# Patient Record
Sex: MALE | Race: WHITE | Employment: UNEMPLOYED | URBAN - METROPOLITAN AREA
[De-identification: names, ages, dates, MRNs, and addresses within clinical notes are randomized per-mention and may not be internally consistent; named-entity substitution may affect disease eponyms.]

---

## 2017-06-29 ENCOUNTER — APPOINTMENT (OUTPATIENT)
Dept: CT IMAGING | Age: 72
DRG: 071 | End: 2017-06-29
Payer: COMMERCIAL

## 2017-06-29 ENCOUNTER — HOSPITAL ENCOUNTER (INPATIENT)
Age: 72
LOS: 3 days | Discharge: HOME HEALTH | DRG: 071 | End: 2017-07-03
Attending: EMERGENCY MEDICINE | Admitting: FAMILY MEDICINE
Payer: COMMERCIAL

## 2017-06-29 ENCOUNTER — APPOINTMENT (OUTPATIENT)
Dept: GENERAL RADIOLOGY | Age: 72
DRG: 071 | End: 2017-06-29
Payer: COMMERCIAL

## 2017-06-29 DIAGNOSIS — R41.3 MEMORY DEFICIT: Primary | ICD-10-CM

## 2017-06-29 LAB
ABSOLUTE EOS #: 0 K/UL (ref 0–0.4)
ABSOLUTE LYMPH #: 1 K/UL (ref 1–4.8)
ABSOLUTE MONO #: 0.7 K/UL (ref 0.1–1.2)
ALBUMIN SERPL-MCNC: 4.3 G/DL (ref 3.5–5.2)
ALBUMIN/GLOBULIN RATIO: 1.2 (ref 1–2.5)
ALP BLD-CCNC: 45 U/L (ref 40–129)
ALT SERPL-CCNC: <5 U/L (ref 5–41)
ANION GAP SERPL CALCULATED.3IONS-SCNC: 17 MMOL/L (ref 9–17)
AST SERPL-CCNC: 13 U/L
BASOPHILS # BLD: 0 %
BASOPHILS ABSOLUTE: 0 K/UL (ref 0–0.2)
BILIRUB SERPL-MCNC: 0.49 MG/DL (ref 0.3–1.2)
BNP INTERPRETATION: NORMAL
BUN BLDV-MCNC: 14 MG/DL (ref 8–23)
BUN/CREAT BLD: ABNORMAL (ref 9–20)
CALCIUM SERPL-MCNC: 10.1 MG/DL (ref 8.6–10.4)
CHLORIDE BLD-SCNC: 97 MMOL/L (ref 98–107)
CO2: 24 MMOL/L (ref 20–31)
CREAT SERPL-MCNC: 1.49 MG/DL (ref 0.7–1.2)
DIFFERENTIAL TYPE: ABNORMAL
EOSINOPHILS RELATIVE PERCENT: 0 %
GFR AFRICAN AMERICAN: 57 ML/MIN
GFR NON-AFRICAN AMERICAN: 47 ML/MIN
GFR SERPL CREATININE-BSD FRML MDRD: ABNORMAL ML/MIN/{1.73_M2}
GFR SERPL CREATININE-BSD FRML MDRD: ABNORMAL ML/MIN/{1.73_M2}
GLUCOSE BLD-MCNC: 265 MG/DL (ref 70–99)
HCT VFR BLD CALC: 41.3 % (ref 41–53)
HEMOGLOBIN: 14.2 G/DL (ref 13.5–17.5)
LYMPHOCYTES # BLD: 10 %
MCH RBC QN AUTO: 31.7 PG (ref 26–34)
MCHC RBC AUTO-ENTMCNC: 34.3 G/DL (ref 31–37)
MCV RBC AUTO: 92.5 FL (ref 80–100)
MONOCYTES # BLD: 7 %
PDW BLD-RTO: 14 % (ref 12.5–15.4)
PLATELET # BLD: 321 K/UL (ref 140–450)
PLATELET ESTIMATE: ABNORMAL
PMV BLD AUTO: 8.7 FL (ref 6–12)
POC TROPONIN I: 0 NG/ML (ref 0–0.1)
POC TROPONIN INTERP: NORMAL
POTASSIUM SERPL-SCNC: 3.4 MMOL/L (ref 3.7–5.3)
PRO-BNP: 188 PG/ML
RBC # BLD: 4.47 M/UL (ref 4.5–5.9)
RBC # BLD: ABNORMAL 10*6/UL
SEG NEUTROPHILS: 83 %
SEGMENTED NEUTROPHILS ABSOLUTE COUNT: 8.4 K/UL (ref 1.8–7.7)
SODIUM BLD-SCNC: 138 MMOL/L (ref 135–144)
TOTAL PROTEIN: 7.9 G/DL (ref 6.4–8.3)
WBC # BLD: 10.2 K/UL (ref 3.5–11)
WBC # BLD: ABNORMAL 10*3/UL

## 2017-06-29 PROCEDURE — 93005 ELECTROCARDIOGRAM TRACING: CPT

## 2017-06-29 PROCEDURE — 80053 COMPREHEN METABOLIC PANEL: CPT

## 2017-06-29 PROCEDURE — 84484 ASSAY OF TROPONIN QUANT: CPT

## 2017-06-29 PROCEDURE — 99285 EMERGENCY DEPT VISIT HI MDM: CPT

## 2017-06-29 PROCEDURE — 85025 COMPLETE CBC W/AUTO DIFF WBC: CPT

## 2017-06-29 PROCEDURE — 83880 ASSAY OF NATRIURETIC PEPTIDE: CPT

## 2017-06-29 PROCEDURE — 70450 CT HEAD/BRAIN W/O DYE: CPT

## 2017-06-29 PROCEDURE — 71020 XR CHEST STANDARD TWO VW: CPT

## 2017-06-29 ASSESSMENT — ENCOUNTER SYMPTOMS
EYES NEGATIVE: 1
SHORTNESS OF BREATH: 1
GASTROINTESTINAL NEGATIVE: 1
ALLERGIC/IMMUNOLOGIC NEGATIVE: 1

## 2017-06-30 ENCOUNTER — APPOINTMENT (OUTPATIENT)
Dept: ULTRASOUND IMAGING | Age: 72
DRG: 071 | End: 2017-06-30
Payer: COMMERCIAL

## 2017-06-30 PROBLEM — G93.40 ACUTE ENCEPHALOPATHY: Status: ACTIVE | Noted: 2017-06-30

## 2017-06-30 PROBLEM — E11.65 TYPE 2 DIABETES MELLITUS WITH HYPERGLYCEMIA, WITHOUT LONG-TERM CURRENT USE OF INSULIN (HCC): Status: ACTIVE | Noted: 2017-06-30

## 2017-06-30 PROBLEM — R06.02 SHORTNESS OF BREATH: Status: ACTIVE | Noted: 2017-06-30

## 2017-06-30 PROBLEM — N17.9 AKI (ACUTE KIDNEY INJURY) (HCC): Status: ACTIVE | Noted: 2017-06-30

## 2017-06-30 LAB
-: ABNORMAL
AMORPHOUS: ABNORMAL
AMPHETAMINE SCREEN URINE: NEGATIVE
ANION GAP SERPL CALCULATED.3IONS-SCNC: 18 MMOL/L (ref 9–17)
BACTERIA: ABNORMAL
BARBITURATE SCREEN URINE: NEGATIVE
BENZODIAZEPINE SCREEN, URINE: POSITIVE
BILIRUBIN URINE: NEGATIVE
BUN BLDV-MCNC: 15 MG/DL (ref 8–23)
BUN/CREAT BLD: ABNORMAL (ref 9–20)
BUPRENORPHINE URINE: ABNORMAL
CALCIUM SERPL-MCNC: 9.7 MG/DL (ref 8.6–10.4)
CANNABINOID SCREEN URINE: POSITIVE
CASTS UA: ABNORMAL /LPF (ref 0–2)
CHLORIDE BLD-SCNC: 98 MMOL/L (ref 98–107)
CO2: 22 MMOL/L (ref 20–31)
COCAINE METABOLITE, URINE: NEGATIVE
COLOR: YELLOW
COMMENT UA: ABNORMAL
CREAT SERPL-MCNC: 1.34 MG/DL (ref 0.7–1.2)
CREATININE URINE: 170.5 MG/DL (ref 39–259)
CRYSTALS, UA: ABNORMAL /HPF
EKG ATRIAL RATE: 103 BPM
EKG P AXIS: 7 DEGREES
EKG P-R INTERVAL: 120 MS
EKG Q-T INTERVAL: 348 MS
EKG QRS DURATION: 78 MS
EKG QTC CALCULATION (BAZETT): 455 MS
EKG R AXIS: -29 DEGREES
EKG T AXIS: 59 DEGREES
EKG VENTRICULAR RATE: 103 BPM
EOSINOPHIL,URINE: NORMAL
EPITHELIAL CELLS UA: ABNORMAL /HPF (ref 0–5)
ESTIMATED AVERAGE GLUCOSE: 160 MG/DL
ETHANOL PERCENT: <0.01 %
ETHANOL: <10 MG/DL
FOLATE: 18.7 NG/ML
GFR AFRICAN AMERICAN: >60 ML/MIN
GFR NON-AFRICAN AMERICAN: 53 ML/MIN
GFR SERPL CREATININE-BSD FRML MDRD: ABNORMAL ML/MIN/{1.73_M2}
GFR SERPL CREATININE-BSD FRML MDRD: ABNORMAL ML/MIN/{1.73_M2}
GLUCOSE BLD-MCNC: 115 MG/DL (ref 75–110)
GLUCOSE BLD-MCNC: 115 MG/DL (ref 75–110)
GLUCOSE BLD-MCNC: 180 MG/DL (ref 70–99)
GLUCOSE BLD-MCNC: 203 MG/DL (ref 75–110)
GLUCOSE BLD-MCNC: 85 MG/DL (ref 75–110)
GLUCOSE URINE: NEGATIVE
HBA1C MFR BLD: 7.2 % (ref 4–6)
KETONES, URINE: NEGATIVE
LEUKOCYTE ESTERASE, URINE: NEGATIVE
MAGNESIUM: 1.5 MG/DL (ref 1.6–2.6)
MDMA URINE: ABNORMAL
METHADONE SCREEN, URINE: NEGATIVE
METHAMPHETAMINE, URINE: ABNORMAL
MICROALBUMIN/CREAT 24H UR: 98 MG/L
MICROALBUMIN/CREAT UR-RTO: 57 MCG/MG CREAT
MUCUS: ABNORMAL
NITRITE, URINE: NEGATIVE
OPIATES, URINE: NEGATIVE
OSMOLALITY URINE: 379 MOSM/KG (ref 80–1300)
OTHER OBSERVATIONS UA: ABNORMAL
OXYCODONE SCREEN URINE: NEGATIVE
PH UA: 5.5 (ref 5–8)
PHENCYCLIDINE, URINE: NEGATIVE
PHOSPHORUS: 4.2 MG/DL (ref 2.5–4.5)
POTASSIUM SERPL-SCNC: 3.6 MMOL/L (ref 3.7–5.3)
PROPOXYPHENE, URINE: ABNORMAL
PROTEIN UA: ABNORMAL
RBC UA: ABNORMAL /HPF (ref 0–2)
RENAL EPITHELIAL, UA: ABNORMAL /HPF
SERUM OSMOLALITY: 296 MOSM/KG (ref 275–295)
SODIUM BLD-SCNC: 138 MMOL/L (ref 135–144)
SODIUM,UR: 39 MMOL/L
SPECIFIC GRAVITY UA: 1.01 (ref 1–1.03)
TEST INFORMATION: ABNORMAL
TRICHOMONAS: ABNORMAL
TRICYCLIC ANTIDEPRESSANTS, UR: ABNORMAL
TURBIDITY: CLEAR
URINE HGB: NEGATIVE
UROBILINOGEN, URINE: NORMAL
VITAMIN B-12: 313 PG/ML (ref 211–946)
WBC UA: ABNORMAL /HPF (ref 0–5)
YEAST: ABNORMAL

## 2017-06-30 PROCEDURE — 83935 ASSAY OF URINE OSMOLALITY: CPT

## 2017-06-30 PROCEDURE — 99222 1ST HOSP IP/OBS MODERATE 55: CPT | Performed by: PSYCHIATRY & NEUROLOGY

## 2017-06-30 PROCEDURE — 82607 VITAMIN B-12: CPT

## 2017-06-30 PROCEDURE — 81001 URINALYSIS AUTO W/SCOPE: CPT

## 2017-06-30 PROCEDURE — 80048 BASIC METABOLIC PNL TOTAL CA: CPT

## 2017-06-30 PROCEDURE — 82947 ASSAY GLUCOSE BLOOD QUANT: CPT

## 2017-06-30 PROCEDURE — 83930 ASSAY OF BLOOD OSMOLALITY: CPT

## 2017-06-30 PROCEDURE — 82043 UR ALBUMIN QUANTITATIVE: CPT

## 2017-06-30 PROCEDURE — 82570 ASSAY OF URINE CREATININE: CPT

## 2017-06-30 PROCEDURE — 84300 ASSAY OF URINE SODIUM: CPT

## 2017-06-30 PROCEDURE — 36415 COLL VENOUS BLD VENIPUNCTURE: CPT

## 2017-06-30 PROCEDURE — G0480 DRUG TEST DEF 1-7 CLASSES: HCPCS

## 2017-06-30 PROCEDURE — 84100 ASSAY OF PHOSPHORUS: CPT

## 2017-06-30 PROCEDURE — 82746 ASSAY OF FOLIC ACID SERUM: CPT

## 2017-06-30 PROCEDURE — 6370000000 HC RX 637 (ALT 250 FOR IP): Performed by: FAMILY MEDICINE

## 2017-06-30 PROCEDURE — 6370000000 HC RX 637 (ALT 250 FOR IP): Performed by: PSYCHIATRY & NEUROLOGY

## 2017-06-30 PROCEDURE — 99223 1ST HOSP IP/OBS HIGH 75: CPT | Performed by: FAMILY MEDICINE

## 2017-06-30 PROCEDURE — 95816 EEG AWAKE AND DROWSY: CPT

## 2017-06-30 PROCEDURE — 1200000000 HC SEMI PRIVATE

## 2017-06-30 PROCEDURE — 83036 HEMOGLOBIN GLYCOSYLATED A1C: CPT

## 2017-06-30 PROCEDURE — G8978 MOBILITY CURRENT STATUS: HCPCS

## 2017-06-30 PROCEDURE — 83735 ASSAY OF MAGNESIUM: CPT

## 2017-06-30 PROCEDURE — 97116 GAIT TRAINING THERAPY: CPT

## 2017-06-30 PROCEDURE — 95819 EEG AWAKE AND ASLEEP: CPT

## 2017-06-30 PROCEDURE — 87205 SMEAR GRAM STAIN: CPT

## 2017-06-30 PROCEDURE — 80307 DRUG TEST PRSMV CHEM ANLYZR: CPT

## 2017-06-30 PROCEDURE — 76775 US EXAM ABDO BACK WALL LIM: CPT

## 2017-06-30 PROCEDURE — 6360000002 HC RX W HCPCS: Performed by: FAMILY MEDICINE

## 2017-06-30 PROCEDURE — 2580000003 HC RX 258: Performed by: FAMILY MEDICINE

## 2017-06-30 PROCEDURE — 97161 PT EVAL LOW COMPLEX 20 MIN: CPT

## 2017-06-30 RX ORDER — SODIUM CHLORIDE 0.9 % (FLUSH) 0.9 %
10 SYRINGE (ML) INJECTION PRN
Status: DISCONTINUED | OUTPATIENT
Start: 2017-06-30 | End: 2017-07-03 | Stop reason: HOSPADM

## 2017-06-30 RX ORDER — DOCUSATE SODIUM 100 MG/1
100 CAPSULE, LIQUID FILLED ORAL 2 TIMES DAILY PRN
Status: DISCONTINUED | OUTPATIENT
Start: 2017-06-30 | End: 2017-07-03 | Stop reason: HOSPADM

## 2017-06-30 RX ORDER — SODIUM CHLORIDE 9 MG/ML
INJECTION, SOLUTION INTRAVENOUS CONTINUOUS
Status: DISCONTINUED | OUTPATIENT
Start: 2017-06-30 | End: 2017-07-03 | Stop reason: HOSPADM

## 2017-06-30 RX ORDER — ONDANSETRON 2 MG/ML
4 INJECTION INTRAMUSCULAR; INTRAVENOUS EVERY 6 HOURS PRN
Status: DISCONTINUED | OUTPATIENT
Start: 2017-06-30 | End: 2017-07-03 | Stop reason: HOSPADM

## 2017-06-30 RX ORDER — ACETAMINOPHEN 325 MG/1
650 TABLET ORAL EVERY 4 HOURS PRN
Status: DISCONTINUED | OUTPATIENT
Start: 2017-06-30 | End: 2017-07-03 | Stop reason: HOSPADM

## 2017-06-30 RX ORDER — DEXTROSE MONOHYDRATE 50 MG/ML
100 INJECTION, SOLUTION INTRAVENOUS PRN
Status: DISCONTINUED | OUTPATIENT
Start: 2017-06-30 | End: 2017-07-03 | Stop reason: HOSPADM

## 2017-06-30 RX ORDER — MAGNESIUM SULFATE 1 G/100ML
1 INJECTION INTRAVENOUS PRN
Status: DISCONTINUED | OUTPATIENT
Start: 2017-06-30 | End: 2017-07-03 | Stop reason: HOSPADM

## 2017-06-30 RX ORDER — AMLODIPINE BESYLATE 5 MG/1
5 TABLET ORAL DAILY
Status: DISCONTINUED | OUTPATIENT
Start: 2017-06-30 | End: 2017-07-03 | Stop reason: HOSPADM

## 2017-06-30 RX ORDER — DEXTROSE MONOHYDRATE 25 G/50ML
12.5 INJECTION, SOLUTION INTRAVENOUS PRN
Status: DISCONTINUED | OUTPATIENT
Start: 2017-06-30 | End: 2017-07-03 | Stop reason: HOSPADM

## 2017-06-30 RX ORDER — SODIUM CHLORIDE 0.9 % (FLUSH) 0.9 %
10 SYRINGE (ML) INJECTION EVERY 12 HOURS SCHEDULED
Status: DISCONTINUED | OUTPATIENT
Start: 2017-06-30 | End: 2017-07-03 | Stop reason: HOSPADM

## 2017-06-30 RX ORDER — POTASSIUM CHLORIDE 20MEQ/15ML
40 LIQUID (ML) ORAL PRN
Status: DISCONTINUED | OUTPATIENT
Start: 2017-06-30 | End: 2017-07-03 | Stop reason: HOSPADM

## 2017-06-30 RX ORDER — GLIPIZIDE 5 MG/1
5 TABLET ORAL DAILY
Status: DISCONTINUED | OUTPATIENT
Start: 2017-06-30 | End: 2017-07-03 | Stop reason: HOSPADM

## 2017-06-30 RX ORDER — NICOTINE POLACRILEX 4 MG
15 LOZENGE BUCCAL PRN
Status: DISCONTINUED | OUTPATIENT
Start: 2017-06-30 | End: 2017-07-03 | Stop reason: HOSPADM

## 2017-06-30 RX ORDER — POTASSIUM CHLORIDE 7.45 MG/ML
10 INJECTION INTRAVENOUS PRN
Status: DISCONTINUED | OUTPATIENT
Start: 2017-06-30 | End: 2017-07-03 | Stop reason: HOSPADM

## 2017-06-30 RX ORDER — LISINOPRIL 20 MG/1
20 TABLET ORAL 2 TIMES DAILY
Status: DISCONTINUED | OUTPATIENT
Start: 2017-06-30 | End: 2017-07-03 | Stop reason: HOSPADM

## 2017-06-30 RX ORDER — POTASSIUM CHLORIDE 20 MEQ/1
40 TABLET, EXTENDED RELEASE ORAL PRN
Status: DISCONTINUED | OUTPATIENT
Start: 2017-06-30 | End: 2017-07-03 | Stop reason: HOSPADM

## 2017-06-30 RX ORDER — CHOLECALCIFEROL (VITAMIN D3) 125 MCG
1000 CAPSULE ORAL DAILY
Status: DISCONTINUED | OUTPATIENT
Start: 2017-06-30 | End: 2017-07-03 | Stop reason: HOSPADM

## 2017-06-30 RX ORDER — ATENOLOL 50 MG/1
50 TABLET ORAL DAILY
Status: DISCONTINUED | OUTPATIENT
Start: 2017-06-30 | End: 2017-07-03 | Stop reason: HOSPADM

## 2017-06-30 RX ORDER — THIAMINE MONONITRATE (VIT B1) 100 MG
100 TABLET ORAL DAILY
Status: DISCONTINUED | OUTPATIENT
Start: 2017-06-30 | End: 2017-07-03 | Stop reason: HOSPADM

## 2017-06-30 RX ORDER — DONEPEZIL HYDROCHLORIDE 10 MG/1
10 TABLET, FILM COATED ORAL NIGHTLY
Status: DISCONTINUED | OUTPATIENT
Start: 2017-06-30 | End: 2017-07-03 | Stop reason: HOSPADM

## 2017-06-30 RX ADMIN — LISINOPRIL 20 MG: 20 TABLET ORAL at 10:32

## 2017-06-30 RX ADMIN — ENOXAPARIN SODIUM 40 MG: 40 INJECTION SUBCUTANEOUS at 10:32

## 2017-06-30 RX ADMIN — DONEPEZIL HYDROCHLORIDE 10 MG: 10 TABLET, FILM COATED ORAL at 20:58

## 2017-06-30 RX ADMIN — SODIUM CHLORIDE: 9 INJECTION, SOLUTION INTRAVENOUS at 21:10

## 2017-06-30 RX ADMIN — AMLODIPINE BESYLATE 5 MG: 5 TABLET ORAL at 10:32

## 2017-06-30 RX ADMIN — Medication 1000 MCG: at 16:41

## 2017-06-30 RX ADMIN — INSULIN LISPRO 4 UNITS: 100 INJECTION, SOLUTION INTRAVENOUS; SUBCUTANEOUS at 10:32

## 2017-06-30 RX ADMIN — Medication 100 MG: at 10:32

## 2017-06-30 RX ADMIN — SODIUM CHLORIDE, PRESERVATIVE FREE 10 ML: 5 INJECTION INTRAVENOUS at 10:45

## 2017-06-30 RX ADMIN — ATENOLOL 50 MG: 50 TABLET ORAL at 10:44

## 2017-06-30 RX ADMIN — GLIPIZIDE 5 MG: 5 TABLET ORAL at 10:32

## 2017-06-30 RX ADMIN — SODIUM CHLORIDE: 9 INJECTION, SOLUTION INTRAVENOUS at 06:32

## 2017-06-30 RX ADMIN — LISINOPRIL 20 MG: 20 TABLET ORAL at 20:58

## 2017-06-30 RX ADMIN — ACETAMINOPHEN 650 MG: 325 TABLET ORAL at 21:09

## 2017-06-30 ASSESSMENT — ENCOUNTER SYMPTOMS
EYE PAIN: 0
SINUS PRESSURE: 0
ABDOMINAL PAIN: 0
BACK PAIN: 0
WHEEZING: 0
BLOOD IN STOOL: 0
VOMITING: 0
COUGH: 0
SORE THROAT: 0
DIARRHEA: 0
RECTAL PAIN: 0
CHEST TIGHTNESS: 0
SHORTNESS OF BREATH: 0
NAUSEA: 0
CONSTIPATION: 0

## 2017-06-30 ASSESSMENT — PAIN DESCRIPTION - ORIENTATION: ORIENTATION: RIGHT;LEFT

## 2017-06-30 ASSESSMENT — PAIN DESCRIPTION - PAIN TYPE: TYPE: ACUTE PAIN

## 2017-06-30 ASSESSMENT — PAIN SCALES - GENERAL
PAINLEVEL_OUTOF10: 0
PAINLEVEL_OUTOF10: 0
PAINLEVEL_OUTOF10: 2

## 2017-06-30 ASSESSMENT — PAIN DESCRIPTION - DESCRIPTORS: DESCRIPTORS: ACHING

## 2017-06-30 ASSESSMENT — PAIN DESCRIPTION - LOCATION: LOCATION: HEAD;LEG

## 2017-07-01 PROBLEM — F12.10 MARIJUANA ABUSE: Status: ACTIVE | Noted: 2017-07-01

## 2017-07-01 PROBLEM — E83.42 HYPOMAGNESEMIA: Status: ACTIVE | Noted: 2017-07-01

## 2017-07-01 PROBLEM — N20.0 CALCULUS, KIDNEY: Status: ACTIVE | Noted: 2017-07-01

## 2017-07-01 PROBLEM — N28.1 RENAL CYST: Status: ACTIVE | Noted: 2017-07-01

## 2017-07-01 LAB
ANION GAP SERPL CALCULATED.3IONS-SCNC: 19 MMOL/L (ref 9–17)
BUN BLDV-MCNC: 14 MG/DL (ref 8–23)
BUN/CREAT BLD: ABNORMAL (ref 9–20)
CALCIUM SERPL-MCNC: 9.2 MG/DL (ref 8.6–10.4)
CHLORIDE BLD-SCNC: 103 MMOL/L (ref 98–107)
CO2: 22 MMOL/L (ref 20–31)
CREAT SERPL-MCNC: 0.99 MG/DL (ref 0.7–1.2)
GFR AFRICAN AMERICAN: >60 ML/MIN
GFR NON-AFRICAN AMERICAN: >60 ML/MIN
GFR SERPL CREATININE-BSD FRML MDRD: ABNORMAL ML/MIN/{1.73_M2}
GFR SERPL CREATININE-BSD FRML MDRD: ABNORMAL ML/MIN/{1.73_M2}
GLUCOSE BLD-MCNC: 124 MG/DL (ref 75–110)
GLUCOSE BLD-MCNC: 134 MG/DL (ref 75–110)
GLUCOSE BLD-MCNC: 148 MG/DL (ref 70–99)
GLUCOSE BLD-MCNC: 165 MG/DL (ref 75–110)
GLUCOSE BLD-MCNC: 193 MG/DL (ref 75–110)
HCT VFR BLD CALC: 39.2 % (ref 41–53)
HEMOGLOBIN: 13.4 G/DL (ref 13.5–17.5)
MAGNESIUM: 1.4 MG/DL (ref 1.6–2.6)
MCH RBC QN AUTO: 31.7 PG (ref 26–34)
MCHC RBC AUTO-ENTMCNC: 34.3 G/DL (ref 31–37)
MCV RBC AUTO: 92.5 FL (ref 80–100)
PDW BLD-RTO: 13.9 % (ref 12.5–15.4)
PLATELET # BLD: 280 K/UL (ref 140–450)
PMV BLD AUTO: 10.1 FL (ref 6–12)
POTASSIUM SERPL-SCNC: 3.6 MMOL/L (ref 3.7–5.3)
RBC # BLD: 4.24 M/UL (ref 4.5–5.9)
SODIUM BLD-SCNC: 144 MMOL/L (ref 135–144)
TSH SERPL DL<=0.05 MIU/L-ACNC: 0.73 MIU/L (ref 0.3–5)
WBC # BLD: 8.8 K/UL (ref 3.5–11)

## 2017-07-01 PROCEDURE — 6370000000 HC RX 637 (ALT 250 FOR IP): Performed by: FAMILY MEDICINE

## 2017-07-01 PROCEDURE — 6370000000 HC RX 637 (ALT 250 FOR IP): Performed by: PSYCHIATRY & NEUROLOGY

## 2017-07-01 PROCEDURE — 83735 ASSAY OF MAGNESIUM: CPT

## 2017-07-01 PROCEDURE — 82947 ASSAY GLUCOSE BLOOD QUANT: CPT

## 2017-07-01 PROCEDURE — 6370000000 HC RX 637 (ALT 250 FOR IP): Performed by: INTERNAL MEDICINE

## 2017-07-01 PROCEDURE — 6360000002 HC RX W HCPCS: Performed by: FAMILY MEDICINE

## 2017-07-01 PROCEDURE — 99233 SBSQ HOSP IP/OBS HIGH 50: CPT | Performed by: FAMILY MEDICINE

## 2017-07-01 PROCEDURE — 1200000000 HC SEMI PRIVATE

## 2017-07-01 PROCEDURE — 85027 COMPLETE CBC AUTOMATED: CPT

## 2017-07-01 PROCEDURE — 36415 COLL VENOUS BLD VENIPUNCTURE: CPT

## 2017-07-01 PROCEDURE — 80048 BASIC METABOLIC PNL TOTAL CA: CPT

## 2017-07-01 PROCEDURE — 84443 ASSAY THYROID STIM HORMONE: CPT

## 2017-07-01 PROCEDURE — 99232 SBSQ HOSP IP/OBS MODERATE 35: CPT | Performed by: NURSE PRACTITIONER

## 2017-07-01 RX ORDER — LORAZEPAM 2 MG/ML
1 INJECTION INTRAMUSCULAR
Status: DISCONTINUED | OUTPATIENT
Start: 2017-07-01 | End: 2017-07-03 | Stop reason: HOSPADM

## 2017-07-01 RX ORDER — SODIUM CHLORIDE 0.9 % (FLUSH) 0.9 %
10 SYRINGE (ML) INJECTION PRN
Status: DISCONTINUED | OUTPATIENT
Start: 2017-07-01 | End: 2017-07-03 | Stop reason: HOSPADM

## 2017-07-01 RX ORDER — SODIUM CHLORIDE 0.9 % (FLUSH) 0.9 %
10 SYRINGE (ML) INJECTION EVERY 12 HOURS SCHEDULED
Status: DISCONTINUED | OUTPATIENT
Start: 2017-07-01 | End: 2017-07-03 | Stop reason: HOSPADM

## 2017-07-01 RX ORDER — LORAZEPAM 2 MG/1
2 TABLET ORAL
Status: DISCONTINUED | OUTPATIENT
Start: 2017-07-01 | End: 2017-07-03 | Stop reason: HOSPADM

## 2017-07-01 RX ORDER — MULTIVITAMIN WITH FOLIC ACID 400 MCG
1 TABLET ORAL DAILY
Status: DISCONTINUED | OUTPATIENT
Start: 2017-07-02 | End: 2017-07-03 | Stop reason: HOSPADM

## 2017-07-01 RX ORDER — LORAZEPAM 2 MG/1
4 TABLET ORAL
Status: DISCONTINUED | OUTPATIENT
Start: 2017-07-01 | End: 2017-07-03 | Stop reason: HOSPADM

## 2017-07-01 RX ORDER — LORAZEPAM 2 MG/ML
4 INJECTION INTRAMUSCULAR
Status: DISCONTINUED | OUTPATIENT
Start: 2017-07-01 | End: 2017-07-03 | Stop reason: HOSPADM

## 2017-07-01 RX ORDER — FOLIC ACID 1 MG/1
1 TABLET ORAL DAILY
Status: DISCONTINUED | OUTPATIENT
Start: 2017-07-02 | End: 2017-07-03 | Stop reason: HOSPADM

## 2017-07-01 RX ORDER — LORAZEPAM 2 MG/ML
3 INJECTION INTRAMUSCULAR
Status: DISCONTINUED | OUTPATIENT
Start: 2017-07-01 | End: 2017-07-03 | Stop reason: HOSPADM

## 2017-07-01 RX ORDER — LORAZEPAM 2 MG/ML
2 INJECTION INTRAMUSCULAR
Status: DISCONTINUED | OUTPATIENT
Start: 2017-07-01 | End: 2017-07-03 | Stop reason: HOSPADM

## 2017-07-01 RX ORDER — LORAZEPAM 1 MG/1
1 TABLET ORAL
Status: DISCONTINUED | OUTPATIENT
Start: 2017-07-01 | End: 2017-07-03 | Stop reason: HOSPADM

## 2017-07-01 RX ADMIN — ATENOLOL 50 MG: 50 TABLET ORAL at 08:44

## 2017-07-01 RX ADMIN — MAGNESIUM SULFATE IN DEXTROSE 1 G: 10 INJECTION, SOLUTION INTRAVENOUS at 10:06

## 2017-07-01 RX ADMIN — AMLODIPINE BESYLATE 5 MG: 5 TABLET ORAL at 08:44

## 2017-07-01 RX ADMIN — LISINOPRIL 20 MG: 20 TABLET ORAL at 08:44

## 2017-07-01 RX ADMIN — Medication 100 MG: at 08:44

## 2017-07-01 RX ADMIN — ENOXAPARIN SODIUM 40 MG: 40 INJECTION SUBCUTANEOUS at 08:44

## 2017-07-01 RX ADMIN — DONEPEZIL HYDROCHLORIDE 10 MG: 10 TABLET, FILM COATED ORAL at 21:27

## 2017-07-01 RX ADMIN — Medication 1000 MCG: at 08:44

## 2017-07-01 RX ADMIN — MAGNESIUM SULFATE IN DEXTROSE 1 G: 10 INJECTION, SOLUTION INTRAVENOUS at 11:16

## 2017-07-01 RX ADMIN — GLIPIZIDE 5 MG: 5 TABLET ORAL at 08:44

## 2017-07-01 RX ADMIN — LISINOPRIL 20 MG: 20 TABLET ORAL at 21:27

## 2017-07-01 RX ADMIN — ONDANSETRON 4 MG: 2 INJECTION, SOLUTION INTRAMUSCULAR; INTRAVENOUS at 17:57

## 2017-07-01 RX ADMIN — LORAZEPAM 3 MG: 1 TABLET ORAL at 22:58

## 2017-07-01 ASSESSMENT — ENCOUNTER SYMPTOMS
NAUSEA: 0
WHEEZING: 0
DIARRHEA: 0
VOMITING: 0
CONSTIPATION: 0
SHORTNESS OF BREATH: 0
ABDOMINAL PAIN: 0

## 2017-07-02 LAB
ANION GAP SERPL CALCULATED.3IONS-SCNC: 14 MMOL/L (ref 9–17)
BUN BLDV-MCNC: 14 MG/DL (ref 8–23)
BUN/CREAT BLD: ABNORMAL (ref 9–20)
C DIFFICILE TOXINS, PCR: NORMAL
CALCIUM SERPL-MCNC: 8.5 MG/DL (ref 8.6–10.4)
CAMPYLOBACTER PCR: NORMAL
CHLORIDE BLD-SCNC: 109 MMOL/L (ref 98–107)
CO2: 21 MMOL/L (ref 20–31)
CREAT SERPL-MCNC: 0.92 MG/DL (ref 0.7–1.2)
DATE, STOOL #1: NORMAL
DATE, STOOL #2: NORMAL
DATE, STOOL #3: NORMAL
GFR AFRICAN AMERICAN: >60 ML/MIN
GFR NON-AFRICAN AMERICAN: >60 ML/MIN
GFR SERPL CREATININE-BSD FRML MDRD: ABNORMAL ML/MIN/{1.73_M2}
GFR SERPL CREATININE-BSD FRML MDRD: ABNORMAL ML/MIN/{1.73_M2}
GLUCOSE BLD-MCNC: 154 MG/DL (ref 75–110)
GLUCOSE BLD-MCNC: 170 MG/DL (ref 70–99)
GLUCOSE BLD-MCNC: 178 MG/DL (ref 75–110)
HCT VFR BLD CALC: 42 % (ref 41–53)
HEMOCCULT SP1 STL QL: NEGATIVE
HEMOCCULT SP2 STL QL: NORMAL
HEMOCCULT SP3 STL QL: NORMAL
HEMOGLOBIN: 14.3 G/DL (ref 13.5–17.5)
LACTOFERRIN, QUAL: NORMAL
MCH RBC QN AUTO: 31.8 PG (ref 26–34)
MCHC RBC AUTO-ENTMCNC: 33.9 G/DL (ref 31–37)
MCV RBC AUTO: 93.8 FL (ref 80–100)
PDW BLD-RTO: 13.9 % (ref 12.5–15.4)
PLATELET # BLD: 337 K/UL (ref 140–450)
PMV BLD AUTO: 9.5 FL (ref 6–12)
POTASSIUM SERPL-SCNC: 3.8 MMOL/L (ref 3.7–5.3)
RBC # BLD: 4.48 M/UL (ref 4.5–5.9)
SALMONELLA PCR: NORMAL
SHIGATOXIN GENE PCR: NORMAL
SHIGELLA SP PCR: NORMAL
SODIUM BLD-SCNC: 144 MMOL/L (ref 135–144)
SPECIMEN DESCRIPTION: NORMAL
SPECIMEN: NORMAL
TIME, STOOL #1: NORMAL
TIME, STOOL #2: NORMAL
TIME, STOOL #3: NORMAL
WBC # BLD: 11.5 K/UL (ref 3.5–11)

## 2017-07-02 PROCEDURE — 6370000000 HC RX 637 (ALT 250 FOR IP): Performed by: FAMILY MEDICINE

## 2017-07-02 PROCEDURE — 6360000002 HC RX W HCPCS: Performed by: FAMILY MEDICINE

## 2017-07-02 PROCEDURE — 85027 COMPLETE CBC AUTOMATED: CPT

## 2017-07-02 PROCEDURE — G0328 FECAL BLOOD SCRN IMMUNOASSAY: HCPCS

## 2017-07-02 PROCEDURE — G8978 MOBILITY CURRENT STATUS: HCPCS

## 2017-07-02 PROCEDURE — G8979 MOBILITY GOAL STATUS: HCPCS

## 2017-07-02 PROCEDURE — 36415 COLL VENOUS BLD VENIPUNCTURE: CPT

## 2017-07-02 PROCEDURE — 87493 C DIFF AMPLIFIED PROBE: CPT

## 2017-07-02 PROCEDURE — 97164 PT RE-EVAL EST PLAN CARE: CPT

## 2017-07-02 PROCEDURE — 87505 NFCT AGENT DETECTION GI: CPT

## 2017-07-02 PROCEDURE — 80048 BASIC METABOLIC PNL TOTAL CA: CPT

## 2017-07-02 PROCEDURE — 1200000000 HC SEMI PRIVATE

## 2017-07-02 PROCEDURE — 6370000000 HC RX 637 (ALT 250 FOR IP): Performed by: INTERNAL MEDICINE

## 2017-07-02 PROCEDURE — 99232 SBSQ HOSP IP/OBS MODERATE 35: CPT | Performed by: FAMILY MEDICINE

## 2017-07-02 PROCEDURE — 99233 SBSQ HOSP IP/OBS HIGH 50: CPT | Performed by: NURSE PRACTITIONER

## 2017-07-02 PROCEDURE — 83630 LACTOFERRIN FECAL (QUAL): CPT

## 2017-07-02 PROCEDURE — 82784 ASSAY IGA/IGD/IGG/IGM EACH: CPT

## 2017-07-02 PROCEDURE — 6370000000 HC RX 637 (ALT 250 FOR IP): Performed by: PSYCHIATRY & NEUROLOGY

## 2017-07-02 PROCEDURE — 83516 IMMUNOASSAY NONANTIBODY: CPT

## 2017-07-02 PROCEDURE — 82947 ASSAY GLUCOSE BLOOD QUANT: CPT

## 2017-07-02 PROCEDURE — 97530 THERAPEUTIC ACTIVITIES: CPT

## 2017-07-02 RX ADMIN — DONEPEZIL HYDROCHLORIDE 10 MG: 10 TABLET, FILM COATED ORAL at 20:41

## 2017-07-02 RX ADMIN — LISINOPRIL 20 MG: 20 TABLET ORAL at 20:41

## 2017-07-02 RX ADMIN — ATENOLOL 50 MG: 50 TABLET ORAL at 08:45

## 2017-07-02 RX ADMIN — THERA TABS 1 TABLET: TAB at 08:45

## 2017-07-02 RX ADMIN — LISINOPRIL 20 MG: 20 TABLET ORAL at 08:45

## 2017-07-02 RX ADMIN — ENOXAPARIN SODIUM 40 MG: 40 INJECTION SUBCUTANEOUS at 08:45

## 2017-07-02 RX ADMIN — GLIPIZIDE 5 MG: 5 TABLET ORAL at 08:45

## 2017-07-02 RX ADMIN — Medication 100 MG: at 08:45

## 2017-07-02 RX ADMIN — ACETAMINOPHEN 650 MG: 325 TABLET ORAL at 16:56

## 2017-07-02 RX ADMIN — AMLODIPINE BESYLATE 5 MG: 5 TABLET ORAL at 08:45

## 2017-07-02 RX ADMIN — Medication 1000 MCG: at 08:45

## 2017-07-02 RX ADMIN — ACETAMINOPHEN 650 MG: 325 TABLET ORAL at 21:04

## 2017-07-02 RX ADMIN — FOLIC ACID 1 MG: 1 TABLET ORAL at 08:45

## 2017-07-02 ASSESSMENT — ENCOUNTER SYMPTOMS
WHEEZING: 0
CONSTIPATION: 0
VOMITING: 0
ABDOMINAL PAIN: 0
DIARRHEA: 0
SHORTNESS OF BREATH: 0
NAUSEA: 0

## 2017-07-02 ASSESSMENT — PAIN SCALES - GENERAL
PAINLEVEL_OUTOF10: 3
PAINLEVEL_OUTOF10: 0
PAINLEVEL_OUTOF10: 0

## 2017-07-03 VITALS
HEART RATE: 73 BPM | HEIGHT: 67 IN | RESPIRATION RATE: 16 BRPM | DIASTOLIC BLOOD PRESSURE: 77 MMHG | WEIGHT: 147.1 LBS | OXYGEN SATURATION: 98 % | BODY MASS INDEX: 23.09 KG/M2 | SYSTOLIC BLOOD PRESSURE: 145 MMHG | TEMPERATURE: 98.1 F

## 2017-07-03 LAB
ANION GAP SERPL CALCULATED.3IONS-SCNC: 13 MMOL/L (ref 9–17)
BUN BLDV-MCNC: 9 MG/DL (ref 8–23)
BUN/CREAT BLD: ABNORMAL (ref 9–20)
CALCIUM SERPL-MCNC: 8.5 MG/DL (ref 8.6–10.4)
CHLORIDE BLD-SCNC: 108 MMOL/L (ref 98–107)
CO2: 21 MMOL/L (ref 20–31)
CREAT SERPL-MCNC: 0.76 MG/DL (ref 0.7–1.2)
GFR AFRICAN AMERICAN: >60 ML/MIN
GFR NON-AFRICAN AMERICAN: >60 ML/MIN
GFR SERPL CREATININE-BSD FRML MDRD: ABNORMAL ML/MIN/{1.73_M2}
GFR SERPL CREATININE-BSD FRML MDRD: ABNORMAL ML/MIN/{1.73_M2}
GLIADIN DEAMINIDATED PEPTIDE AB IGA: <0.1 U/ML
GLIADIN DEAMINIDATED PEPTIDE AB IGG: <0.4 U/ML
GLUCOSE BLD-MCNC: 114 MG/DL (ref 75–110)
GLUCOSE BLD-MCNC: 117 MG/DL (ref 70–99)
GLUCOSE BLD-MCNC: 118 MG/DL (ref 75–110)
GLUCOSE BLD-MCNC: 91 MG/DL (ref 75–110)
HCT VFR BLD CALC: 35.5 % (ref 41–53)
HEMOGLOBIN: 12.2 G/DL (ref 13.5–17.5)
IGA: 97 MG/DL (ref 70–400)
MCH RBC QN AUTO: 32.1 PG (ref 26–34)
MCHC RBC AUTO-ENTMCNC: 34.3 G/DL (ref 31–37)
MCV RBC AUTO: 93.5 FL (ref 80–100)
PDW BLD-RTO: 13.6 % (ref 12.5–15.4)
PLATELET # BLD: 268 K/UL (ref 140–450)
PMV BLD AUTO: 9.1 FL (ref 6–12)
POTASSIUM SERPL-SCNC: 4.2 MMOL/L (ref 3.7–5.3)
RBC # BLD: 3.8 M/UL (ref 4.5–5.9)
SODIUM BLD-SCNC: 142 MMOL/L (ref 135–144)
TISSUE TRANSGLUTAMINASE IGA: <0.1 U/ML
WBC # BLD: 8 K/UL (ref 3.5–11)

## 2017-07-03 PROCEDURE — 6360000002 HC RX W HCPCS: Performed by: FAMILY MEDICINE

## 2017-07-03 PROCEDURE — G8988 SELF CARE GOAL STATUS: HCPCS

## 2017-07-03 PROCEDURE — 6370000000 HC RX 637 (ALT 250 FOR IP): Performed by: NURSE PRACTITIONER

## 2017-07-03 PROCEDURE — 99232 SBSQ HOSP IP/OBS MODERATE 35: CPT | Performed by: FAMILY MEDICINE

## 2017-07-03 PROCEDURE — 97166 OT EVAL MOD COMPLEX 45 MIN: CPT

## 2017-07-03 PROCEDURE — 6370000000 HC RX 637 (ALT 250 FOR IP): Performed by: FAMILY MEDICINE

## 2017-07-03 PROCEDURE — 6370000000 HC RX 637 (ALT 250 FOR IP): Performed by: INTERNAL MEDICINE

## 2017-07-03 PROCEDURE — 80048 BASIC METABOLIC PNL TOTAL CA: CPT

## 2017-07-03 PROCEDURE — G8987 SELF CARE CURRENT STATUS: HCPCS

## 2017-07-03 PROCEDURE — 6370000000 HC RX 637 (ALT 250 FOR IP): Performed by: PSYCHIATRY & NEUROLOGY

## 2017-07-03 PROCEDURE — 97535 SELF CARE MNGMENT TRAINING: CPT

## 2017-07-03 PROCEDURE — 36415 COLL VENOUS BLD VENIPUNCTURE: CPT

## 2017-07-03 PROCEDURE — 97116 GAIT TRAINING THERAPY: CPT

## 2017-07-03 PROCEDURE — 82947 ASSAY GLUCOSE BLOOD QUANT: CPT

## 2017-07-03 PROCEDURE — 85027 COMPLETE CBC AUTOMATED: CPT

## 2017-07-03 RX ORDER — LOPERAMIDE HYDROCHLORIDE 2 MG/1
2 CAPSULE ORAL 4 TIMES DAILY PRN
Status: DISCONTINUED | OUTPATIENT
Start: 2017-07-03 | End: 2017-07-03 | Stop reason: HOSPADM

## 2017-07-03 RX ORDER — DONEPEZIL HYDROCHLORIDE 10 MG/1
10 TABLET, FILM COATED ORAL NIGHTLY
Qty: 30 TABLET | Refills: 3 | Status: ON HOLD | OUTPATIENT
Start: 2017-07-03 | End: 2019-04-16 | Stop reason: HOSPADM

## 2017-07-03 RX ORDER — FOLIC ACID 1 MG/1
1 TABLET ORAL DAILY
Qty: 30 TABLET | Refills: 3 | Status: ON HOLD | OUTPATIENT
Start: 2017-07-03 | End: 2019-04-16 | Stop reason: HOSPADM

## 2017-07-03 RX ORDER — MULTIVITAMIN WITH FOLIC ACID 400 MCG
1 TABLET ORAL DAILY
Qty: 30 TABLET | Refills: 3 | Status: SHIPPED | OUTPATIENT
Start: 2017-07-03 | End: 2020-06-02

## 2017-07-03 RX ADMIN — ATENOLOL 50 MG: 50 TABLET ORAL at 09:07

## 2017-07-03 RX ADMIN — GLIPIZIDE 5 MG: 5 TABLET ORAL at 09:07

## 2017-07-03 RX ADMIN — LISINOPRIL 20 MG: 20 TABLET ORAL at 09:07

## 2017-07-03 RX ADMIN — ENOXAPARIN SODIUM 40 MG: 40 INJECTION SUBCUTANEOUS at 09:08

## 2017-07-03 RX ADMIN — LOPERAMIDE HYDROCHLORIDE 2 MG: 2 CAPSULE ORAL at 05:01

## 2017-07-03 RX ADMIN — Medication 1000 MCG: at 09:07

## 2017-07-03 RX ADMIN — Medication 100 MG: at 09:07

## 2017-07-03 RX ADMIN — AMLODIPINE BESYLATE 5 MG: 5 TABLET ORAL at 09:07

## 2017-07-03 RX ADMIN — FOLIC ACID 1 MG: 1 TABLET ORAL at 09:07

## 2017-07-03 RX ADMIN — LOPERAMIDE HYDROCHLORIDE 2 MG: 2 CAPSULE ORAL at 02:05

## 2017-07-03 RX ADMIN — THERA TABS 1 TABLET: TAB at 09:07

## 2017-07-03 RX ADMIN — ACETAMINOPHEN 650 MG: 325 TABLET ORAL at 09:06

## 2017-07-03 ASSESSMENT — PAIN DESCRIPTION - PAIN TYPE: TYPE: CHRONIC PAIN

## 2017-07-03 ASSESSMENT — PAIN SCALES - GENERAL
PAINLEVEL_OUTOF10: 2
PAINLEVEL_OUTOF10: 5
PAINLEVEL_OUTOF10: 2

## 2017-07-03 ASSESSMENT — ENCOUNTER SYMPTOMS
NAUSEA: 0
CONSTIPATION: 0
ABDOMINAL PAIN: 0
DIARRHEA: 0
VOMITING: 0
WHEEZING: 0
SHORTNESS OF BREATH: 0

## 2017-07-03 ASSESSMENT — PAIN DESCRIPTION - LOCATION: LOCATION: NECK;BACK

## 2018-04-17 ENCOUNTER — HOSPITAL ENCOUNTER (INPATIENT)
Age: 73
LOS: 1 days | Discharge: HOME | DRG: 083 | End: 2018-04-18
Attending: EMERGENCY MEDICINE | Admitting: SURGERY
Payer: MEDICARE

## 2018-04-17 ENCOUNTER — APPOINTMENT (OUTPATIENT)
Dept: GENERAL RADIOLOGY | Age: 73
DRG: 083 | End: 2018-04-17
Payer: MEDICARE

## 2018-04-17 ENCOUNTER — APPOINTMENT (OUTPATIENT)
Dept: CT IMAGING | Age: 73
DRG: 083 | End: 2018-04-17
Payer: MEDICARE

## 2018-04-17 DIAGNOSIS — S01.01XA LACERATION OF SCALP, INITIAL ENCOUNTER: ICD-10-CM

## 2018-04-17 DIAGNOSIS — S06.6X9A: ICD-10-CM

## 2018-04-17 DIAGNOSIS — S00.03XA HEMATOMA OF SCALP, INITIAL ENCOUNTER: Primary | ICD-10-CM

## 2018-04-17 DIAGNOSIS — E87.1 HYPONATREMIA: ICD-10-CM

## 2018-04-17 PROBLEM — I60.9 SUBARACHNOID BLEED (HCC): Status: ACTIVE | Noted: 2018-04-17

## 2018-04-17 LAB
ABSOLUTE EOS #: 0.19 K/UL (ref 0–0.44)
ABSOLUTE IMMATURE GRANULOCYTE: 0.12 K/UL (ref 0–0.3)
ABSOLUTE LYMPH #: 3 K/UL (ref 1.1–3.7)
ABSOLUTE MONO #: 0.99 K/UL (ref 0.1–1.2)
ACETAMINOPHEN LEVEL: <10 UG/ML (ref 10–30)
AMMONIA: 70 UMOL/L (ref 16–60)
ANION GAP SERPL CALCULATED.3IONS-SCNC: 16 MMOL/L (ref 9–17)
BASOPHILS # BLD: 1 % (ref 0–2)
BASOPHILS ABSOLUTE: 0.08 K/UL (ref 0–0.2)
BUN BLDV-MCNC: 9 MG/DL (ref 8–23)
BUN/CREAT BLD: ABNORMAL (ref 9–20)
CALCIUM SERPL-MCNC: 9.9 MG/DL (ref 8.6–10.4)
CHLORIDE BLD-SCNC: 93 MMOL/L (ref 98–107)
CO2: 23 MMOL/L (ref 20–31)
CREAT SERPL-MCNC: 0.55 MG/DL (ref 0.7–1.2)
DIFFERENTIAL TYPE: ABNORMAL
EOSINOPHILS RELATIVE PERCENT: 2 % (ref 1–4)
ETHANOL PERCENT: 0.34 %
ETHANOL: 336 MG/DL
GFR AFRICAN AMERICAN: >60 ML/MIN
GFR NON-AFRICAN AMERICAN: >60 ML/MIN
GFR SERPL CREATININE-BSD FRML MDRD: ABNORMAL ML/MIN/{1.73_M2}
GFR SERPL CREATININE-BSD FRML MDRD: ABNORMAL ML/MIN/{1.73_M2}
GLUCOSE BLD-MCNC: 121 MG/DL (ref 70–99)
HCT VFR BLD CALC: 44.6 % (ref 40.7–50.3)
HEMOGLOBIN: 15 G/DL (ref 13–17)
IMMATURE GRANULOCYTES: 1 %
LYMPHOCYTES # BLD: 24 % (ref 24–43)
MCH RBC QN AUTO: 31.1 PG (ref 25.2–33.5)
MCHC RBC AUTO-ENTMCNC: 33.6 G/DL (ref 28.4–34.8)
MCV RBC AUTO: 92.5 FL (ref 82.6–102.9)
MONOCYTES # BLD: 8 % (ref 3–12)
NRBC AUTOMATED: 0 PER 100 WBC
PDW BLD-RTO: 12.8 % (ref 11.8–14.4)
PLATELET # BLD: 325 K/UL (ref 138–453)
PLATELET ESTIMATE: ABNORMAL
PMV BLD AUTO: 10.6 FL (ref 8.1–13.5)
POTASSIUM SERPL-SCNC: 3.8 MMOL/L (ref 3.7–5.3)
RBC # BLD: 4.82 M/UL (ref 4.21–5.77)
RBC # BLD: ABNORMAL 10*6/UL
SALICYLATE LEVEL: 2 MG/DL (ref 3–10)
SEG NEUTROPHILS: 64 % (ref 36–65)
SEGMENTED NEUTROPHILS ABSOLUTE COUNT: 7.98 K/UL (ref 1.5–8.1)
SODIUM BLD-SCNC: 132 MMOL/L (ref 135–144)
TOXIC TRICYCLIC SC,BLOOD: NEGATIVE
WBC # BLD: 12.4 K/UL (ref 3.5–11.3)
WBC # BLD: ABNORMAL 10*3/UL

## 2018-04-17 PROCEDURE — 6360000002 HC RX W HCPCS: Performed by: EMERGENCY MEDICINE

## 2018-04-17 PROCEDURE — G0378 HOSPITAL OBSERVATION PER HR: HCPCS

## 2018-04-17 PROCEDURE — 80048 BASIC METABOLIC PNL TOTAL CA: CPT

## 2018-04-17 PROCEDURE — 72170 X-RAY EXAM OF PELVIS: CPT

## 2018-04-17 PROCEDURE — 2060000000 HC ICU INTERMEDIATE R&B

## 2018-04-17 PROCEDURE — 82140 ASSAY OF AMMONIA: CPT

## 2018-04-17 PROCEDURE — 90715 TDAP VACCINE 7 YRS/> IM: CPT | Performed by: EMERGENCY MEDICINE

## 2018-04-17 PROCEDURE — G0480 DRUG TEST DEF 1-7 CLASSES: HCPCS

## 2018-04-17 PROCEDURE — 93005 ELECTROCARDIOGRAM TRACING: CPT

## 2018-04-17 PROCEDURE — 90471 IMMUNIZATION ADMIN: CPT | Performed by: EMERGENCY MEDICINE

## 2018-04-17 PROCEDURE — 99285 EMERGENCY DEPT VISIT HI MDM: CPT

## 2018-04-17 PROCEDURE — 85025 COMPLETE CBC W/AUTO DIFF WBC: CPT

## 2018-04-17 PROCEDURE — 80307 DRUG TEST PRSMV CHEM ANLYZR: CPT

## 2018-04-17 PROCEDURE — 72125 CT NECK SPINE W/O DYE: CPT

## 2018-04-17 PROCEDURE — 2580000003 HC RX 258: Performed by: EMERGENCY MEDICINE

## 2018-04-17 PROCEDURE — 70450 CT HEAD/BRAIN W/O DYE: CPT

## 2018-04-17 PROCEDURE — 71045 X-RAY EXAM CHEST 1 VIEW: CPT

## 2018-04-17 RX ORDER — SODIUM CHLORIDE 0.9 % (FLUSH) 0.9 %
10 SYRINGE (ML) INJECTION EVERY 12 HOURS SCHEDULED
Status: DISCONTINUED | OUTPATIENT
Start: 2018-04-17 | End: 2018-04-18 | Stop reason: HOSPADM

## 2018-04-17 RX ORDER — FOLIC ACID 1 MG/1
1 TABLET ORAL DAILY
Status: DISCONTINUED | OUTPATIENT
Start: 2018-04-18 | End: 2018-04-18 | Stop reason: HOSPADM

## 2018-04-17 RX ORDER — GLIPIZIDE 5 MG/1
5 TABLET ORAL DAILY
Status: DISCONTINUED | OUTPATIENT
Start: 2018-04-18 | End: 2018-04-18 | Stop reason: HOSPADM

## 2018-04-17 RX ORDER — ACETAMINOPHEN 325 MG/1
650 TABLET ORAL EVERY 4 HOURS PRN
Status: DISCONTINUED | OUTPATIENT
Start: 2018-04-17 | End: 2018-04-18 | Stop reason: HOSPADM

## 2018-04-17 RX ORDER — ONDANSETRON 2 MG/ML
4 INJECTION INTRAMUSCULAR; INTRAVENOUS EVERY 6 HOURS PRN
Status: DISCONTINUED | OUTPATIENT
Start: 2018-04-17 | End: 2018-04-18

## 2018-04-17 RX ORDER — SODIUM CHLORIDE 0.9 % (FLUSH) 0.9 %
10 SYRINGE (ML) INJECTION PRN
Status: DISCONTINUED | OUTPATIENT
Start: 2018-04-17 | End: 2018-04-18 | Stop reason: HOSPADM

## 2018-04-17 RX ORDER — SODIUM CHLORIDE 9 MG/ML
INJECTION, SOLUTION INTRAVENOUS CONTINUOUS
Status: DISCONTINUED | OUTPATIENT
Start: 2018-04-17 | End: 2018-04-18

## 2018-04-17 RX ORDER — CHOLECALCIFEROL (VITAMIN D3) 125 MCG
1000 CAPSULE ORAL DAILY
Status: DISCONTINUED | OUTPATIENT
Start: 2018-04-18 | End: 2018-04-18 | Stop reason: HOSPADM

## 2018-04-17 RX ORDER — ATENOLOL 50 MG/1
50 TABLET ORAL DAILY
Status: DISCONTINUED | OUTPATIENT
Start: 2018-04-18 | End: 2018-04-18 | Stop reason: HOSPADM

## 2018-04-17 RX ORDER — GEMFIBROZIL 600 MG/1
600 TABLET, FILM COATED ORAL
Status: DISCONTINUED | OUTPATIENT
Start: 2018-04-18 | End: 2018-04-18 | Stop reason: HOSPADM

## 2018-04-17 RX ORDER — MULTIVITAMIN WITH FOLIC ACID 400 MCG
1 TABLET ORAL DAILY
Status: DISCONTINUED | OUTPATIENT
Start: 2018-04-18 | End: 2018-04-18 | Stop reason: HOSPADM

## 2018-04-17 RX ORDER — THIAMINE MONONITRATE (VIT B1) 100 MG
100 TABLET ORAL DAILY
Status: DISCONTINUED | OUTPATIENT
Start: 2018-04-18 | End: 2018-04-18 | Stop reason: HOSPADM

## 2018-04-17 RX ORDER — LISINOPRIL 20 MG/1
20 TABLET ORAL 2 TIMES DAILY
Status: DISCONTINUED | OUTPATIENT
Start: 2018-04-17 | End: 2018-04-18 | Stop reason: HOSPADM

## 2018-04-17 RX ORDER — AMLODIPINE BESYLATE 5 MG/1
5 TABLET ORAL DAILY
Status: DISCONTINUED | OUTPATIENT
Start: 2018-04-18 | End: 2018-04-18 | Stop reason: HOSPADM

## 2018-04-17 RX ORDER — LEVETIRACETAM 10 MG/ML
1000 INJECTION INTRAVASCULAR ONCE
Status: COMPLETED | OUTPATIENT
Start: 2018-04-17 | End: 2018-04-17

## 2018-04-17 RX ORDER — ACETAMINOPHEN 325 MG/1
650 TABLET ORAL EVERY 4 HOURS PRN
Status: DISCONTINUED | OUTPATIENT
Start: 2018-04-17 | End: 2018-04-17 | Stop reason: SDUPTHER

## 2018-04-17 RX ORDER — DONEPEZIL HYDROCHLORIDE 10 MG/1
10 TABLET, FILM COATED ORAL NIGHTLY
Status: DISCONTINUED | OUTPATIENT
Start: 2018-04-17 | End: 2018-04-18 | Stop reason: HOSPADM

## 2018-04-17 RX ADMIN — LEVETIRACETAM 1000 MG: 10 INJECTION INTRAVENOUS at 19:52

## 2018-04-17 RX ADMIN — SODIUM CHLORIDE: 9 INJECTION, SOLUTION INTRAVENOUS at 23:55

## 2018-04-17 RX ADMIN — TETANUS TOXOID, REDUCED DIPHTHERIA TOXOID AND ACELLULAR PERTUSSIS VACCINE, ADSORBED 0.5 ML: 5; 2.5; 8; 8; 2.5 SUSPENSION INTRAMUSCULAR at 19:52

## 2018-04-17 ASSESSMENT — PAIN SCALES - GENERAL: PAINLEVEL_OUTOF10: 0

## 2018-04-18 VITALS
SYSTOLIC BLOOD PRESSURE: 165 MMHG | WEIGHT: 156.53 LBS | BODY MASS INDEX: 25.16 KG/M2 | TEMPERATURE: 98.2 F | HEIGHT: 66 IN | DIASTOLIC BLOOD PRESSURE: 83 MMHG | RESPIRATION RATE: 20 BRPM | OXYGEN SATURATION: 96 % | HEART RATE: 80 BPM

## 2018-04-18 PROBLEM — I60.9 SUBARACHNOID BLEED (HCC): Status: RESOLVED | Noted: 2018-04-17 | Resolved: 2018-04-18

## 2018-04-18 LAB
-: NORMAL
AMORPHOUS: NORMAL
AMPHETAMINE SCREEN URINE: NEGATIVE
ANION GAP SERPL CALCULATED.3IONS-SCNC: 23 MMOL/L (ref 9–17)
BACTERIA: NORMAL
BARBITURATE SCREEN URINE: NEGATIVE
BENZODIAZEPINE SCREEN, URINE: NEGATIVE
BILIRUBIN URINE: NEGATIVE
BUN BLDV-MCNC: 9 MG/DL (ref 8–23)
BUN/CREAT BLD: ABNORMAL (ref 9–20)
BUPRENORPHINE URINE: ABNORMAL
CALCIUM SERPL-MCNC: 10.5 MG/DL (ref 8.6–10.4)
CANNABINOID SCREEN URINE: POSITIVE
CASTS UA: NORMAL /LPF (ref 0–8)
CHLORIDE BLD-SCNC: 99 MMOL/L (ref 98–107)
CO2: 15 MMOL/L (ref 20–31)
COCAINE METABOLITE, URINE: NEGATIVE
COLOR: YELLOW
COMMENT UA: ABNORMAL
CREAT SERPL-MCNC: 0.58 MG/DL (ref 0.7–1.2)
CRYSTALS, UA: NORMAL /HPF
EPITHELIAL CELLS UA: NORMAL /HPF (ref 0–5)
GFR AFRICAN AMERICAN: >60 ML/MIN
GFR NON-AFRICAN AMERICAN: >60 ML/MIN
GFR SERPL CREATININE-BSD FRML MDRD: ABNORMAL ML/MIN/{1.73_M2}
GFR SERPL CREATININE-BSD FRML MDRD: ABNORMAL ML/MIN/{1.73_M2}
GLUCOSE BLD-MCNC: 146 MG/DL (ref 70–99)
GLUCOSE URINE: NEGATIVE
HCT VFR BLD CALC: 46.9 % (ref 40.7–50.3)
HEMOGLOBIN: 15.6 G/DL (ref 13–17)
KETONES, URINE: NEGATIVE
LEUKOCYTE ESTERASE, URINE: NEGATIVE
MCH RBC QN AUTO: 31.6 PG (ref 25.2–33.5)
MCHC RBC AUTO-ENTMCNC: 33.3 G/DL (ref 28.4–34.8)
MCV RBC AUTO: 94.9 FL (ref 82.6–102.9)
MDMA URINE: ABNORMAL
METHADONE SCREEN, URINE: NEGATIVE
METHAMPHETAMINE, URINE: ABNORMAL
MUCUS: NORMAL
NITRITE, URINE: NEGATIVE
NRBC AUTOMATED: 0 PER 100 WBC
OPIATES, URINE: NEGATIVE
OTHER OBSERVATIONS UA: NORMAL
OXYCODONE SCREEN URINE: NEGATIVE
PDW BLD-RTO: 12.9 % (ref 11.8–14.4)
PH UA: 6 (ref 5–8)
PHENCYCLIDINE, URINE: NEGATIVE
PLATELET # BLD: 320 K/UL (ref 138–453)
PMV BLD AUTO: 10.6 FL (ref 8.1–13.5)
POTASSIUM SERPL-SCNC: 4.4 MMOL/L (ref 3.7–5.3)
PROPOXYPHENE, URINE: ABNORMAL
PROTEIN UA: ABNORMAL
RBC # BLD: 4.94 M/UL (ref 4.21–5.77)
RBC UA: NORMAL /HPF (ref 0–4)
RENAL EPITHELIAL, UA: NORMAL /HPF
SODIUM BLD-SCNC: 137 MMOL/L (ref 135–144)
SPECIFIC GRAVITY UA: 1 (ref 1–1.03)
TEST INFORMATION: ABNORMAL
TRICHOMONAS: NORMAL
TRICYCLIC ANTIDEPRESSANTS, UR: ABNORMAL
TURBIDITY: CLEAR
URINE HGB: NEGATIVE
UROBILINOGEN, URINE: NORMAL
WBC # BLD: 13 K/UL (ref 3.5–11.3)
WBC UA: NORMAL /HPF (ref 0–5)
YEAST: NORMAL

## 2018-04-18 PROCEDURE — 97530 THERAPEUTIC ACTIVITIES: CPT

## 2018-04-18 PROCEDURE — 97535 SELF CARE MNGMENT TRAINING: CPT

## 2018-04-18 PROCEDURE — 97165 OT EVAL LOW COMPLEX 30 MIN: CPT

## 2018-04-18 PROCEDURE — 6370000000 HC RX 637 (ALT 250 FOR IP): Performed by: EMERGENCY MEDICINE

## 2018-04-18 PROCEDURE — G9169 MEMORY GOAL STATUS: HCPCS

## 2018-04-18 PROCEDURE — G8989 SELF CARE D/C STATUS: HCPCS

## 2018-04-18 PROCEDURE — G8987 SELF CARE CURRENT STATUS: HCPCS

## 2018-04-18 PROCEDURE — G0378 HOSPITAL OBSERVATION PER HR: HCPCS

## 2018-04-18 PROCEDURE — 36415 COLL VENOUS BLD VENIPUNCTURE: CPT

## 2018-04-18 PROCEDURE — 85027 COMPLETE CBC AUTOMATED: CPT

## 2018-04-18 PROCEDURE — 96374 THER/PROPH/DIAG INJ IV PUSH: CPT

## 2018-04-18 PROCEDURE — 80048 BASIC METABOLIC PNL TOTAL CA: CPT

## 2018-04-18 PROCEDURE — G8979 MOBILITY GOAL STATUS: HCPCS

## 2018-04-18 PROCEDURE — 80307 DRUG TEST PRSMV CHEM ANLYZR: CPT

## 2018-04-18 PROCEDURE — G8978 MOBILITY CURRENT STATUS: HCPCS

## 2018-04-18 PROCEDURE — G8988 SELF CARE GOAL STATUS: HCPCS

## 2018-04-18 PROCEDURE — 92523 SPEECH SOUND LANG COMPREHEN: CPT

## 2018-04-18 PROCEDURE — 81001 URINALYSIS AUTO W/SCOPE: CPT

## 2018-04-18 PROCEDURE — G9168 MEMORY CURRENT STATUS: HCPCS

## 2018-04-18 PROCEDURE — 97161 PT EVAL LOW COMPLEX 20 MIN: CPT

## 2018-04-18 PROCEDURE — 6360000002 HC RX W HCPCS: Performed by: EMERGENCY MEDICINE

## 2018-04-18 PROCEDURE — 6370000000 HC RX 637 (ALT 250 FOR IP): Performed by: SURGERY

## 2018-04-18 RX ORDER — SODIUM CHLORIDE 0.9 % (FLUSH) 0.9 %
10 SYRINGE (ML) INJECTION EVERY 12 HOURS SCHEDULED
Status: DISCONTINUED | OUTPATIENT
Start: 2018-04-18 | End: 2018-04-18 | Stop reason: HOSPADM

## 2018-04-18 RX ORDER — ONDANSETRON 2 MG/ML
4 INJECTION INTRAMUSCULAR; INTRAVENOUS EVERY 6 HOURS PRN
Status: DISCONTINUED | OUTPATIENT
Start: 2018-04-18 | End: 2018-04-18 | Stop reason: HOSPADM

## 2018-04-18 RX ORDER — LORAZEPAM 2 MG/ML
3 INJECTION INTRAMUSCULAR
Status: DISCONTINUED | OUTPATIENT
Start: 2018-04-18 | End: 2018-04-18

## 2018-04-18 RX ORDER — LORAZEPAM 2 MG/ML
2 INJECTION INTRAMUSCULAR
Status: DISCONTINUED | OUTPATIENT
Start: 2018-04-18 | End: 2018-04-18

## 2018-04-18 RX ORDER — SODIUM CHLORIDE 0.9 % (FLUSH) 0.9 %
10 SYRINGE (ML) INJECTION PRN
Status: DISCONTINUED | OUTPATIENT
Start: 2018-04-18 | End: 2018-04-18 | Stop reason: HOSPADM

## 2018-04-18 RX ORDER — LORAZEPAM 2 MG/ML
4 INJECTION INTRAMUSCULAR
Status: DISCONTINUED | OUTPATIENT
Start: 2018-04-18 | End: 2018-04-18

## 2018-04-18 RX ORDER — LORAZEPAM 2 MG/1
2 TABLET ORAL
Status: DISCONTINUED | OUTPATIENT
Start: 2018-04-18 | End: 2018-04-18

## 2018-04-18 RX ORDER — LORAZEPAM 1 MG/1
1 TABLET ORAL
Status: DISCONTINUED | OUTPATIENT
Start: 2018-04-18 | End: 2018-04-18

## 2018-04-18 RX ORDER — LORAZEPAM 2 MG/ML
1 INJECTION INTRAMUSCULAR
Status: DISCONTINUED | OUTPATIENT
Start: 2018-04-18 | End: 2018-04-18

## 2018-04-18 RX ORDER — LORAZEPAM 2 MG/1
4 TABLET ORAL
Status: DISCONTINUED | OUTPATIENT
Start: 2018-04-18 | End: 2018-04-18

## 2018-04-18 RX ADMIN — METFORMIN HYDROCHLORIDE 1000 MG: 500 TABLET ORAL at 08:48

## 2018-04-18 RX ADMIN — LISINOPRIL 20 MG: 20 TABLET ORAL at 03:29

## 2018-04-18 RX ADMIN — METFORMIN HYDROCHLORIDE 1000 MG: 500 TABLET ORAL at 17:02

## 2018-04-18 RX ADMIN — THERA TABS 1 TABLET: TAB at 08:48

## 2018-04-18 RX ADMIN — Medication 100 MG: at 08:48

## 2018-04-18 RX ADMIN — ACETAMINOPHEN 650 MG: 325 TABLET ORAL at 03:28

## 2018-04-18 RX ADMIN — AMLODIPINE BESYLATE 5 MG: 5 TABLET ORAL at 08:48

## 2018-04-18 RX ADMIN — DONEPEZIL HYDROCHLORIDE 10 MG: 10 TABLET, FILM COATED ORAL at 03:29

## 2018-04-18 RX ADMIN — ATENOLOL 50 MG: 50 TABLET ORAL at 08:48

## 2018-04-18 RX ADMIN — LISINOPRIL 20 MG: 20 TABLET ORAL at 08:48

## 2018-04-18 RX ADMIN — ONDANSETRON 4 MG: 2 INJECTION INTRAMUSCULAR; INTRAVENOUS at 08:42

## 2018-04-18 RX ADMIN — FOLIC ACID 1 MG: 1 TABLET ORAL at 08:48

## 2018-04-18 RX ADMIN — GEMFIBROZIL 600 MG: 600 TABLET ORAL at 08:48

## 2018-04-18 RX ADMIN — CYANOCOBALAMIN TAB 500 MCG 1000 MCG: 500 TAB at 08:48

## 2018-04-18 RX ADMIN — GEMFIBROZIL 600 MG: 600 TABLET ORAL at 17:02

## 2018-04-18 RX ADMIN — GLIPIZIDE 5 MG: 5 TABLET ORAL at 08:48

## 2018-04-18 ASSESSMENT — PAIN SCALES - GENERAL
PAINLEVEL_OUTOF10: 0
PAINLEVEL_OUTOF10: 7

## 2018-04-18 ASSESSMENT — PAIN DESCRIPTION - FREQUENCY: FREQUENCY: INTERMITTENT

## 2018-04-18 ASSESSMENT — PAIN DESCRIPTION - LOCATION
LOCATION: BACK

## 2018-04-18 ASSESSMENT — PAIN DESCRIPTION - DESCRIPTORS: DESCRIPTORS: ACHING

## 2018-04-18 ASSESSMENT — PAIN DESCRIPTION - PAIN TYPE
TYPE: ACUTE PAIN

## 2018-04-18 ASSESSMENT — PAIN DESCRIPTION - ORIENTATION
ORIENTATION: RIGHT
ORIENTATION: RIGHT
ORIENTATION: RIGHT;MID

## 2018-04-22 LAB
EKG ATRIAL RATE: 73 BPM
EKG P-R INTERVAL: 264 MS
EKG Q-T INTERVAL: 370 MS
EKG QRS DURATION: 88 MS
EKG QTC CALCULATION (BAZETT): 407 MS
EKG R AXIS: -34 DEGREES
EKG T AXIS: -20 DEGREES
EKG VENTRICULAR RATE: 73 BPM

## 2018-08-16 ENCOUNTER — HOSPITAL ENCOUNTER (EMERGENCY)
Age: 73
Discharge: HOME | End: 2018-08-17
Attending: EMERGENCY MEDICINE
Payer: MEDICARE

## 2018-08-16 ENCOUNTER — APPOINTMENT (OUTPATIENT)
Dept: CT IMAGING | Age: 73
End: 2018-08-16
Payer: MEDICARE

## 2018-08-16 ENCOUNTER — APPOINTMENT (OUTPATIENT)
Dept: GENERAL RADIOLOGY | Age: 73
End: 2018-08-16
Payer: MEDICARE

## 2018-08-16 VITALS
HEIGHT: 67 IN | OXYGEN SATURATION: 97 % | HEART RATE: 74 BPM | SYSTOLIC BLOOD PRESSURE: 148 MMHG | WEIGHT: 160 LBS | DIASTOLIC BLOOD PRESSURE: 94 MMHG | TEMPERATURE: 99 F | BODY MASS INDEX: 25.11 KG/M2 | RESPIRATION RATE: 17 BRPM

## 2018-08-16 DIAGNOSIS — R41.82 ALTERED MENTAL STATUS, UNSPECIFIED ALTERED MENTAL STATUS TYPE: Primary | ICD-10-CM
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
## 2018-08-16 LAB
ABSOLUTE EOS #: 0.11 K/UL (ref 0–0.44)
ABSOLUTE IMMATURE GRANULOCYTE: 0.04 K/UL (ref 0–0.3)
ABSOLUTE LYMPH #: 1.62 K/UL (ref 1.1–3.7)
ABSOLUTE MONO #: 1 K/UL (ref 0.1–1.2)
ANION GAP SERPL CALCULATED.3IONS-SCNC: 16 MMOL/L (ref 9–17)
BASOPHILS # BLD: 0 % (ref 0–2)
BASOPHILS ABSOLUTE: 0.05 K/UL (ref 0–0.2)
BNP INTERPRETATION: NORMAL
BUN BLDV-MCNC: 25 MG/DL (ref 8–23)
BUN/CREAT BLD: ABNORMAL (ref 9–20)
CALCIUM SERPL-MCNC: 11.1 MG/DL (ref 8.6–10.4)
CHLORIDE BLD-SCNC: 103 MMOL/L (ref 98–107)
CO2: 22 MMOL/L (ref 20–31)
CREAT SERPL-MCNC: 1.2 MG/DL (ref 0.7–1.2)
DIFFERENTIAL TYPE: ABNORMAL
EKG ATRIAL RATE: 71 BPM
EKG P AXIS: 4 DEGREES
EKG P-R INTERVAL: 184 MS
EKG Q-T INTERVAL: 386 MS
EKG QRS DURATION: 88 MS
EKG QTC CALCULATION (BAZETT): 419 MS
EKG R AXIS: -30 DEGREES
EKG T AXIS: -2 DEGREES
EKG VENTRICULAR RATE: 71 BPM
EOSINOPHILS RELATIVE PERCENT: 1 % (ref 1–4)
GFR AFRICAN AMERICAN: >60 ML/MIN
GFR NON-AFRICAN AMERICAN: 60 ML/MIN
GFR SERPL CREATININE-BSD FRML MDRD: ABNORMAL ML/MIN/{1.73_M2}
GFR SERPL CREATININE-BSD FRML MDRD: ABNORMAL ML/MIN/{1.73_M2}
GLUCOSE BLD-MCNC: 138 MG/DL (ref 70–99)
HCT VFR BLD CALC: 45.3 % (ref 40.7–50.3)
HEMOGLOBIN: 15.4 G/DL (ref 13–17)
IMMATURE GRANULOCYTES: 0 %
INR BLD: 1
LIPASE: 28 U/L (ref 13–60)
LYMPHOCYTES # BLD: 14 % (ref 24–43)
MAGNESIUM: 1.5 MG/DL (ref 1.6–2.6)
MCH RBC QN AUTO: 31.6 PG (ref 25.2–33.5)
MCHC RBC AUTO-ENTMCNC: 34 G/DL (ref 28.4–34.8)
MCV RBC AUTO: 92.8 FL (ref 82.6–102.9)
MONOCYTES # BLD: 9 % (ref 3–12)
NRBC AUTOMATED: 0 PER 100 WBC
PARTIAL THROMBOPLASTIN TIME: 27.7 SEC (ref 20.5–30.5)
PDW BLD-RTO: 13.7 % (ref 11.8–14.4)
PLATELET # BLD: 358 K/UL (ref 138–453)
PLATELET ESTIMATE: ABNORMAL
PMV BLD AUTO: 10.4 FL (ref 8.1–13.5)
POC TROPONIN I: 0 NG/ML (ref 0–0.1)
POC TROPONIN I: 0.01 NG/ML (ref 0–0.1)
POC TROPONIN INTERP: NORMAL
POC TROPONIN INTERP: NORMAL
POTASSIUM SERPL-SCNC: 4.6 MMOL/L (ref 3.7–5.3)
PRO-BNP: 160 PG/ML
PROTHROMBIN TIME: 10.7 SEC (ref 9–12)
RBC # BLD: 4.88 M/UL (ref 4.21–5.77)
RBC # BLD: ABNORMAL 10*6/UL
SEG NEUTROPHILS: 76 % (ref 36–65)
SEGMENTED NEUTROPHILS ABSOLUTE COUNT: 8.73 K/UL (ref 1.5–8.1)
SODIUM BLD-SCNC: 141 MMOL/L (ref 135–144)
WBC # BLD: 11.6 K/UL (ref 3.5–11.3)
WBC # BLD: ABNORMAL 10*3/UL

## 2018-08-16 PROCEDURE — 80048 BASIC METABOLIC PNL TOTAL CA: CPT

## 2018-08-16 PROCEDURE — 84484 ASSAY OF TROPONIN QUANT: CPT

## 2018-08-16 PROCEDURE — 71045 X-RAY EXAM CHEST 1 VIEW: CPT

## 2018-08-16 PROCEDURE — 81001 URINALYSIS AUTO W/SCOPE: CPT

## 2018-08-16 PROCEDURE — 99285 EMERGENCY DEPT VISIT HI MDM: CPT

## 2018-08-16 PROCEDURE — 87086 URINE CULTURE/COLONY COUNT: CPT

## 2018-08-16 PROCEDURE — 85610 PROTHROMBIN TIME: CPT

## 2018-08-16 PROCEDURE — 85025 COMPLETE CBC W/AUTO DIFF WBC: CPT

## 2018-08-16 PROCEDURE — 83690 ASSAY OF LIPASE: CPT

## 2018-08-16 PROCEDURE — 2580000003 HC RX 258: Performed by: EMERGENCY MEDICINE

## 2018-08-16 PROCEDURE — 83735 ASSAY OF MAGNESIUM: CPT

## 2018-08-16 PROCEDURE — 85730 THROMBOPLASTIN TIME PARTIAL: CPT

## 2018-08-16 PROCEDURE — 83880 ASSAY OF NATRIURETIC PEPTIDE: CPT

## 2018-08-16 PROCEDURE — 70450 CT HEAD/BRAIN W/O DYE: CPT

## 2018-08-16 PROCEDURE — 93005 ELECTROCARDIOGRAM TRACING: CPT

## 2018-08-16 RX ORDER — 0.9 % SODIUM CHLORIDE 0.9 %
1000 INTRAVENOUS SOLUTION INTRAVENOUS ONCE
Status: COMPLETED | OUTPATIENT
Start: 2018-08-16 | End: 2018-08-16

## 2018-08-16 RX ADMIN — SODIUM CHLORIDE 1000 ML: 9 INJECTION, SOLUTION INTRAVENOUS at 20:39

## 2018-08-16 ASSESSMENT — PAIN DESCRIPTION - FREQUENCY: FREQUENCY: CONTINUOUS

## 2018-08-16 ASSESSMENT — PAIN DESCRIPTION - LOCATION: LOCATION: NECK

## 2018-08-16 ASSESSMENT — PAIN DESCRIPTION - DESCRIPTORS: DESCRIPTORS: ACHING

## 2018-08-16 ASSESSMENT — PAIN DESCRIPTION - PAIN TYPE: TYPE: ACUTE PAIN

## 2018-08-16 ASSESSMENT — PAIN DESCRIPTION - ONSET: ONSET: SUDDEN

## 2018-08-16 ASSESSMENT — PAIN SCALES - GENERAL: PAINLEVEL_OUTOF10: 7
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
## 2018-08-17 LAB
-: ABNORMAL
AMORPHOUS: ABNORMAL
BACTERIA: ABNORMAL
BILIRUBIN URINE: NEGATIVE
CASTS UA: ABNORMAL /LPF (ref 0–8)
COLOR: YELLOW
CRYSTALS, UA: ABNORMAL /HPF
CULTURE: NORMAL
EPITHELIAL CELLS UA: ABNORMAL /HPF (ref 0–5)
GLUCOSE URINE: NEGATIVE
KETONES, URINE: ABNORMAL
LEUKOCYTE ESTERASE, URINE: NEGATIVE
Lab: NORMAL
MUCUS: ABNORMAL
NITRITE, URINE: NEGATIVE
OTHER OBSERVATIONS UA: ABNORMAL
PH UA: 5 (ref 5–8)
PROTEIN UA: ABNORMAL
RBC UA: ABNORMAL /HPF (ref 0–4)
RENAL EPITHELIAL, UA: ABNORMAL /HPF
SPECIFIC GRAVITY UA: 1.02 (ref 1–1.03)
SPECIMEN DESCRIPTION: NORMAL
STATUS: NORMAL
TRICHOMONAS: ABNORMAL
TURBIDITY: CLEAR
URINE HGB: NEGATIVE
UROBILINOGEN, URINE: NORMAL
WBC UA: ABNORMAL /HPF (ref 0–5)
YEAST: ABNORMAL

## 2018-08-17 ASSESSMENT — ENCOUNTER SYMPTOMS
RHINORRHEA: 0
NAUSEA: 0
SORE THROAT: 0
CONSTIPATION: 0
DIARRHEA: 0
SHORTNESS OF BREATH: 0
VOMITING: 0
WHEEZING: 0
COUGH: 0
ABDOMINAL PAIN: 0
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
## 2018-08-17 NOTE — ED NOTES
Pt walk into ER w/ steady gait c/o ongoing confusion since appx Monday, worsening. Pt son is @ bedside and is very knowledgable about pt condition. Pt son called neurologist today and was told to bring pt to ER to get CT scan. Pt states he had a fall sometime last week but is confused on the time or story. Pt words come out in wrong order and pt has trouble finding words. Pt son states this is worse than normal. Pt deneis pain or any physical deficits. Pt sensation intact, grasps intact, and all movements. Pt in NAD and rr even and unlabored. Will cotninue to monitor.      Ernst Han RN  08/16/18 9060

## 2019-03-08 ENCOUNTER — APPOINTMENT (OUTPATIENT)
Dept: CT IMAGING | Age: 74
DRG: 023 | End: 2019-03-08
Payer: MEDICARE

## 2019-03-08 ENCOUNTER — HOSPITAL ENCOUNTER (EMERGENCY)
Age: 74
Discharge: ANOTHER HOSPITAL | End: 2019-03-08
Attending: EMERGENCY MEDICINE
Payer: MEDICARE

## 2019-03-08 ENCOUNTER — APPOINTMENT (OUTPATIENT)
Dept: GENERAL RADIOLOGY | Age: 74
DRG: 023 | End: 2019-03-08
Payer: MEDICARE

## 2019-03-08 ENCOUNTER — APPOINTMENT (OUTPATIENT)
Dept: CT IMAGING | Age: 74
End: 2019-03-08
Payer: MEDICARE

## 2019-03-08 ENCOUNTER — HOSPITAL ENCOUNTER (INPATIENT)
Age: 74
LOS: 18 days | Discharge: NURSING, REHAB OR LONG-TERM CARE | DRG: 023 | End: 2019-03-26
Attending: EMERGENCY MEDICINE | Admitting: PSYCHIATRY & NEUROLOGY
Payer: MEDICARE

## 2019-03-08 VITALS
HEART RATE: 78 BPM | WEIGHT: 150 LBS | TEMPERATURE: 97.6 F | HEIGHT: 67 IN | OXYGEN SATURATION: 95 % | BODY MASS INDEX: 23.54 KG/M2 | DIASTOLIC BLOOD PRESSURE: 93 MMHG | RESPIRATION RATE: 16 BRPM | SYSTOLIC BLOOD PRESSURE: 160 MMHG

## 2019-03-08 DIAGNOSIS — N17.9 AKI (ACUTE KIDNEY INJURY) (HCC): ICD-10-CM
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
## 2019-03-08 DIAGNOSIS — I62.9 INTRACRANIAL HEMORRHAGE (HCC): Primary | ICD-10-CM

## 2019-03-08 DIAGNOSIS — S00.01XA ABRASION OF SCALP, INITIAL ENCOUNTER: ICD-10-CM

## 2019-03-08 DIAGNOSIS — R41.82 ALTERED MENTAL STATUS, UNSPECIFIED ALTERED MENTAL STATUS TYPE: ICD-10-CM

## 2019-03-08 DIAGNOSIS — S06.359A: ICD-10-CM
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
## 2019-03-08 PROBLEM — I61.9 BRAIN BLEED (HCC): Status: ACTIVE | Noted: 2019-03-08

## 2019-03-08 LAB
ABO/RH: NORMAL
ABSOLUTE EOS #: 0 K/UL (ref 0–0.4)
ABSOLUTE IMMATURE GRANULOCYTE: ABNORMAL K/UL (ref 0–0.3)
ABSOLUTE LYMPH #: 1.28 K/UL (ref 1–4.8)
ABSOLUTE MONO #: 0.92 K/UL (ref 0.1–1.3)
ACT TEG: 105 SEC (ref 86–118)
ALBUMIN SERPL-MCNC: 4.5 G/DL (ref 3.5–5.2)
ALBUMIN/GLOBULIN RATIO: NORMAL (ref 1–2.5)
ALLEN TEST: ABNORMAL
ALP BLD-CCNC: 82 U/L (ref 40–129)
ALT SERPL-CCNC: 17 U/L (ref 5–41)
ANGLE, RAPID TEG: 74 DEG (ref 64–80)
ANION GAP SERPL CALCULATED.3IONS-SCNC: 16 MMOL/L (ref 9–17)
ANION GAP SERPL CALCULATED.3IONS-SCNC: 17 MMOL/L (ref 9–17)
ANTIBODY SCREEN: NEGATIVE
ARM BAND NUMBER: NORMAL
AST SERPL-CCNC: 29 U/L
BASOPHILS # BLD: 0 % (ref 0–2)
BASOPHILS ABSOLUTE: 0 K/UL (ref 0–0.2)
BILIRUB SERPL-MCNC: 0.33 MG/DL (ref 0.3–1.2)
BILIRUBIN DIRECT: 0.1 MG/DL
BILIRUBIN, INDIRECT: 0.23 MG/DL (ref 0–1)
BLOOD BANK SPECIMEN: ABNORMAL
BUN BLDV-MCNC: 13 MG/DL (ref 8–23)
BUN BLDV-MCNC: 14 MG/DL (ref 8–23)
BUN/CREAT BLD: ABNORMAL (ref 9–20)
CALCIUM SERPL-MCNC: 10 MG/DL (ref 8.6–10.4)
CARBOXYHEMOGLOBIN: 1 % (ref 0–5)
CHLORIDE BLD-SCNC: 102 MMOL/L (ref 98–107)
CHLORIDE BLD-SCNC: 97 MMOL/L (ref 98–107)
CO2: 18 MMOL/L (ref 20–31)
CO2: 23 MMOL/L (ref 20–31)
CREAT SERPL-MCNC: 0.59 MG/DL (ref 0.7–1.2)
CREAT SERPL-MCNC: 0.7 MG/DL (ref 0.7–1.2)
DIFFERENTIAL TYPE: ABNORMAL
EOSINOPHILS RELATIVE PERCENT: 0 % (ref 0–4)
EPL-TEG: 0.2 % (ref 0–15)
ETHANOL PERCENT: 0.19 %
ETHANOL: 186 MG/DL
EXPIRATION DATE: NORMAL
FIO2: ABNORMAL
GFR AFRICAN AMERICAN: >60 ML/MIN
GFR AFRICAN AMERICAN: >60 ML/MIN
GFR NON-AFRICAN AMERICAN: >60 ML/MIN
GFR NON-AFRICAN AMERICAN: >60 ML/MIN
GFR SERPL CREATININE-BSD FRML MDRD: ABNORMAL ML/MIN/{1.73_M2}
GLOBULIN: NORMAL G/DL (ref 1.5–3.8)
GLUCOSE BLD-MCNC: 146 MG/DL (ref 70–99)
GLUCOSE BLD-MCNC: 176 MG/DL (ref 70–99)
HCG QUALITATIVE: ABNORMAL
HCO3 VENOUS: 20.3 MMOL/L (ref 24–30)
HCT VFR BLD CALC: 45 % (ref 40.7–50.3)
HCT VFR BLD CALC: 49.2 % (ref 41–53)
HEMOGLOBIN: 15.6 G/DL (ref 13–17)
HEMOGLOBIN: 15.8 G/DL (ref 13.5–17.5)
HEPARIN THERAPY: NORMAL
IMMATURE GRANULOCYTES: ABNORMAL %
INR BLD: 0.9
KINETICS RAPID TEG: 1.4 MIN (ref 1–2)
LY30 (LYSIS) TEG: 0.2 % (ref 0–8)
LYMPHOCYTES # BLD: 7 % (ref 24–44)
MA(MAX CLOT) RAPID TEG: 69.6 MM (ref 52–71)
MCH RBC QN AUTO: 30 PG (ref 26–34)
MCH RBC QN AUTO: 31.6 PG (ref 25.2–33.5)
MCHC RBC AUTO-ENTMCNC: 32.1 G/DL (ref 31–37)
MCHC RBC AUTO-ENTMCNC: 34.7 G/DL (ref 28.4–34.8)
MCV RBC AUTO: 91.3 FL (ref 82.6–102.9)
MCV RBC AUTO: 93.5 FL (ref 80–100)
METHEMOGLOBIN: ABNORMAL % (ref 0–1.5)
MODE: ABNORMAL
MONOCYTES # BLD: 5 % (ref 1–7)
MORPHOLOGY: NORMAL
NEGATIVE BASE EXCESS, VEN: 4 MMOL/L (ref 0–2)
NOTIFICATION TIME: ABNORMAL
NOTIFICATION: ABNORMAL
NRBC AUTOMATED: 0 PER 100 WBC
NRBC AUTOMATED: ABNORMAL PER 100 WBC
O2 DEVICE/FLOW/%: ABNORMAL
O2 SAT, VEN: 88.2 % (ref 60–85)
OXYHEMOGLOBIN: ABNORMAL % (ref 95–98)
PARTIAL THROMBOPLASTIN TIME: 26.3 SEC (ref 20.5–30.5)
PATIENT TEMP: 37
PCO2, VEN, TEMP ADJ: ABNORMAL MMHG (ref 39–55)
PCO2, VEN: 37 (ref 39–55)
PDW BLD-RTO: 13 % (ref 11.8–14.4)
PDW BLD-RTO: 13.6 % (ref 11.5–14.9)
PEEP/CPAP: ABNORMAL
PH VENOUS: 7.36 (ref 7.32–7.42)
PH, VEN, TEMP ADJ: ABNORMAL (ref 7.32–7.42)
PLATELET # BLD: 238 K/UL (ref 138–453)
PLATELET # BLD: 267 K/UL (ref 150–450)
PLATELET ESTIMATE: ABNORMAL
PMV BLD AUTO: 10.3 FL (ref 8.1–13.5)
PMV BLD AUTO: 8.8 FL (ref 6–12)
PO2, VEN, TEMP ADJ: ABNORMAL MMHG (ref 30–50)
PO2, VEN: 60.5 (ref 30–50)
POSITIVE BASE EXCESS, VEN: ABNORMAL MMOL/L (ref 0–2)
POTASSIUM SERPL-SCNC: 3.7 MMOL/L (ref 3.7–5.3)
POTASSIUM SERPL-SCNC: 4 MMOL/L (ref 3.7–5.3)
PROTHROMBIN TIME: 9.9 SEC (ref 9–12)
PSV: ABNORMAL
PT. POSITION: ABNORMAL
RBC # BLD: 4.93 M/UL (ref 4.21–5.77)
RBC # BLD: 5.26 M/UL (ref 4.5–5.9)
RBC # BLD: ABNORMAL 10*6/UL
REACTION TIME RAPID TEG: 0.6 MIN (ref 0–1)
RESPIRATORY RATE: ABNORMAL
SAMPLE SITE: ABNORMAL
SEG NEUTROPHILS: 88 % (ref 36–66)
SEGMENTED NEUTROPHILS ABSOLUTE COUNT: 16.1 K/UL (ref 1.3–9.1)
SET RATE: ABNORMAL
SODIUM BLD-SCNC: 136 MMOL/L (ref 135–144)
SODIUM BLD-SCNC: 137 MMOL/L (ref 135–144)
TEG COMMENT: NORMAL
TEXT FOR RESPIRATORY: ABNORMAL
TOTAL HB: ABNORMAL G/DL (ref 12–16)
TOTAL PROTEIN: 7.5 G/DL (ref 6.4–8.3)
TOTAL RATE: ABNORMAL
TROPONIN INTERP: NORMAL
TROPONIN T: NORMAL NG/ML
TROPONIN, HIGH SENSITIVITY: 9 NG/L (ref 0–22)
VT: ABNORMAL
WBC # BLD: 17.9 K/UL (ref 3.5–11.3)
WBC # BLD: 18.3 K/UL (ref 3.5–11)
WBC # BLD: ABNORMAL 10*3/UL

## 2019-03-08 PROCEDURE — 82805 BLOOD GASES W/O2 SATURATION: CPT

## 2019-03-08 PROCEDURE — 2500000003 HC RX 250 WO HCPCS

## 2019-03-08 PROCEDURE — 85610 PROTHROMBIN TIME: CPT

## 2019-03-08 PROCEDURE — 80051 ELECTROLYTE PANEL: CPT

## 2019-03-08 PROCEDURE — 84703 CHORIONIC GONADOTROPIN ASSAY: CPT

## 2019-03-08 PROCEDURE — 86901 BLOOD TYPING SEROLOGIC RH(D): CPT

## 2019-03-08 PROCEDURE — 84520 ASSAY OF UREA NITROGEN: CPT

## 2019-03-08 PROCEDURE — 36415 COLL VENOUS BLD VENIPUNCTURE: CPT

## 2019-03-08 PROCEDURE — 71045 X-RAY EXAM CHEST 1 VIEW: CPT

## 2019-03-08 PROCEDURE — 72125 CT NECK SPINE W/O DYE: CPT

## 2019-03-08 PROCEDURE — 80048 BASIC METABOLIC PNL TOTAL CA: CPT

## 2019-03-08 PROCEDURE — 85390 FIBRINOLYSINS SCREEN I&R: CPT

## 2019-03-08 PROCEDURE — 93005 ELECTROCARDIOGRAM TRACING: CPT

## 2019-03-08 PROCEDURE — 85027 COMPLETE CBC AUTOMATED: CPT

## 2019-03-08 PROCEDURE — 72128 CT CHEST SPINE W/O DYE: CPT

## 2019-03-08 PROCEDURE — 99285 EMERGENCY DEPT VISIT HI MDM: CPT

## 2019-03-08 PROCEDURE — 82565 ASSAY OF CREATININE: CPT

## 2019-03-08 PROCEDURE — 6810039000 HC L1 TRAUMA ALERT

## 2019-03-08 PROCEDURE — 96374 THER/PROPH/DIAG INJ IV PUSH: CPT

## 2019-03-08 PROCEDURE — 85025 COMPLETE CBC W/AUTO DIFF WBC: CPT

## 2019-03-08 PROCEDURE — 85210 CLOT FACTOR II PROTHROM SPEC: CPT

## 2019-03-08 PROCEDURE — 86900 BLOOD TYPING SEROLOGIC ABO: CPT

## 2019-03-08 PROCEDURE — G0480 DRUG TEST DEF 1-7 CLASSES: HCPCS

## 2019-03-08 PROCEDURE — 72131 CT LUMBAR SPINE W/O DYE: CPT

## 2019-03-08 PROCEDURE — 84484 ASSAY OF TROPONIN QUANT: CPT

## 2019-03-08 PROCEDURE — 86850 RBC ANTIBODY SCREEN: CPT

## 2019-03-08 PROCEDURE — 87641 MR-STAPH DNA AMP PROBE: CPT

## 2019-03-08 PROCEDURE — 71260 CT THORAX DX C+: CPT

## 2019-03-08 PROCEDURE — 6360000004 HC RX CONTRAST MEDICATION: Performed by: SURGERY

## 2019-03-08 PROCEDURE — 2000000003 HC NEURO ICU R&B

## 2019-03-08 PROCEDURE — 6360000002 HC RX W HCPCS: Performed by: STUDENT IN AN ORGANIZED HEALTH CARE EDUCATION/TRAINING PROGRAM

## 2019-03-08 PROCEDURE — 80076 HEPATIC FUNCTION PANEL: CPT

## 2019-03-08 PROCEDURE — 6360000002 HC RX W HCPCS

## 2019-03-08 PROCEDURE — 70450 CT HEAD/BRAIN W/O DYE: CPT

## 2019-03-08 PROCEDURE — 85730 THROMBOPLASTIN TIME PARTIAL: CPT

## 2019-03-08 PROCEDURE — 2500000003 HC RX 250 WO HCPCS: Performed by: STUDENT IN AN ORGANIZED HEALTH CARE EDUCATION/TRAINING PROGRAM

## 2019-03-08 PROCEDURE — 82947 ASSAY GLUCOSE BLOOD QUANT: CPT

## 2019-03-08 RX ORDER — OXYCODONE HYDROCHLORIDE 5 MG/1
5 TABLET ORAL EVERY 4 HOURS PRN
Status: DISCONTINUED | OUTPATIENT
Start: 2019-03-08 | End: 2019-03-26 | Stop reason: HOSPADM

## 2019-03-08 RX ORDER — ONDANSETRON 2 MG/ML
4 INJECTION INTRAMUSCULAR; INTRAVENOUS EVERY 6 HOURS PRN
Status: DISCONTINUED | OUTPATIENT
Start: 2019-03-08 | End: 2019-03-09

## 2019-03-08 RX ORDER — FENTANYL CITRATE 50 UG/ML
50 INJECTION, SOLUTION INTRAMUSCULAR; INTRAVENOUS
Status: DISCONTINUED | OUTPATIENT
Start: 2019-03-08 | End: 2019-03-26 | Stop reason: HOSPADM

## 2019-03-08 RX ORDER — OXYCODONE HYDROCHLORIDE 5 MG/1
10 TABLET ORAL EVERY 4 HOURS PRN
Status: DISCONTINUED | OUTPATIENT
Start: 2019-03-08 | End: 2019-03-26 | Stop reason: HOSPADM

## 2019-03-08 RX ORDER — MAGNESIUM SULFATE 1 G/100ML
1 INJECTION INTRAVENOUS PRN
Status: DISCONTINUED | OUTPATIENT
Start: 2019-03-08 | End: 2019-03-26 | Stop reason: HOSPADM

## 2019-03-08 RX ORDER — SODIUM CHLORIDE 0.9 % (FLUSH) 0.9 %
10 SYRINGE (ML) INJECTION PRN
Status: DISCONTINUED | OUTPATIENT
Start: 2019-03-08 | End: 2019-03-26 | Stop reason: HOSPADM

## 2019-03-08 RX ORDER — LABETALOL HYDROCHLORIDE 5 MG/ML
10 INJECTION, SOLUTION INTRAVENOUS EVERY 4 HOURS PRN
Status: DISCONTINUED | OUTPATIENT
Start: 2019-03-08 | End: 2019-03-12

## 2019-03-08 RX ORDER — SODIUM CHLORIDE 0.9 % (FLUSH) 0.9 %
10 SYRINGE (ML) INJECTION EVERY 12 HOURS SCHEDULED
Status: DISCONTINUED | OUTPATIENT
Start: 2019-03-08 | End: 2019-03-26 | Stop reason: HOSPADM

## 2019-03-08 RX ORDER — ACETAMINOPHEN 325 MG/1
650 TABLET ORAL EVERY 4 HOURS PRN
Status: DISCONTINUED | OUTPATIENT
Start: 2019-03-08 | End: 2019-03-26 | Stop reason: HOSPADM

## 2019-03-08 RX ORDER — FAMOTIDINE 20 MG/1
20 TABLET, FILM COATED ORAL 2 TIMES DAILY
Status: DISCONTINUED | OUTPATIENT
Start: 2019-03-08 | End: 2019-03-09

## 2019-03-08 RX ORDER — LABETALOL HYDROCHLORIDE 5 MG/ML
10 INJECTION, SOLUTION INTRAVENOUS ONCE
Status: COMPLETED | OUTPATIENT
Start: 2019-03-08 | End: 2019-03-08

## 2019-03-08 RX ORDER — POTASSIUM CHLORIDE 7.45 MG/ML
10 INJECTION INTRAVENOUS PRN
Status: DISCONTINUED | OUTPATIENT
Start: 2019-03-08 | End: 2019-03-26 | Stop reason: HOSPADM

## 2019-03-08 RX ORDER — FENTANYL CITRATE 50 UG/ML
25 INJECTION, SOLUTION INTRAMUSCULAR; INTRAVENOUS
Status: DISCONTINUED | OUTPATIENT
Start: 2019-03-08 | End: 2019-03-26 | Stop reason: HOSPADM

## 2019-03-08 RX ADMIN — LABETALOL 20 MG/4 ML (5 MG/ML) INTRAVENOUS SYRINGE 10 MG: at 18:10

## 2019-03-08 RX ADMIN — ONDANSETRON 4 MG: 2 INJECTION INTRAMUSCULAR; INTRAVENOUS at 23:28

## 2019-03-08 RX ADMIN — IOVERSOL 130 ML: 741 INJECTION INTRA-ARTERIAL; INTRAVENOUS at 19:13

## 2019-03-08 ASSESSMENT — ENCOUNTER SYMPTOMS
NAUSEA: 0
COUGH: 0
SHORTNESS OF BREATH: 0
DIARRHEA: 0
ABDOMINAL PAIN: 0
EYE ITCHING: 0
EYE REDNESS: 0
BACK PAIN: 0
CONSTIPATION: 0
VOMITING: 0
RHINORRHEA: 0

## 2019-03-09 ENCOUNTER — ANESTHESIA EVENT (OUTPATIENT)
Dept: OPERATING ROOM | Age: 74
DRG: 023 | End: 2019-03-09
Payer: MEDICARE

## 2019-03-09 ENCOUNTER — APPOINTMENT (OUTPATIENT)
Dept: CT IMAGING | Age: 74
DRG: 023 | End: 2019-03-09
Payer: MEDICARE

## 2019-03-09 ENCOUNTER — ANESTHESIA (OUTPATIENT)
Dept: OPERATING ROOM | Age: 74
DRG: 023 | End: 2019-03-09
Payer: MEDICARE

## 2019-03-09 ENCOUNTER — APPOINTMENT (OUTPATIENT)
Dept: GENERAL RADIOLOGY | Age: 74
DRG: 023 | End: 2019-03-09
Payer: MEDICARE

## 2019-03-09 VITALS
SYSTOLIC BLOOD PRESSURE: 121 MMHG | DIASTOLIC BLOOD PRESSURE: 58 MMHG | OXYGEN SATURATION: 100 % | RESPIRATION RATE: 10 BRPM

## 2019-03-09 LAB
-: NORMAL
ABSOLUTE EOS #: 0 K/UL (ref 0–0.4)
ABSOLUTE IMMATURE GRANULOCYTE: 0 K/UL (ref 0–0.3)
ABSOLUTE LYMPH #: 1 K/UL (ref 1–4.8)
ABSOLUTE MONO #: 0.4 K/UL (ref 0.1–0.8)
ALLEN TEST: NORMAL
AMORPHOUS: NORMAL
AMPHETAMINE SCREEN URINE: NEGATIVE
ANION GAP SERPL CALCULATED.3IONS-SCNC: 17 MMOL/L (ref 9–17)
BACTERIA: NORMAL
BARBITURATE SCREEN URINE: NEGATIVE
BASOPHILS # BLD: 0 % (ref 0–2)
BASOPHILS ABSOLUTE: 0 K/UL (ref 0–0.2)
BENZODIAZEPINE SCREEN, URINE: NEGATIVE
BILIRUBIN URINE: NEGATIVE
BUN BLDV-MCNC: 14 MG/DL (ref 8–23)
BUN/CREAT BLD: ABNORMAL (ref 9–20)
BUPRENORPHINE URINE: ABNORMAL
CALCIUM SERPL-MCNC: 9.3 MG/DL (ref 8.6–10.4)
CANNABINOID SCREEN URINE: POSITIVE
CASTS UA: NORMAL /LPF (ref 0–8)
CHLORIDE BLD-SCNC: 97 MMOL/L (ref 98–107)
CO2: 19 MMOL/L (ref 20–31)
COCAINE METABOLITE, URINE: NEGATIVE
COLOR: YELLOW
COMMENT UA: ABNORMAL
CREAT SERPL-MCNC: 0.69 MG/DL (ref 0.7–1.2)
CRYSTALS, UA: NORMAL /HPF
DIFFERENTIAL TYPE: ABNORMAL
EKG ATRIAL RATE: 74 BPM
EKG P-R INTERVAL: 238 MS
EKG Q-T INTERVAL: 372 MS
EKG QRS DURATION: 90 MS
EKG QTC CALCULATION (BAZETT): 412 MS
EKG R AXIS: -26 DEGREES
EKG T AXIS: 16 DEGREES
EKG VENTRICULAR RATE: 74 BPM
EOSINOPHILS RELATIVE PERCENT: 0 % (ref 1–4)
EPITHELIAL CELLS UA: NORMAL /HPF (ref 0–5)
FIO2: 30
GFR AFRICAN AMERICAN: >60 ML/MIN
GFR NON-AFRICAN AMERICAN: >60 ML/MIN
GFR SERPL CREATININE-BSD FRML MDRD: ABNORMAL ML/MIN/{1.73_M2}
GFR SERPL CREATININE-BSD FRML MDRD: ABNORMAL ML/MIN/{1.73_M2}
GLUCOSE BLD-MCNC: 101 MG/DL (ref 75–110)
GLUCOSE BLD-MCNC: 117 MG/DL (ref 75–110)
GLUCOSE BLD-MCNC: 152 MG/DL (ref 75–110)
GLUCOSE BLD-MCNC: 192 MG/DL (ref 70–99)
GLUCOSE BLD-MCNC: 86 MG/DL (ref 75–110)
GLUCOSE URINE: NEGATIVE
HCT VFR BLD CALC: 44.4 % (ref 40.7–50.3)
HEMOGLOBIN: 14.6 G/DL (ref 13–17)
IMMATURE GRANULOCYTES: 0 %
KETONES, URINE: ABNORMAL
LEUKOCYTE ESTERASE, URINE: NEGATIVE
LYMPHOCYTES # BLD: 5 % (ref 24–44)
MCH RBC QN AUTO: 31.4 PG (ref 25.2–33.5)
MCHC RBC AUTO-ENTMCNC: 32.9 G/DL (ref 28.4–34.8)
MCV RBC AUTO: 95.5 FL (ref 82.6–102.9)
MDMA URINE: ABNORMAL
METHADONE SCREEN, URINE: NEGATIVE
METHAMPHETAMINE, URINE: ABNORMAL
MODE: NORMAL
MONOCYTES # BLD: 2 % (ref 1–7)
MORPHOLOGY: NORMAL
MRSA, DNA, NASAL: NORMAL
MUCUS: NORMAL
NEGATIVE BASE EXCESS, ART: NORMAL (ref 0–2)
NITRITE, URINE: NEGATIVE
NRBC AUTOMATED: 0 PER 100 WBC
O2 DEVICE/FLOW/%: NORMAL
OPIATES, URINE: NEGATIVE
OTHER OBSERVATIONS UA: NORMAL
OXYCODONE SCREEN URINE: NEGATIVE
PATIENT TEMP: NORMAL
PDW BLD-RTO: 13.3 % (ref 11.8–14.4)
PH UA: 5 (ref 5–8)
PHENCYCLIDINE, URINE: NEGATIVE
PLATELET # BLD: 238 K/UL (ref 138–453)
PLATELET ESTIMATE: ABNORMAL
PMV BLD AUTO: 10.6 FL (ref 8.1–13.5)
POC HCO3: 25 MMOL/L (ref 21–28)
POC O2 SATURATION: 97 % (ref 94–98)
POC PCO2 TEMP: NORMAL MM HG
POC PCO2: 39 MM HG (ref 35–48)
POC PH TEMP: NORMAL
POC PH: 7.42 (ref 7.35–7.45)
POC PO2 TEMP: NORMAL MM HG
POC PO2: 91.1 MM HG (ref 83–108)
POSITIVE BASE EXCESS, ART: 0 (ref 0–3)
POTASSIUM SERPL-SCNC: 4.2 MMOL/L (ref 3.7–5.3)
PROPOXYPHENE, URINE: ABNORMAL
PROTEIN UA: ABNORMAL
RBC # BLD: 4.65 M/UL (ref 4.21–5.77)
RBC # BLD: ABNORMAL 10*6/UL
RBC UA: NORMAL /HPF (ref 0–4)
RENAL EPITHELIAL, UA: NORMAL /HPF
SAMPLE SITE: NORMAL
SEG NEUTROPHILS: 93 % (ref 36–66)
SEGMENTED NEUTROPHILS ABSOLUTE COUNT: 18.5 K/UL (ref 1.8–7.7)
SODIUM BLD-SCNC: 133 MMOL/L (ref 135–144)
SODIUM BLD-SCNC: 137 MMOL/L (ref 135–144)
SODIUM BLD-SCNC: 143 MMOL/L (ref 135–144)
SODIUM BLD-SCNC: 144 MMOL/L (ref 135–144)
SODIUM BLD-SCNC: 144 MMOL/L (ref 135–144)
SODIUM BLD-SCNC: 145 MMOL/L (ref 135–144)
SPECIFIC GRAVITY UA: 1.05 (ref 1–1.03)
SPECIMEN DESCRIPTION: NORMAL
TCO2 (CALC), ART: 26 MMOL/L (ref 22–29)
TEST INFORMATION: ABNORMAL
TRICHOMONAS: NORMAL
TRICYCLIC ANTIDEPRESSANTS, UR: ABNORMAL
TURBIDITY: CLEAR
URINE HGB: ABNORMAL
UROBILINOGEN, URINE: NORMAL
WBC # BLD: 19.9 K/UL (ref 3.5–11.3)
WBC # BLD: ABNORMAL 10*3/UL
WBC UA: NORMAL /HPF (ref 0–5)
YEAST: NORMAL

## 2019-03-09 PROCEDURE — 71045 X-RAY EXAM CHEST 1 VIEW: CPT

## 2019-03-09 PROCEDURE — 36556 INSERT NON-TUNNEL CV CATH: CPT

## 2019-03-09 PROCEDURE — 94002 VENT MGMT INPAT INIT DAY: CPT

## 2019-03-09 PROCEDURE — 2500000003 HC RX 250 WO HCPCS: Performed by: STUDENT IN AN ORGANIZED HEALTH CARE EDUCATION/TRAINING PROGRAM

## 2019-03-09 PROCEDURE — 84295 ASSAY OF SERUM SODIUM: CPT

## 2019-03-09 PROCEDURE — 3700000001 HC ADD 15 MINUTES (ANESTHESIA): Performed by: NEUROLOGICAL SURGERY

## 2019-03-09 PROCEDURE — 82947 ASSAY GLUCOSE BLOOD QUANT: CPT

## 2019-03-09 PROCEDURE — 70450 CT HEAD/BRAIN W/O DYE: CPT

## 2019-03-09 PROCEDURE — 6370000000 HC RX 637 (ALT 250 FOR IP): Performed by: STUDENT IN AN ORGANIZED HEALTH CARE EDUCATION/TRAINING PROGRAM

## 2019-03-09 PROCEDURE — 2500000003 HC RX 250 WO HCPCS: Performed by: NURSE ANESTHETIST, CERTIFIED REGISTERED

## 2019-03-09 PROCEDURE — 3600000014 HC SURGERY LEVEL 4 ADDTL 15MIN: Performed by: NEUROLOGICAL SURGERY

## 2019-03-09 PROCEDURE — 94770 HC ETCO2 MONITOR DAILY: CPT

## 2019-03-09 PROCEDURE — 36415 COLL VENOUS BLD VENIPUNCTURE: CPT

## 2019-03-09 PROCEDURE — 2720000010 HC SURG SUPPLY STERILE: Performed by: NEUROLOGICAL SURGERY

## 2019-03-09 PROCEDURE — 81001 URINALYSIS AUTO W/SCOPE: CPT

## 2019-03-09 PROCEDURE — 6360000002 HC RX W HCPCS: Performed by: STUDENT IN AN ORGANIZED HEALTH CARE EDUCATION/TRAINING PROGRAM

## 2019-03-09 PROCEDURE — 3600000004 HC SURGERY LEVEL 4 BASE: Performed by: NEUROLOGICAL SURGERY

## 2019-03-09 PROCEDURE — 0BH18EZ INSERTION OF ENDOTRACHEAL AIRWAY INTO TRACHEA, VIA NATURAL OR ARTIFICIAL OPENING ENDOSCOPIC: ICD-10-PCS | Performed by: SURGERY

## 2019-03-09 PROCEDURE — 5A1945Z RESPIRATORY VENTILATION, 24-96 CONSECUTIVE HOURS: ICD-10-PCS | Performed by: SURGERY

## 2019-03-09 PROCEDURE — 6360000002 HC RX W HCPCS: Performed by: NURSE ANESTHETIST, CERTIFIED REGISTERED

## 2019-03-09 PROCEDURE — 80048 BASIC METABOLIC PNL TOTAL CA: CPT

## 2019-03-09 PROCEDURE — 2580000003 HC RX 258

## 2019-03-09 PROCEDURE — 51702 INSERT TEMP BLADDER CATH: CPT

## 2019-03-09 PROCEDURE — 2700000000 HC OXYGEN THERAPY PER DAY

## 2019-03-09 PROCEDURE — 6360000002 HC RX W HCPCS

## 2019-03-09 PROCEDURE — 2580000003 HC RX 258: Performed by: STUDENT IN AN ORGANIZED HEALTH CARE EDUCATION/TRAINING PROGRAM

## 2019-03-09 PROCEDURE — 85025 COMPLETE CBC W/AUTO DIFF WBC: CPT

## 2019-03-09 PROCEDURE — 2580000003 HC RX 258: Performed by: NURSE ANESTHETIST, CERTIFIED REGISTERED

## 2019-03-09 PROCEDURE — 82803 BLOOD GASES ANY COMBINATION: CPT

## 2019-03-09 PROCEDURE — 36600 WITHDRAWAL OF ARTERIAL BLOOD: CPT

## 2019-03-09 PROCEDURE — C1729 CATH, DRAINAGE: HCPCS | Performed by: NEUROLOGICAL SURGERY

## 2019-03-09 PROCEDURE — 74018 RADEX ABDOMEN 1 VIEW: CPT

## 2019-03-09 PROCEDURE — 70480 CT ORBIT/EAR/FOSSA W/O DYE: CPT

## 2019-03-09 PROCEDURE — 3700000000 HC ANESTHESIA ATTENDED CARE: Performed by: NEUROLOGICAL SURGERY

## 2019-03-09 PROCEDURE — 80307 DRUG TEST PRSMV CHEM ANLYZR: CPT

## 2019-03-09 PROCEDURE — 31500 INSERT EMERGENCY AIRWAY: CPT

## 2019-03-09 PROCEDURE — 99291 CRITICAL CARE FIRST HOUR: CPT | Performed by: PSYCHIATRY & NEUROLOGY

## 2019-03-09 PROCEDURE — 009600Z DRAINAGE OF CEREBRAL VENTRICLE WITH DRAINAGE DEVICE, OPEN APPROACH: ICD-10-PCS | Performed by: NEUROLOGICAL SURGERY

## 2019-03-09 PROCEDURE — 2500000003 HC RX 250 WO HCPCS: Performed by: NEUROLOGICAL SURGERY

## 2019-03-09 PROCEDURE — 2000000003 HC NEURO ICU R&B

## 2019-03-09 PROCEDURE — 2709999900 HC NON-CHARGEABLE SUPPLY: Performed by: NEUROLOGICAL SURGERY

## 2019-03-09 PROCEDURE — 05HM33Z INSERTION OF INFUSION DEVICE INTO RIGHT INTERNAL JUGULAR VEIN, PERCUTANEOUS APPROACH: ICD-10-PCS | Performed by: SURGERY

## 2019-03-09 RX ORDER — LORAZEPAM 2 MG/ML
4 INJECTION INTRAMUSCULAR
Status: DISCONTINUED | OUTPATIENT
Start: 2019-03-09 | End: 2019-03-14

## 2019-03-09 RX ORDER — ONDANSETRON 2 MG/ML
4 INJECTION INTRAMUSCULAR; INTRAVENOUS ONCE
Status: COMPLETED | OUTPATIENT
Start: 2019-03-09 | End: 2019-03-09

## 2019-03-09 RX ORDER — PROPOFOL 10 MG/ML
10 INJECTION, EMULSION INTRAVENOUS
Status: DISCONTINUED | OUTPATIENT
Start: 2019-03-09 | End: 2019-03-12

## 2019-03-09 RX ORDER — DEXTROSE MONOHYDRATE 50 MG/ML
100 INJECTION, SOLUTION INTRAVENOUS PRN
Status: DISCONTINUED | OUTPATIENT
Start: 2019-03-09 | End: 2019-03-14

## 2019-03-09 RX ORDER — DIAZEPAM 5 MG/1
10 TABLET ORAL 4 TIMES DAILY
Status: DISCONTINUED | OUTPATIENT
Start: 2019-03-09 | End: 2019-03-09

## 2019-03-09 RX ORDER — PROMETHAZINE HYDROCHLORIDE 25 MG/ML
12.5 INJECTION, SOLUTION INTRAMUSCULAR; INTRAVENOUS ONCE
Status: COMPLETED | OUTPATIENT
Start: 2019-03-09 | End: 2019-03-09

## 2019-03-09 RX ORDER — LORAZEPAM 1 MG/1
3 TABLET ORAL
Status: DISCONTINUED | OUTPATIENT
Start: 2019-03-09 | End: 2019-03-14

## 2019-03-09 RX ORDER — LORAZEPAM 2 MG/ML
1 INJECTION INTRAMUSCULAR
Status: DISCONTINUED | OUTPATIENT
Start: 2019-03-09 | End: 2019-03-14

## 2019-03-09 RX ORDER — PROPOFOL 10 MG/ML
INJECTION, EMULSION INTRAVENOUS
Status: COMPLETED
Start: 2019-03-09 | End: 2019-03-09

## 2019-03-09 RX ORDER — LORAZEPAM 1 MG/1
4 TABLET ORAL
Status: DISCONTINUED | OUTPATIENT
Start: 2019-03-09 | End: 2019-03-14

## 2019-03-09 RX ORDER — DEXTROSE MONOHYDRATE 25 G/50ML
12.5 INJECTION, SOLUTION INTRAVENOUS PRN
Status: DISCONTINUED | OUTPATIENT
Start: 2019-03-09 | End: 2019-03-14

## 2019-03-09 RX ORDER — CEFAZOLIN SODIUM 1 G/3ML
INJECTION, POWDER, FOR SOLUTION INTRAMUSCULAR; INTRAVENOUS PRN
Status: DISCONTINUED | OUTPATIENT
Start: 2019-03-09 | End: 2019-03-09 | Stop reason: SDUPTHER

## 2019-03-09 RX ORDER — LIDOCAINE HYDROCHLORIDE 40 MG/ML
4 INJECTION, SOLUTION RETROBULBAR; TOPICAL
Status: DISCONTINUED | OUTPATIENT
Start: 2019-03-09 | End: 2019-03-26 | Stop reason: HOSPADM

## 2019-03-09 RX ORDER — ALBUTEROL SULFATE 90 UG/1
2 AEROSOL, METERED RESPIRATORY (INHALATION)
Status: DISCONTINUED | OUTPATIENT
Start: 2019-03-09 | End: 2019-03-09

## 2019-03-09 RX ORDER — ACETYLCYSTEINE 200 MG/ML
600 SOLUTION ORAL; RESPIRATORY (INHALATION)
Status: DISCONTINUED | OUTPATIENT
Start: 2019-03-09 | End: 2019-03-09

## 2019-03-09 RX ORDER — LEVETIRACETAM 10 MG/ML
1000 INJECTION INTRAVASCULAR ONCE
Status: COMPLETED | OUTPATIENT
Start: 2019-03-09 | End: 2019-03-09

## 2019-03-09 RX ORDER — ONDANSETRON 2 MG/ML
4 INJECTION INTRAMUSCULAR; INTRAVENOUS EVERY 4 HOURS PRN
Status: DISCONTINUED | OUTPATIENT
Start: 2019-03-09 | End: 2019-03-26 | Stop reason: HOSPADM

## 2019-03-09 RX ORDER — PROPOFOL 10 MG/ML
10 INJECTION, EMULSION INTRAVENOUS
Status: DISCONTINUED | OUTPATIENT
Start: 2019-03-09 | End: 2019-03-09

## 2019-03-09 RX ORDER — 0.9 % SODIUM CHLORIDE 0.9 %
VIAL (ML) INJECTION
Status: COMPLETED
Start: 2019-03-09 | End: 2019-03-09

## 2019-03-09 RX ORDER — SCOLOPAMINE TRANSDERMAL SYSTEM 1 MG/1
1 PATCH, EXTENDED RELEASE TRANSDERMAL
Status: DISCONTINUED | OUTPATIENT
Start: 2019-03-09 | End: 2019-03-14

## 2019-03-09 RX ORDER — THIAMINE HYDROCHLORIDE 100 MG/ML
100 INJECTION, SOLUTION INTRAMUSCULAR; INTRAVENOUS DAILY
Status: DISCONTINUED | OUTPATIENT
Start: 2019-03-09 | End: 2019-03-09

## 2019-03-09 RX ORDER — LORAZEPAM 2 MG/ML
2 INJECTION INTRAMUSCULAR
Status: DISCONTINUED | OUTPATIENT
Start: 2019-03-09 | End: 2019-03-14

## 2019-03-09 RX ORDER — LORAZEPAM 1 MG/1
1 TABLET ORAL
Status: DISCONTINUED | OUTPATIENT
Start: 2019-03-09 | End: 2019-03-14

## 2019-03-09 RX ORDER — LORAZEPAM 2 MG/ML
3 INJECTION INTRAMUSCULAR
Status: DISCONTINUED | OUTPATIENT
Start: 2019-03-09 | End: 2019-03-14

## 2019-03-09 RX ORDER — 3% SODIUM CHLORIDE 3 G/100ML
INJECTION, SOLUTION INTRAVENOUS
Status: COMPLETED
Start: 2019-03-09 | End: 2019-03-09

## 2019-03-09 RX ORDER — NICOTINE POLACRILEX 4 MG
15 LOZENGE BUCCAL PRN
Status: DISCONTINUED | OUTPATIENT
Start: 2019-03-09 | End: 2019-03-14

## 2019-03-09 RX ORDER — ROCURONIUM BROMIDE 10 MG/ML
INJECTION, SOLUTION INTRAVENOUS PRN
Status: DISCONTINUED | OUTPATIENT
Start: 2019-03-09 | End: 2019-03-09 | Stop reason: SDUPTHER

## 2019-03-09 RX ORDER — 3% SODIUM CHLORIDE 3 G/100ML
500 INJECTION, SOLUTION INTRAVENOUS ONCE
Status: COMPLETED | OUTPATIENT
Start: 2019-03-09 | End: 2019-03-09

## 2019-03-09 RX ORDER — SODIUM CHLORIDE 0.9 % (FLUSH) 0.9 %
10 SYRINGE (ML) INJECTION EVERY 12 HOURS SCHEDULED
Status: DISCONTINUED | OUTPATIENT
Start: 2019-03-09 | End: 2019-03-26 | Stop reason: HOSPADM

## 2019-03-09 RX ORDER — LIDOCAINE HYDROCHLORIDE AND EPINEPHRINE 15; 5 MG/ML; UG/ML
INJECTION, SOLUTION EPIDURAL PRN
Status: DISCONTINUED | OUTPATIENT
Start: 2019-03-09 | End: 2019-03-09 | Stop reason: HOSPADM

## 2019-03-09 RX ORDER — SODIUM CHLORIDE 0.9 % (FLUSH) 0.9 %
10 SYRINGE (ML) INJECTION PRN
Status: DISCONTINUED | OUTPATIENT
Start: 2019-03-09 | End: 2019-03-26 | Stop reason: HOSPADM

## 2019-03-09 RX ORDER — PROMETHAZINE HYDROCHLORIDE 25 MG/ML
INJECTION, SOLUTION INTRAMUSCULAR; INTRAVENOUS
Status: COMPLETED
Start: 2019-03-09 | End: 2019-03-09

## 2019-03-09 RX ORDER — LEVETIRACETAM 5 MG/ML
500 INJECTION INTRAVASCULAR EVERY 12 HOURS
Status: DISCONTINUED | OUTPATIENT
Start: 2019-03-09 | End: 2019-03-14

## 2019-03-09 RX ORDER — FOLIC ACID 5 MG/ML
1 INJECTION, SOLUTION INTRAMUSCULAR; INTRAVENOUS; SUBCUTANEOUS DAILY
Status: DISCONTINUED | OUTPATIENT
Start: 2019-03-09 | End: 2019-03-09

## 2019-03-09 RX ORDER — CHLORHEXIDINE GLUCONATE 0.12 MG/ML
15 RINSE ORAL 2 TIMES DAILY
Status: DISCONTINUED | OUTPATIENT
Start: 2019-03-09 | End: 2019-03-12

## 2019-03-09 RX ORDER — 3% SODIUM CHLORIDE 3 G/100ML
20 INJECTION, SOLUTION INTRAVENOUS CONTINUOUS
Status: DISCONTINUED | OUTPATIENT
Start: 2019-03-09 | End: 2019-03-09

## 2019-03-09 RX ORDER — LORAZEPAM 1 MG/1
2 TABLET ORAL
Status: DISCONTINUED | OUTPATIENT
Start: 2019-03-09 | End: 2019-03-14

## 2019-03-09 RX ORDER — SODIUM CHLORIDE 9 MG/ML
INJECTION, SOLUTION INTRAVENOUS CONTINUOUS
Status: DISCONTINUED | OUTPATIENT
Start: 2019-03-09 | End: 2019-03-16

## 2019-03-09 RX ORDER — FENTANYL CITRATE 50 UG/ML
25 INJECTION, SOLUTION INTRAMUSCULAR; INTRAVENOUS
Status: DISCONTINUED | OUTPATIENT
Start: 2019-03-09 | End: 2019-03-26 | Stop reason: HOSPADM

## 2019-03-09 RX ORDER — FENTANYL CITRATE 50 UG/ML
INJECTION, SOLUTION INTRAMUSCULAR; INTRAVENOUS PRN
Status: DISCONTINUED | OUTPATIENT
Start: 2019-03-09 | End: 2019-03-09 | Stop reason: SDUPTHER

## 2019-03-09 RX ORDER — SODIUM CHLORIDE 9 MG/ML
INJECTION, SOLUTION INTRAVENOUS CONTINUOUS PRN
Status: DISCONTINUED | OUTPATIENT
Start: 2019-03-09 | End: 2019-03-09 | Stop reason: SDUPTHER

## 2019-03-09 RX ADMIN — FAMOTIDINE 20 MG: 10 INJECTION, SOLUTION INTRAVENOUS at 08:27

## 2019-03-09 RX ADMIN — Medication 10 ML: at 08:27

## 2019-03-09 RX ADMIN — PROPOFOL 20 MCG/KG/MIN: 10 INJECTION, EMULSION INTRAVENOUS at 03:42

## 2019-03-09 RX ADMIN — PHENYLEPHRINE HYDROCHLORIDE 100 MCG: 10 INJECTION INTRAVENOUS at 10:59

## 2019-03-09 RX ADMIN — Medication 15 ML: at 20:20

## 2019-03-09 RX ADMIN — ONDANSETRON 4 MG: 2 INJECTION INTRAMUSCULAR; INTRAVENOUS at 02:47

## 2019-03-09 RX ADMIN — 3% SODIUM CHLORIDE 40 ML/HR: 3 INJECTION, SOLUTION INTRAVENOUS at 04:15

## 2019-03-09 RX ADMIN — DIAZEPAM 10 MG: 5 TABLET ORAL at 14:48

## 2019-03-09 RX ADMIN — FAMOTIDINE 20 MG: 10 INJECTION, SOLUTION INTRAVENOUS at 20:21

## 2019-03-09 RX ADMIN — PHENYLEPHRINE HYDROCHLORIDE 100 MCG: 10 INJECTION INTRAVENOUS at 11:12

## 2019-03-09 RX ADMIN — PROPOFOL 50 MCG/KG/MIN: 10 INJECTION, EMULSION INTRAVENOUS at 09:35

## 2019-03-09 RX ADMIN — CEFAZOLIN 2 MG: 1 INJECTION, POWDER, FOR SOLUTION INTRAMUSCULAR; INTRAVENOUS at 10:55

## 2019-03-09 RX ADMIN — Medication 25 MCG/HR: at 11:55

## 2019-03-09 RX ADMIN — FENTANYL CITRATE 50 MCG: 50 INJECTION INTRAMUSCULAR; INTRAVENOUS at 08:20

## 2019-03-09 RX ADMIN — PROMETHAZINE HYDROCHLORIDE 12.5 MG: 25 INJECTION, SOLUTION INTRAMUSCULAR; INTRAVENOUS at 01:33

## 2019-03-09 RX ADMIN — FENTANYL CITRATE 50 MCG: 50 INJECTION INTRAMUSCULAR; INTRAVENOUS at 04:39

## 2019-03-09 RX ADMIN — LEVETIRACETAM 500 MG: 5 INJECTION INTRAVENOUS at 18:29

## 2019-03-09 RX ADMIN — Medication 15 ML: at 08:27

## 2019-03-09 RX ADMIN — PROPOFOL 30 MCG/KG/MIN: 10 INJECTION, EMULSION INTRAVENOUS at 18:34

## 2019-03-09 RX ADMIN — SODIUM CHLORIDE: 9 INJECTION, SOLUTION INTRAMUSCULAR; INTRAVENOUS; SUBCUTANEOUS at 01:34

## 2019-03-09 RX ADMIN — THIAMINE HYDROCHLORIDE: 100 INJECTION, SOLUTION INTRAMUSCULAR; INTRAVENOUS at 18:09

## 2019-03-09 RX ADMIN — LEVETIRACETAM 1000 MG: 10 INJECTION INTRAVENOUS at 04:54

## 2019-03-09 RX ADMIN — SODIUM CHLORIDE: 9 INJECTION, SOLUTION INTRAVENOUS at 17:39

## 2019-03-09 RX ADMIN — ROCURONIUM BROMIDE 50 MG: 10 INJECTION INTRAVENOUS at 10:47

## 2019-03-09 RX ADMIN — SODIUM CHLORIDE 40 ML/HR: 3 INJECTION, SOLUTION INTRAVENOUS at 04:15

## 2019-03-09 RX ADMIN — FENTANYL CITRATE 50 MCG: 50 INJECTION INTRAMUSCULAR; INTRAVENOUS at 10:30

## 2019-03-09 RX ADMIN — INSULIN LISPRO 2 UNITS: 100 INJECTION, SOLUTION INTRAVENOUS; SUBCUTANEOUS at 20:21

## 2019-03-09 RX ADMIN — FENTANYL CITRATE 50 MCG: 50 INJECTION INTRAMUSCULAR; INTRAVENOUS at 02:39

## 2019-03-09 RX ADMIN — PROPOFOL 50 MCG/KG/MIN: 10 INJECTION, EMULSION INTRAVENOUS at 14:44

## 2019-03-09 RX ADMIN — SODIUM CHLORIDE 500 ML/HR: 3 INJECTION, SOLUTION INTRAVENOUS at 09:32

## 2019-03-09 RX ADMIN — SODIUM CHLORIDE: 0.9 INJECTION, SOLUTION INTRAVENOUS at 10:44

## 2019-03-09 RX ADMIN — FENTANYL CITRATE 100 MCG: 50 INJECTION INTRAMUSCULAR; INTRAVENOUS at 10:50

## 2019-03-09 ASSESSMENT — PULMONARY FUNCTION TESTS
PIF_VALUE: 16
PIF_VALUE: 18
PIF_VALUE: 18
PIF_VALUE: 16
PIF_VALUE: 16
PIF_VALUE: 20
PIF_VALUE: 16
PIF_VALUE: 16
PIF_VALUE: 15
PIF_VALUE: 16
PIF_VALUE: 19
PIF_VALUE: 16
PIF_VALUE: 16
PIF_VALUE: 22
PIF_VALUE: 16
PIF_VALUE: 18
PIF_VALUE: 16
PIF_VALUE: 18
PIF_VALUE: 16
PIF_VALUE: 18
PIF_VALUE: 16
PIF_VALUE: 17
PIF_VALUE: 16
PIF_VALUE: 21
PIF_VALUE: 18
PIF_VALUE: 18
PIF_VALUE: 16
PIF_VALUE: 15
PIF_VALUE: 20
PIF_VALUE: 16
PIF_VALUE: 22
PIF_VALUE: 18
PIF_VALUE: 16
PIF_VALUE: 18
PIF_VALUE: 17
PIF_VALUE: 16
PIF_VALUE: 3
PIF_VALUE: 18
PIF_VALUE: 16
PIF_VALUE: 18

## 2019-03-09 ASSESSMENT — ENCOUNTER SYMPTOMS
WHEEZING: 0
FACIAL SWELLING: 0
SHORTNESS OF BREATH: 0
BACK PAIN: 0
SHORTNESS OF BREATH: 1
ABDOMINAL PAIN: 0

## 2019-03-09 ASSESSMENT — PAIN SCALES - GENERAL
PAINLEVEL_OUTOF10: 6
PAINLEVEL_OUTOF10: 7
PAINLEVEL_OUTOF10: 9

## 2019-03-10 ENCOUNTER — APPOINTMENT (OUTPATIENT)
Dept: GENERAL RADIOLOGY | Age: 74
DRG: 023 | End: 2019-03-10
Payer: MEDICARE

## 2019-03-10 LAB
ABSOLUTE EOS #: 0.04 K/UL (ref 0–0.44)
ABSOLUTE IMMATURE GRANULOCYTE: 0.06 K/UL (ref 0–0.3)
ABSOLUTE LYMPH #: 1.25 K/UL (ref 1.1–3.7)
ABSOLUTE MONO #: 1.4 K/UL (ref 0.1–1.2)
ALLEN TEST: POSITIVE
ANION GAP SERPL CALCULATED.3IONS-SCNC: 11 MMOL/L (ref 9–17)
BASOPHILS # BLD: 0 % (ref 0–2)
BASOPHILS ABSOLUTE: 0.04 K/UL (ref 0–0.2)
BUN BLDV-MCNC: 11 MG/DL (ref 8–23)
BUN/CREAT BLD: ABNORMAL (ref 9–20)
CALCIUM IONIZED: 1.15 MMOL/L (ref 1.13–1.33)
CALCIUM SERPL-MCNC: 8.3 MG/DL (ref 8.6–10.4)
CHLORIDE BLD-SCNC: 113 MMOL/L (ref 98–107)
CO2: 23 MMOL/L (ref 20–31)
CREAT SERPL-MCNC: 0.78 MG/DL (ref 0.7–1.2)
DIFFERENTIAL TYPE: ABNORMAL
EOSINOPHILS RELATIVE PERCENT: 0 % (ref 1–4)
FIO2: 30
GFR AFRICAN AMERICAN: >60 ML/MIN
GFR NON-AFRICAN AMERICAN: >60 ML/MIN
GFR SERPL CREATININE-BSD FRML MDRD: ABNORMAL ML/MIN/{1.73_M2}
GFR SERPL CREATININE-BSD FRML MDRD: ABNORMAL ML/MIN/{1.73_M2}
GLUCOSE BLD-MCNC: 152 MG/DL (ref 75–110)
GLUCOSE BLD-MCNC: 156 MG/DL (ref 75–110)
GLUCOSE BLD-MCNC: 159 MG/DL (ref 75–110)
GLUCOSE BLD-MCNC: 181 MG/DL (ref 75–110)
GLUCOSE BLD-MCNC: 192 MG/DL (ref 70–99)
HCT VFR BLD CALC: 40.3 % (ref 40.7–50.3)
HEMOGLOBIN: 13 G/DL (ref 13–17)
IMMATURE GRANULOCYTES: 1 %
LYMPHOCYTES # BLD: 10 % (ref 24–43)
MAGNESIUM: 1.9 MG/DL (ref 1.6–2.6)
MCH RBC QN AUTO: 31 PG (ref 25.2–33.5)
MCHC RBC AUTO-ENTMCNC: 32.3 G/DL (ref 28.4–34.8)
MCV RBC AUTO: 96.2 FL (ref 82.6–102.9)
MODE: NORMAL
MONOCYTES # BLD: 11 % (ref 3–12)
NEGATIVE BASE EXCESS, ART: NORMAL (ref 0–2)
NRBC AUTOMATED: 0 PER 100 WBC
O2 DEVICE/FLOW/%: NORMAL
PATIENT TEMP: NORMAL
PDW BLD-RTO: 13.9 % (ref 11.8–14.4)
PHOSPHORUS: 2.4 MG/DL (ref 2.5–4.5)
PLATELET # BLD: 211 K/UL (ref 138–453)
PLATELET ESTIMATE: ABNORMAL
PMV BLD AUTO: 11.1 FL (ref 8.1–13.5)
POC HCO3: 25.8 MMOL/L (ref 21–28)
POC O2 SATURATION: 97 % (ref 94–98)
POC PCO2 TEMP: NORMAL MM HG
POC PCO2: 42 MM HG (ref 35–48)
POC PH TEMP: NORMAL
POC PH: 7.4 (ref 7.35–7.45)
POC PO2 TEMP: NORMAL MM HG
POC PO2: 93.4 MM HG (ref 83–108)
POSITIVE BASE EXCESS, ART: 1 (ref 0–3)
POTASSIUM SERPL-SCNC: 3.9 MMOL/L (ref 3.7–5.3)
RBC # BLD: 4.19 M/UL (ref 4.21–5.77)
RBC # BLD: ABNORMAL 10*6/UL
SAMPLE SITE: NORMAL
SEG NEUTROPHILS: 78 % (ref 36–65)
SEGMENTED NEUTROPHILS ABSOLUTE COUNT: 9.94 K/UL (ref 1.5–8.1)
SODIUM BLD-SCNC: 142 MMOL/L (ref 135–144)
SODIUM BLD-SCNC: 143 MMOL/L (ref 135–144)
SODIUM BLD-SCNC: 147 MMOL/L (ref 135–144)
TCO2 (CALC), ART: 27 MMOL/L (ref 22–29)
WBC # BLD: 12.7 K/UL (ref 3.5–11.3)
WBC # BLD: ABNORMAL 10*3/UL

## 2019-03-10 PROCEDURE — 6360000002 HC RX W HCPCS: Performed by: STUDENT IN AN ORGANIZED HEALTH CARE EDUCATION/TRAINING PROGRAM

## 2019-03-10 PROCEDURE — 6370000000 HC RX 637 (ALT 250 FOR IP): Performed by: STUDENT IN AN ORGANIZED HEALTH CARE EDUCATION/TRAINING PROGRAM

## 2019-03-10 PROCEDURE — 2500000003 HC RX 250 WO HCPCS: Performed by: STUDENT IN AN ORGANIZED HEALTH CARE EDUCATION/TRAINING PROGRAM

## 2019-03-10 PROCEDURE — 94770 HC ETCO2 MONITOR DAILY: CPT

## 2019-03-10 PROCEDURE — 99291 CRITICAL CARE FIRST HOUR: CPT | Performed by: PSYCHIATRY & NEUROLOGY

## 2019-03-10 PROCEDURE — 84295 ASSAY OF SERUM SODIUM: CPT

## 2019-03-10 PROCEDURE — 80048 BASIC METABOLIC PNL TOTAL CA: CPT

## 2019-03-10 PROCEDURE — 83735 ASSAY OF MAGNESIUM: CPT

## 2019-03-10 PROCEDURE — 71045 X-RAY EXAM CHEST 1 VIEW: CPT

## 2019-03-10 PROCEDURE — 36600 WITHDRAWAL OF ARTERIAL BLOOD: CPT

## 2019-03-10 PROCEDURE — 82803 BLOOD GASES ANY COMBINATION: CPT

## 2019-03-10 PROCEDURE — 95819 EEG AWAKE AND ASLEEP: CPT

## 2019-03-10 PROCEDURE — 36415 COLL VENOUS BLD VENIPUNCTURE: CPT

## 2019-03-10 PROCEDURE — 82330 ASSAY OF CALCIUM: CPT

## 2019-03-10 PROCEDURE — 82947 ASSAY GLUCOSE BLOOD QUANT: CPT

## 2019-03-10 PROCEDURE — 84100 ASSAY OF PHOSPHORUS: CPT

## 2019-03-10 PROCEDURE — 2700000000 HC OXYGEN THERAPY PER DAY

## 2019-03-10 PROCEDURE — 85025 COMPLETE CBC W/AUTO DIFF WBC: CPT

## 2019-03-10 PROCEDURE — 94762 N-INVAS EAR/PLS OXIMTRY CONT: CPT

## 2019-03-10 PROCEDURE — 2580000003 HC RX 258: Performed by: STUDENT IN AN ORGANIZED HEALTH CARE EDUCATION/TRAINING PROGRAM

## 2019-03-10 PROCEDURE — 94003 VENT MGMT INPAT SUBQ DAY: CPT

## 2019-03-10 PROCEDURE — 2000000003 HC NEURO ICU R&B

## 2019-03-10 RX ADMIN — PROPOFOL 40 MCG/KG/MIN: 10 INJECTION, EMULSION INTRAVENOUS at 01:30

## 2019-03-10 RX ADMIN — DEXMEDETOMIDINE HYDROCHLORIDE 0.2 MCG/KG/HR: 100 INJECTION, SOLUTION INTRAVENOUS at 12:44

## 2019-03-10 RX ADMIN — PROPOFOL 20 MCG/KG/MIN: 10 INJECTION, EMULSION INTRAVENOUS at 14:53

## 2019-03-10 RX ADMIN — THIAMINE HYDROCHLORIDE: 100 INJECTION, SOLUTION INTRAMUSCULAR; INTRAVENOUS at 08:27

## 2019-03-10 RX ADMIN — INSULIN LISPRO 3 UNITS: 100 INJECTION, SOLUTION INTRAVENOUS; SUBCUTANEOUS at 12:17

## 2019-03-10 RX ADMIN — INSULIN LISPRO 2 UNITS: 100 INJECTION, SOLUTION INTRAVENOUS; SUBCUTANEOUS at 20:19

## 2019-03-10 RX ADMIN — FAMOTIDINE 20 MG: 10 INJECTION, SOLUTION INTRAVENOUS at 20:18

## 2019-03-10 RX ADMIN — PROPOFOL 50 MCG/KG/MIN: 10 INJECTION, EMULSION INTRAVENOUS at 21:41

## 2019-03-10 RX ADMIN — PROPOFOL 40 MCG/KG/MIN: 10 INJECTION, EMULSION INTRAVENOUS at 05:21

## 2019-03-10 RX ADMIN — Medication 15 ML: at 20:17

## 2019-03-10 RX ADMIN — PROPOFOL 30 MCG/KG/MIN: 10 INJECTION, EMULSION INTRAVENOUS at 09:57

## 2019-03-10 RX ADMIN — FAMOTIDINE 20 MG: 10 INJECTION, SOLUTION INTRAVENOUS at 08:27

## 2019-03-10 RX ADMIN — Medication 15 ML: at 08:18

## 2019-03-10 RX ADMIN — LEVETIRACETAM 500 MG: 5 INJECTION INTRAVENOUS at 06:19

## 2019-03-10 RX ADMIN — LEVETIRACETAM 500 MG: 5 INJECTION INTRAVENOUS at 17:35

## 2019-03-10 RX ADMIN — INSULIN LISPRO 3 UNITS: 100 INJECTION, SOLUTION INTRAVENOUS; SUBCUTANEOUS at 16:44

## 2019-03-10 RX ADMIN — INSULIN LISPRO 3 UNITS: 100 INJECTION, SOLUTION INTRAVENOUS; SUBCUTANEOUS at 08:27

## 2019-03-10 RX ADMIN — Medication 10 ML: at 08:27

## 2019-03-10 RX ADMIN — Medication 50 MCG/HR: at 08:18

## 2019-03-10 ASSESSMENT — PULMONARY FUNCTION TESTS
PIF_VALUE: 18
PIF_VALUE: 18
PIF_VALUE: 17
PIF_VALUE: 18
PIF_VALUE: 19
PIF_VALUE: 19
PIF_VALUE: 18
PIF_VALUE: 19
PIF_VALUE: 17
PIF_VALUE: 18
PIF_VALUE: 18
PIF_VALUE: 19
PIF_VALUE: 17
PIF_VALUE: 19
PIF_VALUE: 19

## 2019-03-11 ENCOUNTER — APPOINTMENT (OUTPATIENT)
Dept: GENERAL RADIOLOGY | Age: 74
DRG: 023 | End: 2019-03-11
Payer: MEDICARE

## 2019-03-11 ENCOUNTER — APPOINTMENT (OUTPATIENT)
Dept: CT IMAGING | Age: 74
DRG: 023 | End: 2019-03-11
Payer: MEDICARE

## 2019-03-11 LAB
ABSOLUTE EOS #: 0.12 K/UL (ref 0–0.44)
ABSOLUTE IMMATURE GRANULOCYTE: 0.03 K/UL (ref 0–0.3)
ABSOLUTE LYMPH #: 1.52 K/UL (ref 1.1–3.7)
ABSOLUTE MONO #: 1.18 K/UL (ref 0.1–1.2)
ALLEN TEST: POSITIVE
ALLEN TEST: POSITIVE
ANION GAP SERPL CALCULATED.3IONS-SCNC: 12 MMOL/L (ref 9–17)
BASOPHILS # BLD: 0 % (ref 0–2)
BASOPHILS ABSOLUTE: 0.05 K/UL (ref 0–0.2)
BUN BLDV-MCNC: 7 MG/DL (ref 8–23)
BUN/CREAT BLD: ABNORMAL (ref 9–20)
CALCIUM IONIZED: 1.16 MMOL/L (ref 1.13–1.33)
CALCIUM SERPL-MCNC: 8 MG/DL (ref 8.6–10.4)
CHLORIDE BLD-SCNC: 112 MMOL/L (ref 98–107)
CO2: 20 MMOL/L (ref 20–31)
CREAT SERPL-MCNC: 0.51 MG/DL (ref 0.7–1.2)
DIFFERENTIAL TYPE: ABNORMAL
EOSINOPHILS RELATIVE PERCENT: 1 % (ref 1–4)
FIO2: 30
FIO2: 40
GFR AFRICAN AMERICAN: >60 ML/MIN
GFR NON-AFRICAN AMERICAN: >60 ML/MIN
GFR SERPL CREATININE-BSD FRML MDRD: ABNORMAL ML/MIN/{1.73_M2}
GFR SERPL CREATININE-BSD FRML MDRD: ABNORMAL ML/MIN/{1.73_M2}
GLUCOSE BLD-MCNC: 131 MG/DL (ref 75–110)
GLUCOSE BLD-MCNC: 155 MG/DL (ref 70–99)
GLUCOSE BLD-MCNC: 158 MG/DL (ref 75–110)
GLUCOSE BLD-MCNC: 98 MG/DL (ref 75–110)
HCT VFR BLD CALC: 36.7 % (ref 40.7–50.3)
HEMOGLOBIN: 12.1 G/DL (ref 13–17)
IMMATURE GRANULOCYTES: 0 %
LYMPHOCYTES # BLD: 13 % (ref 24–43)
MAGNESIUM: 1.8 MG/DL (ref 1.6–2.6)
MCH RBC QN AUTO: 31.5 PG (ref 25.2–33.5)
MCHC RBC AUTO-ENTMCNC: 33 G/DL (ref 28.4–34.8)
MCV RBC AUTO: 95.6 FL (ref 82.6–102.9)
MODE: ABNORMAL
MODE: NORMAL
MONOCYTES # BLD: 10 % (ref 3–12)
NEGATIVE BASE EXCESS, ART: ABNORMAL (ref 0–2)
NEGATIVE BASE EXCESS, ART: NORMAL (ref 0–2)
NRBC AUTOMATED: 0 PER 100 WBC
O2 DEVICE/FLOW/%: ABNORMAL
O2 DEVICE/FLOW/%: NORMAL
PATIENT TEMP: ABNORMAL
PATIENT TEMP: NORMAL
PDW BLD-RTO: 13.9 % (ref 11.8–14.4)
PHOSPHORUS: 1.4 MG/DL (ref 2.5–4.5)
PLATELET # BLD: 181 K/UL (ref 138–453)
PLATELET ESTIMATE: ABNORMAL
PMV BLD AUTO: 12.5 FL (ref 8.1–13.5)
POC HCO3: 23.6 MMOL/L (ref 21–28)
POC HCO3: 25.1 MMOL/L (ref 21–28)
POC O2 SATURATION: 97 % (ref 94–98)
POC O2 SATURATION: 99 % (ref 94–98)
POC PCO2 TEMP: ABNORMAL MM HG
POC PCO2 TEMP: NORMAL MM HG
POC PCO2: 33.1 MM HG (ref 35–48)
POC PCO2: 40.8 MM HG (ref 35–48)
POC PH TEMP: ABNORMAL
POC PH TEMP: NORMAL
POC PH: 7.4 (ref 7.35–7.45)
POC PH: 7.46 (ref 7.35–7.45)
POC PO2 TEMP: ABNORMAL MM HG
POC PO2 TEMP: NORMAL MM HG
POC PO2: 137.9 MM HG (ref 83–108)
POC PO2: 87 MM HG (ref 83–108)
POSITIVE BASE EXCESS, ART: 0 (ref 0–3)
POSITIVE BASE EXCESS, ART: 0 (ref 0–3)
POTASSIUM SERPL-SCNC: 3.4 MMOL/L (ref 3.7–5.3)
RBC # BLD: 3.84 M/UL (ref 4.21–5.77)
RBC # BLD: ABNORMAL 10*6/UL
SAMPLE SITE: ABNORMAL
SAMPLE SITE: NORMAL
SEG NEUTROPHILS: 76 % (ref 36–65)
SEGMENTED NEUTROPHILS ABSOLUTE COUNT: 8.88 K/UL (ref 1.5–8.1)
SODIUM BLD-SCNC: 144 MMOL/L (ref 135–144)
TCO2 (CALC), ART: 25 MMOL/L (ref 22–29)
TCO2 (CALC), ART: 26 MMOL/L (ref 22–29)
WBC # BLD: 11.8 K/UL (ref 3.5–11.3)
WBC # BLD: ABNORMAL 10*3/UL

## 2019-03-11 PROCEDURE — 36600 WITHDRAWAL OF ARTERIAL BLOOD: CPT

## 2019-03-11 PROCEDURE — 2580000003 HC RX 258: Performed by: NURSE PRACTITIONER

## 2019-03-11 PROCEDURE — APPNB30 APP NON BILLABLE TIME 0-30 MINS: Performed by: REGISTERED NURSE

## 2019-03-11 PROCEDURE — 82947 ASSAY GLUCOSE BLOOD QUANT: CPT

## 2019-03-11 PROCEDURE — 71045 X-RAY EXAM CHEST 1 VIEW: CPT

## 2019-03-11 PROCEDURE — 6370000000 HC RX 637 (ALT 250 FOR IP): Performed by: STUDENT IN AN ORGANIZED HEALTH CARE EDUCATION/TRAINING PROGRAM

## 2019-03-11 PROCEDURE — 85025 COMPLETE CBC W/AUTO DIFF WBC: CPT

## 2019-03-11 PROCEDURE — 2500000003 HC RX 250 WO HCPCS: Performed by: STUDENT IN AN ORGANIZED HEALTH CARE EDUCATION/TRAINING PROGRAM

## 2019-03-11 PROCEDURE — 82330 ASSAY OF CALCIUM: CPT

## 2019-03-11 PROCEDURE — 6360000002 HC RX W HCPCS: Performed by: STUDENT IN AN ORGANIZED HEALTH CARE EDUCATION/TRAINING PROGRAM

## 2019-03-11 PROCEDURE — 6370000000 HC RX 637 (ALT 250 FOR IP): Performed by: NURSE PRACTITIONER

## 2019-03-11 PROCEDURE — 95822 EEG COMA OR SLEEP ONLY: CPT | Performed by: PSYCHIATRY & NEUROLOGY

## 2019-03-11 PROCEDURE — 00P630Z REMOVAL OF DRAINAGE DEVICE FROM CEREBRAL VENTRICLE, PERCUTANEOUS APPROACH: ICD-10-PCS | Performed by: NEUROLOGICAL SURGERY

## 2019-03-11 PROCEDURE — 94770 HC ETCO2 MONITOR DAILY: CPT

## 2019-03-11 PROCEDURE — APPNB45 APP NON BILLABLE 31-45 MINUTES: Performed by: NURSE PRACTITIONER

## 2019-03-11 PROCEDURE — 80048 BASIC METABOLIC PNL TOTAL CA: CPT

## 2019-03-11 PROCEDURE — 94003 VENT MGMT INPAT SUBQ DAY: CPT

## 2019-03-11 PROCEDURE — 82803 BLOOD GASES ANY COMBINATION: CPT

## 2019-03-11 PROCEDURE — 2500000003 HC RX 250 WO HCPCS: Performed by: NURSE PRACTITIONER

## 2019-03-11 PROCEDURE — 36415 COLL VENOUS BLD VENIPUNCTURE: CPT

## 2019-03-11 PROCEDURE — 83735 ASSAY OF MAGNESIUM: CPT

## 2019-03-11 PROCEDURE — 99291 CRITICAL CARE FIRST HOUR: CPT | Performed by: PSYCHIATRY & NEUROLOGY

## 2019-03-11 PROCEDURE — 70450 CT HEAD/BRAIN W/O DYE: CPT

## 2019-03-11 PROCEDURE — 2700000000 HC OXYGEN THERAPY PER DAY

## 2019-03-11 PROCEDURE — 94762 N-INVAS EAR/PLS OXIMTRY CONT: CPT

## 2019-03-11 PROCEDURE — 2580000003 HC RX 258: Performed by: STUDENT IN AN ORGANIZED HEALTH CARE EDUCATION/TRAINING PROGRAM

## 2019-03-11 PROCEDURE — 2000000003 HC NEURO ICU R&B

## 2019-03-11 PROCEDURE — 84100 ASSAY OF PHOSPHORUS: CPT

## 2019-03-11 RX ORDER — THIAMINE HCL 100 MG
100 TABLET ORAL DAILY
Status: DISCONTINUED | OUTPATIENT
Start: 2019-03-12 | End: 2019-03-26 | Stop reason: HOSPADM

## 2019-03-11 RX ORDER — FOLIC ACID 1 MG/1
1 TABLET ORAL DAILY
Status: DISCONTINUED | OUTPATIENT
Start: 2019-03-12 | End: 2019-03-26 | Stop reason: HOSPADM

## 2019-03-11 RX ORDER — POTASSIUM CHLORIDE 20MEQ/15ML
40 LIQUID (ML) ORAL ONCE
Status: COMPLETED | OUTPATIENT
Start: 2019-03-11 | End: 2019-03-11

## 2019-03-11 RX ORDER — SENNA AND DOCUSATE SODIUM 50; 8.6 MG/1; MG/1
2 TABLET, FILM COATED ORAL DAILY
Status: DISCONTINUED | OUTPATIENT
Start: 2019-03-11 | End: 2019-03-16

## 2019-03-11 RX ADMIN — POTASSIUM PHOSPHATE, MONOBASIC AND POTASSIUM PHOSPHATE, DIBASIC 20 MMOL: 224; 236 INJECTION, SOLUTION, CONCENTRATE INTRAVENOUS at 11:17

## 2019-03-11 RX ADMIN — LEVETIRACETAM 500 MG: 5 INJECTION INTRAVENOUS at 17:40

## 2019-03-11 RX ADMIN — PROPOFOL 50 MCG/KG/MIN: 10 INJECTION, EMULSION INTRAVENOUS at 08:47

## 2019-03-11 RX ADMIN — Medication 50 MCG/HR: at 03:27

## 2019-03-11 RX ADMIN — LORAZEPAM 2 MG: 1 TABLET ORAL at 12:54

## 2019-03-11 RX ADMIN — PROPOFOL 20 MCG/KG/MIN: 10 INJECTION, EMULSION INTRAVENOUS at 20:51

## 2019-03-11 RX ADMIN — FAMOTIDINE 20 MG: 10 INJECTION, SOLUTION INTRAVENOUS at 21:04

## 2019-03-11 RX ADMIN — POTASSIUM CHLORIDE 40 MEQ: 40 SOLUTION ORAL at 12:51

## 2019-03-11 RX ADMIN — PROPOFOL 50 MCG/KG/MIN: 10 INJECTION, EMULSION INTRAVENOUS at 05:08

## 2019-03-11 RX ADMIN — Medication 15 ML: at 21:04

## 2019-03-11 RX ADMIN — Medication 15 ML: at 08:01

## 2019-03-11 RX ADMIN — PROPOFOL 50 MCG/KG/MIN: 10 INJECTION, EMULSION INTRAVENOUS at 01:48

## 2019-03-11 RX ADMIN — PROPOFOL 50 MCG/KG/MIN: 10 INJECTION, EMULSION INTRAVENOUS at 12:51

## 2019-03-11 RX ADMIN — INSULIN LISPRO 3 UNITS: 100 INJECTION, SOLUTION INTRAVENOUS; SUBCUTANEOUS at 15:21

## 2019-03-11 RX ADMIN — Medication 10 ML: at 08:01

## 2019-03-11 RX ADMIN — FAMOTIDINE 20 MG: 10 INJECTION, SOLUTION INTRAVENOUS at 08:01

## 2019-03-11 RX ADMIN — THIAMINE HYDROCHLORIDE: 100 INJECTION, SOLUTION INTRAMUSCULAR; INTRAVENOUS at 08:01

## 2019-03-11 RX ADMIN — LEVETIRACETAM 500 MG: 5 INJECTION INTRAVENOUS at 05:27

## 2019-03-11 RX ADMIN — SENNOSIDES AND DOCUSATE SODIUM 2 TABLET: 8.6; 5 TABLET ORAL at 08:01

## 2019-03-11 RX ADMIN — Medication 50 MCG/HR: at 23:17

## 2019-03-11 ASSESSMENT — PULMONARY FUNCTION TESTS
PIF_VALUE: 15
PIF_VALUE: 11
PIF_VALUE: 21
PIF_VALUE: 15
PIF_VALUE: 11
PIF_VALUE: 11
PIF_VALUE: 22
PIF_VALUE: 15
PIF_VALUE: 15

## 2019-03-11 ASSESSMENT — PAIN SCALES - GENERAL: PAINLEVEL_OUTOF10: 0

## 2019-03-12 ENCOUNTER — APPOINTMENT (OUTPATIENT)
Dept: GENERAL RADIOLOGY | Age: 74
DRG: 023 | End: 2019-03-12
Payer: MEDICARE

## 2019-03-12 LAB
ABSOLUTE EOS #: 0.15 K/UL (ref 0–0.44)
ABSOLUTE IMMATURE GRANULOCYTE: 0.04 K/UL (ref 0–0.3)
ABSOLUTE LYMPH #: 1.48 K/UL (ref 1.1–3.7)
ABSOLUTE MONO #: 1.17 K/UL (ref 0.1–1.2)
ALLEN TEST: POSITIVE
ANION GAP SERPL CALCULATED.3IONS-SCNC: 11 MMOL/L (ref 9–17)
BASOPHILS # BLD: 0 % (ref 0–2)
BASOPHILS ABSOLUTE: 0.05 K/UL (ref 0–0.2)
BUN BLDV-MCNC: 6 MG/DL (ref 8–23)
BUN/CREAT BLD: ABNORMAL (ref 9–20)
CALCIUM IONIZED: 1.09 MMOL/L (ref 1.13–1.33)
CALCIUM SERPL-MCNC: 7.9 MG/DL (ref 8.6–10.4)
CHLORIDE BLD-SCNC: 113 MMOL/L (ref 98–107)
CO2: 21 MMOL/L (ref 20–31)
CREAT SERPL-MCNC: 0.6 MG/DL (ref 0.7–1.2)
DIFFERENTIAL TYPE: ABNORMAL
EOSINOPHILS RELATIVE PERCENT: 1 % (ref 1–4)
FIO2: 30
GFR AFRICAN AMERICAN: >60 ML/MIN
GFR NON-AFRICAN AMERICAN: >60 ML/MIN
GFR SERPL CREATININE-BSD FRML MDRD: ABNORMAL ML/MIN/{1.73_M2}
GFR SERPL CREATININE-BSD FRML MDRD: ABNORMAL ML/MIN/{1.73_M2}
GLUCOSE BLD-MCNC: 115 MG/DL (ref 75–110)
GLUCOSE BLD-MCNC: 119 MG/DL (ref 75–110)
GLUCOSE BLD-MCNC: 126 MG/DL (ref 70–99)
GLUCOSE BLD-MCNC: 156 MG/DL (ref 75–110)
HCT VFR BLD CALC: 38.1 % (ref 40.7–50.3)
HEMOGLOBIN: 12.4 G/DL (ref 13–17)
IMMATURE GRANULOCYTES: 0 %
LYMPHOCYTES # BLD: 12 % (ref 24–43)
MAGNESIUM: 1.7 MG/DL (ref 1.6–2.6)
MCH RBC QN AUTO: 31.4 PG (ref 25.2–33.5)
MCHC RBC AUTO-ENTMCNC: 32.5 G/DL (ref 28.4–34.8)
MCV RBC AUTO: 96.5 FL (ref 82.6–102.9)
MODE: NORMAL
MONOCYTES # BLD: 10 % (ref 3–12)
NEGATIVE BASE EXCESS, ART: 1 (ref 0–2)
NRBC AUTOMATED: 0 PER 100 WBC
O2 DEVICE/FLOW/%: NORMAL
PATIENT TEMP: NORMAL
PDW BLD-RTO: 13.7 % (ref 11.8–14.4)
PHOSPHORUS: 1.8 MG/DL (ref 2.5–4.5)
PLATELET # BLD: 180 K/UL (ref 138–453)
PLATELET ESTIMATE: ABNORMAL
PMV BLD AUTO: 11 FL (ref 8.1–13.5)
POC HCO3: 23.6 MMOL/L (ref 21–28)
POC O2 SATURATION: 96 % (ref 94–98)
POC PCO2 TEMP: NORMAL MM HG
POC PCO2: 39 MM HG (ref 35–48)
POC PH TEMP: NORMAL
POC PH: 7.39 (ref 7.35–7.45)
POC PO2 TEMP: NORMAL MM HG
POC PO2: 84.9 MM HG (ref 83–108)
POSITIVE BASE EXCESS, ART: NORMAL (ref 0–3)
POTASSIUM SERPL-SCNC: 4.2 MMOL/L (ref 3.7–5.3)
PROCALCITONIN: 0.07 NG/ML
RBC # BLD: 3.95 M/UL (ref 4.21–5.77)
RBC # BLD: ABNORMAL 10*6/UL
SAMPLE SITE: NORMAL
SEG NEUTROPHILS: 77 % (ref 36–65)
SEGMENTED NEUTROPHILS ABSOLUTE COUNT: 9.29 K/UL (ref 1.5–8.1)
SODIUM BLD-SCNC: 145 MMOL/L (ref 135–144)
TCO2 (CALC), ART: 25 MMOL/L (ref 22–29)
TRIGL SERPL-MCNC: 128 MG/DL
WBC # BLD: 12.2 K/UL (ref 3.5–11.3)
WBC # BLD: ABNORMAL 10*3/UL

## 2019-03-12 PROCEDURE — 71045 X-RAY EXAM CHEST 1 VIEW: CPT

## 2019-03-12 PROCEDURE — 94770 HC ETCO2 MONITOR DAILY: CPT

## 2019-03-12 PROCEDURE — 94003 VENT MGMT INPAT SUBQ DAY: CPT

## 2019-03-12 PROCEDURE — 6370000000 HC RX 637 (ALT 250 FOR IP): Performed by: NEUROLOGICAL SURGERY

## 2019-03-12 PROCEDURE — 2500000003 HC RX 250 WO HCPCS: Performed by: STUDENT IN AN ORGANIZED HEALTH CARE EDUCATION/TRAINING PROGRAM

## 2019-03-12 PROCEDURE — 84145 PROCALCITONIN (PCT): CPT

## 2019-03-12 PROCEDURE — 2580000003 HC RX 258: Performed by: STUDENT IN AN ORGANIZED HEALTH CARE EDUCATION/TRAINING PROGRAM

## 2019-03-12 PROCEDURE — 82330 ASSAY OF CALCIUM: CPT

## 2019-03-12 PROCEDURE — 99291 CRITICAL CARE FIRST HOUR: CPT | Performed by: PSYCHIATRY & NEUROLOGY

## 2019-03-12 PROCEDURE — 6370000000 HC RX 637 (ALT 250 FOR IP): Performed by: NURSE PRACTITIONER

## 2019-03-12 PROCEDURE — 85025 COMPLETE CBC W/AUTO DIFF WBC: CPT

## 2019-03-12 PROCEDURE — 6360000002 HC RX W HCPCS: Performed by: PSYCHIATRY & NEUROLOGY

## 2019-03-12 PROCEDURE — 84478 ASSAY OF TRIGLYCERIDES: CPT

## 2019-03-12 PROCEDURE — 93005 ELECTROCARDIOGRAM TRACING: CPT

## 2019-03-12 PROCEDURE — 6360000002 HC RX W HCPCS: Performed by: STUDENT IN AN ORGANIZED HEALTH CARE EDUCATION/TRAINING PROGRAM

## 2019-03-12 PROCEDURE — 76937 US GUIDE VASCULAR ACCESS: CPT

## 2019-03-12 PROCEDURE — 94762 N-INVAS EAR/PLS OXIMTRY CONT: CPT

## 2019-03-12 PROCEDURE — APPNB30 APP NON BILLABLE TIME 0-30 MINS: Performed by: REGISTERED NURSE

## 2019-03-12 PROCEDURE — 82947 ASSAY GLUCOSE BLOOD QUANT: CPT

## 2019-03-12 PROCEDURE — 80048 BASIC METABOLIC PNL TOTAL CA: CPT

## 2019-03-12 PROCEDURE — 2000000003 HC NEURO ICU R&B

## 2019-03-12 PROCEDURE — 36415 COLL VENOUS BLD VENIPUNCTURE: CPT

## 2019-03-12 PROCEDURE — 82803 BLOOD GASES ANY COMBINATION: CPT

## 2019-03-12 PROCEDURE — 83735 ASSAY OF MAGNESIUM: CPT

## 2019-03-12 PROCEDURE — 2700000000 HC OXYGEN THERAPY PER DAY

## 2019-03-12 PROCEDURE — 84100 ASSAY OF PHOSPHORUS: CPT

## 2019-03-12 PROCEDURE — 36600 WITHDRAWAL OF ARTERIAL BLOOD: CPT

## 2019-03-12 RX ORDER — HYDRALAZINE HYDROCHLORIDE 20 MG/ML
10 INJECTION INTRAMUSCULAR; INTRAVENOUS EVERY 6 HOURS PRN
Status: DISCONTINUED | OUTPATIENT
Start: 2019-03-12 | End: 2019-03-13

## 2019-03-12 RX ORDER — LEVOFLOXACIN 5 MG/ML
500 INJECTION, SOLUTION INTRAVENOUS EVERY 24 HOURS
Status: DISCONTINUED | OUTPATIENT
Start: 2019-03-12 | End: 2019-03-13

## 2019-03-12 RX ORDER — LABETALOL HYDROCHLORIDE 5 MG/ML
10 INJECTION, SOLUTION INTRAVENOUS
Status: DISCONTINUED | OUTPATIENT
Start: 2019-03-12 | End: 2019-03-13

## 2019-03-12 RX ADMIN — LORAZEPAM 2 MG: 2 INJECTION INTRAMUSCULAR; INTRAVENOUS at 21:16

## 2019-03-12 RX ADMIN — ENOXAPARIN SODIUM 40 MG: 40 INJECTION SUBCUTANEOUS at 17:00

## 2019-03-12 RX ADMIN — Medication 10 MG: at 23:02

## 2019-03-12 RX ADMIN — LORAZEPAM 2 MG: 2 INJECTION INTRAMUSCULAR; INTRAVENOUS at 17:00

## 2019-03-12 RX ADMIN — Medication 10 ML: at 08:15

## 2019-03-12 RX ADMIN — LEVOFLOXACIN 500 MG: 5 INJECTION, SOLUTION INTRAVENOUS at 12:31

## 2019-03-12 RX ADMIN — LEVETIRACETAM 500 MG: 5 INJECTION INTRAVENOUS at 05:25

## 2019-03-12 RX ADMIN — SENNOSIDES AND DOCUSATE SODIUM 2 TABLET: 8.6; 5 TABLET ORAL at 08:39

## 2019-03-12 RX ADMIN — FAMOTIDINE 20 MG: 10 INJECTION, SOLUTION INTRAVENOUS at 08:15

## 2019-03-12 RX ADMIN — Medication 100 MG: at 08:39

## 2019-03-12 RX ADMIN — LEVETIRACETAM 500 MG: 5 INJECTION INTRAVENOUS at 17:00

## 2019-03-12 RX ADMIN — Medication 10 ML: at 20:48

## 2019-03-12 RX ADMIN — LORAZEPAM 2 MG: 2 INJECTION INTRAMUSCULAR; INTRAVENOUS at 01:13

## 2019-03-12 RX ADMIN — LORAZEPAM 2 MG: 2 INJECTION INTRAMUSCULAR; INTRAVENOUS at 02:36

## 2019-03-12 RX ADMIN — Medication 10 MG: at 18:14

## 2019-03-12 RX ADMIN — FOLIC ACID 1 MG: 1 TABLET ORAL at 08:39

## 2019-03-12 ASSESSMENT — PULMONARY FUNCTION TESTS
PIF_VALUE: 24
PIF_VALUE: 13
PIF_VALUE: 20
PIF_VALUE: 18

## 2019-03-12 ASSESSMENT — PAIN SCALES - GENERAL: PAINLEVEL_OUTOF10: 0

## 2019-03-13 ENCOUNTER — APPOINTMENT (OUTPATIENT)
Dept: CT IMAGING | Age: 74
DRG: 023 | End: 2019-03-13
Payer: MEDICARE

## 2019-03-13 ENCOUNTER — APPOINTMENT (OUTPATIENT)
Dept: GENERAL RADIOLOGY | Age: 74
DRG: 023 | End: 2019-03-13
Payer: MEDICARE

## 2019-03-13 LAB
ABSOLUTE EOS #: 0.04 K/UL (ref 0–0.44)
ABSOLUTE EOS #: 0.05 K/UL (ref 0–0.44)
ABSOLUTE IMMATURE GRANULOCYTE: 0.04 K/UL (ref 0–0.3)
ABSOLUTE IMMATURE GRANULOCYTE: 0.09 K/UL (ref 0–0.3)
ABSOLUTE LYMPH #: 0.83 K/UL (ref 1.1–3.7)
ABSOLUTE LYMPH #: 1.24 K/UL (ref 1.1–3.7)
ABSOLUTE MONO #: 0.99 K/UL (ref 0.1–1.2)
ABSOLUTE MONO #: 1.11 K/UL (ref 0.1–1.2)
ALLEN TEST: ABNORMAL
ALLEN TEST: POSITIVE
ANION GAP SERPL CALCULATED.3IONS-SCNC: 16 MMOL/L (ref 9–17)
ANION GAP SERPL CALCULATED.3IONS-SCNC: 19 MMOL/L (ref 9–17)
BASOPHILS # BLD: 0 % (ref 0–2)
BASOPHILS # BLD: 1 % (ref 0–2)
BASOPHILS ABSOLUTE: 0.05 K/UL (ref 0–0.2)
BASOPHILS ABSOLUTE: 0.06 K/UL (ref 0–0.2)
BUN BLDV-MCNC: 12 MG/DL (ref 8–23)
BUN BLDV-MCNC: 7 MG/DL (ref 8–23)
BUN/CREAT BLD: ABNORMAL (ref 9–20)
BUN/CREAT BLD: ABNORMAL (ref 9–20)
CALCIUM IONIZED: 1.13 MMOL/L (ref 1.13–1.33)
CALCIUM SERPL-MCNC: 8.4 MG/DL (ref 8.6–10.4)
CALCIUM SERPL-MCNC: 8.5 MG/DL (ref 8.6–10.4)
CHLORIDE BLD-SCNC: 106 MMOL/L (ref 98–107)
CHLORIDE BLD-SCNC: 107 MMOL/L (ref 98–107)
CO2: 15 MMOL/L (ref 20–31)
CO2: 16 MMOL/L (ref 20–31)
CREAT SERPL-MCNC: 0.47 MG/DL (ref 0.7–1.2)
CREAT SERPL-MCNC: 0.55 MG/DL (ref 0.7–1.2)
DIFFERENTIAL TYPE: ABNORMAL
DIFFERENTIAL TYPE: ABNORMAL
EKG ATRIAL RATE: 80 BPM
EKG P AXIS: 17 DEGREES
EKG P-R INTERVAL: 174 MS
EKG Q-T INTERVAL: 380 MS
EKG QRS DURATION: 76 MS
EKG QTC CALCULATION (BAZETT): 438 MS
EKG R AXIS: -7 DEGREES
EKG T AXIS: -40 DEGREES
EKG VENTRICULAR RATE: 80 BPM
EOSINOPHILS RELATIVE PERCENT: 0 % (ref 1–4)
EOSINOPHILS RELATIVE PERCENT: 0 % (ref 1–4)
FIO2: 100
FIO2: ABNORMAL
GFR AFRICAN AMERICAN: >60 ML/MIN
GFR AFRICAN AMERICAN: >60 ML/MIN
GFR NON-AFRICAN AMERICAN: >60 ML/MIN
GFR NON-AFRICAN AMERICAN: >60 ML/MIN
GFR SERPL CREATININE-BSD FRML MDRD: ABNORMAL ML/MIN/{1.73_M2}
GLUCOSE BLD-MCNC: 158 MG/DL (ref 70–99)
GLUCOSE BLD-MCNC: 158 MG/DL (ref 75–110)
GLUCOSE BLD-MCNC: 165 MG/DL (ref 74–100)
GLUCOSE BLD-MCNC: 165 MG/DL (ref 75–110)
GLUCOSE BLD-MCNC: 192 MG/DL (ref 75–110)
GLUCOSE BLD-MCNC: 242 MG/DL (ref 75–110)
GLUCOSE BLD-MCNC: 260 MG/DL (ref 70–99)
HCT VFR BLD CALC: 41.5 % (ref 40.7–50.3)
HCT VFR BLD CALC: 42.5 % (ref 40.7–50.3)
HCT VFR BLD CALC: 43.4 % (ref 40.7–50.3)
HEMOGLOBIN: 13.6 G/DL (ref 13–17)
HEMOGLOBIN: 14.7 G/DL (ref 13–17)
HEMOGLOBIN: 14.7 G/DL (ref 13–17)
IMMATURE GRANULOCYTES: 0 %
IMMATURE GRANULOCYTES: 1 %
INR BLD: 1
LACTIC ACID, WHOLE BLOOD: 0.9 MMOL/L (ref 0.7–2.1)
LYMPHOCYTES # BLD: 10 % (ref 24–43)
LYMPHOCYTES # BLD: 6 % (ref 24–43)
MAGNESIUM: 1.6 MG/DL (ref 1.6–2.6)
MAGNESIUM: 1.9 MG/DL (ref 1.6–2.6)
MCH RBC QN AUTO: 31.5 PG (ref 25.2–33.5)
MCH RBC QN AUTO: 31.5 PG (ref 25.2–33.5)
MCH RBC QN AUTO: 31.6 PG (ref 25.2–33.5)
MCHC RBC AUTO-ENTMCNC: 32.8 G/DL (ref 28.4–34.8)
MCHC RBC AUTO-ENTMCNC: 33.9 G/DL (ref 28.4–34.8)
MCHC RBC AUTO-ENTMCNC: 34.6 G/DL (ref 28.4–34.8)
MCV RBC AUTO: 91 FL (ref 82.6–102.9)
MCV RBC AUTO: 93.1 FL (ref 82.6–102.9)
MCV RBC AUTO: 96.5 FL (ref 82.6–102.9)
MODE: ABNORMAL
MODE: ABNORMAL
MONOCYTES # BLD: 8 % (ref 3–12)
MONOCYTES # BLD: 8 % (ref 3–12)
NEGATIVE BASE EXCESS, ART: 3 (ref 0–2)
NEGATIVE BASE EXCESS, ART: 6 (ref 0–2)
NRBC AUTOMATED: 0 PER 100 WBC
O2 DEVICE/FLOW/%: ABNORMAL
O2 DEVICE/FLOW/%: ABNORMAL
PARTIAL THROMBOPLASTIN TIME: 22.4 SEC (ref 20.5–30.5)
PATIENT TEMP: 37.4
PATIENT TEMP: ABNORMAL
PDW BLD-RTO: 13.2 % (ref 11.8–14.4)
PDW BLD-RTO: 13.2 % (ref 11.8–14.4)
PDW BLD-RTO: 13.4 % (ref 11.8–14.4)
PHOSPHORUS: 1.8 MG/DL (ref 2.5–4.5)
PLATELET # BLD: 197 K/UL (ref 138–453)
PLATELET # BLD: 239 K/UL (ref 138–453)
PLATELET # BLD: 255 K/UL (ref 138–453)
PLATELET ESTIMATE: ABNORMAL
PLATELET ESTIMATE: ABNORMAL
PMV BLD AUTO: 10.9 FL (ref 8.1–13.5)
PMV BLD AUTO: 10.9 FL (ref 8.1–13.5)
PMV BLD AUTO: 11.1 FL (ref 8.1–13.5)
POC HCO3: 18.3 MMOL/L (ref 21–28)
POC HCO3: 22.9 MMOL/L (ref 21–28)
POC O2 SATURATION: 100 % (ref 94–98)
POC O2 SATURATION: 95 % (ref 94–98)
POC PCO2 TEMP: 24 MM HG
POC PCO2 TEMP: ABNORMAL MM HG
POC PCO2: 23.6 MM HG (ref 35–48)
POC PCO2: 54.1 MM HG (ref 35–48)
POC PH TEMP: 7.49
POC PH TEMP: ABNORMAL
POC PH: 7.24 (ref 7.35–7.45)
POC PH: 7.5 (ref 7.35–7.45)
POC PO2 TEMP: 70 MM HG
POC PO2 TEMP: ABNORMAL MM HG
POC PO2: 235.8 MM HG (ref 83–108)
POC PO2: 68.5 MM HG (ref 83–108)
POSITIVE BASE EXCESS, ART: ABNORMAL (ref 0–3)
POSITIVE BASE EXCESS, ART: ABNORMAL (ref 0–3)
POTASSIUM SERPL-SCNC: 3.5 MMOL/L (ref 3.7–5.3)
POTASSIUM SERPL-SCNC: 3.5 MMOL/L (ref 3.7–5.3)
PROTHROMBIN TIME: 10.2 SEC (ref 9–12)
RBC # BLD: 4.3 M/UL (ref 4.21–5.77)
RBC # BLD: 4.66 M/UL (ref 4.21–5.77)
RBC # BLD: 4.67 M/UL (ref 4.21–5.77)
RBC # BLD: ABNORMAL 10*6/UL
RBC # BLD: ABNORMAL 10*6/UL
SAMPLE SITE: ABNORMAL
SAMPLE SITE: ABNORMAL
SEG NEUTROPHILS: 81 % (ref 36–65)
SEG NEUTROPHILS: 85 % (ref 36–65)
SEGMENTED NEUTROPHILS ABSOLUTE COUNT: 10.1 K/UL (ref 1.5–8.1)
SEGMENTED NEUTROPHILS ABSOLUTE COUNT: 12.61 K/UL (ref 1.5–8.1)
SODIUM BLD-SCNC: 137 MMOL/L (ref 135–144)
SODIUM BLD-SCNC: 142 MMOL/L (ref 135–144)
TCO2 (CALC), ART: 19 MMOL/L (ref 22–29)
TCO2 (CALC), ART: 25 MMOL/L (ref 22–29)
WBC # BLD: 12.5 K/UL (ref 3.5–11.3)
WBC # BLD: 14.7 K/UL (ref 3.5–11.3)
WBC # BLD: 22.8 K/UL (ref 3.5–11.3)
WBC # BLD: ABNORMAL 10*3/UL
WBC # BLD: ABNORMAL 10*3/UL

## 2019-03-13 PROCEDURE — 83605 ASSAY OF LACTIC ACID: CPT

## 2019-03-13 PROCEDURE — 70450 CT HEAD/BRAIN W/O DYE: CPT

## 2019-03-13 PROCEDURE — 76937 US GUIDE VASCULAR ACCESS: CPT

## 2019-03-13 PROCEDURE — 2500000003 HC RX 250 WO HCPCS: Performed by: NURSE PRACTITIONER

## 2019-03-13 PROCEDURE — 6360000002 HC RX W HCPCS: Performed by: NURSE PRACTITIONER

## 2019-03-13 PROCEDURE — 82330 ASSAY OF CALCIUM: CPT

## 2019-03-13 PROCEDURE — 99222 1ST HOSP IP/OBS MODERATE 55: CPT | Performed by: INTERNAL MEDICINE

## 2019-03-13 PROCEDURE — 6370000000 HC RX 637 (ALT 250 FOR IP): Performed by: STUDENT IN AN ORGANIZED HEALTH CARE EDUCATION/TRAINING PROGRAM

## 2019-03-13 PROCEDURE — 97110 THERAPEUTIC EXERCISES: CPT

## 2019-03-13 PROCEDURE — 85730 THROMBOPLASTIN TIME PARTIAL: CPT

## 2019-03-13 PROCEDURE — 6360000002 HC RX W HCPCS: Performed by: STUDENT IN AN ORGANIZED HEALTH CARE EDUCATION/TRAINING PROGRAM

## 2019-03-13 PROCEDURE — C9113 INJ PANTOPRAZOLE SODIUM, VIA: HCPCS | Performed by: NURSE PRACTITIONER

## 2019-03-13 PROCEDURE — 85014 HEMATOCRIT: CPT

## 2019-03-13 PROCEDURE — 74018 RADEX ABDOMEN 1 VIEW: CPT

## 2019-03-13 PROCEDURE — G8979 MOBILITY GOAL STATUS: HCPCS

## 2019-03-13 PROCEDURE — 99291 CRITICAL CARE FIRST HOUR: CPT | Performed by: PSYCHIATRY & NEUROLOGY

## 2019-03-13 PROCEDURE — 36415 COLL VENOUS BLD VENIPUNCTURE: CPT

## 2019-03-13 PROCEDURE — 2700000000 HC OXYGEN THERAPY PER DAY

## 2019-03-13 PROCEDURE — APPNB45 APP NON BILLABLE 31-45 MINUTES: Performed by: NURSE PRACTITIONER

## 2019-03-13 PROCEDURE — 99221 1ST HOSP IP/OBS SF/LOW 40: CPT | Performed by: PHYSICAL MEDICINE & REHABILITATION

## 2019-03-13 PROCEDURE — 82947 ASSAY GLUCOSE BLOOD QUANT: CPT

## 2019-03-13 PROCEDURE — 2500000003 HC RX 250 WO HCPCS

## 2019-03-13 PROCEDURE — 5A1945Z RESPIRATORY VENTILATION, 24-96 CONSECUTIVE HOURS: ICD-10-PCS | Performed by: SURGERY

## 2019-03-13 PROCEDURE — 2580000003 HC RX 258: Performed by: NURSE PRACTITIONER

## 2019-03-13 PROCEDURE — G8978 MOBILITY CURRENT STATUS: HCPCS

## 2019-03-13 PROCEDURE — 2000000003 HC NEURO ICU R&B

## 2019-03-13 PROCEDURE — 97530 THERAPEUTIC ACTIVITIES: CPT

## 2019-03-13 PROCEDURE — 6370000000 HC RX 637 (ALT 250 FOR IP): Performed by: PSYCHIATRY & NEUROLOGY

## 2019-03-13 PROCEDURE — 85610 PROTHROMBIN TIME: CPT

## 2019-03-13 PROCEDURE — 85018 HEMOGLOBIN: CPT

## 2019-03-13 PROCEDURE — 83735 ASSAY OF MAGNESIUM: CPT

## 2019-03-13 PROCEDURE — 6370000000 HC RX 637 (ALT 250 FOR IP): Performed by: NURSE PRACTITIONER

## 2019-03-13 PROCEDURE — 6360000004 HC RX CONTRAST MEDICATION: Performed by: NURSE PRACTITIONER

## 2019-03-13 PROCEDURE — 94002 VENT MGMT INPAT INIT DAY: CPT

## 2019-03-13 PROCEDURE — 82803 BLOOD GASES ANY COMBINATION: CPT

## 2019-03-13 PROCEDURE — 94762 N-INVAS EAR/PLS OXIMTRY CONT: CPT

## 2019-03-13 PROCEDURE — 85027 COMPLETE CBC AUTOMATED: CPT

## 2019-03-13 PROCEDURE — 84100 ASSAY OF PHOSPHORUS: CPT

## 2019-03-13 PROCEDURE — 85025 COMPLETE CBC W/AUTO DIFF WBC: CPT

## 2019-03-13 PROCEDURE — 71260 CT THORAX DX C+: CPT

## 2019-03-13 PROCEDURE — APPNB60 APP NON BILLABLE TIME 46-60 MINS: Performed by: NURSE PRACTITIONER

## 2019-03-13 PROCEDURE — 94770 HC ETCO2 MONITOR DAILY: CPT

## 2019-03-13 PROCEDURE — 36600 WITHDRAWAL OF ARTERIAL BLOOD: CPT

## 2019-03-13 PROCEDURE — 2500000003 HC RX 250 WO HCPCS: Performed by: STUDENT IN AN ORGANIZED HEALTH CARE EDUCATION/TRAINING PROGRAM

## 2019-03-13 PROCEDURE — 71045 X-RAY EXAM CHEST 1 VIEW: CPT

## 2019-03-13 PROCEDURE — 80048 BASIC METABOLIC PNL TOTAL CA: CPT

## 2019-03-13 PROCEDURE — 95951 HC EEG MONITORING VIDEO RECORDING: CPT

## 2019-03-13 PROCEDURE — 97162 PT EVAL MOD COMPLEX 30 MIN: CPT

## 2019-03-13 RX ORDER — VANCOMYCIN HYDROCHLORIDE 1 G/200ML
1000 INJECTION, SOLUTION INTRAVENOUS EVERY 12 HOURS
Status: DISCONTINUED | OUTPATIENT
Start: 2019-03-13 | End: 2019-03-20

## 2019-03-13 RX ORDER — METOPROLOL TARTRATE 5 MG/5ML
5 INJECTION INTRAVENOUS ONCE
Status: COMPLETED | OUTPATIENT
Start: 2019-03-13 | End: 2019-03-13

## 2019-03-13 RX ORDER — ATENOLOL 50 MG/1
50 TABLET ORAL DAILY
Status: DISCONTINUED | OUTPATIENT
Start: 2019-03-13 | End: 2019-03-22

## 2019-03-13 RX ORDER — LABETALOL HYDROCHLORIDE 5 MG/ML
10 INJECTION, SOLUTION INTRAVENOUS
Status: DISCONTINUED | OUTPATIENT
Start: 2019-03-13 | End: 2019-03-26 | Stop reason: HOSPADM

## 2019-03-13 RX ORDER — METOPROLOL TARTRATE 5 MG/5ML
INJECTION INTRAVENOUS
Status: COMPLETED
Start: 2019-03-13 | End: 2019-03-13

## 2019-03-13 RX ORDER — HYDRALAZINE HYDROCHLORIDE 20 MG/ML
10 INJECTION INTRAMUSCULAR; INTRAVENOUS EVERY 4 HOURS PRN
Status: DISCONTINUED | OUTPATIENT
Start: 2019-03-13 | End: 2019-03-26 | Stop reason: HOSPADM

## 2019-03-13 RX ORDER — ACETAMINOPHEN 650 MG/1
650 SUPPOSITORY RECTAL EVERY 4 HOURS PRN
Status: DISCONTINUED | OUTPATIENT
Start: 2019-03-13 | End: 2019-03-26 | Stop reason: HOSPADM

## 2019-03-13 RX ORDER — PROPOFOL 10 MG/ML
INJECTION, EMULSION INTRAVENOUS
Status: DISCONTINUED
Start: 2019-03-13 | End: 2019-03-14

## 2019-03-13 RX ORDER — HEPARIN SODIUM 10000 [USP'U]/100ML
15 INJECTION, SOLUTION INTRAVENOUS CONTINUOUS
Status: DISCONTINUED | OUTPATIENT
Start: 2019-03-13 | End: 2019-03-19

## 2019-03-13 RX ORDER — AMLODIPINE BESYLATE 10 MG/1
10 TABLET ORAL DAILY
Status: DISCONTINUED | OUTPATIENT
Start: 2019-03-13 | End: 2019-03-26 | Stop reason: HOSPADM

## 2019-03-13 RX ORDER — LISINOPRIL 20 MG/1
40 TABLET ORAL DAILY
Status: DISCONTINUED | OUTPATIENT
Start: 2019-03-13 | End: 2019-03-20

## 2019-03-13 RX ORDER — PROPOFOL 10 MG/ML
10 INJECTION, EMULSION INTRAVENOUS
Status: DISCONTINUED | OUTPATIENT
Start: 2019-03-13 | End: 2019-03-15

## 2019-03-13 RX ORDER — MAGNESIUM SULFATE 1 G/100ML
1 INJECTION INTRAVENOUS
Status: COMPLETED | OUTPATIENT
Start: 2019-03-13 | End: 2019-03-13

## 2019-03-13 RX ADMIN — LORAZEPAM 4 MG: 2 INJECTION INTRAMUSCULAR; INTRAVENOUS at 12:29

## 2019-03-13 RX ADMIN — LORAZEPAM 2 MG: 2 INJECTION INTRAMUSCULAR; INTRAVENOUS at 10:41

## 2019-03-13 RX ADMIN — PIPERACILLIN AND TAZOBACTAM 3.38 G: 3; .375 INJECTION, POWDER, LYOPHILIZED, FOR SOLUTION INTRAVENOUS; PARENTERAL at 20:22

## 2019-03-13 RX ADMIN — NICARDIPINE HYDROCHLORIDE 10 MG/HR: 0.1 INJECTION, SOLUTION INTRAVENOUS at 13:22

## 2019-03-13 RX ADMIN — INSULIN LISPRO 3 UNITS: 100 INJECTION, SOLUTION INTRAVENOUS; SUBCUTANEOUS at 08:31

## 2019-03-13 RX ADMIN — HYDRALAZINE HYDROCHLORIDE 10 MG: 20 INJECTION INTRAMUSCULAR; INTRAVENOUS at 00:04

## 2019-03-13 RX ADMIN — LISINOPRIL 40 MG: 20 TABLET ORAL at 15:18

## 2019-03-13 RX ADMIN — NICARDIPINE HYDROCHLORIDE 5 MG/HR: 0.1 INJECTION, SOLUTION INTRAVENOUS at 05:55

## 2019-03-13 RX ADMIN — INSULIN LISPRO 6 UNITS: 100 INJECTION, SOLUTION INTRAVENOUS; SUBCUTANEOUS at 20:28

## 2019-03-13 RX ADMIN — LORAZEPAM 2 MG: 2 INJECTION INTRAMUSCULAR; INTRAVENOUS at 02:28

## 2019-03-13 RX ADMIN — MAGNESIUM SULFATE HEPTAHYDRATE 1 G: 1 INJECTION, SOLUTION INTRAVENOUS at 18:30

## 2019-03-13 RX ADMIN — SODIUM CHLORIDE 8 MG/HR: 9 INJECTION, SOLUTION INTRAVENOUS at 18:35

## 2019-03-13 RX ADMIN — FENTANYL CITRATE 50 MCG: 50 INJECTION INTRAMUSCULAR; INTRAVENOUS at 18:22

## 2019-03-13 RX ADMIN — Medication 10 MG: at 01:25

## 2019-03-13 RX ADMIN — VANCOMYCIN HYDROCHLORIDE 1000 MG: 1 INJECTION, SOLUTION INTRAVENOUS at 22:57

## 2019-03-13 RX ADMIN — PROPOFOL 50 MCG/KG/MIN: 10 INJECTION, EMULSION INTRAVENOUS at 20:16

## 2019-03-13 RX ADMIN — ENOXAPARIN SODIUM 40 MG: 40 INJECTION SUBCUTANEOUS at 08:16

## 2019-03-13 RX ADMIN — AMLODIPINE BESYLATE 10 MG: 10 TABLET ORAL at 15:18

## 2019-03-13 RX ADMIN — NICARDIPINE HYDROCHLORIDE 7.5 MG/HR: 0.1 INJECTION, SOLUTION INTRAVENOUS at 09:44

## 2019-03-13 RX ADMIN — HYDRALAZINE HYDROCHLORIDE 10 MG: 20 INJECTION INTRAMUSCULAR; INTRAVENOUS at 09:34

## 2019-03-13 RX ADMIN — LORAZEPAM 4 MG: 2 INJECTION INTRAMUSCULAR; INTRAVENOUS at 13:42

## 2019-03-13 RX ADMIN — IOVERSOL 75 ML: 741 INJECTION INTRA-ARTERIAL; INTRAVENOUS at 21:16

## 2019-03-13 RX ADMIN — LORAZEPAM 4 MG: 2 INJECTION INTRAMUSCULAR; INTRAVENOUS at 14:46

## 2019-03-13 RX ADMIN — METOPROLOL TARTRATE 5 MG: 5 INJECTION INTRAVENOUS at 12:50

## 2019-03-13 RX ADMIN — METOPROLOL TARTRATE 5 MG: 5 INJECTION INTRAVENOUS at 15:00

## 2019-03-13 RX ADMIN — ACETAMINOPHEN 650 MG: 650 SUPPOSITORY RECTAL at 23:22

## 2019-03-13 RX ADMIN — NICARDIPINE HYDROCHLORIDE 5 MG/HR: 0.1 INJECTION, SOLUTION INTRAVENOUS at 18:06

## 2019-03-13 RX ADMIN — Medication 10 MG: at 10:57

## 2019-03-13 RX ADMIN — POTASSIUM PHOSPHATE, MONOBASIC AND POTASSIUM PHOSPHATE, DIBASIC 20 MMOL: 224; 236 INJECTION, SOLUTION, CONCENTRATE INTRAVENOUS at 08:31

## 2019-03-13 RX ADMIN — MAGNESIUM SULFATE HEPTAHYDRATE 1 G: 1 INJECTION, SOLUTION INTRAVENOUS at 14:47

## 2019-03-13 RX ADMIN — LEVETIRACETAM 500 MG: 5 INJECTION INTRAVENOUS at 18:30

## 2019-03-13 RX ADMIN — MAGNESIUM SULFATE HEPTAHYDRATE 1 G: 1 INJECTION, SOLUTION INTRAVENOUS at 15:00

## 2019-03-13 RX ADMIN — FENTANYL CITRATE 50 MCG: 50 INJECTION INTRAMUSCULAR; INTRAVENOUS at 15:56

## 2019-03-13 RX ADMIN — Medication 10 MG: at 04:37

## 2019-03-13 RX ADMIN — SODIUM CHLORIDE 80 MG: 9 INJECTION, SOLUTION INTRAVENOUS at 18:04

## 2019-03-13 RX ADMIN — LEVETIRACETAM 500 MG: 5 INJECTION INTRAVENOUS at 05:36

## 2019-03-13 RX ADMIN — PROPOFOL 40 MCG/KG/MIN: 10 INJECTION, EMULSION INTRAVENOUS at 18:15

## 2019-03-13 RX ADMIN — ATENOLOL 50 MG: 50 TABLET ORAL at 15:18

## 2019-03-13 RX ADMIN — NICARDIPINE HYDROCHLORIDE 12.5 MG/HR: 0.1 INJECTION, SOLUTION INTRAVENOUS at 15:21

## 2019-03-13 RX ADMIN — LORAZEPAM 2 MG: 2 INJECTION INTRAMUSCULAR; INTRAVENOUS at 08:16

## 2019-03-13 ASSESSMENT — PULMONARY FUNCTION TESTS
PIF_VALUE: 42
PIF_VALUE: 20
PIF_VALUE: 13
PIF_VALUE: 21

## 2019-03-13 ASSESSMENT — PAIN SCALES - GENERAL: PAINLEVEL_OUTOF10: 0

## 2019-03-14 ENCOUNTER — APPOINTMENT (OUTPATIENT)
Dept: GENERAL RADIOLOGY | Age: 74
DRG: 023 | End: 2019-03-14
Payer: MEDICARE

## 2019-03-14 ENCOUNTER — APPOINTMENT (OUTPATIENT)
Dept: CT IMAGING | Age: 74
DRG: 023 | End: 2019-03-14
Payer: MEDICARE

## 2019-03-14 LAB
-: NORMAL
ABSOLUTE EOS #: 0.03 K/UL (ref 0–0.44)
ABSOLUTE IMMATURE GRANULOCYTE: 0.09 K/UL (ref 0–0.3)
ABSOLUTE LYMPH #: 0.96 K/UL (ref 1.1–3.7)
ABSOLUTE MONO #: 1.4 K/UL (ref 0.1–1.2)
ALLEN TEST: POSITIVE
ANION GAP SERPL CALCULATED.3IONS-SCNC: 11 MMOL/L (ref 9–17)
BASOPHILS # BLD: 0 % (ref 0–2)
BASOPHILS ABSOLUTE: 0.04 K/UL (ref 0–0.2)
BUN BLDV-MCNC: 13 MG/DL (ref 8–23)
BUN/CREAT BLD: ABNORMAL (ref 9–20)
CALCIUM IONIZED: 1.1 MMOL/L (ref 1.13–1.33)
CALCIUM SERPL-MCNC: 7.8 MG/DL (ref 8.6–10.4)
CHLORIDE BLD-SCNC: 109 MMOL/L (ref 98–107)
CO2: 20 MMOL/L (ref 20–31)
CREAT SERPL-MCNC: 0.6 MG/DL (ref 0.7–1.2)
DIFFERENTIAL TYPE: ABNORMAL
EOSINOPHILS RELATIVE PERCENT: 0 % (ref 1–4)
FIO2: 40
GFR AFRICAN AMERICAN: >60 ML/MIN
GFR NON-AFRICAN AMERICAN: >60 ML/MIN
GFR SERPL CREATININE-BSD FRML MDRD: ABNORMAL ML/MIN/{1.73_M2}
GFR SERPL CREATININE-BSD FRML MDRD: ABNORMAL ML/MIN/{1.73_M2}
GLUCOSE BLD-MCNC: 135 MG/DL (ref 75–110)
GLUCOSE BLD-MCNC: 153 MG/DL (ref 75–110)
GLUCOSE BLD-MCNC: 160 MG/DL (ref 70–99)
GLUCOSE BLD-MCNC: 163 MG/DL (ref 75–110)
GLUCOSE BLD-MCNC: 166 MG/DL (ref 75–110)
GLUCOSE BLD-MCNC: 175 MG/DL (ref 75–110)
HCT VFR BLD CALC: 34.6 % (ref 40.7–50.3)
HCT VFR BLD CALC: 39.4 % (ref 40.7–50.3)
HEMOGLOBIN: 11.9 G/DL (ref 13–17)
HEMOGLOBIN: 12.3 G/DL (ref 13–17)
IMMATURE GRANULOCYTES: 1 %
LYMPHOCYTES # BLD: 6 % (ref 24–43)
MAGNESIUM: 2 MG/DL (ref 1.6–2.6)
MCH RBC QN AUTO: 31.2 PG (ref 25.2–33.5)
MCHC RBC AUTO-ENTMCNC: 34.4 G/DL (ref 28.4–34.8)
MCV RBC AUTO: 90.6 FL (ref 82.6–102.9)
MODE: ABNORMAL
MONOCYTES # BLD: 8 % (ref 3–12)
NEGATIVE BASE EXCESS, ART: 1 (ref 0–2)
NRBC AUTOMATED: 0 PER 100 WBC
O2 DEVICE/FLOW/%: ABNORMAL
PARTIAL THROMBOPLASTIN TIME: 28.5 SEC (ref 20.5–30.5)
PARTIAL THROMBOPLASTIN TIME: 34.3 SEC (ref 20.5–30.5)
PARTIAL THROMBOPLASTIN TIME: 36.6 SEC (ref 20.5–30.5)
PATIENT TEMP: ABNORMAL
PDW BLD-RTO: 13.3 % (ref 11.8–14.4)
PHOSPHORUS: 2.5 MG/DL (ref 2.5–4.5)
PLATELET # BLD: 208 K/UL (ref 138–453)
PLATELET ESTIMATE: ABNORMAL
PMV BLD AUTO: 11.6 FL (ref 8.1–13.5)
POC HCO3: 22.4 MMOL/L (ref 21–28)
POC O2 SATURATION: 98 % (ref 94–98)
POC PCO2 TEMP: ABNORMAL MM HG
POC PCO2: 30.7 MM HG (ref 35–48)
POC PH TEMP: ABNORMAL
POC PH: 7.47 (ref 7.35–7.45)
POC PO2 TEMP: ABNORMAL MM HG
POC PO2: 104.7 MM HG (ref 83–108)
POSITIVE BASE EXCESS, ART: ABNORMAL (ref 0–3)
POTASSIUM SERPL-SCNC: 3.2 MMOL/L (ref 3.7–5.3)
RBC # BLD: 3.82 M/UL (ref 4.21–5.77)
RBC # BLD: ABNORMAL 10*6/UL
REASON FOR REJECTION: NORMAL
SAMPLE SITE: ABNORMAL
SEG NEUTROPHILS: 85 % (ref 36–65)
SEGMENTED NEUTROPHILS ABSOLUTE COUNT: 14.69 K/UL (ref 1.5–8.1)
SODIUM BLD-SCNC: 140 MMOL/L (ref 135–144)
TCO2 (CALC), ART: 23 MMOL/L (ref 22–29)
WBC # BLD: 17.2 K/UL (ref 3.5–11.3)
WBC # BLD: ABNORMAL 10*3/UL
ZZ NTE CLEAN UP: ORDERED TEST: NORMAL
ZZ NTE WITH NAME CLEAN UP: SPECIMEN SOURCE: NORMAL

## 2019-03-14 PROCEDURE — 74018 RADEX ABDOMEN 1 VIEW: CPT

## 2019-03-14 PROCEDURE — 94762 N-INVAS EAR/PLS OXIMTRY CONT: CPT

## 2019-03-14 PROCEDURE — 85730 THROMBOPLASTIN TIME PARTIAL: CPT

## 2019-03-14 PROCEDURE — 84100 ASSAY OF PHOSPHORUS: CPT

## 2019-03-14 PROCEDURE — 51798 US URINE CAPACITY MEASURE: CPT

## 2019-03-14 PROCEDURE — 6360000002 HC RX W HCPCS: Performed by: STUDENT IN AN ORGANIZED HEALTH CARE EDUCATION/TRAINING PROGRAM

## 2019-03-14 PROCEDURE — 82803 BLOOD GASES ANY COMBINATION: CPT

## 2019-03-14 PROCEDURE — 85018 HEMOGLOBIN: CPT

## 2019-03-14 PROCEDURE — 36415 COLL VENOUS BLD VENIPUNCTURE: CPT

## 2019-03-14 PROCEDURE — C9113 INJ PANTOPRAZOLE SODIUM, VIA: HCPCS | Performed by: INTERNAL MEDICINE

## 2019-03-14 PROCEDURE — 99291 CRITICAL CARE FIRST HOUR: CPT | Performed by: PSYCHIATRY & NEUROLOGY

## 2019-03-14 PROCEDURE — 87205 SMEAR GRAM STAIN: CPT

## 2019-03-14 PROCEDURE — 93970 EXTREMITY STUDY: CPT

## 2019-03-14 PROCEDURE — 85014 HEMATOCRIT: CPT

## 2019-03-14 PROCEDURE — 85025 COMPLETE CBC W/AUTO DIFF WBC: CPT

## 2019-03-14 PROCEDURE — 6360000002 HC RX W HCPCS: Performed by: INTERNAL MEDICINE

## 2019-03-14 PROCEDURE — 6370000000 HC RX 637 (ALT 250 FOR IP): Performed by: STUDENT IN AN ORGANIZED HEALTH CARE EDUCATION/TRAINING PROGRAM

## 2019-03-14 PROCEDURE — 87070 CULTURE OTHR SPECIMN AEROBIC: CPT

## 2019-03-14 PROCEDURE — 70450 CT HEAD/BRAIN W/O DYE: CPT

## 2019-03-14 PROCEDURE — 94770 HC ETCO2 MONITOR DAILY: CPT

## 2019-03-14 PROCEDURE — 95951 HC EEG MONITORING VIDEO RECORDING: CPT

## 2019-03-14 PROCEDURE — 06H033Z INSERTION OF INFUSION DEVICE INTO INFERIOR VENA CAVA, PERCUTANEOUS APPROACH: ICD-10-PCS | Performed by: PSYCHIATRY & NEUROLOGY

## 2019-03-14 PROCEDURE — 6360000002 HC RX W HCPCS: Performed by: NURSE PRACTITIONER

## 2019-03-14 PROCEDURE — 6370000000 HC RX 637 (ALT 250 FOR IP): Performed by: NURSE PRACTITIONER

## 2019-03-14 PROCEDURE — 2580000003 HC RX 258: Performed by: STUDENT IN AN ORGANIZED HEALTH CARE EDUCATION/TRAINING PROGRAM

## 2019-03-14 PROCEDURE — 80048 BASIC METABOLIC PNL TOTAL CA: CPT

## 2019-03-14 PROCEDURE — 51701 INSERT BLADDER CATHETER: CPT

## 2019-03-14 PROCEDURE — 6370000000 HC RX 637 (ALT 250 FOR IP): Performed by: NEUROLOGICAL SURGERY

## 2019-03-14 PROCEDURE — 6370000000 HC RX 637 (ALT 250 FOR IP): Performed by: PSYCHIATRY & NEUROLOGY

## 2019-03-14 PROCEDURE — 2580000003 HC RX 258: Performed by: NURSE PRACTITIONER

## 2019-03-14 PROCEDURE — 2700000000 HC OXYGEN THERAPY PER DAY

## 2019-03-14 PROCEDURE — 2580000003 HC RX 258: Performed by: INTERNAL MEDICINE

## 2019-03-14 PROCEDURE — 83735 ASSAY OF MAGNESIUM: CPT

## 2019-03-14 PROCEDURE — 99222 1ST HOSP IP/OBS MODERATE 55: CPT | Performed by: SURGERY

## 2019-03-14 PROCEDURE — 82330 ASSAY OF CALCIUM: CPT

## 2019-03-14 PROCEDURE — 95951 PR EEG MONITORING/VIDEORECORD: CPT | Performed by: PSYCHIATRY & NEUROLOGY

## 2019-03-14 PROCEDURE — 94003 VENT MGMT INPAT SUBQ DAY: CPT

## 2019-03-14 PROCEDURE — 3609017100 HC EGD: Performed by: INTERNAL MEDICINE

## 2019-03-14 PROCEDURE — 2000000003 HC NEURO ICU R&B

## 2019-03-14 PROCEDURE — 82947 ASSAY GLUCOSE BLOOD QUANT: CPT

## 2019-03-14 PROCEDURE — 71045 X-RAY EXAM CHEST 1 VIEW: CPT

## 2019-03-14 PROCEDURE — 0DJ08ZZ INSPECTION OF UPPER INTESTINAL TRACT, VIA NATURAL OR ARTIFICIAL OPENING ENDOSCOPIC: ICD-10-PCS | Performed by: INTERNAL MEDICINE

## 2019-03-14 PROCEDURE — 43235 EGD DIAGNOSTIC BRUSH WASH: CPT | Performed by: INTERNAL MEDICINE

## 2019-03-14 RX ORDER — NICOTINE POLACRILEX 4 MG
15 LOZENGE BUCCAL PRN
Status: DISCONTINUED | OUTPATIENT
Start: 2019-03-14 | End: 2019-03-26 | Stop reason: HOSPADM

## 2019-03-14 RX ORDER — DEXTROSE MONOHYDRATE 50 MG/ML
100 INJECTION, SOLUTION INTRAVENOUS PRN
Status: DISCONTINUED | OUTPATIENT
Start: 2019-03-14 | End: 2019-03-26 | Stop reason: HOSPADM

## 2019-03-14 RX ORDER — 0.9 % SODIUM CHLORIDE 0.9 %
10 VIAL (ML) INJECTION 2 TIMES DAILY
Status: DISCONTINUED | OUTPATIENT
Start: 2019-03-14 | End: 2019-03-14

## 2019-03-14 RX ORDER — LEVETIRACETAM 5 MG/ML
500 INJECTION INTRAVASCULAR ONCE
Status: COMPLETED | OUTPATIENT
Start: 2019-03-14 | End: 2019-03-14

## 2019-03-14 RX ORDER — DEXAMETHASONE SODIUM PHOSPHATE 4 MG/ML
4 INJECTION, SOLUTION INTRA-ARTICULAR; INTRALESIONAL; INTRAMUSCULAR; INTRAVENOUS; SOFT TISSUE EVERY 8 HOURS
Status: DISCONTINUED | OUTPATIENT
Start: 2019-03-14 | End: 2019-03-16

## 2019-03-14 RX ORDER — LEVETIRACETAM 10 MG/ML
1000 INJECTION INTRAVASCULAR EVERY 12 HOURS
Status: DISCONTINUED | OUTPATIENT
Start: 2019-03-14 | End: 2019-03-21

## 2019-03-14 RX ORDER — POTASSIUM CHLORIDE 20MEQ/15ML
40 LIQUID (ML) ORAL 2 TIMES DAILY
Status: COMPLETED | OUTPATIENT
Start: 2019-03-14 | End: 2019-03-14

## 2019-03-14 RX ORDER — PANTOPRAZOLE SODIUM 40 MG/10ML
40 INJECTION, POWDER, LYOPHILIZED, FOR SOLUTION INTRAVENOUS 2 TIMES DAILY
Status: DISCONTINUED | OUTPATIENT
Start: 2019-03-14 | End: 2019-03-14

## 2019-03-14 RX ORDER — DEXTROSE MONOHYDRATE 25 G/50ML
12.5 INJECTION, SOLUTION INTRAVENOUS PRN
Status: DISCONTINUED | OUTPATIENT
Start: 2019-03-14 | End: 2019-03-26 | Stop reason: HOSPADM

## 2019-03-14 RX ADMIN — ACETAMINOPHEN 650 MG: 650 SUPPOSITORY RECTAL at 04:45

## 2019-03-14 RX ADMIN — POTASSIUM CHLORIDE 10 MEQ: 7.46 INJECTION, SOLUTION INTRAVENOUS at 12:25

## 2019-03-14 RX ADMIN — POTASSIUM CHLORIDE 40 MEQ: 40 SOLUTION ORAL at 16:00

## 2019-03-14 RX ADMIN — INSULIN LISPRO 3 UNITS: 100 INJECTION, SOLUTION INTRAVENOUS; SUBCUTANEOUS at 02:47

## 2019-03-14 RX ADMIN — Medication 10 ML: at 21:00

## 2019-03-14 RX ADMIN — INSULIN LISPRO 1 UNITS: 100 INJECTION, SOLUTION INTRAVENOUS; SUBCUTANEOUS at 23:54

## 2019-03-14 RX ADMIN — VANCOMYCIN HYDROCHLORIDE 1000 MG: 1 INJECTION, SOLUTION INTRAVENOUS at 23:25

## 2019-03-14 RX ADMIN — LEVETIRACETAM 500 MG: 5 INJECTION INTRAVENOUS at 11:13

## 2019-03-14 RX ADMIN — SENNOSIDES AND DOCUSATE SODIUM 2 TABLET: 8.6; 5 TABLET ORAL at 08:17

## 2019-03-14 RX ADMIN — LISINOPRIL 40 MG: 20 TABLET ORAL at 08:17

## 2019-03-14 RX ADMIN — PANTOPRAZOLE SODIUM 8 MG/HR: 40 INJECTION, POWDER, FOR SOLUTION INTRAVENOUS at 14:10

## 2019-03-14 RX ADMIN — PANTOPRAZOLE SODIUM 8 MG/HR: 40 INJECTION, POWDER, FOR SOLUTION INTRAVENOUS at 09:33

## 2019-03-14 RX ADMIN — LEVETIRACETAM 1000 MG: 10 INJECTION INTRAVENOUS at 16:00

## 2019-03-14 RX ADMIN — PIPERACILLIN AND TAZOBACTAM 3.38 G: 3; .375 INJECTION, POWDER, LYOPHILIZED, FOR SOLUTION INTRAVENOUS; PARENTERAL at 13:44

## 2019-03-14 RX ADMIN — LEVETIRACETAM 500 MG: 5 INJECTION INTRAVENOUS at 05:18

## 2019-03-14 RX ADMIN — ATENOLOL 50 MG: 50 TABLET ORAL at 08:17

## 2019-03-14 RX ADMIN — VANCOMYCIN HYDROCHLORIDE 1000 MG: 1 INJECTION, SOLUTION INTRAVENOUS at 12:49

## 2019-03-14 RX ADMIN — POTASSIUM CHLORIDE 10 MEQ: 7.46 INJECTION, SOLUTION INTRAVENOUS at 08:17

## 2019-03-14 RX ADMIN — HEPARIN SODIUM AND DEXTROSE 23 UNITS/KG/HR: 10000; 5 INJECTION INTRAVENOUS at 20:40

## 2019-03-14 RX ADMIN — CALCIUM GLUCONATE 2 G: 98 INJECTION, SOLUTION INTRAVENOUS at 14:10

## 2019-03-14 RX ADMIN — AMLODIPINE BESYLATE 10 MG: 10 TABLET ORAL at 08:17

## 2019-03-14 RX ADMIN — FENTANYL CITRATE 50 MCG: 50 INJECTION INTRAMUSCULAR; INTRAVENOUS at 04:42

## 2019-03-14 RX ADMIN — PROPOFOL 30 MCG/KG/MIN: 10 INJECTION, EMULSION INTRAVENOUS at 13:15

## 2019-03-14 RX ADMIN — INSULIN LISPRO 1 UNITS: 100 INJECTION, SOLUTION INTRAVENOUS; SUBCUTANEOUS at 13:44

## 2019-03-14 RX ADMIN — DEXAMETHASONE SODIUM PHOSPHATE 4 MG: 4 INJECTION, SOLUTION INTRAMUSCULAR; INTRAVENOUS at 22:14

## 2019-03-14 RX ADMIN — FOLIC ACID 1 MG: 1 TABLET ORAL at 08:17

## 2019-03-14 RX ADMIN — PIPERACILLIN AND TAZOBACTAM 3.38 G: 3; .375 INJECTION, POWDER, LYOPHILIZED, FOR SOLUTION INTRAVENOUS; PARENTERAL at 23:25

## 2019-03-14 RX ADMIN — PIPERACILLIN AND TAZOBACTAM 3.38 G: 3; .375 INJECTION, POWDER, LYOPHILIZED, FOR SOLUTION INTRAVENOUS; PARENTERAL at 05:35

## 2019-03-14 RX ADMIN — POTASSIUM CHLORIDE 10 MEQ: 7.46 INJECTION, SOLUTION INTRAVENOUS at 09:34

## 2019-03-14 RX ADMIN — POTASSIUM CHLORIDE 40 MEQ: 40 SOLUTION ORAL at 22:14

## 2019-03-14 RX ADMIN — FENTANYL CITRATE 50 MCG: 50 INJECTION INTRAMUSCULAR; INTRAVENOUS at 20:39

## 2019-03-14 RX ADMIN — POTASSIUM CHLORIDE 10 MEQ: 7.46 INJECTION, SOLUTION INTRAVENOUS at 11:11

## 2019-03-14 RX ADMIN — Medication 100 MG: at 08:17

## 2019-03-14 RX ADMIN — HEPARIN SODIUM AND DEXTROSE 15 UNITS/KG/HR: 10000; 5 INJECTION INTRAVENOUS at 00:30

## 2019-03-14 RX ADMIN — INSULIN LISPRO 1 UNITS: 100 INJECTION, SOLUTION INTRAVENOUS; SUBCUTANEOUS at 16:00

## 2019-03-14 ASSESSMENT — PULMONARY FUNCTION TESTS
PIF_VALUE: 3
PIF_VALUE: 22
PIF_VALUE: 20
PIF_VALUE: 20
PIF_VALUE: 22
PIF_VALUE: 19
PIF_VALUE: 20
PIF_VALUE: 20
PIF_VALUE: 19
PIF_VALUE: 20
PIF_VALUE: 21
PIF_VALUE: 22
PIF_VALUE: 19
PIF_VALUE: 19
PIF_VALUE: 20

## 2019-03-15 ENCOUNTER — APPOINTMENT (OUTPATIENT)
Dept: GENERAL RADIOLOGY | Age: 74
DRG: 023 | End: 2019-03-15
Payer: MEDICARE

## 2019-03-15 LAB
ABSOLUTE EOS #: 0 K/UL (ref 0–0.4)
ABSOLUTE IMMATURE GRANULOCYTE: 0 K/UL (ref 0–0.3)
ABSOLUTE LYMPH #: 1 K/UL (ref 1–4.8)
ABSOLUTE MONO #: 1 K/UL (ref 0.1–0.8)
ALLEN TEST: POSITIVE
ANION GAP SERPL CALCULATED.3IONS-SCNC: 10 MMOL/L (ref 9–17)
BASOPHILS # BLD: 0 % (ref 0–2)
BASOPHILS ABSOLUTE: 0 K/UL (ref 0–0.2)
BUN BLDV-MCNC: 10 MG/DL (ref 8–23)
BUN/CREAT BLD: ABNORMAL (ref 9–20)
CALCIUM IONIZED: 1.25 MMOL/L (ref 1.13–1.33)
CALCIUM SERPL-MCNC: 8.8 MG/DL (ref 8.6–10.4)
CHLORIDE BLD-SCNC: 107 MMOL/L (ref 98–107)
CO2: 19 MMOL/L (ref 20–31)
CREAT SERPL-MCNC: 0.63 MG/DL (ref 0.7–1.2)
CULTURE: NO GROWTH
DATE, STOOL #1: ABNORMAL
DATE, STOOL #2: ABNORMAL
DATE, STOOL #3: ABNORMAL
DIFFERENTIAL TYPE: ABNORMAL
DIRECT EXAM: NORMAL
EOSINOPHILS RELATIVE PERCENT: 0 % (ref 1–4)
FIO2: 30
GFR AFRICAN AMERICAN: >60 ML/MIN
GFR NON-AFRICAN AMERICAN: >60 ML/MIN
GFR SERPL CREATININE-BSD FRML MDRD: ABNORMAL ML/MIN/{1.73_M2}
GFR SERPL CREATININE-BSD FRML MDRD: ABNORMAL ML/MIN/{1.73_M2}
GLUCOSE BLD-MCNC: 201 MG/DL (ref 70–99)
GLUCOSE BLD-MCNC: 216 MG/DL (ref 75–110)
GLUCOSE BLD-MCNC: 222 MG/DL (ref 75–110)
GLUCOSE BLD-MCNC: 238 MG/DL (ref 75–110)
GLUCOSE BLD-MCNC: 238 MG/DL (ref 75–110)
HCT VFR BLD CALC: 34.6 % (ref 40.7–50.3)
HCT VFR BLD CALC: 35.1 % (ref 40.7–50.3)
HEMOCCULT SP1 STL QL: POSITIVE
HEMOCCULT SP2 STL QL: ABNORMAL
HEMOCCULT SP3 STL QL: ABNORMAL
HEMOGLOBIN: 11.6 G/DL (ref 13–17)
HEMOGLOBIN: 11.7 G/DL (ref 13–17)
IMMATURE GRANULOCYTES: 0 %
LV EF: 55 %
LVEF MODALITY: NORMAL
LYMPHOCYTES # BLD: 6 % (ref 24–44)
Lab: NORMAL
MAGNESIUM: 1.7 MG/DL (ref 1.6–2.6)
MCH RBC QN AUTO: 31.5 PG (ref 25.2–33.5)
MCHC RBC AUTO-ENTMCNC: 33.5 G/DL (ref 28.4–34.8)
MCV RBC AUTO: 94 FL (ref 82.6–102.9)
MODE: ABNORMAL
MONOCYTES # BLD: 6 % (ref 1–7)
MORPHOLOGY: NORMAL
NEGATIVE BASE EXCESS, ART: 3 (ref 0–2)
NRBC AUTOMATED: 0 PER 100 WBC
O2 DEVICE/FLOW/%: ABNORMAL
PARTIAL THROMBOPLASTIN TIME: 45.6 SEC (ref 20.5–30.5)
PARTIAL THROMBOPLASTIN TIME: 47.7 SEC (ref 20.5–30.5)
PARTIAL THROMBOPLASTIN TIME: 51.4 SEC (ref 20.5–30.5)
PARTIAL THROMBOPLASTIN TIME: 57.4 SEC (ref 20.5–30.5)
PARTIAL THROMBOPLASTIN TIME: 74.6 SEC (ref 20.5–30.5)
PATIENT TEMP: ABNORMAL
PDW BLD-RTO: 13.7 % (ref 11.8–14.4)
PHOSPHORUS: 1.8 MG/DL (ref 2.5–4.5)
PLATELET # BLD: 220 K/UL (ref 138–453)
PLATELET ESTIMATE: ABNORMAL
PMV BLD AUTO: 11.2 FL (ref 8.1–13.5)
POC HCO3: 20.2 MMOL/L (ref 21–28)
POC O2 SATURATION: 96 % (ref 94–98)
POC PCO2 TEMP: ABNORMAL MM HG
POC PCO2: 29.8 MM HG (ref 35–48)
POC PH TEMP: ABNORMAL
POC PH: 7.44 (ref 7.35–7.45)
POC PO2 TEMP: ABNORMAL MM HG
POC PO2: 74.1 MM HG (ref 83–108)
POSITIVE BASE EXCESS, ART: ABNORMAL (ref 0–3)
POTASSIUM SERPL-SCNC: 4.4 MMOL/L (ref 3.7–5.3)
RBC # BLD: 3.68 M/UL (ref 4.21–5.77)
RBC # BLD: ABNORMAL 10*6/UL
SAMPLE SITE: ABNORMAL
SEG NEUTROPHILS: 88 % (ref 36–66)
SEGMENTED NEUTROPHILS ABSOLUTE COUNT: 14.7 K/UL (ref 1.8–7.7)
SODIUM BLD-SCNC: 136 MMOL/L (ref 135–144)
SPECIMEN DESCRIPTION: NORMAL
TCO2 (CALC), ART: 21 MMOL/L (ref 22–29)
TIME, STOOL #1: ABNORMAL
TIME, STOOL #2: ABNORMAL
TIME, STOOL #3: ABNORMAL
WBC # BLD: 16.7 K/UL (ref 3.5–11.3)
WBC # BLD: ABNORMAL 10*3/UL

## 2019-03-15 PROCEDURE — 95951 PR EEG MONITORING/VIDEORECORD: CPT | Performed by: PSYCHIATRY & NEUROLOGY

## 2019-03-15 PROCEDURE — 84100 ASSAY OF PHOSPHORUS: CPT

## 2019-03-15 PROCEDURE — 2500000003 HC RX 250 WO HCPCS: Performed by: NURSE PRACTITIONER

## 2019-03-15 PROCEDURE — G0328 FECAL BLOOD SCRN IMMUNOASSAY: HCPCS

## 2019-03-15 PROCEDURE — 85025 COMPLETE CBC W/AUTO DIFF WBC: CPT

## 2019-03-15 PROCEDURE — 93306 TTE W/DOPPLER COMPLETE: CPT

## 2019-03-15 PROCEDURE — 80048 BASIC METABOLIC PNL TOTAL CA: CPT

## 2019-03-15 PROCEDURE — 94003 VENT MGMT INPAT SUBQ DAY: CPT

## 2019-03-15 PROCEDURE — 2580000003 HC RX 258: Performed by: INTERNAL MEDICINE

## 2019-03-15 PROCEDURE — 99232 SBSQ HOSP IP/OBS MODERATE 35: CPT | Performed by: INTERNAL MEDICINE

## 2019-03-15 PROCEDURE — 2700000000 HC OXYGEN THERAPY PER DAY

## 2019-03-15 PROCEDURE — 36415 COLL VENOUS BLD VENIPUNCTURE: CPT

## 2019-03-15 PROCEDURE — 93971 EXTREMITY STUDY: CPT

## 2019-03-15 PROCEDURE — 87040 BLOOD CULTURE FOR BACTERIA: CPT

## 2019-03-15 PROCEDURE — 83735 ASSAY OF MAGNESIUM: CPT

## 2019-03-15 PROCEDURE — 6370000000 HC RX 637 (ALT 250 FOR IP): Performed by: PSYCHIATRY & NEUROLOGY

## 2019-03-15 PROCEDURE — 6360000002 HC RX W HCPCS: Performed by: INTERNAL MEDICINE

## 2019-03-15 PROCEDURE — APPNB60 APP NON BILLABLE TIME 46-60 MINS: Performed by: NURSE PRACTITIONER

## 2019-03-15 PROCEDURE — 82330 ASSAY OF CALCIUM: CPT

## 2019-03-15 PROCEDURE — 82803 BLOOD GASES ANY COMBINATION: CPT

## 2019-03-15 PROCEDURE — 6360000002 HC RX W HCPCS: Performed by: NURSE PRACTITIONER

## 2019-03-15 PROCEDURE — 94762 N-INVAS EAR/PLS OXIMTRY CONT: CPT

## 2019-03-15 PROCEDURE — 85730 THROMBOPLASTIN TIME PARTIAL: CPT

## 2019-03-15 PROCEDURE — 2580000003 HC RX 258: Performed by: NURSE PRACTITIONER

## 2019-03-15 PROCEDURE — 6370000000 HC RX 637 (ALT 250 FOR IP): Performed by: NURSE PRACTITIONER

## 2019-03-15 PROCEDURE — C9113 INJ PANTOPRAZOLE SODIUM, VIA: HCPCS | Performed by: INTERNAL MEDICINE

## 2019-03-15 PROCEDURE — 82947 ASSAY GLUCOSE BLOOD QUANT: CPT

## 2019-03-15 PROCEDURE — 6370000000 HC RX 637 (ALT 250 FOR IP): Performed by: NEUROLOGICAL SURGERY

## 2019-03-15 PROCEDURE — 6360000002 HC RX W HCPCS: Performed by: STUDENT IN AN ORGANIZED HEALTH CARE EDUCATION/TRAINING PROGRAM

## 2019-03-15 PROCEDURE — 95951 HC EEG MONITORING VIDEO RECORDING: CPT

## 2019-03-15 PROCEDURE — 2000000003 HC NEURO ICU R&B

## 2019-03-15 PROCEDURE — 51798 US URINE CAPACITY MEASURE: CPT

## 2019-03-15 PROCEDURE — 36600 WITHDRAWAL OF ARTERIAL BLOOD: CPT

## 2019-03-15 PROCEDURE — 94770 HC ETCO2 MONITOR DAILY: CPT

## 2019-03-15 PROCEDURE — 71045 X-RAY EXAM CHEST 1 VIEW: CPT

## 2019-03-15 PROCEDURE — 99291 CRITICAL CARE FIRST HOUR: CPT | Performed by: PSYCHIATRY & NEUROLOGY

## 2019-03-15 RX ORDER — 0.9 % SODIUM CHLORIDE 0.9 %
10 VIAL (ML) INJECTION 2 TIMES DAILY
Status: DISCONTINUED | OUTPATIENT
Start: 2019-03-17 | End: 2019-03-18 | Stop reason: CLARIF

## 2019-03-15 RX ORDER — MAGNESIUM SULFATE 1 G/100ML
1 INJECTION INTRAVENOUS
Status: COMPLETED | OUTPATIENT
Start: 2019-03-15 | End: 2019-03-15

## 2019-03-15 RX ORDER — FUROSEMIDE 10 MG/ML
20 INJECTION INTRAMUSCULAR; INTRAVENOUS ONCE
Status: COMPLETED | OUTPATIENT
Start: 2019-03-15 | End: 2019-03-15

## 2019-03-15 RX ORDER — PANTOPRAZOLE SODIUM 40 MG/10ML
40 INJECTION, POWDER, LYOPHILIZED, FOR SOLUTION INTRAVENOUS 2 TIMES DAILY
Status: DISCONTINUED | OUTPATIENT
Start: 2019-03-17 | End: 2019-03-18 | Stop reason: CLARIF

## 2019-03-15 RX ADMIN — PIPERACILLIN AND TAZOBACTAM 3.38 G: 3; .375 INJECTION, POWDER, LYOPHILIZED, FOR SOLUTION INTRAVENOUS; PARENTERAL at 23:01

## 2019-03-15 RX ADMIN — LEVETIRACETAM 1000 MG: 10 INJECTION INTRAVENOUS at 05:11

## 2019-03-15 RX ADMIN — PROPOFOL 30 MCG/KG/MIN: 10 INJECTION, EMULSION INTRAVENOUS at 05:11

## 2019-03-15 RX ADMIN — VANCOMYCIN HYDROCHLORIDE 1000 MG: 1 INJECTION, SOLUTION INTRAVENOUS at 11:35

## 2019-03-15 RX ADMIN — SODIUM PHOSPHATE, MONOBASIC, MONOHYDRATE 15 MMOL: 276; 142 INJECTION, SOLUTION INTRAVENOUS at 10:20

## 2019-03-15 RX ADMIN — MAGNESIUM SULFATE HEPTAHYDRATE 1 G: 1 INJECTION, SOLUTION INTRAVENOUS at 09:05

## 2019-03-15 RX ADMIN — Medication 100 MG: at 09:04

## 2019-03-15 RX ADMIN — ATENOLOL 50 MG: 50 TABLET ORAL at 09:04

## 2019-03-15 RX ADMIN — PANTOPRAZOLE SODIUM 8 MG/HR: 40 INJECTION, POWDER, FOR SOLUTION INTRAVENOUS at 14:01

## 2019-03-15 RX ADMIN — FUROSEMIDE 20 MG: 10 INJECTION, SOLUTION INTRAMUSCULAR; INTRAVENOUS at 11:35

## 2019-03-15 RX ADMIN — MAGNESIUM SULFATE HEPTAHYDRATE 1 G: 1 INJECTION, SOLUTION INTRAVENOUS at 10:18

## 2019-03-15 RX ADMIN — VANCOMYCIN HYDROCHLORIDE 1000 MG: 1 INJECTION, SOLUTION INTRAVENOUS at 22:59

## 2019-03-15 RX ADMIN — AMLODIPINE BESYLATE 10 MG: 10 TABLET ORAL at 09:04

## 2019-03-15 RX ADMIN — PIPERACILLIN AND TAZOBACTAM 3.38 G: 3; .375 INJECTION, POWDER, LYOPHILIZED, FOR SOLUTION INTRAVENOUS; PARENTERAL at 16:02

## 2019-03-15 RX ADMIN — INSULIN LISPRO 2 UNITS: 100 INJECTION, SOLUTION INTRAVENOUS; SUBCUTANEOUS at 23:05

## 2019-03-15 RX ADMIN — FOLIC ACID 1 MG: 1 TABLET ORAL at 09:04

## 2019-03-15 RX ADMIN — DEXAMETHASONE SODIUM PHOSPHATE 4 MG: 4 INJECTION, SOLUTION INTRAMUSCULAR; INTRAVENOUS at 02:15

## 2019-03-15 RX ADMIN — INSULIN LISPRO 2 UNITS: 100 INJECTION, SOLUTION INTRAVENOUS; SUBCUTANEOUS at 17:44

## 2019-03-15 RX ADMIN — LISINOPRIL 40 MG: 20 TABLET ORAL at 09:04

## 2019-03-15 RX ADMIN — Medication 1 MG/HR: at 12:03

## 2019-03-15 RX ADMIN — PIPERACILLIN AND TAZOBACTAM 3.38 G: 3; .375 INJECTION, POWDER, LYOPHILIZED, FOR SOLUTION INTRAVENOUS; PARENTERAL at 08:13

## 2019-03-15 RX ADMIN — DEXAMETHASONE SODIUM PHOSPHATE 4 MG: 4 INJECTION, SOLUTION INTRAMUSCULAR; INTRAVENOUS at 10:14

## 2019-03-15 RX ADMIN — PANTOPRAZOLE SODIUM 8 MG/HR: 40 INJECTION, POWDER, FOR SOLUTION INTRAVENOUS at 01:26

## 2019-03-15 RX ADMIN — INSULIN LISPRO 2 UNITS: 100 INJECTION, SOLUTION INTRAVENOUS; SUBCUTANEOUS at 11:35

## 2019-03-15 RX ADMIN — LEVETIRACETAM 1000 MG: 10 INJECTION INTRAVENOUS at 17:44

## 2019-03-15 RX ADMIN — HEPARIN SODIUM AND DEXTROSE 25 UNITS/KG/HR: 10000; 5 INJECTION INTRAVENOUS at 14:01

## 2019-03-15 RX ADMIN — INSULIN LISPRO 2 UNITS: 100 INJECTION, SOLUTION INTRAVENOUS; SUBCUTANEOUS at 05:25

## 2019-03-15 RX ADMIN — DEXAMETHASONE SODIUM PHOSPHATE 4 MG: 4 INJECTION, SOLUTION INTRAMUSCULAR; INTRAVENOUS at 17:44

## 2019-03-15 ASSESSMENT — PULMONARY FUNCTION TESTS
PIF_VALUE: 20
PIF_VALUE: 22
PIF_VALUE: 18
PIF_VALUE: 19
PIF_VALUE: 25
PIF_VALUE: 21
PIF_VALUE: 23
PIF_VALUE: 22
PIF_VALUE: 18
PIF_VALUE: 25
PIF_VALUE: 19
PIF_VALUE: 19
PIF_VALUE: 25
PIF_VALUE: 18
PIF_VALUE: 19

## 2019-03-15 ASSESSMENT — PAIN SCALES - GENERAL: PAINLEVEL_OUTOF10: 0

## 2019-03-16 ENCOUNTER — APPOINTMENT (OUTPATIENT)
Dept: GENERAL RADIOLOGY | Age: 74
DRG: 023 | End: 2019-03-16
Payer: MEDICARE

## 2019-03-16 LAB
ABSOLUTE EOS #: 0 K/UL (ref 0–0.4)
ABSOLUTE IMMATURE GRANULOCYTE: 0 K/UL (ref 0–0.3)
ABSOLUTE LYMPH #: 0.71 K/UL (ref 1–4.8)
ABSOLUTE MONO #: 1.13 K/UL (ref 0.1–0.8)
ALLEN TEST: POSITIVE
ANION GAP SERPL CALCULATED.3IONS-SCNC: 13 MMOL/L (ref 9–17)
BASOPHILS # BLD: 0 % (ref 0–2)
BASOPHILS ABSOLUTE: 0 K/UL (ref 0–0.2)
BUN BLDV-MCNC: 11 MG/DL (ref 8–23)
BUN/CREAT BLD: ABNORMAL (ref 9–20)
CALCIUM IONIZED: 1.16 MMOL/L (ref 1.13–1.33)
CALCIUM SERPL-MCNC: 8.8 MG/DL (ref 8.6–10.4)
CHLORIDE BLD-SCNC: 102 MMOL/L (ref 98–107)
CO2: 20 MMOL/L (ref 20–31)
CREAT SERPL-MCNC: 0.64 MG/DL (ref 0.7–1.2)
DIFFERENTIAL TYPE: ABNORMAL
EOSINOPHILS RELATIVE PERCENT: 0 % (ref 1–4)
FIO2: 30
GFR AFRICAN AMERICAN: >60 ML/MIN
GFR NON-AFRICAN AMERICAN: >60 ML/MIN
GFR SERPL CREATININE-BSD FRML MDRD: ABNORMAL ML/MIN/{1.73_M2}
GFR SERPL CREATININE-BSD FRML MDRD: ABNORMAL ML/MIN/{1.73_M2}
GLUCOSE BLD-MCNC: 244 MG/DL (ref 75–110)
GLUCOSE BLD-MCNC: 262 MG/DL (ref 75–110)
GLUCOSE BLD-MCNC: 264 MG/DL (ref 70–99)
GLUCOSE BLD-MCNC: 276 MG/DL (ref 75–110)
GLUCOSE BLD-MCNC: 278 MG/DL (ref 75–110)
HCO3 VENOUS: 22.3 MMOL/L (ref 22–29)
HCT VFR BLD CALC: 35.4 % (ref 40.7–50.3)
HEMOGLOBIN: 11.7 G/DL (ref 13–17)
IMMATURE GRANULOCYTES: 0 %
LYMPHOCYTES # BLD: 5 % (ref 24–44)
MAGNESIUM: 1.7 MG/DL (ref 1.6–2.6)
MCH RBC QN AUTO: 31 PG (ref 25.2–33.5)
MCHC RBC AUTO-ENTMCNC: 33.1 G/DL (ref 28.4–34.8)
MCV RBC AUTO: 93.9 FL (ref 82.6–102.9)
MODE: ABNORMAL
MONOCYTES # BLD: 8 % (ref 1–7)
MORPHOLOGY: NORMAL
NEGATIVE BASE EXCESS, VEN: ABNORMAL (ref 0–2)
NRBC AUTOMATED: 0 PER 100 WBC
O2 DEVICE/FLOW/%: ABNORMAL
O2 SAT, VEN: 86 % (ref 60–85)
PARTIAL THROMBOPLASTIN TIME: 53.5 SEC (ref 20.5–30.5)
PARTIAL THROMBOPLASTIN TIME: 66.6 SEC (ref 20.5–30.5)
PARTIAL THROMBOPLASTIN TIME: 68.1 SEC (ref 20.5–30.5)
PARTIAL THROMBOPLASTIN TIME: 69.6 SEC (ref 20.5–30.5)
PATIENT TEMP: ABNORMAL
PCO2, VEN: 29.2 MM HG (ref 41–51)
PDW BLD-RTO: 13.3 % (ref 11.8–14.4)
PH VENOUS: 7.49 (ref 7.32–7.43)
PHOSPHORUS: 3.2 MG/DL (ref 2.5–4.5)
PLATELET # BLD: 274 K/UL (ref 138–453)
PLATELET ESTIMATE: ABNORMAL
PMV BLD AUTO: 11.5 FL (ref 8.1–13.5)
PO2, VEN: 45.9 MM HG (ref 30–50)
POC PCO2 TEMP: ABNORMAL MM HG
POC PH TEMP: ABNORMAL
POC PO2 TEMP: ABNORMAL MM HG
POSITIVE BASE EXCESS, VEN: 0 (ref 0–3)
POTASSIUM SERPL-SCNC: 3.6 MMOL/L (ref 3.7–5.3)
RBC # BLD: 3.77 M/UL (ref 4.21–5.77)
RBC # BLD: ABNORMAL 10*6/UL
SAMPLE SITE: ABNORMAL
SEG NEUTROPHILS: 87 % (ref 36–66)
SEGMENTED NEUTROPHILS ABSOLUTE COUNT: 12.26 K/UL (ref 1.8–7.7)
SODIUM BLD-SCNC: 135 MMOL/L (ref 135–144)
TOTAL CO2, VENOUS: 23 MMOL/L (ref 23–30)
TRIGL SERPL-MCNC: 119 MG/DL
VANCOMYCIN TROUGH DATE LAST DOSE: NORMAL
VANCOMYCIN TROUGH DOSE AMOUNT: NORMAL
VANCOMYCIN TROUGH TIME LAST DOSE: NORMAL
VANCOMYCIN TROUGH: 15.9 UG/ML (ref 10–20)
WBC # BLD: 14.1 K/UL (ref 3.5–11.3)
WBC # BLD: ABNORMAL 10*3/UL

## 2019-03-16 PROCEDURE — 82330 ASSAY OF CALCIUM: CPT

## 2019-03-16 PROCEDURE — 2700000000 HC OXYGEN THERAPY PER DAY

## 2019-03-16 PROCEDURE — 82803 BLOOD GASES ANY COMBINATION: CPT

## 2019-03-16 PROCEDURE — 82947 ASSAY GLUCOSE BLOOD QUANT: CPT

## 2019-03-16 PROCEDURE — 99291 CRITICAL CARE FIRST HOUR: CPT | Performed by: PSYCHIATRY & NEUROLOGY

## 2019-03-16 PROCEDURE — 6360000002 HC RX W HCPCS: Performed by: NURSE PRACTITIONER

## 2019-03-16 PROCEDURE — 36415 COLL VENOUS BLD VENIPUNCTURE: CPT

## 2019-03-16 PROCEDURE — 2580000003 HC RX 258: Performed by: NURSE PRACTITIONER

## 2019-03-16 PROCEDURE — 94762 N-INVAS EAR/PLS OXIMTRY CONT: CPT

## 2019-03-16 PROCEDURE — 6360000002 HC RX W HCPCS: Performed by: STUDENT IN AN ORGANIZED HEALTH CARE EDUCATION/TRAINING PROGRAM

## 2019-03-16 PROCEDURE — 6370000000 HC RX 637 (ALT 250 FOR IP): Performed by: NEUROLOGICAL SURGERY

## 2019-03-16 PROCEDURE — 80202 ASSAY OF VANCOMYCIN: CPT

## 2019-03-16 PROCEDURE — 84100 ASSAY OF PHOSPHORUS: CPT

## 2019-03-16 PROCEDURE — C9113 INJ PANTOPRAZOLE SODIUM, VIA: HCPCS | Performed by: NURSE PRACTITIONER

## 2019-03-16 PROCEDURE — 2000000003 HC NEURO ICU R&B

## 2019-03-16 PROCEDURE — 94003 VENT MGMT INPAT SUBQ DAY: CPT

## 2019-03-16 PROCEDURE — 6370000000 HC RX 637 (ALT 250 FOR IP): Performed by: STUDENT IN AN ORGANIZED HEALTH CARE EDUCATION/TRAINING PROGRAM

## 2019-03-16 PROCEDURE — 80048 BASIC METABOLIC PNL TOTAL CA: CPT

## 2019-03-16 PROCEDURE — 51702 INSERT TEMP BLADDER CATH: CPT

## 2019-03-16 PROCEDURE — 6370000000 HC RX 637 (ALT 250 FOR IP): Performed by: PSYCHIATRY & NEUROLOGY

## 2019-03-16 PROCEDURE — 85025 COMPLETE CBC W/AUTO DIFF WBC: CPT

## 2019-03-16 PROCEDURE — 6370000000 HC RX 637 (ALT 250 FOR IP): Performed by: NURSE PRACTITIONER

## 2019-03-16 PROCEDURE — 51798 US URINE CAPACITY MEASURE: CPT

## 2019-03-16 PROCEDURE — 85730 THROMBOPLASTIN TIME PARTIAL: CPT

## 2019-03-16 PROCEDURE — 71045 X-RAY EXAM CHEST 1 VIEW: CPT

## 2019-03-16 PROCEDURE — 36600 WITHDRAWAL OF ARTERIAL BLOOD: CPT

## 2019-03-16 PROCEDURE — APPNB45 APP NON BILLABLE 31-45 MINUTES: Performed by: NURSE PRACTITIONER

## 2019-03-16 PROCEDURE — 84478 ASSAY OF TRIGLYCERIDES: CPT

## 2019-03-16 PROCEDURE — 83735 ASSAY OF MAGNESIUM: CPT

## 2019-03-16 PROCEDURE — 94770 HC ETCO2 MONITOR DAILY: CPT

## 2019-03-16 PROCEDURE — 51701 INSERT BLADDER CATHETER: CPT

## 2019-03-16 PROCEDURE — 97110 THERAPEUTIC EXERCISES: CPT

## 2019-03-16 RX ORDER — POTASSIUM CHLORIDE 20MEQ/15ML
40 LIQUID (ML) ORAL ONCE
Status: COMPLETED | OUTPATIENT
Start: 2019-03-16 | End: 2019-03-16

## 2019-03-16 RX ORDER — MAGNESIUM SULFATE 1 G/100ML
1 INJECTION INTRAVENOUS ONCE
Status: COMPLETED | OUTPATIENT
Start: 2019-03-16 | End: 2019-03-16

## 2019-03-16 RX ORDER — MIDAZOLAM HYDROCHLORIDE 1 MG/ML
2 INJECTION INTRAMUSCULAR; INTRAVENOUS
Status: DISCONTINUED | OUTPATIENT
Start: 2019-03-16 | End: 2019-03-26 | Stop reason: HOSPADM

## 2019-03-16 RX ADMIN — PIPERACILLIN AND TAZOBACTAM 3.38 G: 3; .375 INJECTION, POWDER, LYOPHILIZED, FOR SOLUTION INTRAVENOUS; PARENTERAL at 22:59

## 2019-03-16 RX ADMIN — DEXAMETHASONE SODIUM PHOSPHATE 4 MG: 4 INJECTION, SOLUTION INTRAMUSCULAR; INTRAVENOUS at 01:53

## 2019-03-16 RX ADMIN — INSULIN LISPRO 3 UNITS: 100 INJECTION, SOLUTION INTRAVENOUS; SUBCUTANEOUS at 17:31

## 2019-03-16 RX ADMIN — Medication 100 MG: at 08:31

## 2019-03-16 RX ADMIN — ATENOLOL 50 MG: 50 TABLET ORAL at 08:31

## 2019-03-16 RX ADMIN — FENTANYL CITRATE 50 MCG: 50 INJECTION INTRAMUSCULAR; INTRAVENOUS at 00:21

## 2019-03-16 RX ADMIN — MIDAZOLAM HYDROCHLORIDE 2 MG: 1 INJECTION, SOLUTION INTRAMUSCULAR; INTRAVENOUS at 17:14

## 2019-03-16 RX ADMIN — DEXAMETHASONE SODIUM PHOSPHATE 4 MG: 4 INJECTION, SOLUTION INTRAMUSCULAR; INTRAVENOUS at 10:14

## 2019-03-16 RX ADMIN — PANTOPRAZOLE SODIUM 8 MG/HR: 40 INJECTION, POWDER, FOR SOLUTION INTRAVENOUS at 00:07

## 2019-03-16 RX ADMIN — FOLIC ACID 1 MG: 1 TABLET ORAL at 08:31

## 2019-03-16 RX ADMIN — Medication 3 MG/HR: at 05:05

## 2019-03-16 RX ADMIN — LISINOPRIL 40 MG: 20 TABLET ORAL at 08:31

## 2019-03-16 RX ADMIN — PANTOPRAZOLE SODIUM 8 MG/HR: 40 INJECTION, POWDER, FOR SOLUTION INTRAVENOUS at 11:54

## 2019-03-16 RX ADMIN — INSULIN LISPRO 2 UNITS: 100 INJECTION, SOLUTION INTRAVENOUS; SUBCUTANEOUS at 23:07

## 2019-03-16 RX ADMIN — LEVETIRACETAM 1000 MG: 10 INJECTION INTRAVENOUS at 17:31

## 2019-03-16 RX ADMIN — INSULIN LISPRO 3 UNITS: 100 INJECTION, SOLUTION INTRAVENOUS; SUBCUTANEOUS at 11:54

## 2019-03-16 RX ADMIN — LEVETIRACETAM 1000 MG: 10 INJECTION INTRAVENOUS at 05:24

## 2019-03-16 RX ADMIN — FENTANYL CITRATE 50 MCG: 50 INJECTION INTRAMUSCULAR; INTRAVENOUS at 05:19

## 2019-03-16 RX ADMIN — VANCOMYCIN HYDROCHLORIDE 1000 MG: 1 INJECTION, SOLUTION INTRAVENOUS at 22:59

## 2019-03-16 RX ADMIN — MAGNESIUM SULFATE HEPTAHYDRATE 1 G: 1 INJECTION, SOLUTION INTRAVENOUS at 08:31

## 2019-03-16 RX ADMIN — PIPERACILLIN AND TAZOBACTAM 3.38 G: 3; .375 INJECTION, POWDER, LYOPHILIZED, FOR SOLUTION INTRAVENOUS; PARENTERAL at 16:13

## 2019-03-16 RX ADMIN — FENTANYL CITRATE 50 MCG: 50 INJECTION INTRAMUSCULAR; INTRAVENOUS at 10:41

## 2019-03-16 RX ADMIN — MIDAZOLAM HYDROCHLORIDE 2 MG: 1 INJECTION, SOLUTION INTRAMUSCULAR; INTRAVENOUS at 16:13

## 2019-03-16 RX ADMIN — ACETAMINOPHEN 650 MG: 325 TABLET ORAL at 16:21

## 2019-03-16 RX ADMIN — POTASSIUM CHLORIDE 40 MEQ: 40 SOLUTION ORAL at 08:31

## 2019-03-16 RX ADMIN — AMLODIPINE BESYLATE 10 MG: 10 TABLET ORAL at 08:31

## 2019-03-16 RX ADMIN — VANCOMYCIN HYDROCHLORIDE 1000 MG: 1 INJECTION, SOLUTION INTRAVENOUS at 11:54

## 2019-03-16 RX ADMIN — FENTANYL CITRATE 50 MCG: 50 INJECTION INTRAMUSCULAR; INTRAVENOUS at 02:42

## 2019-03-16 RX ADMIN — INSULIN LISPRO 3 UNITS: 100 INJECTION, SOLUTION INTRAVENOUS; SUBCUTANEOUS at 05:22

## 2019-03-16 RX ADMIN — HEPARIN SODIUM AND DEXTROSE 23 UNITS/KG/HR: 10000; 5 INJECTION INTRAVENOUS at 01:54

## 2019-03-16 RX ADMIN — PIPERACILLIN AND TAZOBACTAM 3.38 G: 3; .375 INJECTION, POWDER, LYOPHILIZED, FOR SOLUTION INTRAVENOUS; PARENTERAL at 08:18

## 2019-03-16 RX ADMIN — HEPARIN SODIUM AND DEXTROSE 23 UNITS/KG/HR: 10000; 5 INJECTION INTRAVENOUS at 20:12

## 2019-03-16 ASSESSMENT — PULMONARY FUNCTION TESTS
PIF_VALUE: 19
PIF_VALUE: 14
PIF_VALUE: 11
PIF_VALUE: 12
PIF_VALUE: 18
PIF_VALUE: 18
PIF_VALUE: 23
PIF_VALUE: 11

## 2019-03-16 ASSESSMENT — PAIN SCALES - GENERAL: PAINLEVEL_OUTOF10: 0

## 2019-03-17 ENCOUNTER — APPOINTMENT (OUTPATIENT)
Dept: GENERAL RADIOLOGY | Age: 74
DRG: 023 | End: 2019-03-17
Payer: MEDICARE

## 2019-03-17 LAB
ABSOLUTE EOS #: 0.11 K/UL (ref 0–0.44)
ABSOLUTE IMMATURE GRANULOCYTE: 0.44 K/UL (ref 0–0.3)
ABSOLUTE LYMPH #: 1.41 K/UL (ref 1.1–3.7)
ABSOLUTE MONO #: 1.21 K/UL (ref 0.1–1.2)
ALLEN TEST: POSITIVE
ANION GAP SERPL CALCULATED.3IONS-SCNC: 10 MMOL/L (ref 9–17)
BASOPHILS # BLD: 1 % (ref 0–2)
BASOPHILS ABSOLUTE: 0.06 K/UL (ref 0–0.2)
BUN BLDV-MCNC: 12 MG/DL (ref 8–23)
BUN/CREAT BLD: ABNORMAL (ref 9–20)
CALCIUM IONIZED: 1.17 MMOL/L (ref 1.13–1.33)
CALCIUM SERPL-MCNC: 8.6 MG/DL (ref 8.6–10.4)
CHLORIDE BLD-SCNC: 102 MMOL/L (ref 98–107)
CO2: 25 MMOL/L (ref 20–31)
CREAT SERPL-MCNC: 0.59 MG/DL (ref 0.7–1.2)
DIFFERENTIAL TYPE: ABNORMAL
EOSINOPHILS RELATIVE PERCENT: 1 % (ref 1–4)
FIO2: 30
GFR AFRICAN AMERICAN: >60 ML/MIN
GFR NON-AFRICAN AMERICAN: >60 ML/MIN
GFR SERPL CREATININE-BSD FRML MDRD: ABNORMAL ML/MIN/{1.73_M2}
GFR SERPL CREATININE-BSD FRML MDRD: ABNORMAL ML/MIN/{1.73_M2}
GLUCOSE BLD-MCNC: 224 MG/DL (ref 75–110)
GLUCOSE BLD-MCNC: 225 MG/DL (ref 75–110)
GLUCOSE BLD-MCNC: 248 MG/DL (ref 70–99)
GLUCOSE BLD-MCNC: 256 MG/DL (ref 75–110)
HCT VFR BLD CALC: 33.7 % (ref 40.7–50.3)
HEMOGLOBIN: 11.5 G/DL (ref 13–17)
IMMATURE GRANULOCYTES: 4 %
INR BLD: 1
LYMPHOCYTES # BLD: 12 % (ref 24–43)
MAGNESIUM: 1.6 MG/DL (ref 1.6–2.6)
MCH RBC QN AUTO: 31.7 PG (ref 25.2–33.5)
MCHC RBC AUTO-ENTMCNC: 34.1 G/DL (ref 28.4–34.8)
MCV RBC AUTO: 92.8 FL (ref 82.6–102.9)
MODE: ABNORMAL
MONOCYTES # BLD: 10 % (ref 3–12)
NEGATIVE BASE EXCESS, ART: ABNORMAL (ref 0–2)
NRBC AUTOMATED: 0 PER 100 WBC
O2 DEVICE/FLOW/%: ABNORMAL
PARTIAL THROMBOPLASTIN TIME: 38.5 SEC (ref 20.5–30.5)
PARTIAL THROMBOPLASTIN TIME: 42.2 SEC (ref 20.5–30.5)
PARTIAL THROMBOPLASTIN TIME: 81.9 SEC (ref 20.5–30.5)
PARTIAL THROMBOPLASTIN TIME: 95.2 SEC (ref 20.5–30.5)
PATIENT TEMP: ABNORMAL
PDW BLD-RTO: 13.1 % (ref 11.8–14.4)
PHOSPHORUS: 2 MG/DL (ref 2.5–4.5)
PLATELET # BLD: 291 K/UL (ref 138–453)
PLATELET ESTIMATE: ABNORMAL
PMV BLD AUTO: 11.7 FL (ref 8.1–13.5)
POC HCO3: 25.5 MMOL/L (ref 21–28)
POC O2 SATURATION: 97 % (ref 94–98)
POC PCO2 TEMP: ABNORMAL MM HG
POC PCO2: 31.9 MM HG (ref 35–48)
POC PH TEMP: ABNORMAL
POC PH: 7.51 (ref 7.35–7.45)
POC PO2 TEMP: ABNORMAL MM HG
POC PO2: 77 MM HG (ref 83–108)
POSITIVE BASE EXCESS, ART: 3 (ref 0–3)
POTASSIUM SERPL-SCNC: 3.3 MMOL/L (ref 3.7–5.3)
PROTHROMBIN TIME: 10.3 SEC (ref 9–12)
RBC # BLD: 3.63 M/UL (ref 4.21–5.77)
RBC # BLD: ABNORMAL 10*6/UL
SAMPLE SITE: ABNORMAL
SEG NEUTROPHILS: 72 % (ref 36–65)
SEGMENTED NEUTROPHILS ABSOLUTE COUNT: 8.73 K/UL (ref 1.5–8.1)
SODIUM BLD-SCNC: 137 MMOL/L (ref 135–144)
TCO2 (CALC), ART: 27 MMOL/L (ref 22–29)
WBC # BLD: 12 K/UL (ref 3.5–11.3)
WBC # BLD: ABNORMAL 10*3/UL

## 2019-03-17 PROCEDURE — C9113 INJ PANTOPRAZOLE SODIUM, VIA: HCPCS | Performed by: NURSE PRACTITIONER

## 2019-03-17 PROCEDURE — 2000000003 HC NEURO ICU R&B

## 2019-03-17 PROCEDURE — 2580000003 HC RX 258: Performed by: STUDENT IN AN ORGANIZED HEALTH CARE EDUCATION/TRAINING PROGRAM

## 2019-03-17 PROCEDURE — APPNB45 APP NON BILLABLE 31-45 MINUTES: Performed by: NURSE PRACTITIONER

## 2019-03-17 PROCEDURE — 82330 ASSAY OF CALCIUM: CPT

## 2019-03-17 PROCEDURE — 6370000000 HC RX 637 (ALT 250 FOR IP): Performed by: NURSE PRACTITIONER

## 2019-03-17 PROCEDURE — 6360000002 HC RX W HCPCS: Performed by: NURSE PRACTITIONER

## 2019-03-17 PROCEDURE — 85025 COMPLETE CBC W/AUTO DIFF WBC: CPT

## 2019-03-17 PROCEDURE — 94003 VENT MGMT INPAT SUBQ DAY: CPT

## 2019-03-17 PROCEDURE — 6370000000 HC RX 637 (ALT 250 FOR IP): Performed by: EMERGENCY MEDICINE

## 2019-03-17 PROCEDURE — 36600 WITHDRAWAL OF ARTERIAL BLOOD: CPT

## 2019-03-17 PROCEDURE — 2500000003 HC RX 250 WO HCPCS: Performed by: NURSE PRACTITIONER

## 2019-03-17 PROCEDURE — 2700000000 HC OXYGEN THERAPY PER DAY

## 2019-03-17 PROCEDURE — 80048 BASIC METABOLIC PNL TOTAL CA: CPT

## 2019-03-17 PROCEDURE — 6370000000 HC RX 637 (ALT 250 FOR IP): Performed by: NEUROLOGICAL SURGERY

## 2019-03-17 PROCEDURE — 82947 ASSAY GLUCOSE BLOOD QUANT: CPT

## 2019-03-17 PROCEDURE — 6360000002 HC RX W HCPCS: Performed by: STUDENT IN AN ORGANIZED HEALTH CARE EDUCATION/TRAINING PROGRAM

## 2019-03-17 PROCEDURE — 6370000000 HC RX 637 (ALT 250 FOR IP): Performed by: PSYCHIATRY & NEUROLOGY

## 2019-03-17 PROCEDURE — 85610 PROTHROMBIN TIME: CPT

## 2019-03-17 PROCEDURE — 2580000003 HC RX 258: Performed by: NURSE PRACTITIONER

## 2019-03-17 PROCEDURE — 99291 CRITICAL CARE FIRST HOUR: CPT | Performed by: PSYCHIATRY & NEUROLOGY

## 2019-03-17 PROCEDURE — 82803 BLOOD GASES ANY COMBINATION: CPT

## 2019-03-17 PROCEDURE — 36415 COLL VENOUS BLD VENIPUNCTURE: CPT

## 2019-03-17 PROCEDURE — 83735 ASSAY OF MAGNESIUM: CPT

## 2019-03-17 PROCEDURE — 94770 HC ETCO2 MONITOR DAILY: CPT

## 2019-03-17 PROCEDURE — 85730 THROMBOPLASTIN TIME PARTIAL: CPT

## 2019-03-17 PROCEDURE — 84100 ASSAY OF PHOSPHORUS: CPT

## 2019-03-17 PROCEDURE — 71045 X-RAY EXAM CHEST 1 VIEW: CPT

## 2019-03-17 RX ORDER — POTASSIUM CHLORIDE 20MEQ/15ML
40 LIQUID (ML) ORAL ONCE
Status: COMPLETED | OUTPATIENT
Start: 2019-03-17 | End: 2019-03-17

## 2019-03-17 RX ORDER — ZIPRASIDONE MESYLATE 20 MG/ML
10 INJECTION, POWDER, LYOPHILIZED, FOR SOLUTION INTRAMUSCULAR ONCE
Status: COMPLETED | OUTPATIENT
Start: 2019-03-17 | End: 2019-03-17

## 2019-03-17 RX ORDER — WARFARIN SODIUM 5 MG/1
5 TABLET ORAL
Status: COMPLETED | OUTPATIENT
Start: 2019-03-17 | End: 2019-03-17

## 2019-03-17 RX ORDER — MAGNESIUM SULFATE 1 G/100ML
1 INJECTION INTRAVENOUS
Status: COMPLETED | OUTPATIENT
Start: 2019-03-17 | End: 2019-03-17

## 2019-03-17 RX ADMIN — WARFARIN SODIUM 5 MG: 5 TABLET ORAL at 21:20

## 2019-03-17 RX ADMIN — LISINOPRIL 40 MG: 20 TABLET ORAL at 08:37

## 2019-03-17 RX ADMIN — ZIPRASIDONE MESYLATE 10 MG: 20 INJECTION, POWDER, LYOPHILIZED, FOR SOLUTION INTRAMUSCULAR at 11:01

## 2019-03-17 RX ADMIN — MAGNESIUM SULFATE HEPTAHYDRATE 1 G: 1 INJECTION, SOLUTION INTRAVENOUS at 08:08

## 2019-03-17 RX ADMIN — POTASSIUM PHOSPHATE, MONOBASIC AND POTASSIUM PHOSPHATE, DIBASIC 20 MMOL: 224; 236 INJECTION, SOLUTION, CONCENTRATE INTRAVENOUS at 08:37

## 2019-03-17 RX ADMIN — Medication 10 ML: at 20:34

## 2019-03-17 RX ADMIN — PIPERACILLIN AND TAZOBACTAM 3.38 G: 3; .375 INJECTION, POWDER, LYOPHILIZED, FOR SOLUTION INTRAVENOUS; PARENTERAL at 17:58

## 2019-03-17 RX ADMIN — AMLODIPINE BESYLATE 10 MG: 10 TABLET ORAL at 08:37

## 2019-03-17 RX ADMIN — WATER 1.2 ML: 1 INJECTION INTRAMUSCULAR; INTRAVENOUS; SUBCUTANEOUS at 11:02

## 2019-03-17 RX ADMIN — PIPERACILLIN AND TAZOBACTAM 3.38 G: 3; .375 INJECTION, POWDER, LYOPHILIZED, FOR SOLUTION INTRAVENOUS; PARENTERAL at 08:28

## 2019-03-17 RX ADMIN — POTASSIUM CHLORIDE 40 MEQ: 40 SOLUTION ORAL at 08:37

## 2019-03-17 RX ADMIN — Medication 100 MG: at 08:37

## 2019-03-17 RX ADMIN — INSULIN LISPRO 3 UNITS: 100 INJECTION, SOLUTION INTRAVENOUS; SUBCUTANEOUS at 18:27

## 2019-03-17 RX ADMIN — Medication 10 ML: at 10:47

## 2019-03-17 RX ADMIN — ATENOLOL 50 MG: 50 TABLET ORAL at 08:37

## 2019-03-17 RX ADMIN — FOLIC ACID 1 MG: 1 TABLET ORAL at 08:37

## 2019-03-17 RX ADMIN — LEVETIRACETAM 1000 MG: 10 INJECTION INTRAVENOUS at 18:27

## 2019-03-17 RX ADMIN — PANTOPRAZOLE SODIUM 40 MG: 40 INJECTION, POWDER, FOR SOLUTION INTRAVENOUS at 20:33

## 2019-03-17 RX ADMIN — VANCOMYCIN HYDROCHLORIDE 1000 MG: 1 INJECTION, SOLUTION INTRAVENOUS at 15:23

## 2019-03-17 RX ADMIN — FENTANYL CITRATE 50 MCG: 50 INJECTION INTRAMUSCULAR; INTRAVENOUS at 00:06

## 2019-03-17 RX ADMIN — HEPARIN SODIUM AND DEXTROSE 18 UNITS/KG/HR: 10000; 5 INJECTION INTRAVENOUS at 20:56

## 2019-03-17 RX ADMIN — MAGNESIUM SULFATE HEPTAHYDRATE 1 G: 1 INJECTION, SOLUTION INTRAVENOUS at 11:14

## 2019-03-17 RX ADMIN — PANTOPRAZOLE SODIUM 40 MG: 40 INJECTION, POWDER, FOR SOLUTION INTRAVENOUS at 08:37

## 2019-03-17 RX ADMIN — HYDRALAZINE HYDROCHLORIDE 10 MG: 20 INJECTION INTRAMUSCULAR; INTRAVENOUS at 02:17

## 2019-03-17 RX ADMIN — INSULIN LISPRO 2 UNITS: 100 INJECTION, SOLUTION INTRAVENOUS; SUBCUTANEOUS at 13:29

## 2019-03-17 RX ADMIN — Medication 10 ML: at 20:35

## 2019-03-17 RX ADMIN — FENTANYL CITRATE 50 MCG: 50 INJECTION INTRAMUSCULAR; INTRAVENOUS at 02:21

## 2019-03-17 RX ADMIN — LEVETIRACETAM 1000 MG: 10 INJECTION INTRAVENOUS at 05:37

## 2019-03-17 RX ADMIN — INSULIN LISPRO 2 UNITS: 100 INJECTION, SOLUTION INTRAVENOUS; SUBCUTANEOUS at 05:41

## 2019-03-17 ASSESSMENT — PULMONARY FUNCTION TESTS
PIF_VALUE: 20
PIF_VALUE: 11
PIF_VALUE: 21
PIF_VALUE: 19

## 2019-03-18 ENCOUNTER — APPOINTMENT (OUTPATIENT)
Dept: GENERAL RADIOLOGY | Age: 74
DRG: 023 | End: 2019-03-18
Payer: MEDICARE

## 2019-03-18 LAB
ABSOLUTE EOS #: 0.21 K/UL (ref 0–0.44)
ABSOLUTE IMMATURE GRANULOCYTE: 0.46 K/UL (ref 0–0.3)
ABSOLUTE LYMPH #: 1.06 K/UL (ref 1.1–3.7)
ABSOLUTE MONO #: 1.39 K/UL (ref 0.1–1.2)
ANION GAP SERPL CALCULATED.3IONS-SCNC: 9 MMOL/L (ref 9–17)
BASOPHILS # BLD: 1 % (ref 0–2)
BASOPHILS ABSOLUTE: 0.13 K/UL (ref 0–0.2)
BUN BLDV-MCNC: 7 MG/DL (ref 8–23)
BUN/CREAT BLD: ABNORMAL (ref 9–20)
CALCIUM IONIZED: 1.16 MMOL/L (ref 1.13–1.33)
CALCIUM SERPL-MCNC: 8.9 MG/DL (ref 8.6–10.4)
CHLORIDE BLD-SCNC: 98 MMOL/L (ref 98–107)
CO2: 26 MMOL/L (ref 20–31)
CREAT SERPL-MCNC: 0.53 MG/DL (ref 0.7–1.2)
DIFFERENTIAL TYPE: ABNORMAL
EOSINOPHILS RELATIVE PERCENT: 1 % (ref 1–4)
ESTIMATED AVERAGE GLUCOSE: 151 MG/DL
GFR AFRICAN AMERICAN: >60 ML/MIN
GFR NON-AFRICAN AMERICAN: >60 ML/MIN
GFR SERPL CREATININE-BSD FRML MDRD: ABNORMAL ML/MIN/{1.73_M2}
GFR SERPL CREATININE-BSD FRML MDRD: ABNORMAL ML/MIN/{1.73_M2}
GLUCOSE BLD-MCNC: 202 MG/DL (ref 75–110)
GLUCOSE BLD-MCNC: 217 MG/DL (ref 75–110)
GLUCOSE BLD-MCNC: 265 MG/DL (ref 70–99)
GLUCOSE BLD-MCNC: 270 MG/DL (ref 75–110)
GLUCOSE BLD-MCNC: 277 MG/DL (ref 75–110)
GLUCOSE BLD-MCNC: 318 MG/DL (ref 75–110)
HBA1C MFR BLD: 6.9 % (ref 4–6)
HCT VFR BLD CALC: 38.5 % (ref 40.7–50.3)
HEMOGLOBIN: 13.1 G/DL (ref 13–17)
IMMATURE GRANULOCYTES: 3 %
INR BLD: 1
LYMPHOCYTES # BLD: 7 % (ref 24–43)
MAGNESIUM: 1.8 MG/DL (ref 1.6–2.6)
MCH RBC QN AUTO: 31.8 PG (ref 25.2–33.5)
MCHC RBC AUTO-ENTMCNC: 34 G/DL (ref 28.4–34.8)
MCV RBC AUTO: 93.4 FL (ref 82.6–102.9)
MONOCYTES # BLD: 9 % (ref 3–12)
NRBC AUTOMATED: 0 PER 100 WBC
PARTIAL THROMBOPLASTIN TIME: 48.8 SEC (ref 20.5–30.5)
PARTIAL THROMBOPLASTIN TIME: 55.3 SEC (ref 20.5–30.5)
PARTIAL THROMBOPLASTIN TIME: 55.3 SEC (ref 20.5–30.5)
PARTIAL THROMBOPLASTIN TIME: 78.8 SEC (ref 20.5–30.5)
PDW BLD-RTO: 12.9 % (ref 11.8–14.4)
PHOSPHORUS: 3.1 MG/DL (ref 2.5–4.5)
PLATELET # BLD: 332 K/UL (ref 138–453)
PLATELET ESTIMATE: ABNORMAL
PMV BLD AUTO: 11 FL (ref 8.1–13.5)
POTASSIUM SERPL-SCNC: 3.6 MMOL/L (ref 3.7–5.3)
PROTHROMBIN TIME: 10.3 SEC (ref 9–12)
RBC # BLD: 4.12 M/UL (ref 4.21–5.77)
RBC # BLD: ABNORMAL 10*6/UL
SEG NEUTROPHILS: 79 % (ref 36–65)
SEGMENTED NEUTROPHILS ABSOLUTE COUNT: 11.57 K/UL (ref 1.5–8.1)
SODIUM BLD-SCNC: 133 MMOL/L (ref 135–144)
WBC # BLD: 14.8 K/UL (ref 3.5–11.3)
WBC # BLD: ABNORMAL 10*3/UL

## 2019-03-18 PROCEDURE — 82947 ASSAY GLUCOSE BLOOD QUANT: CPT

## 2019-03-18 PROCEDURE — 82330 ASSAY OF CALCIUM: CPT

## 2019-03-18 PROCEDURE — 2580000003 HC RX 258: Performed by: STUDENT IN AN ORGANIZED HEALTH CARE EDUCATION/TRAINING PROGRAM

## 2019-03-18 PROCEDURE — 6360000002 HC RX W HCPCS: Performed by: NURSE PRACTITIONER

## 2019-03-18 PROCEDURE — 2580000003 HC RX 258: Performed by: NURSE PRACTITIONER

## 2019-03-18 PROCEDURE — 83735 ASSAY OF MAGNESIUM: CPT

## 2019-03-18 PROCEDURE — 85610 PROTHROMBIN TIME: CPT

## 2019-03-18 PROCEDURE — 6370000000 HC RX 637 (ALT 250 FOR IP): Performed by: STUDENT IN AN ORGANIZED HEALTH CARE EDUCATION/TRAINING PROGRAM

## 2019-03-18 PROCEDURE — 6370000000 HC RX 637 (ALT 250 FOR IP): Performed by: NURSE PRACTITIONER

## 2019-03-18 PROCEDURE — 93971 EXTREMITY STUDY: CPT

## 2019-03-18 PROCEDURE — 99291 CRITICAL CARE FIRST HOUR: CPT | Performed by: PSYCHIATRY & NEUROLOGY

## 2019-03-18 PROCEDURE — 83036 HEMOGLOBIN GLYCOSYLATED A1C: CPT

## 2019-03-18 PROCEDURE — 84100 ASSAY OF PHOSPHORUS: CPT

## 2019-03-18 PROCEDURE — 6360000002 HC RX W HCPCS: Performed by: STUDENT IN AN ORGANIZED HEALTH CARE EDUCATION/TRAINING PROGRAM

## 2019-03-18 PROCEDURE — 85025 COMPLETE CBC W/AUTO DIFF WBC: CPT

## 2019-03-18 PROCEDURE — 92523 SPEECH SOUND LANG COMPREHEN: CPT

## 2019-03-18 PROCEDURE — 36415 COLL VENOUS BLD VENIPUNCTURE: CPT

## 2019-03-18 PROCEDURE — 6370000000 HC RX 637 (ALT 250 FOR IP): Performed by: NEUROLOGICAL SURGERY

## 2019-03-18 PROCEDURE — 80048 BASIC METABOLIC PNL TOTAL CA: CPT

## 2019-03-18 PROCEDURE — 85730 THROMBOPLASTIN TIME PARTIAL: CPT

## 2019-03-18 PROCEDURE — C9113 INJ PANTOPRAZOLE SODIUM, VIA: HCPCS | Performed by: NURSE PRACTITIONER

## 2019-03-18 PROCEDURE — 6370000000 HC RX 637 (ALT 250 FOR IP): Performed by: PSYCHIATRY & NEUROLOGY

## 2019-03-18 PROCEDURE — 2000000003 HC NEURO ICU R&B

## 2019-03-18 PROCEDURE — 6370000000 HC RX 637 (ALT 250 FOR IP): Performed by: EMERGENCY MEDICINE

## 2019-03-18 RX ORDER — WARFARIN SODIUM 5 MG/1
5 TABLET ORAL
Status: COMPLETED | OUTPATIENT
Start: 2019-03-18 | End: 2019-03-18

## 2019-03-18 RX ORDER — QUETIAPINE FUMARATE 25 MG/1
25 TABLET, FILM COATED ORAL NIGHTLY
Status: DISCONTINUED | OUTPATIENT
Start: 2019-03-18 | End: 2019-03-26 | Stop reason: HOSPADM

## 2019-03-18 RX ORDER — SODIUM CHLORIDE 9 MG/ML
INJECTION, SOLUTION INTRAVENOUS CONTINUOUS
Status: DISCONTINUED | OUTPATIENT
Start: 2019-03-18 | End: 2019-03-21

## 2019-03-18 RX ORDER — PANTOPRAZOLE SODIUM 40 MG/1
40 TABLET, DELAYED RELEASE ORAL
Status: DISCONTINUED | OUTPATIENT
Start: 2019-03-18 | End: 2019-03-26 | Stop reason: HOSPADM

## 2019-03-18 RX ADMIN — SODIUM CHLORIDE: 9 INJECTION, SOLUTION INTRAVENOUS at 12:13

## 2019-03-18 RX ADMIN — Medication 10 ML: at 20:33

## 2019-03-18 RX ADMIN — WARFARIN SODIUM 5 MG: 5 TABLET ORAL at 17:14

## 2019-03-18 RX ADMIN — INSULIN LISPRO 6 UNITS: 100 INJECTION, SOLUTION INTRAVENOUS; SUBCUTANEOUS at 17:16

## 2019-03-18 RX ADMIN — VANCOMYCIN HYDROCHLORIDE 1000 MG: 1 INJECTION, SOLUTION INTRAVENOUS at 23:15

## 2019-03-18 RX ADMIN — HEPARIN SODIUM AND DEXTROSE 22 UNITS/KG/HR: 10000; 5 INJECTION INTRAVENOUS at 16:01

## 2019-03-18 RX ADMIN — ATENOLOL 50 MG: 50 TABLET ORAL at 08:37

## 2019-03-18 RX ADMIN — INSULIN LISPRO 8 UNITS: 100 INJECTION, SOLUTION INTRAVENOUS; SUBCUTANEOUS at 12:19

## 2019-03-18 RX ADMIN — PANTOPRAZOLE SODIUM 40 MG: 40 TABLET, DELAYED RELEASE ORAL at 17:14

## 2019-03-18 RX ADMIN — HYDRALAZINE HYDROCHLORIDE 10 MG: 20 INJECTION INTRAMUSCULAR; INTRAVENOUS at 06:56

## 2019-03-18 RX ADMIN — LEVETIRACETAM 1000 MG: 10 INJECTION INTRAVENOUS at 06:03

## 2019-03-18 RX ADMIN — INSULIN LISPRO 3 UNITS: 100 INJECTION, SOLUTION INTRAVENOUS; SUBCUTANEOUS at 23:09

## 2019-03-18 RX ADMIN — PIPERACILLIN AND TAZOBACTAM 3.38 G: 3; .375 INJECTION, POWDER, LYOPHILIZED, FOR SOLUTION INTRAVENOUS; PARENTERAL at 00:09

## 2019-03-18 RX ADMIN — FOLIC ACID 1 MG: 1 TABLET ORAL at 08:37

## 2019-03-18 RX ADMIN — HYDRALAZINE HYDROCHLORIDE 10 MG: 20 INJECTION INTRAMUSCULAR; INTRAVENOUS at 03:05

## 2019-03-18 RX ADMIN — Medication 10 ML: at 08:30

## 2019-03-18 RX ADMIN — METFORMIN HYDROCHLORIDE 1000 MG: 500 TABLET ORAL at 16:07

## 2019-03-18 RX ADMIN — PIPERACILLIN AND TAZOBACTAM 3.38 G: 3; .375 INJECTION, POWDER, LYOPHILIZED, FOR SOLUTION INTRAVENOUS; PARENTERAL at 07:32

## 2019-03-18 RX ADMIN — PIPERACILLIN AND TAZOBACTAM 3.38 G: 3; .375 INJECTION, POWDER, LYOPHILIZED, FOR SOLUTION INTRAVENOUS; PARENTERAL at 23:15

## 2019-03-18 RX ADMIN — Medication 10 ML: at 08:34

## 2019-03-18 RX ADMIN — Medication 100 MG: at 08:37

## 2019-03-18 RX ADMIN — ACETAMINOPHEN 650 MG: 325 TABLET ORAL at 00:18

## 2019-03-18 RX ADMIN — QUETIAPINE FUMARATE 25 MG: 25 TABLET ORAL at 20:33

## 2019-03-18 RX ADMIN — VANCOMYCIN HYDROCHLORIDE 1000 MG: 1 INJECTION, SOLUTION INTRAVENOUS at 12:08

## 2019-03-18 RX ADMIN — PIPERACILLIN AND TAZOBACTAM 3.38 G: 3; .375 INJECTION, POWDER, LYOPHILIZED, FOR SOLUTION INTRAVENOUS; PARENTERAL at 16:04

## 2019-03-18 RX ADMIN — LISINOPRIL 40 MG: 20 TABLET ORAL at 08:37

## 2019-03-18 RX ADMIN — VANCOMYCIN HYDROCHLORIDE 1000 MG: 1 INJECTION, SOLUTION INTRAVENOUS at 00:09

## 2019-03-18 RX ADMIN — INSULIN LISPRO 2 UNITS: 100 INJECTION, SOLUTION INTRAVENOUS; SUBCUTANEOUS at 00:16

## 2019-03-18 RX ADMIN — INSULIN LISPRO 2 UNITS: 100 INJECTION, SOLUTION INTRAVENOUS; SUBCUTANEOUS at 06:03

## 2019-03-18 RX ADMIN — LEVETIRACETAM 1000 MG: 10 INJECTION INTRAVENOUS at 17:13

## 2019-03-18 RX ADMIN — PANTOPRAZOLE SODIUM 40 MG: 40 INJECTION, POWDER, FOR SOLUTION INTRAVENOUS at 08:30

## 2019-03-18 RX ADMIN — AMLODIPINE BESYLATE 10 MG: 10 TABLET ORAL at 08:37

## 2019-03-18 ASSESSMENT — PAIN SCALES - GENERAL: PAINLEVEL_OUTOF10: 1

## 2019-03-19 ENCOUNTER — APPOINTMENT (OUTPATIENT)
Dept: CT IMAGING | Age: 74
DRG: 023 | End: 2019-03-19
Payer: MEDICARE

## 2019-03-19 ENCOUNTER — APPOINTMENT (OUTPATIENT)
Dept: ULTRASOUND IMAGING | Age: 74
DRG: 023 | End: 2019-03-19
Payer: MEDICARE

## 2019-03-19 LAB
ABSOLUTE EOS #: 0 K/UL (ref 0–0.4)
ABSOLUTE EOS #: 0.48 K/UL (ref 0–0.4)
ABSOLUTE IMMATURE GRANULOCYTE: 0 K/UL (ref 0–0.3)
ABSOLUTE IMMATURE GRANULOCYTE: 0.61 K/UL (ref 0–0.3)
ABSOLUTE LYMPH #: 1.84 K/UL (ref 1–4.8)
ABSOLUTE LYMPH #: 2.42 K/UL (ref 1–4.8)
ABSOLUTE MONO #: 0.73 K/UL (ref 0.1–0.8)
ABSOLUTE MONO #: 4.28 K/UL (ref 0.1–0.8)
ACT TEG: 121 SEC (ref 86–118)
ACT TEG: 152 SEC (ref 86–118)
ALLEN TEST: ABNORMAL
ANGLE, RAPID TEG: 77.3 DEG (ref 64–80)
ANGLE, RAPID TEG: 79.4 DEG (ref 64–80)
ANION GAP SERPL CALCULATED.3IONS-SCNC: 11 MMOL/L (ref 9–17)
ANION GAP SERPL CALCULATED.3IONS-SCNC: 13 MMOL/L (ref 9–17)
BASOPHILS # BLD: 0 % (ref 0–2)
BASOPHILS # BLD: 2 % (ref 0–2)
BASOPHILS ABSOLUTE: 0 K/UL (ref 0–0.2)
BASOPHILS ABSOLUTE: 0.48 K/UL (ref 0–0.2)
BLOOD BANK SPECIMEN: NORMAL
BUN BLDV-MCNC: 13 MG/DL (ref 8–23)
BUN BLDV-MCNC: 23 MG/DL (ref 8–23)
BUN/CREAT BLD: ABNORMAL (ref 9–20)
BUN/CREAT BLD: ABNORMAL (ref 9–20)
CALCIUM IONIZED: 1.09 MMOL/L (ref 1.13–1.33)
CALCIUM IONIZED: 1.16 MMOL/L (ref 1.13–1.33)
CALCIUM SERPL-MCNC: 7.9 MG/DL (ref 8.6–10.4)
CALCIUM SERPL-MCNC: 8 MG/DL (ref 8.6–10.4)
CHLORIDE BLD-SCNC: 102 MMOL/L (ref 98–107)
CHLORIDE BLD-SCNC: 95 MMOL/L (ref 98–107)
CO2: 21 MMOL/L (ref 20–31)
CO2: 23 MMOL/L (ref 20–31)
CREAT SERPL-MCNC: 0.91 MG/DL (ref 0.7–1.2)
CREAT SERPL-MCNC: 2.16 MG/DL (ref 0.7–1.2)
DIFFERENTIAL TYPE: ABNORMAL
DIFFERENTIAL TYPE: ABNORMAL
EOSINOPHILS RELATIVE PERCENT: 0 % (ref 1–4)
EOSINOPHILS RELATIVE PERCENT: 2 % (ref 1–4)
EPL-TEG: 0.1 % (ref 0–15)
EPL-TEG: 0.5 % (ref 0–15)
FIO2: ABNORMAL
GFR AFRICAN AMERICAN: 37 ML/MIN
GFR AFRICAN AMERICAN: >60 ML/MIN
GFR NON-AFRICAN AMERICAN: 30 ML/MIN
GFR NON-AFRICAN AMERICAN: >60 ML/MIN
GFR SERPL CREATININE-BSD FRML MDRD: ABNORMAL ML/MIN/{1.73_M2}
GLUCOSE BLD-MCNC: 176 MG/DL (ref 70–99)
GLUCOSE BLD-MCNC: 225 MG/DL (ref 75–110)
GLUCOSE BLD-MCNC: 323 MG/DL (ref 70–99)
GLUCOSE BLD-MCNC: 339 MG/DL (ref 75–110)
HCT VFR BLD CALC: 20.8 % (ref 40.7–50.3)
HCT VFR BLD CALC: 24.8 % (ref 40.7–50.3)
HCT VFR BLD CALC: 28.9 % (ref 40.7–50.3)
HEMOGLOBIN: 7.1 G/DL (ref 13–17)
HEMOGLOBIN: 8.5 G/DL (ref 13–17)
HEMOGLOBIN: 9.8 G/DL (ref 13–17)
HEPARIN THERAPY: ABNORMAL
HEPARIN THERAPY: YES
IMMATURE GRANULOCYTES: 0 %
IMMATURE GRANULOCYTES: 2 %
INR BLD: 1.4
KINETICS RAPID TEG: 0.8 MIN (ref 1–2)
KINETICS RAPID TEG: 1 MIN (ref 1–2)
LY30 (LYSIS) TEG: 0.1 % (ref 0–8)
LY30 (LYSIS) TEG: 0.5 % (ref 0–8)
LYMPHOCYTES # BLD: 10 % (ref 24–44)
LYMPHOCYTES # BLD: 6 % (ref 24–44)
MA(MAX CLOT) RAPID TEG: 73.3 MM (ref 52–71)
MA(MAX CLOT) RAPID TEG: 78.9 MM (ref 52–71)
MAGNESIUM: 1.4 MG/DL (ref 1.6–2.6)
MAGNESIUM: 2.1 MG/DL (ref 1.6–2.6)
MCH RBC QN AUTO: 31.1 PG (ref 25.2–33.5)
MCH RBC QN AUTO: 31.5 PG (ref 25.2–33.5)
MCHC RBC AUTO-ENTMCNC: 33.9 G/DL (ref 28.4–34.8)
MCHC RBC AUTO-ENTMCNC: 34.3 G/DL (ref 28.4–34.8)
MCV RBC AUTO: 91.7 FL (ref 82.6–102.9)
MCV RBC AUTO: 91.9 FL (ref 82.6–102.9)
MODE: ABNORMAL
MONOCYTES # BLD: 14 % (ref 1–7)
MONOCYTES # BLD: 3 % (ref 1–7)
MORPHOLOGY: NORMAL
MORPHOLOGY: NORMAL
NEGATIVE BASE EXCESS, ART: ABNORMAL (ref 0–2)
NRBC AUTOMATED: 0 PER 100 WBC
NRBC AUTOMATED: 0.1 PER 100 WBC
O2 DEVICE/FLOW/%: ABNORMAL
PARTIAL THROMBOPLASTIN TIME: 25.7 SEC (ref 20.5–30.5)
PARTIAL THROMBOPLASTIN TIME: 32.2 SEC (ref 20.5–30.5)
PARTIAL THROMBOPLASTIN TIME: 65.9 SEC (ref 20.5–30.5)
PATIENT TEMP: ABNORMAL
PDW BLD-RTO: 12.9 % (ref 11.8–14.4)
PDW BLD-RTO: 13.6 % (ref 11.8–14.4)
PHOSPHORUS: 3 MG/DL (ref 2.5–4.5)
PLATELET # BLD: 229 K/UL (ref 138–453)
PLATELET # BLD: 336 K/UL (ref 138–453)
PLATELET ESTIMATE: ABNORMAL
PLATELET ESTIMATE: ABNORMAL
PMV BLD AUTO: 11.3 FL (ref 8.1–13.5)
PMV BLD AUTO: 11.7 FL (ref 8.1–13.5)
POC HCO3: 23.6 MMOL/L (ref 21–28)
POC O2 SATURATION: 95 % (ref 94–98)
POC PCO2 TEMP: ABNORMAL MM HG
POC PCO2: 25.3 MM HG (ref 35–48)
POC PH TEMP: ABNORMAL
POC PH: 7.58 (ref 7.35–7.45)
POC PO2 TEMP: ABNORMAL MM HG
POC PO2: 63.8 MM HG (ref 83–108)
POSITIVE BASE EXCESS, ART: 2 (ref 0–3)
POTASSIUM SERPL-SCNC: 3 MMOL/L (ref 3.7–5.3)
POTASSIUM SERPL-SCNC: 3.4 MMOL/L (ref 3.7–5.3)
PROTHROMBIN TIME: 14.2 SEC (ref 9–12)
RBC # BLD: 2.7 M/UL (ref 4.21–5.77)
RBC # BLD: 3.15 M/UL (ref 4.21–5.77)
RBC # BLD: ABNORMAL 10*6/UL
RBC # BLD: ABNORMAL 10*6/UL
REACTION TIME RAPID TEG: 0.8 MIN (ref 0–1)
REACTION TIME RAPID TEG: 1.1 MIN (ref 0–1)
SAMPLE SITE: ABNORMAL
SEG NEUTROPHILS: 78 % (ref 36–66)
SEG NEUTROPHILS: 83 % (ref 36–66)
SEGMENTED NEUTROPHILS ABSOLUTE COUNT: 20.09 K/UL (ref 1.8–7.7)
SEGMENTED NEUTROPHILS ABSOLUTE COUNT: 23.87 K/UL (ref 1.8–7.7)
SODIUM BLD-SCNC: 131 MMOL/L (ref 135–144)
SODIUM BLD-SCNC: 134 MMOL/L (ref 135–144)
TCO2 (CALC), ART: 24 MMOL/L (ref 22–29)
TEG COMMENT: ABNORMAL
TEG COMMENT: ABNORMAL
WBC # BLD: 24.2 K/UL (ref 3.5–11.3)
WBC # BLD: 30.6 K/UL (ref 3.5–11.3)
WBC # BLD: ABNORMAL 10*3/UL
WBC # BLD: ABNORMAL 10*3/UL

## 2019-03-19 PROCEDURE — 71250 CT THORAX DX C-: CPT

## 2019-03-19 PROCEDURE — 99291 CRITICAL CARE FIRST HOUR: CPT | Performed by: PSYCHIATRY & NEUROLOGY

## 2019-03-19 PROCEDURE — 36415 COLL VENOUS BLD VENIPUNCTURE: CPT

## 2019-03-19 PROCEDURE — 6360000002 HC RX W HCPCS: Performed by: STUDENT IN AN ORGANIZED HEALTH CARE EDUCATION/TRAINING PROGRAM

## 2019-03-19 PROCEDURE — 6360000002 HC RX W HCPCS: Performed by: NURSE PRACTITIONER

## 2019-03-19 PROCEDURE — 86920 COMPATIBILITY TEST SPIN: CPT

## 2019-03-19 PROCEDURE — 2580000003 HC RX 258: Performed by: NURSE PRACTITIONER

## 2019-03-19 PROCEDURE — 82947 ASSAY GLUCOSE BLOOD QUANT: CPT

## 2019-03-19 PROCEDURE — 82803 BLOOD GASES ANY COMBINATION: CPT

## 2019-03-19 PROCEDURE — 99231 SBSQ HOSP IP/OBS SF/LOW 25: CPT | Performed by: PHYSICAL MEDICINE & REHABILITATION

## 2019-03-19 PROCEDURE — P9016 RBC LEUKOCYTES REDUCED: HCPCS

## 2019-03-19 PROCEDURE — 6370000000 HC RX 637 (ALT 250 FOR IP): Performed by: PSYCHIATRY & NEUROLOGY

## 2019-03-19 PROCEDURE — 2000000003 HC NEURO ICU R&B

## 2019-03-19 PROCEDURE — 2580000003 HC RX 258: Performed by: STUDENT IN AN ORGANIZED HEALTH CARE EDUCATION/TRAINING PROGRAM

## 2019-03-19 PROCEDURE — 6370000000 HC RX 637 (ALT 250 FOR IP): Performed by: NURSE PRACTITIONER

## 2019-03-19 PROCEDURE — 85018 HEMOGLOBIN: CPT

## 2019-03-19 PROCEDURE — 36600 WITHDRAWAL OF ARTERIAL BLOOD: CPT

## 2019-03-19 PROCEDURE — 6370000000 HC RX 637 (ALT 250 FOR IP): Performed by: STUDENT IN AN ORGANIZED HEALTH CARE EDUCATION/TRAINING PROGRAM

## 2019-03-19 PROCEDURE — 84100 ASSAY OF PHOSPHORUS: CPT

## 2019-03-19 PROCEDURE — 80048 BASIC METABOLIC PNL TOTAL CA: CPT

## 2019-03-19 PROCEDURE — 86900 BLOOD TYPING SEROLOGIC ABO: CPT

## 2019-03-19 PROCEDURE — 86901 BLOOD TYPING SEROLOGIC RH(D): CPT

## 2019-03-19 PROCEDURE — 82330 ASSAY OF CALCIUM: CPT

## 2019-03-19 PROCEDURE — 85025 COMPLETE CBC W/AUTO DIFF WBC: CPT

## 2019-03-19 PROCEDURE — 86850 RBC ANTIBODY SCREEN: CPT

## 2019-03-19 PROCEDURE — 85210 CLOT FACTOR II PROTHROM SPEC: CPT

## 2019-03-19 PROCEDURE — 85730 THROMBOPLASTIN TIME PARTIAL: CPT

## 2019-03-19 PROCEDURE — 83735 ASSAY OF MAGNESIUM: CPT

## 2019-03-19 PROCEDURE — 85610 PROTHROMBIN TIME: CPT

## 2019-03-19 PROCEDURE — 85014 HEMATOCRIT: CPT

## 2019-03-19 PROCEDURE — 85390 FIBRINOLYSINS SCREEN I&R: CPT

## 2019-03-19 PROCEDURE — 76770 US EXAM ABDO BACK WALL COMP: CPT

## 2019-03-19 PROCEDURE — 36430 TRANSFUSION BLD/BLD COMPNT: CPT

## 2019-03-19 RX ORDER — PROTAMINE SULFATE 10 MG/ML
30 INJECTION, SOLUTION INTRAVENOUS ONCE
Status: COMPLETED | OUTPATIENT
Start: 2019-03-19 | End: 2019-03-19

## 2019-03-19 RX ORDER — 0.9 % SODIUM CHLORIDE 0.9 %
500 INTRAVENOUS SOLUTION INTRAVENOUS ONCE
Status: COMPLETED | OUTPATIENT
Start: 2019-03-19 | End: 2019-03-19

## 2019-03-19 RX ORDER — POTASSIUM CHLORIDE 7.45 MG/ML
10 INJECTION INTRAVENOUS
Status: COMPLETED | OUTPATIENT
Start: 2019-03-19 | End: 2019-03-19

## 2019-03-19 RX ORDER — 0.9 % SODIUM CHLORIDE 0.9 %
250 INTRAVENOUS SOLUTION INTRAVENOUS ONCE
Status: COMPLETED | OUTPATIENT
Start: 2019-03-19 | End: 2019-03-19

## 2019-03-19 RX ORDER — MAGNESIUM SULFATE 1 G/100ML
2 INJECTION INTRAVENOUS ONCE
Status: COMPLETED | OUTPATIENT
Start: 2019-03-19 | End: 2019-03-19

## 2019-03-19 RX ORDER — POTASSIUM CHLORIDE 20 MEQ/1
40 TABLET, EXTENDED RELEASE ORAL 2 TIMES DAILY
Status: DISCONTINUED | OUTPATIENT
Start: 2019-03-19 | End: 2019-03-20

## 2019-03-19 RX ORDER — 0.9 % SODIUM CHLORIDE 0.9 %
1000 INTRAVENOUS SOLUTION INTRAVENOUS ONCE
Status: COMPLETED | OUTPATIENT
Start: 2019-03-19 | End: 2019-03-19

## 2019-03-19 RX ADMIN — FENTANYL CITRATE 50 MCG: 50 INJECTION INTRAMUSCULAR; INTRAVENOUS at 21:14

## 2019-03-19 RX ADMIN — Medication 10 ML: at 22:10

## 2019-03-19 RX ADMIN — CALCIUM GLUCONATE 2 G: 98 INJECTION, SOLUTION INTRAVENOUS at 09:45

## 2019-03-19 RX ADMIN — QUETIAPINE FUMARATE 25 MG: 25 TABLET ORAL at 22:08

## 2019-03-19 RX ADMIN — POTASSIUM CHLORIDE 10 MEQ: 10 INJECTION, SOLUTION INTRAVENOUS at 10:39

## 2019-03-19 RX ADMIN — SODIUM CHLORIDE 250 ML: 0.9 INJECTION, SOLUTION INTRAVENOUS at 08:30

## 2019-03-19 RX ADMIN — SODIUM CHLORIDE 500 ML: 0.9 INJECTION, SOLUTION INTRAVENOUS at 18:13

## 2019-03-19 RX ADMIN — INSULIN LISPRO 12 UNITS: 100 INJECTION, SOLUTION INTRAVENOUS; SUBCUTANEOUS at 10:07

## 2019-03-19 RX ADMIN — MAGNESIUM SULFATE HEPTAHYDRATE 2 G: 1 INJECTION, SOLUTION INTRAVENOUS at 05:08

## 2019-03-19 RX ADMIN — LEVETIRACETAM 1000 MG: 10 INJECTION INTRAVENOUS at 17:15

## 2019-03-19 RX ADMIN — POTASSIUM CHLORIDE 10 MEQ: 10 INJECTION, SOLUTION INTRAVENOUS at 06:05

## 2019-03-19 RX ADMIN — POTASSIUM CHLORIDE 10 MEQ: 7.46 INJECTION, SOLUTION INTRAVENOUS at 23:48

## 2019-03-19 RX ADMIN — VANCOMYCIN HYDROCHLORIDE 1000 MG: 1 INJECTION, SOLUTION INTRAVENOUS at 11:41

## 2019-03-19 RX ADMIN — INSULIN LISPRO 6 UNITS: 100 INJECTION, SOLUTION INTRAVENOUS; SUBCUTANEOUS at 17:15

## 2019-03-19 RX ADMIN — POTASSIUM CHLORIDE 10 MEQ: 7.46 INJECTION, SOLUTION INTRAVENOUS at 22:43

## 2019-03-19 RX ADMIN — PANTOPRAZOLE SODIUM 40 MG: 40 TABLET, DELAYED RELEASE ORAL at 21:41

## 2019-03-19 RX ADMIN — INSULIN LISPRO 2 UNITS: 100 INJECTION, SOLUTION INTRAVENOUS; SUBCUTANEOUS at 21:41

## 2019-03-19 RX ADMIN — POTASSIUM CHLORIDE 10 MEQ: 10 INJECTION, SOLUTION INTRAVENOUS at 07:00

## 2019-03-19 RX ADMIN — POTASSIUM CHLORIDE 40 MEQ: 1500 TABLET, EXTENDED RELEASE ORAL at 22:08

## 2019-03-19 RX ADMIN — POTASSIUM CHLORIDE 10 MEQ: 10 INJECTION, SOLUTION INTRAVENOUS at 08:58

## 2019-03-19 RX ADMIN — SODIUM CHLORIDE 1000 ML: 9 INJECTION, SOLUTION INTRAVENOUS at 05:07

## 2019-03-19 RX ADMIN — PROTAMINE SULFATE 30 MG: 10 INJECTION, SOLUTION INTRAVENOUS at 09:50

## 2019-03-19 RX ADMIN — POTASSIUM CHLORIDE 10 MEQ: 7.46 INJECTION, SOLUTION INTRAVENOUS at 21:14

## 2019-03-19 RX ADMIN — PIPERACILLIN AND TAZOBACTAM 3.38 G: 3; .375 INJECTION, POWDER, LYOPHILIZED, FOR SOLUTION INTRAVENOUS; PARENTERAL at 17:15

## 2019-03-19 ASSESSMENT — PAIN SCALES - GENERAL: PAINLEVEL_OUTOF10: 10

## 2019-03-20 LAB
ABSOLUTE EOS #: 0.51 K/UL (ref 0–0.4)
ABSOLUTE IMMATURE GRANULOCYTE: 0.76 K/UL (ref 0–0.3)
ABSOLUTE LYMPH #: 1.78 K/UL (ref 1–4.8)
ABSOLUTE MONO #: 0.76 K/UL (ref 0.1–0.8)
ANION GAP SERPL CALCULATED.3IONS-SCNC: 12 MMOL/L (ref 9–17)
ANION GAP SERPL CALCULATED.3IONS-SCNC: 12 MMOL/L (ref 9–17)
BASOPHILS # BLD: 0 % (ref 0–2)
BASOPHILS ABSOLUTE: 0 K/UL (ref 0–0.2)
BUN BLDV-MCNC: 23 MG/DL (ref 8–23)
BUN BLDV-MCNC: 24 MG/DL (ref 8–23)
BUN/CREAT BLD: ABNORMAL (ref 9–20)
BUN/CREAT BLD: ABNORMAL (ref 9–20)
CALCIUM IONIZED: 1.11 MMOL/L (ref 1.13–1.33)
CALCIUM SERPL-MCNC: 7.6 MG/DL (ref 8.6–10.4)
CALCIUM SERPL-MCNC: 7.8 MG/DL (ref 8.6–10.4)
CHLORIDE BLD-SCNC: 101 MMOL/L (ref 98–107)
CHLORIDE BLD-SCNC: 102 MMOL/L (ref 98–107)
CO2: 17 MMOL/L (ref 20–31)
CO2: 18 MMOL/L (ref 20–31)
CREAT SERPL-MCNC: 2.28 MG/DL (ref 0.7–1.2)
CREAT SERPL-MCNC: 2.45 MG/DL (ref 0.7–1.2)
DIFFERENTIAL TYPE: ABNORMAL
EOSINOPHILS RELATIVE PERCENT: 2 % (ref 1–4)
GFR AFRICAN AMERICAN: 32 ML/MIN
GFR AFRICAN AMERICAN: 34 ML/MIN
GFR NON-AFRICAN AMERICAN: 26 ML/MIN
GFR NON-AFRICAN AMERICAN: 28 ML/MIN
GFR SERPL CREATININE-BSD FRML MDRD: ABNORMAL ML/MIN/{1.73_M2}
GLUCOSE BLD-MCNC: 146 MG/DL (ref 75–110)
GLUCOSE BLD-MCNC: 152 MG/DL (ref 75–110)
GLUCOSE BLD-MCNC: 156 MG/DL (ref 75–110)
GLUCOSE BLD-MCNC: 215 MG/DL (ref 75–110)
GLUCOSE BLD-MCNC: 217 MG/DL (ref 75–110)
GLUCOSE BLD-MCNC: 228 MG/DL (ref 70–99)
GLUCOSE BLD-MCNC: 229 MG/DL (ref 70–99)
HCT VFR BLD CALC: 23.4 % (ref 40.7–50.3)
HCT VFR BLD CALC: 24.3 % (ref 40.7–50.3)
HEMOGLOBIN: 8.1 G/DL (ref 13–17)
HEMOGLOBIN: 8.3 G/DL (ref 13–17)
HEMOGLOBIN: 9 G/DL (ref 13–17)
IMMATURE GRANULOCYTES: 3 %
INR BLD: 1.4
LACTIC ACID, WHOLE BLOOD: 1.6 MMOL/L (ref 0.7–2.1)
LYMPHOCYTES # BLD: 7 % (ref 24–44)
MAGNESIUM: 1.9 MG/DL (ref 1.6–2.6)
MAGNESIUM: 1.9 MG/DL (ref 1.6–2.6)
MCH RBC QN AUTO: 30.2 PG (ref 25.2–33.5)
MCHC RBC AUTO-ENTMCNC: 34.2 G/DL (ref 28.4–34.8)
MCV RBC AUTO: 88.4 FL (ref 82.6–102.9)
MONOCYTES # BLD: 3 % (ref 1–7)
MORPHOLOGY: NORMAL
NRBC AUTOMATED: 0.1 PER 100 WBC
PARTIAL THROMBOPLASTIN TIME: 18.6 SEC (ref 20.5–30.5)
PARTIAL THROMBOPLASTIN TIME: 26.2 SEC (ref 20.5–30.5)
PDW BLD-RTO: 14.2 % (ref 11.8–14.4)
PHOSPHORUS: 3.9 MG/DL (ref 2.5–4.5)
PHOSPHORUS: 3.9 MG/DL (ref 2.5–4.5)
PLATELET # BLD: 261 K/UL (ref 138–453)
PLATELET ESTIMATE: ABNORMAL
PMV BLD AUTO: 11.3 FL (ref 8.1–13.5)
POTASSIUM SERPL-SCNC: 4.3 MMOL/L (ref 3.7–5.3)
POTASSIUM SERPL-SCNC: 4.5 MMOL/L (ref 3.7–5.3)
PROCALCITONIN: 0.54 NG/ML
PROTHROMBIN TIME: 14.9 SEC (ref 9–12)
RBC # BLD: 2.75 M/UL (ref 4.21–5.77)
RBC # BLD: ABNORMAL 10*6/UL
SEG NEUTROPHILS: 85 % (ref 36–66)
SEGMENTED NEUTROPHILS ABSOLUTE COUNT: 21.59 K/UL (ref 1.8–7.7)
SODIUM BLD-SCNC: 131 MMOL/L (ref 135–144)
SODIUM BLD-SCNC: 131 MMOL/L (ref 135–144)
WBC # BLD: 25.4 K/UL (ref 3.5–11.3)
WBC # BLD: ABNORMAL 10*3/UL

## 2019-03-20 PROCEDURE — 2580000003 HC RX 258: Performed by: STUDENT IN AN ORGANIZED HEALTH CARE EDUCATION/TRAINING PROGRAM

## 2019-03-20 PROCEDURE — 97530 THERAPEUTIC ACTIVITIES: CPT

## 2019-03-20 PROCEDURE — 99291 CRITICAL CARE FIRST HOUR: CPT | Performed by: PSYCHIATRY & NEUROLOGY

## 2019-03-20 PROCEDURE — 85025 COMPLETE CBC W/AUTO DIFF WBC: CPT

## 2019-03-20 PROCEDURE — 6360000002 HC RX W HCPCS: Performed by: STUDENT IN AN ORGANIZED HEALTH CARE EDUCATION/TRAINING PROGRAM

## 2019-03-20 PROCEDURE — 86900 BLOOD TYPING SEROLOGIC ABO: CPT

## 2019-03-20 PROCEDURE — 82947 ASSAY GLUCOSE BLOOD QUANT: CPT

## 2019-03-20 PROCEDURE — 83605 ASSAY OF LACTIC ACID: CPT

## 2019-03-20 PROCEDURE — 6370000000 HC RX 637 (ALT 250 FOR IP): Performed by: PSYCHIATRY & NEUROLOGY

## 2019-03-20 PROCEDURE — 94762 N-INVAS EAR/PLS OXIMTRY CONT: CPT

## 2019-03-20 PROCEDURE — 36415 COLL VENOUS BLD VENIPUNCTURE: CPT

## 2019-03-20 PROCEDURE — 85018 HEMOGLOBIN: CPT

## 2019-03-20 PROCEDURE — 80048 BASIC METABOLIC PNL TOTAL CA: CPT

## 2019-03-20 PROCEDURE — 85014 HEMATOCRIT: CPT

## 2019-03-20 PROCEDURE — 84100 ASSAY OF PHOSPHORUS: CPT

## 2019-03-20 PROCEDURE — 6370000000 HC RX 637 (ALT 250 FOR IP): Performed by: NEUROLOGICAL SURGERY

## 2019-03-20 PROCEDURE — 2000000003 HC NEURO ICU R&B

## 2019-03-20 PROCEDURE — 82330 ASSAY OF CALCIUM: CPT

## 2019-03-20 PROCEDURE — APPNB45 APP NON BILLABLE 31-45 MINUTES: Performed by: NURSE PRACTITIONER

## 2019-03-20 PROCEDURE — 6360000002 HC RX W HCPCS: Performed by: NURSE PRACTITIONER

## 2019-03-20 PROCEDURE — 6370000000 HC RX 637 (ALT 250 FOR IP): Performed by: NURSE PRACTITIONER

## 2019-03-20 PROCEDURE — 92507 TX SP LANG VOICE COMM INDIV: CPT

## 2019-03-20 PROCEDURE — 6370000000 HC RX 637 (ALT 250 FOR IP): Performed by: STUDENT IN AN ORGANIZED HEALTH CARE EDUCATION/TRAINING PROGRAM

## 2019-03-20 PROCEDURE — 83735 ASSAY OF MAGNESIUM: CPT

## 2019-03-20 PROCEDURE — 85610 PROTHROMBIN TIME: CPT

## 2019-03-20 PROCEDURE — 85730 THROMBOPLASTIN TIME PARTIAL: CPT

## 2019-03-20 PROCEDURE — 36430 TRANSFUSION BLD/BLD COMPNT: CPT

## 2019-03-20 PROCEDURE — 2580000003 HC RX 258: Performed by: NURSE PRACTITIONER

## 2019-03-20 PROCEDURE — 84145 PROCALCITONIN (PCT): CPT

## 2019-03-20 PROCEDURE — P9016 RBC LEUKOCYTES REDUCED: HCPCS

## 2019-03-20 RX ORDER — FUROSEMIDE 10 MG/ML
20 INJECTION INTRAMUSCULAR; INTRAVENOUS ONCE
Status: COMPLETED | OUTPATIENT
Start: 2019-03-20 | End: 2019-03-20

## 2019-03-20 RX ORDER — 0.9 % SODIUM CHLORIDE 0.9 %
250 INTRAVENOUS SOLUTION INTRAVENOUS ONCE
Status: COMPLETED | OUTPATIENT
Start: 2019-03-20 | End: 2019-03-20

## 2019-03-20 RX ADMIN — LEVETIRACETAM 1000 MG: 10 INJECTION INTRAVENOUS at 18:42

## 2019-03-20 RX ADMIN — LEVETIRACETAM 1000 MG: 10 INJECTION INTRAVENOUS at 05:35

## 2019-03-20 RX ADMIN — Medication 10 ML: at 19:58

## 2019-03-20 RX ADMIN — INSULIN LISPRO 3 UNITS: 100 INJECTION, SOLUTION INTRAVENOUS; SUBCUTANEOUS at 11:52

## 2019-03-20 RX ADMIN — PIPERACILLIN AND TAZOBACTAM 3.38 G: 3; .375 INJECTION, POWDER, LYOPHILIZED, FOR SOLUTION INTRAVENOUS; PARENTERAL at 18:42

## 2019-03-20 RX ADMIN — INSULIN LISPRO 3 UNITS: 100 INJECTION, SOLUTION INTRAVENOUS; SUBCUTANEOUS at 16:27

## 2019-03-20 RX ADMIN — PIPERACILLIN AND TAZOBACTAM 3.38 G: 3; .375 INJECTION, POWDER, LYOPHILIZED, FOR SOLUTION INTRAVENOUS; PARENTERAL at 02:28

## 2019-03-20 RX ADMIN — VANCOMYCIN HYDROCHLORIDE 1000 MG: 1 INJECTION, SOLUTION INTRAVENOUS at 00:43

## 2019-03-20 RX ADMIN — FENTANYL CITRATE: 50 INJECTION INTRAMUSCULAR; INTRAVENOUS at 16:24

## 2019-03-20 RX ADMIN — Medication 100 MG: at 09:08

## 2019-03-20 RX ADMIN — ATENOLOL 50 MG: 50 TABLET ORAL at 09:08

## 2019-03-20 RX ADMIN — FUROSEMIDE 20 MG: 10 INJECTION, SOLUTION INTRAMUSCULAR; INTRAVENOUS at 05:35

## 2019-03-20 RX ADMIN — AMLODIPINE BESYLATE 10 MG: 10 TABLET ORAL at 09:08

## 2019-03-20 RX ADMIN — POTASSIUM CHLORIDE 10 MEQ: 7.46 INJECTION, SOLUTION INTRAVENOUS at 00:55

## 2019-03-20 RX ADMIN — LISINOPRIL 40 MG: 20 TABLET ORAL at 09:08

## 2019-03-20 RX ADMIN — FOLIC ACID 1 MG: 1 TABLET ORAL at 09:08

## 2019-03-20 RX ADMIN — PANTOPRAZOLE SODIUM 40 MG: 40 TABLET, DELAYED RELEASE ORAL at 19:59

## 2019-03-20 RX ADMIN — INSULIN LISPRO 6 UNITS: 100 INJECTION, SOLUTION INTRAVENOUS; SUBCUTANEOUS at 09:16

## 2019-03-20 RX ADMIN — FENTANYL CITRATE 50 MCG: 50 INJECTION INTRAMUSCULAR; INTRAVENOUS at 01:38

## 2019-03-20 RX ADMIN — PIPERACILLIN AND TAZOBACTAM 3.38 G: 3; .375 INJECTION, POWDER, LYOPHILIZED, FOR SOLUTION INTRAVENOUS; PARENTERAL at 11:11

## 2019-03-20 RX ADMIN — MAGNESIUM GLUCONATE 500 MG ORAL TABLET 400 MG: 500 TABLET ORAL at 09:08

## 2019-03-20 RX ADMIN — QUETIAPINE FUMARATE 25 MG: 25 TABLET ORAL at 19:59

## 2019-03-20 RX ADMIN — PANTOPRAZOLE SODIUM 40 MG: 40 TABLET, DELAYED RELEASE ORAL at 09:08

## 2019-03-20 RX ADMIN — FENTANYL CITRATE 50 MCG: 50 INJECTION INTRAMUSCULAR; INTRAVENOUS at 06:02

## 2019-03-20 RX ADMIN — SODIUM CHLORIDE 250 ML: 0.9 INJECTION, SOLUTION INTRAVENOUS at 01:21

## 2019-03-20 ASSESSMENT — PAIN DESCRIPTION - PAIN TYPE: TYPE: ACUTE PAIN

## 2019-03-20 ASSESSMENT — PAIN SCALES - GENERAL
PAINLEVEL_OUTOF10: 8
PAINLEVEL_OUTOF10: 7
PAINLEVEL_OUTOF10: 10
PAINLEVEL_OUTOF10: 10

## 2019-03-20 ASSESSMENT — PAIN DESCRIPTION - LOCATION: LOCATION: ABDOMEN

## 2019-03-21 ENCOUNTER — APPOINTMENT (OUTPATIENT)
Dept: ULTRASOUND IMAGING | Age: 74
DRG: 023 | End: 2019-03-21
Payer: MEDICARE

## 2019-03-21 ENCOUNTER — APPOINTMENT (OUTPATIENT)
Dept: CT IMAGING | Age: 74
DRG: 023 | End: 2019-03-21
Payer: MEDICARE

## 2019-03-21 PROBLEM — N17.9 AKI (ACUTE KIDNEY INJURY) (HCC): Status: ACTIVE | Noted: 2019-03-21

## 2019-03-21 PROBLEM — E87.29 HIGH ANION GAP METABOLIC ACIDOSIS: Status: ACTIVE | Noted: 2019-03-21

## 2019-03-21 LAB
-: NORMAL
ABO/RH: NORMAL
ABSOLUTE EOS #: 0.47 K/UL (ref 0–0.4)
ABSOLUTE IMMATURE GRANULOCYTE: 0.7 K/UL (ref 0–0.3)
ABSOLUTE LYMPH #: 1.17 K/UL (ref 1–4.8)
ABSOLUTE MONO #: 2.33 K/UL (ref 0.1–0.8)
AMORPHOUS: NORMAL
ANION GAP SERPL CALCULATED.3IONS-SCNC: 17 MMOL/L (ref 9–17)
ANTIBODY SCREEN: NEGATIVE
ARM BAND NUMBER: NORMAL
BACTERIA: NORMAL
BASOPHILS # BLD: 0 % (ref 0–2)
BASOPHILS ABSOLUTE: 0 K/UL (ref 0–0.2)
BILIRUBIN URINE: NEGATIVE
BLD PROD TYP BPU: NORMAL
BUN BLDV-MCNC: 31 MG/DL (ref 8–23)
BUN/CREAT BLD: ABNORMAL (ref 9–20)
CALCIUM IONIZED: 1.13 MMOL/L (ref 1.13–1.33)
CALCIUM SERPL-MCNC: 8.2 MG/DL (ref 8.6–10.4)
CASTS UA: NORMAL /LPF (ref 0–8)
CHLORIDE BLD-SCNC: 105 MMOL/L (ref 98–107)
CO2: 16 MMOL/L (ref 20–31)
COLOR: YELLOW
COMMENT UA: ABNORMAL
COMPLEMENT C3: 132 MG/DL (ref 90–180)
COMPLEMENT C4: 15 MG/DL (ref 10–40)
CREAT SERPL-MCNC: 3.55 MG/DL (ref 0.7–1.2)
CREATININE URINE: 41.8 MG/DL (ref 39–259)
CREATININE URINE: 54.5 MG/DL (ref 39–259)
CROSSMATCH RESULT: NORMAL
CRYSTALS, UA: NORMAL /HPF
CULTURE: NORMAL
CULTURE: NORMAL
DIFFERENTIAL TYPE: ABNORMAL
DISPENSE STATUS BLOOD BANK: NORMAL
EOSINOPHILS RELATIVE PERCENT: 2 % (ref 1–4)
EPITHELIAL CELLS UA: NORMAL /HPF (ref 0–5)
EXPIRATION DATE: NORMAL
FREE KAPPA/LAMBDA RATIO: 1.77 (ref 0.26–1.65)
GFR AFRICAN AMERICAN: 21 ML/MIN
GFR NON-AFRICAN AMERICAN: 17 ML/MIN
GFR SERPL CREATININE-BSD FRML MDRD: ABNORMAL ML/MIN/{1.73_M2}
GFR SERPL CREATININE-BSD FRML MDRD: ABNORMAL ML/MIN/{1.73_M2}
GLUCOSE BLD-MCNC: 129 MG/DL (ref 75–110)
GLUCOSE BLD-MCNC: 155 MG/DL (ref 75–110)
GLUCOSE BLD-MCNC: 161 MG/DL (ref 70–99)
GLUCOSE BLD-MCNC: 164 MG/DL (ref 75–110)
GLUCOSE BLD-MCNC: 214 MG/DL (ref 75–110)
GLUCOSE URINE: NEGATIVE
HAV IGM SER IA-ACNC: NONREACTIVE
HCT VFR BLD CALC: 27.3 % (ref 40.7–50.3)
HEMOGLOBIN: 9.3 G/DL (ref 13–17)
HEPATITIS B CORE IGM ANTIBODY: NONREACTIVE
HEPATITIS B SURFACE ANTIGEN: NONREACTIVE
HEPATITIS C ANTIBODY: NONREACTIVE
IMMATURE GRANULOCYTES: 3 %
INR BLD: 1.6
KAPPA FREE LIGHT CHAINS QNT: 3.52 MG/DL (ref 0.37–1.94)
KETONES, URINE: NEGATIVE
LAMBDA FREE LIGHT CHAINS QNT: 1.99 MG/DL (ref 0.57–2.63)
LEUKOCYTE ESTERASE, URINE: NEGATIVE
LYMPHOCYTES # BLD: 5 % (ref 24–44)
Lab: NORMAL
Lab: NORMAL
MAGNESIUM: 2 MG/DL (ref 1.6–2.6)
MCH RBC QN AUTO: 31.1 PG (ref 25.2–33.5)
MCHC RBC AUTO-ENTMCNC: 34.1 G/DL (ref 28.4–34.8)
MCV RBC AUTO: 91.3 FL (ref 82.6–102.9)
MONOCYTES # BLD: 10 % (ref 1–7)
MORPHOLOGY: ABNORMAL
MORPHOLOGY: ABNORMAL
MUCUS: NORMAL
NITRITE, URINE: NEGATIVE
NRBC AUTOMATED: 0.1 PER 100 WBC
OSMOLALITY URINE: 277 MOSM/KG (ref 80–1300)
OTHER OBSERVATIONS UA: NORMAL
PDW BLD-RTO: 14.9 % (ref 11.8–14.4)
PH UA: 5 (ref 5–8)
PHOSPHORUS: 4.3 MG/DL (ref 2.5–4.5)
PLATELET # BLD: 312 K/UL (ref 138–453)
PLATELET ESTIMATE: ABNORMAL
PMV BLD AUTO: 10.8 FL (ref 8.1–13.5)
POTASSIUM SERPL-SCNC: 4 MMOL/L (ref 3.7–5.3)
PROCALCITONIN: 0.43 NG/ML
PROTEIN UA: ABNORMAL
PROTHROMBIN TIME: 15.9 SEC (ref 9–12)
RBC # BLD: 2.99 M/UL (ref 4.21–5.77)
RBC # BLD: ABNORMAL 10*6/UL
RBC UA: NORMAL /HPF (ref 0–4)
RENAL EPITHELIAL, UA: NORMAL /HPF
SEG NEUTROPHILS: 80 % (ref 36–66)
SEGMENTED NEUTROPHILS ABSOLUTE COUNT: 18.63 K/UL (ref 1.8–7.7)
SODIUM BLD-SCNC: 138 MMOL/L (ref 135–144)
SODIUM BLD-SCNC: 140 MMOL/L (ref 135–144)
SODIUM BLD-SCNC: 142 MMOL/L (ref 135–144)
SODIUM,UR: 69 MMOL/L
SODIUM,UR: 79 MMOL/L
SPECIFIC GRAVITY UA: 1.01 (ref 1–1.03)
SPECIMEN DESCRIPTION: NORMAL
SPECIMEN DESCRIPTION: NORMAL
TOTAL PROTEIN, URINE: 44 MG/DL
TRANSFUSION STATUS: NORMAL
TRICHOMONAS: NORMAL
TURBIDITY: CLEAR
UNIT DIVISION: 0
UNIT NUMBER: NORMAL
URINE HGB: ABNORMAL
UROBILINOGEN, URINE: NORMAL
WBC # BLD: 23.3 K/UL (ref 3.5–11.3)
WBC # BLD: ABNORMAL 10*3/UL
WBC UA: NORMAL /HPF (ref 0–5)
YEAST: NORMAL

## 2019-03-21 PROCEDURE — 2580000003 HC RX 258: Performed by: NEUROLOGICAL SURGERY

## 2019-03-21 PROCEDURE — 86038 ANTINUCLEAR ANTIBODIES: CPT

## 2019-03-21 PROCEDURE — 6360000002 HC RX W HCPCS: Performed by: NEUROLOGICAL SURGERY

## 2019-03-21 PROCEDURE — 76937 US GUIDE VASCULAR ACCESS: CPT

## 2019-03-21 PROCEDURE — 86160 COMPLEMENT ANTIGEN: CPT

## 2019-03-21 PROCEDURE — 2500000003 HC RX 250 WO HCPCS: Performed by: NEUROLOGICAL SURGERY

## 2019-03-21 PROCEDURE — 2060000000 HC ICU INTERMEDIATE R&B

## 2019-03-21 PROCEDURE — 83550 IRON BINDING TEST: CPT

## 2019-03-21 PROCEDURE — 76770 US EXAM ABDO BACK WALL COMP: CPT

## 2019-03-21 PROCEDURE — 2580000003 HC RX 258: Performed by: STUDENT IN AN ORGANIZED HEALTH CARE EDUCATION/TRAINING PROGRAM

## 2019-03-21 PROCEDURE — 83540 ASSAY OF IRON: CPT

## 2019-03-21 PROCEDURE — 83935 ASSAY OF URINE OSMOLALITY: CPT

## 2019-03-21 PROCEDURE — 82330 ASSAY OF CALCIUM: CPT

## 2019-03-21 PROCEDURE — 86334 IMMUNOFIX E-PHORESIS SERUM: CPT

## 2019-03-21 PROCEDURE — 80074 ACUTE HEPATITIS PANEL: CPT

## 2019-03-21 PROCEDURE — 6370000000 HC RX 637 (ALT 250 FOR IP): Performed by: STUDENT IN AN ORGANIZED HEALTH CARE EDUCATION/TRAINING PROGRAM

## 2019-03-21 PROCEDURE — 6370000000 HC RX 637 (ALT 250 FOR IP): Performed by: PSYCHIATRY & NEUROLOGY

## 2019-03-21 PROCEDURE — 82746 ASSAY OF FOLIC ACID SERUM: CPT

## 2019-03-21 PROCEDURE — 92507 TX SP LANG VOICE COMM INDIV: CPT

## 2019-03-21 PROCEDURE — 85025 COMPLETE CBC W/AUTO DIFF WBC: CPT

## 2019-03-21 PROCEDURE — 82947 ASSAY GLUCOSE BLOOD QUANT: CPT

## 2019-03-21 PROCEDURE — 86147 CARDIOLIPIN ANTIBODY EA IG: CPT

## 2019-03-21 PROCEDURE — 97535 SELF CARE MNGMENT TRAINING: CPT

## 2019-03-21 PROCEDURE — 97530 THERAPEUTIC ACTIVITIES: CPT

## 2019-03-21 PROCEDURE — 99233 SBSQ HOSP IP/OBS HIGH 50: CPT | Performed by: NEUROLOGICAL SURGERY

## 2019-03-21 PROCEDURE — 84145 PROCALCITONIN (PCT): CPT

## 2019-03-21 PROCEDURE — 82607 VITAMIN B-12: CPT

## 2019-03-21 PROCEDURE — 83883 ASSAY NEPHELOMETRY NOT SPEC: CPT

## 2019-03-21 PROCEDURE — 85045 AUTOMATED RETICULOCYTE COUNT: CPT

## 2019-03-21 PROCEDURE — 85610 PROTHROMBIN TIME: CPT

## 2019-03-21 PROCEDURE — 84156 ASSAY OF PROTEIN URINE: CPT

## 2019-03-21 PROCEDURE — 84295 ASSAY OF SERUM SODIUM: CPT

## 2019-03-21 PROCEDURE — 6360000002 HC RX W HCPCS: Performed by: NURSE PRACTITIONER

## 2019-03-21 PROCEDURE — 6360000002 HC RX W HCPCS: Performed by: INTERNAL MEDICINE

## 2019-03-21 PROCEDURE — 6360000002 HC RX W HCPCS: Performed by: STUDENT IN AN ORGANIZED HEALTH CARE EDUCATION/TRAINING PROGRAM

## 2019-03-21 PROCEDURE — 36415 COLL VENOUS BLD VENIPUNCTURE: CPT

## 2019-03-21 PROCEDURE — 2580000003 HC RX 258: Performed by: NURSE PRACTITIONER

## 2019-03-21 PROCEDURE — 84100 ASSAY OF PHOSPHORUS: CPT

## 2019-03-21 PROCEDURE — 81001 URINALYSIS AUTO W/SCOPE: CPT

## 2019-03-21 PROCEDURE — 6370000000 HC RX 637 (ALT 250 FOR IP): Performed by: NEUROLOGICAL SURGERY

## 2019-03-21 PROCEDURE — 82570 ASSAY OF URINE CREATININE: CPT

## 2019-03-21 PROCEDURE — 99232 SBSQ HOSP IP/OBS MODERATE 35: CPT | Performed by: PHYSICAL MEDICINE & REHABILITATION

## 2019-03-21 PROCEDURE — 84300 ASSAY OF URINE SODIUM: CPT

## 2019-03-21 PROCEDURE — 70450 CT HEAD/BRAIN W/O DYE: CPT

## 2019-03-21 PROCEDURE — 83735 ASSAY OF MAGNESIUM: CPT

## 2019-03-21 PROCEDURE — 82728 ASSAY OF FERRITIN: CPT

## 2019-03-21 PROCEDURE — 80048 BASIC METABOLIC PNL TOTAL CA: CPT

## 2019-03-21 PROCEDURE — 97166 OT EVAL MOD COMPLEX 45 MIN: CPT

## 2019-03-21 PROCEDURE — 6370000000 HC RX 637 (ALT 250 FOR IP): Performed by: NURSE PRACTITIONER

## 2019-03-21 RX ORDER — HALOPERIDOL 5 MG/ML
2 INJECTION INTRAMUSCULAR ONCE
Status: COMPLETED | OUTPATIENT
Start: 2019-03-21 | End: 2019-03-21

## 2019-03-21 RX ORDER — SENNA AND DOCUSATE SODIUM 50; 8.6 MG/1; MG/1
2 TABLET, FILM COATED ORAL DAILY
Status: DISCONTINUED | OUTPATIENT
Start: 2019-03-21 | End: 2019-03-26 | Stop reason: HOSPADM

## 2019-03-21 RX ORDER — POLYETHYLENE GLYCOL 3350 17 G/17G
17 POWDER, FOR SOLUTION ORAL DAILY
Status: DISCONTINUED | OUTPATIENT
Start: 2019-03-21 | End: 2019-03-26 | Stop reason: HOSPADM

## 2019-03-21 RX ORDER — FUROSEMIDE 10 MG/ML
20 INJECTION INTRAMUSCULAR; INTRAVENOUS ONCE
Status: COMPLETED | OUTPATIENT
Start: 2019-03-21 | End: 2019-03-21

## 2019-03-21 RX ADMIN — INSULIN LISPRO 6 UNITS: 100 INJECTION, SOLUTION INTRAVENOUS; SUBCUTANEOUS at 13:10

## 2019-03-21 RX ADMIN — ACETAMINOPHEN 650 MG: 325 TABLET ORAL at 08:05

## 2019-03-21 RX ADMIN — PIPERACILLIN SODIUM,TAZOBACTAM SODIUM 2.25 G: 2; .25 INJECTION, POWDER, FOR SOLUTION INTRAVENOUS at 20:14

## 2019-03-21 RX ADMIN — HALOPERIDOL LACTATE 2 MG: 5 INJECTION, SOLUTION INTRAMUSCULAR at 23:09

## 2019-03-21 RX ADMIN — AMLODIPINE BESYLATE 10 MG: 10 TABLET ORAL at 08:11

## 2019-03-21 RX ADMIN — INSULIN LISPRO 3 UNITS: 100 INJECTION, SOLUTION INTRAVENOUS; SUBCUTANEOUS at 08:23

## 2019-03-21 RX ADMIN — Medication 10 ML: at 20:15

## 2019-03-21 RX ADMIN — FOLIC ACID 1 MG: 1 TABLET ORAL at 08:11

## 2019-03-21 RX ADMIN — ATENOLOL 50 MG: 50 TABLET ORAL at 08:11

## 2019-03-21 RX ADMIN — PIPERACILLIN AND TAZOBACTAM 3.38 G: 3; .375 INJECTION, POWDER, LYOPHILIZED, FOR SOLUTION INTRAVENOUS; PARENTERAL at 02:34

## 2019-03-21 RX ADMIN — SODIUM BICARBONATE: 84 INJECTION, SOLUTION INTRAVENOUS at 14:19

## 2019-03-21 RX ADMIN — LEVETIRACETAM 1000 MG: 10 INJECTION INTRAVENOUS at 06:29

## 2019-03-21 RX ADMIN — PANTOPRAZOLE SODIUM 40 MG: 40 TABLET, DELAYED RELEASE ORAL at 08:11

## 2019-03-21 RX ADMIN — INSULIN LISPRO 3 UNITS: 100 INJECTION, SOLUTION INTRAVENOUS; SUBCUTANEOUS at 17:49

## 2019-03-21 RX ADMIN — PIPERACILLIN AND TAZOBACTAM 3.38 G: 3; .375 INJECTION, POWDER, LYOPHILIZED, FOR SOLUTION INTRAVENOUS; PARENTERAL at 10:08

## 2019-03-21 RX ADMIN — FUROSEMIDE 20 MG: 10 INJECTION, SOLUTION INTRAMUSCULAR; INTRAVENOUS at 17:37

## 2019-03-21 RX ADMIN — LEVETIRACETAM 250 MG: 500 INJECTION, SOLUTION, CONCENTRATE INTRAVENOUS at 17:38

## 2019-03-21 RX ADMIN — Medication 10 ML: at 08:04

## 2019-03-21 RX ADMIN — PANTOPRAZOLE SODIUM 40 MG: 40 TABLET, DELAYED RELEASE ORAL at 15:37

## 2019-03-21 RX ADMIN — QUETIAPINE FUMARATE 25 MG: 25 TABLET ORAL at 21:50

## 2019-03-21 RX ADMIN — MAGNESIUM GLUCONATE 500 MG ORAL TABLET 400 MG: 500 TABLET ORAL at 08:11

## 2019-03-21 RX ADMIN — SODIUM BICARBONATE: 84 INJECTION, SOLUTION INTRAVENOUS at 23:10

## 2019-03-21 RX ADMIN — Medication 100 MG: at 08:11

## 2019-03-21 ASSESSMENT — PAIN DESCRIPTION - FREQUENCY: FREQUENCY: CONTINUOUS

## 2019-03-21 ASSESSMENT — PAIN SCALES - GENERAL
PAINLEVEL_OUTOF10: 2
PAINLEVEL_OUTOF10: 8

## 2019-03-21 ASSESSMENT — PAIN DESCRIPTION - LOCATION: LOCATION: ABDOMEN

## 2019-03-21 ASSESSMENT — PAIN DESCRIPTION - DESCRIPTORS: DESCRIPTORS: ACHING;SORE;TENDER

## 2019-03-21 ASSESSMENT — PAIN DESCRIPTION - PAIN TYPE: TYPE: ACUTE PAIN

## 2019-03-22 ENCOUNTER — APPOINTMENT (OUTPATIENT)
Dept: GENERAL RADIOLOGY | Age: 74
DRG: 023 | End: 2019-03-22
Payer: MEDICARE

## 2019-03-22 LAB
ABSOLUTE EOS #: 0.68 K/UL (ref 0–0.44)
ABSOLUTE IMMATURE GRANULOCYTE: 0.49 K/UL (ref 0–0.3)
ABSOLUTE LYMPH #: 1.09 K/UL (ref 1.1–3.7)
ABSOLUTE MONO #: 1.47 K/UL (ref 0.1–1.2)
ABSOLUTE RETIC #: 0.11 M/UL (ref 0.03–0.08)
ALBUMIN SERPL-MCNC: 2.4 G/DL (ref 3.5–5.2)
ALBUMIN/GLOBULIN RATIO: 0.9 (ref 1–2.5)
ALLEN TEST: POSITIVE
ALP BLD-CCNC: 50 U/L (ref 40–129)
ALT SERPL-CCNC: 10 U/L (ref 5–41)
AMMONIA: 14 UMOL/L (ref 16–60)
ANION GAP SERPL CALCULATED.3IONS-SCNC: 15 MMOL/L (ref 9–17)
ANTI-NUCLEAR ANTIBODY (ANA): NEGATIVE
AST SERPL-CCNC: 11 U/L
BASOPHILS # BLD: 1 % (ref 0–2)
BASOPHILS ABSOLUTE: 0.1 K/UL (ref 0–0.2)
BILIRUB SERPL-MCNC: 0.6 MG/DL (ref 0.3–1.2)
BILIRUBIN DIRECT: 0.21 MG/DL
BILIRUBIN, INDIRECT: 0.39 MG/DL (ref 0–1)
BUN BLDV-MCNC: 34 MG/DL (ref 8–23)
BUN/CREAT BLD: ABNORMAL (ref 9–20)
CALCIUM IONIZED: 1.11 MMOL/L (ref 1.13–1.33)
CALCIUM SERPL-MCNC: 7.8 MG/DL (ref 8.6–10.4)
CHLORIDE BLD-SCNC: 107 MMOL/L (ref 98–107)
CO2: 19 MMOL/L (ref 20–31)
CREAT SERPL-MCNC: 3.89 MG/DL (ref 0.7–1.2)
DIFFERENTIAL TYPE: ABNORMAL
EKG ATRIAL RATE: 78 BPM
EKG P-R INTERVAL: 152 MS
EKG Q-T INTERVAL: 394 MS
EKG QRS DURATION: 88 MS
EKG QTC CALCULATION (BAZETT): 449 MS
EKG R AXIS: -169 DEGREES
EKG T AXIS: -169 DEGREES
EKG VENTRICULAR RATE: 78 BPM
EOSINOPHILS RELATIVE PERCENT: 4 % (ref 1–4)
FERRITIN: 391 UG/L (ref 30–400)
FIO2: 21
FOLATE: >20 NG/ML
GFR AFRICAN AMERICAN: 19 ML/MIN
GFR NON-AFRICAN AMERICAN: 15 ML/MIN
GFR SERPL CREATININE-BSD FRML MDRD: ABNORMAL ML/MIN/{1.73_M2}
GFR SERPL CREATININE-BSD FRML MDRD: ABNORMAL ML/MIN/{1.73_M2}
GLOBULIN: ABNORMAL G/DL (ref 1.5–3.8)
GLUCOSE BLD-MCNC: 147 MG/DL (ref 75–110)
GLUCOSE BLD-MCNC: 155 MG/DL (ref 75–110)
GLUCOSE BLD-MCNC: 164 MG/DL (ref 70–99)
GLUCOSE BLD-MCNC: 187 MG/DL (ref 75–110)
HCT VFR BLD CALC: 24.9 % (ref 40.7–50.3)
HEMOGLOBIN: 8.5 G/DL (ref 13–17)
IMMATURE GRANULOCYTES: 3 %
IMMATURE RETIC FRACT: 30 % (ref 2.7–18.3)
INR BLD: 1.8
IRON SATURATION: 28 % (ref 20–55)
IRON: 43 UG/DL (ref 59–158)
LYMPHOCYTES # BLD: 6 % (ref 24–43)
MAGNESIUM: 1.9 MG/DL (ref 1.6–2.6)
MCH RBC QN AUTO: 30.7 PG (ref 25.2–33.5)
MCHC RBC AUTO-ENTMCNC: 34.1 G/DL (ref 28.4–34.8)
MCV RBC AUTO: 89.9 FL (ref 82.6–102.9)
MODE: ABNORMAL
MONOCYTES # BLD: 9 % (ref 3–12)
NEGATIVE BASE EXCESS, ART: ABNORMAL (ref 0–2)
NRBC AUTOMATED: 0.1 PER 100 WBC
O2 DEVICE/FLOW/%: ABNORMAL
PATHOLOGIST: NORMAL
PATIENT TEMP: ABNORMAL
PDW BLD-RTO: 14.7 % (ref 11.8–14.4)
PHOSPHORUS: 3.7 MG/DL (ref 2.5–4.5)
PLATELET # BLD: 390 K/UL (ref 138–453)
PLATELET ESTIMATE: ABNORMAL
PMV BLD AUTO: 10.5 FL (ref 8.1–13.5)
POC HCO3: 24.1 MMOL/L (ref 21–28)
POC O2 SATURATION: 91 % (ref 94–98)
POC PCO2 TEMP: ABNORMAL MM HG
POC PCO2: 28.6 MM HG (ref 35–48)
POC PH TEMP: ABNORMAL
POC PH: 7.53 (ref 7.35–7.45)
POC PO2 TEMP: ABNORMAL MM HG
POC PO2: 52.1 MM HG (ref 83–108)
POSITIVE BASE EXCESS, ART: 2 (ref 0–3)
POTASSIUM SERPL-SCNC: 3.2 MMOL/L (ref 3.7–5.3)
PROTHROMBIN TIME: 17.8 SEC (ref 9–12)
RBC # BLD: 2.77 M/UL (ref 4.21–5.77)
RBC # BLD: ABNORMAL 10*6/UL
RETIC %: 3.7 % (ref 0.5–1.9)
RETIC HEMOGLOBIN: 35 PG (ref 28.2–35.7)
SAMPLE SITE: ABNORMAL
SEG NEUTROPHILS: 77 % (ref 36–65)
SEGMENTED NEUTROPHILS ABSOLUTE COUNT: 13.22 K/UL (ref 1.5–8.1)
SERUM IFX INTERP: NORMAL
SODIUM BLD-SCNC: 139 MMOL/L (ref 135–144)
SODIUM BLD-SCNC: 140 MMOL/L (ref 135–144)
SODIUM BLD-SCNC: 141 MMOL/L (ref 135–144)
SODIUM BLD-SCNC: 142 MMOL/L (ref 135–144)
SURGICAL PATHOLOGY REPORT: NORMAL
TCO2 (CALC), ART: 25 MMOL/L (ref 22–29)
TOTAL IRON BINDING CAPACITY: 154 UG/DL (ref 250–450)
TOTAL PROTEIN: 5 G/DL (ref 6.4–8.3)
UNSATURATED IRON BINDING CAPACITY: 111 UG/DL (ref 112–347)
VITAMIN B-12: 633 PG/ML (ref 232–1245)
WBC # BLD: 17.1 K/UL (ref 3.5–11.3)
WBC # BLD: ABNORMAL 10*3/UL

## 2019-03-22 PROCEDURE — 93005 ELECTROCARDIOGRAM TRACING: CPT

## 2019-03-22 PROCEDURE — 97116 GAIT TRAINING THERAPY: CPT

## 2019-03-22 PROCEDURE — 2580000003 HC RX 258: Performed by: NEUROLOGICAL SURGERY

## 2019-03-22 PROCEDURE — 2580000003 HC RX 258: Performed by: INTERNAL MEDICINE

## 2019-03-22 PROCEDURE — 84295 ASSAY OF SERUM SODIUM: CPT

## 2019-03-22 PROCEDURE — 6360000002 HC RX W HCPCS: Performed by: STUDENT IN AN ORGANIZED HEALTH CARE EDUCATION/TRAINING PROGRAM

## 2019-03-22 PROCEDURE — 6370000000 HC RX 637 (ALT 250 FOR IP): Performed by: STUDENT IN AN ORGANIZED HEALTH CARE EDUCATION/TRAINING PROGRAM

## 2019-03-22 PROCEDURE — 82947 ASSAY GLUCOSE BLOOD QUANT: CPT

## 2019-03-22 PROCEDURE — 6370000000 HC RX 637 (ALT 250 FOR IP): Performed by: NEUROLOGICAL SURGERY

## 2019-03-22 PROCEDURE — 99233 SBSQ HOSP IP/OBS HIGH 50: CPT | Performed by: INTERNAL MEDICINE

## 2019-03-22 PROCEDURE — 6360000002 HC RX W HCPCS: Performed by: NEUROLOGICAL SURGERY

## 2019-03-22 PROCEDURE — 36600 WITHDRAWAL OF ARTERIAL BLOOD: CPT

## 2019-03-22 PROCEDURE — 2060000000 HC ICU INTERMEDIATE R&B

## 2019-03-22 PROCEDURE — 76937 US GUIDE VASCULAR ACCESS: CPT

## 2019-03-22 PROCEDURE — 6370000000 HC RX 637 (ALT 250 FOR IP): Performed by: PSYCHIATRY & NEUROLOGY

## 2019-03-22 PROCEDURE — 85610 PROTHROMBIN TIME: CPT

## 2019-03-22 PROCEDURE — 80076 HEPATIC FUNCTION PANEL: CPT

## 2019-03-22 PROCEDURE — 82803 BLOOD GASES ANY COMBINATION: CPT

## 2019-03-22 PROCEDURE — 80048 BASIC METABOLIC PNL TOTAL CA: CPT

## 2019-03-22 PROCEDURE — 97110 THERAPEUTIC EXERCISES: CPT

## 2019-03-22 PROCEDURE — 83735 ASSAY OF MAGNESIUM: CPT

## 2019-03-22 PROCEDURE — 85025 COMPLETE CBC W/AUTO DIFF WBC: CPT

## 2019-03-22 PROCEDURE — 84100 ASSAY OF PHOSPHORUS: CPT

## 2019-03-22 PROCEDURE — 82140 ASSAY OF AMMONIA: CPT

## 2019-03-22 PROCEDURE — 6370000000 HC RX 637 (ALT 250 FOR IP): Performed by: NURSE PRACTITIONER

## 2019-03-22 PROCEDURE — 36415 COLL VENOUS BLD VENIPUNCTURE: CPT

## 2019-03-22 PROCEDURE — 71045 X-RAY EXAM CHEST 1 VIEW: CPT

## 2019-03-22 PROCEDURE — 99233 SBSQ HOSP IP/OBS HIGH 50: CPT | Performed by: PSYCHIATRY & NEUROLOGY

## 2019-03-22 PROCEDURE — 2500000003 HC RX 250 WO HCPCS: Performed by: NEUROLOGICAL SURGERY

## 2019-03-22 PROCEDURE — 82330 ASSAY OF CALCIUM: CPT

## 2019-03-22 RX ORDER — LEVETIRACETAM 500 MG/1
500 TABLET ORAL 2 TIMES DAILY
Status: DISCONTINUED | OUTPATIENT
Start: 2019-03-22 | End: 2019-03-26 | Stop reason: HOSPADM

## 2019-03-22 RX ORDER — CALCIUM CARBONATE/VITAMIN D3 250-3.125
1 TABLET ORAL DAILY
Status: DISCONTINUED | OUTPATIENT
Start: 2019-03-22 | End: 2019-03-26 | Stop reason: HOSPADM

## 2019-03-22 RX ORDER — SODIUM CHLORIDE 9 MG/ML
INJECTION, SOLUTION INTRAVENOUS CONTINUOUS
Status: DISCONTINUED | OUTPATIENT
Start: 2019-03-22 | End: 2019-03-25

## 2019-03-22 RX ORDER — MAGNESIUM SULFATE 1 G/100ML
1 INJECTION INTRAVENOUS
Status: COMPLETED | OUTPATIENT
Start: 2019-03-22 | End: 2019-03-22

## 2019-03-22 RX ORDER — LEVETIRACETAM 5 MG/ML
500 INJECTION INTRAVASCULAR EVERY 12 HOURS
Status: DISCONTINUED | OUTPATIENT
Start: 2019-03-22 | End: 2019-03-22

## 2019-03-22 RX ORDER — LANOLIN ALCOHOL/MO/W.PET/CERES
325 CREAM (GRAM) TOPICAL 2 TIMES DAILY WITH MEALS
Status: DISCONTINUED | OUTPATIENT
Start: 2019-03-22 | End: 2019-03-26 | Stop reason: HOSPADM

## 2019-03-22 RX ORDER — OLANZAPINE 5 MG/1
5 TABLET ORAL ONCE
Status: COMPLETED | OUTPATIENT
Start: 2019-03-22 | End: 2019-03-22

## 2019-03-22 RX ORDER — CARVEDILOL 6.25 MG/1
6.25 TABLET ORAL 2 TIMES DAILY WITH MEALS
Status: DISCONTINUED | OUTPATIENT
Start: 2019-03-22 | End: 2019-03-24

## 2019-03-22 RX ADMIN — MAGNESIUM GLUCONATE 500 MG ORAL TABLET 400 MG: 500 TABLET ORAL at 08:25

## 2019-03-22 RX ADMIN — INSULIN LISPRO 3 UNITS: 100 INJECTION, SOLUTION INTRAVENOUS; SUBCUTANEOUS at 17:14

## 2019-03-22 RX ADMIN — FERROUS SULFATE TAB EC 325 MG (65 MG FE EQUIVALENT) 325 MG: 325 (65 FE) TABLET DELAYED RESPONSE at 16:28

## 2019-03-22 RX ADMIN — ATENOLOL 50 MG: 50 TABLET ORAL at 08:25

## 2019-03-22 RX ADMIN — FOLIC ACID 1 MG: 1 TABLET ORAL at 10:52

## 2019-03-22 RX ADMIN — MAGNESIUM SULFATE HEPTAHYDRATE 1 G: 1 INJECTION, SOLUTION INTRAVENOUS at 16:25

## 2019-03-22 RX ADMIN — CARVEDILOL 6.25 MG: 6.25 TABLET, FILM COATED ORAL at 10:47

## 2019-03-22 RX ADMIN — PIPERACILLIN SODIUM,TAZOBACTAM SODIUM 2.25 G: 2; .25 INJECTION, POWDER, FOR SOLUTION INTRAVENOUS at 10:47

## 2019-03-22 RX ADMIN — MAGNESIUM SULFATE HEPTAHYDRATE 1 G: 1 INJECTION, SOLUTION INTRAVENOUS at 13:45

## 2019-03-22 RX ADMIN — PIPERACILLIN SODIUM,TAZOBACTAM SODIUM 2.25 G: 2; .25 INJECTION, POWDER, FOR SOLUTION INTRAVENOUS at 18:23

## 2019-03-22 RX ADMIN — PIPERACILLIN SODIUM,TAZOBACTAM SODIUM 2.25 G: 2; .25 INJECTION, POWDER, FOR SOLUTION INTRAVENOUS at 03:28

## 2019-03-22 RX ADMIN — SODIUM BICARBONATE: 84 INJECTION, SOLUTION INTRAVENOUS at 20:41

## 2019-03-22 RX ADMIN — CARVEDILOL 6.25 MG: 6.25 TABLET, FILM COATED ORAL at 16:28

## 2019-03-22 RX ADMIN — Medication 100 MG: at 12:44

## 2019-03-22 RX ADMIN — INSULIN LISPRO 3 UNITS: 100 INJECTION, SOLUTION INTRAVENOUS; SUBCUTANEOUS at 08:26

## 2019-03-22 RX ADMIN — PANTOPRAZOLE SODIUM 40 MG: 40 TABLET, DELAYED RELEASE ORAL at 08:25

## 2019-03-22 RX ADMIN — AMLODIPINE BESYLATE 10 MG: 10 TABLET ORAL at 10:52

## 2019-03-22 RX ADMIN — QUETIAPINE FUMARATE 25 MG: 25 TABLET ORAL at 20:40

## 2019-03-22 RX ADMIN — SODIUM CHLORIDE: 9 INJECTION, SOLUTION INTRAVENOUS at 23:10

## 2019-03-22 RX ADMIN — CALCIUM CARBONATE-CHOLECALCIFEROL TAB 250 MG-125 UNIT 250 MG: 250-125 TAB at 16:28

## 2019-03-22 RX ADMIN — INSULIN LISPRO 2 UNITS: 100 INJECTION, SOLUTION INTRAVENOUS; SUBCUTANEOUS at 20:45

## 2019-03-22 RX ADMIN — INSULIN LISPRO 3 UNITS: 100 INJECTION, SOLUTION INTRAVENOUS; SUBCUTANEOUS at 12:44

## 2019-03-22 RX ADMIN — PANTOPRAZOLE SODIUM 40 MG: 40 TABLET, DELAYED RELEASE ORAL at 17:13

## 2019-03-22 RX ADMIN — LEVETIRACETAM 250 MG: 500 INJECTION, SOLUTION, CONCENTRATE INTRAVENOUS at 05:18

## 2019-03-22 RX ADMIN — OLANZAPINE 5 MG: 5 TABLET, FILM COATED ORAL at 23:10

## 2019-03-22 RX ADMIN — LEVETIRACETAM 500 MG: 500 TABLET, FILM COATED ORAL at 20:40

## 2019-03-22 ASSESSMENT — PAIN DESCRIPTION - LOCATION: LOCATION: ABDOMEN

## 2019-03-22 ASSESSMENT — PAIN DESCRIPTION - PAIN TYPE: TYPE: ACUTE PAIN

## 2019-03-22 ASSESSMENT — PAIN SCALES - WONG BAKER: WONGBAKER_NUMERICALRESPONSE: 2

## 2019-03-23 LAB
ABSOLUTE EOS #: 0.61 K/UL (ref 0–0.44)
ABSOLUTE IMMATURE GRANULOCYTE: 0.29 K/UL (ref 0–0.3)
ABSOLUTE LYMPH #: 1.05 K/UL (ref 1.1–3.7)
ABSOLUTE MONO #: 1.45 K/UL (ref 0.1–1.2)
ANION GAP SERPL CALCULATED.3IONS-SCNC: 16 MMOL/L (ref 9–17)
BASOPHILS # BLD: 1 % (ref 0–2)
BASOPHILS ABSOLUTE: 0.14 K/UL (ref 0–0.2)
BUN BLDV-MCNC: 32 MG/DL (ref 8–23)
BUN/CREAT BLD: ABNORMAL (ref 9–20)
CALCIUM IONIZED: 1.1 MMOL/L (ref 1.13–1.33)
CALCIUM SERPL-MCNC: 8.1 MG/DL (ref 8.6–10.4)
CHLORIDE BLD-SCNC: 105 MMOL/L (ref 98–107)
CO2: 22 MMOL/L (ref 20–31)
CREAT SERPL-MCNC: 3.77 MG/DL (ref 0.7–1.2)
DIFFERENTIAL TYPE: ABNORMAL
EOSINOPHILS RELATIVE PERCENT: 3 % (ref 1–4)
GFR AFRICAN AMERICAN: 19 ML/MIN
GFR NON-AFRICAN AMERICAN: 16 ML/MIN
GFR SERPL CREATININE-BSD FRML MDRD: ABNORMAL ML/MIN/{1.73_M2}
GFR SERPL CREATININE-BSD FRML MDRD: ABNORMAL ML/MIN/{1.73_M2}
GLUCOSE BLD-MCNC: 140 MG/DL (ref 75–110)
GLUCOSE BLD-MCNC: 141 MG/DL (ref 75–110)
GLUCOSE BLD-MCNC: 142 MG/DL (ref 75–110)
GLUCOSE BLD-MCNC: 168 MG/DL (ref 75–110)
GLUCOSE BLD-MCNC: 169 MG/DL (ref 70–99)
GLUCOSE BLD-MCNC: 181 MG/DL (ref 75–110)
HCT VFR BLD CALC: 26.7 % (ref 40.7–50.3)
HEMOGLOBIN: 8.7 G/DL (ref 13–17)
IMMATURE GRANULOCYTES: 2 %
INR BLD: 1.6
LYMPHOCYTES # BLD: 6 % (ref 24–43)
MAGNESIUM: 2.2 MG/DL (ref 1.6–2.6)
MCH RBC QN AUTO: 30.6 PG (ref 25.2–33.5)
MCHC RBC AUTO-ENTMCNC: 32.6 G/DL (ref 28.4–34.8)
MCV RBC AUTO: 94 FL (ref 82.6–102.9)
MONOCYTES # BLD: 8 % (ref 3–12)
NRBC AUTOMATED: 0 PER 100 WBC
PDW BLD-RTO: 15.4 % (ref 11.8–14.4)
PHOSPHORUS: 3.5 MG/DL (ref 2.5–4.5)
PLATELET # BLD: 547 K/UL (ref 138–453)
PLATELET ESTIMATE: ABNORMAL
PMV BLD AUTO: 10.7 FL (ref 8.1–13.5)
POTASSIUM SERPL-SCNC: 3.3 MMOL/L (ref 3.7–5.3)
PROTHROMBIN TIME: 16.2 SEC (ref 9–12)
RBC # BLD: 2.84 M/UL (ref 4.21–5.77)
RBC # BLD: ABNORMAL 10*6/UL
SEG NEUTROPHILS: 80 % (ref 36–65)
SEGMENTED NEUTROPHILS ABSOLUTE COUNT: 15.07 K/UL (ref 1.5–8.1)
SODIUM BLD-SCNC: 141 MMOL/L (ref 135–144)
SODIUM BLD-SCNC: 143 MMOL/L (ref 135–144)
WBC # BLD: 18.6 K/UL (ref 3.5–11.3)
WBC # BLD: ABNORMAL 10*3/UL

## 2019-03-23 PROCEDURE — 6370000000 HC RX 637 (ALT 250 FOR IP): Performed by: PSYCHIATRY & NEUROLOGY

## 2019-03-23 PROCEDURE — 6370000000 HC RX 637 (ALT 250 FOR IP): Performed by: NURSE PRACTITIONER

## 2019-03-23 PROCEDURE — 80048 BASIC METABOLIC PNL TOTAL CA: CPT

## 2019-03-23 PROCEDURE — 85610 PROTHROMBIN TIME: CPT

## 2019-03-23 PROCEDURE — 99232 SBSQ HOSP IP/OBS MODERATE 35: CPT | Performed by: PSYCHIATRY & NEUROLOGY

## 2019-03-23 PROCEDURE — 83735 ASSAY OF MAGNESIUM: CPT

## 2019-03-23 PROCEDURE — 6360000002 HC RX W HCPCS: Performed by: STUDENT IN AN ORGANIZED HEALTH CARE EDUCATION/TRAINING PROGRAM

## 2019-03-23 PROCEDURE — 84100 ASSAY OF PHOSPHORUS: CPT

## 2019-03-23 PROCEDURE — 2060000000 HC ICU INTERMEDIATE R&B

## 2019-03-23 PROCEDURE — 36415 COLL VENOUS BLD VENIPUNCTURE: CPT

## 2019-03-23 PROCEDURE — 97110 THERAPEUTIC EXERCISES: CPT

## 2019-03-23 PROCEDURE — 2580000003 HC RX 258: Performed by: NEUROLOGICAL SURGERY

## 2019-03-23 PROCEDURE — 99232 SBSQ HOSP IP/OBS MODERATE 35: CPT | Performed by: INTERNAL MEDICINE

## 2019-03-23 PROCEDURE — 6370000000 HC RX 637 (ALT 250 FOR IP): Performed by: STUDENT IN AN ORGANIZED HEALTH CARE EDUCATION/TRAINING PROGRAM

## 2019-03-23 PROCEDURE — 6360000002 HC RX W HCPCS: Performed by: NEUROLOGICAL SURGERY

## 2019-03-23 PROCEDURE — 97530 THERAPEUTIC ACTIVITIES: CPT

## 2019-03-23 PROCEDURE — 82330 ASSAY OF CALCIUM: CPT

## 2019-03-23 PROCEDURE — 6370000000 HC RX 637 (ALT 250 FOR IP): Performed by: NEUROLOGICAL SURGERY

## 2019-03-23 PROCEDURE — 2580000003 HC RX 258: Performed by: STUDENT IN AN ORGANIZED HEALTH CARE EDUCATION/TRAINING PROGRAM

## 2019-03-23 PROCEDURE — 85025 COMPLETE CBC W/AUTO DIFF WBC: CPT

## 2019-03-23 PROCEDURE — 84295 ASSAY OF SERUM SODIUM: CPT

## 2019-03-23 RX ADMIN — INSULIN LISPRO 3 UNITS: 100 INJECTION, SOLUTION INTRAVENOUS; SUBCUTANEOUS at 13:04

## 2019-03-23 RX ADMIN — FOLIC ACID 1 MG: 1 TABLET ORAL at 08:40

## 2019-03-23 RX ADMIN — PIPERACILLIN SODIUM,TAZOBACTAM SODIUM 2.25 G: 2; .25 INJECTION, POWDER, FOR SOLUTION INTRAVENOUS at 11:26

## 2019-03-23 RX ADMIN — POTASSIUM CHLORIDE 10 MEQ: 7.46 INJECTION, SOLUTION INTRAVENOUS at 16:18

## 2019-03-23 RX ADMIN — FERROUS SULFATE TAB EC 325 MG (65 MG FE EQUIVALENT) 325 MG: 325 (65 FE) TABLET DELAYED RESPONSE at 16:17

## 2019-03-23 RX ADMIN — PIPERACILLIN SODIUM,TAZOBACTAM SODIUM 2.25 G: 2; .25 INJECTION, POWDER, FOR SOLUTION INTRAVENOUS at 02:39

## 2019-03-23 RX ADMIN — Medication 100 MG: at 10:35

## 2019-03-23 RX ADMIN — CALCIUM GLUCONATE 1 G: 98 INJECTION, SOLUTION INTRAVENOUS at 08:46

## 2019-03-23 RX ADMIN — CARVEDILOL 6.25 MG: 6.25 TABLET, FILM COATED ORAL at 08:39

## 2019-03-23 RX ADMIN — IRON SUCROSE 300 MG: 20 INJECTION, SOLUTION INTRAVENOUS at 08:46

## 2019-03-23 RX ADMIN — QUETIAPINE FUMARATE 25 MG: 25 TABLET ORAL at 22:16

## 2019-03-23 RX ADMIN — LEVETIRACETAM 500 MG: 500 TABLET, FILM COATED ORAL at 08:39

## 2019-03-23 RX ADMIN — LEVETIRACETAM 500 MG: 500 TABLET, FILM COATED ORAL at 22:16

## 2019-03-23 RX ADMIN — FERROUS SULFATE TAB EC 325 MG (65 MG FE EQUIVALENT) 325 MG: 325 (65 FE) TABLET DELAYED RESPONSE at 08:39

## 2019-03-23 RX ADMIN — PANTOPRAZOLE SODIUM 40 MG: 40 TABLET, DELAYED RELEASE ORAL at 16:17

## 2019-03-23 RX ADMIN — CARVEDILOL 6.25 MG: 6.25 TABLET, FILM COATED ORAL at 16:17

## 2019-03-23 RX ADMIN — CALCIUM CARBONATE-CHOLECALCIFEROL TAB 250 MG-125 UNIT 250 MG: 250-125 TAB at 08:40

## 2019-03-23 RX ADMIN — PANTOPRAZOLE SODIUM 40 MG: 40 TABLET, DELAYED RELEASE ORAL at 08:39

## 2019-03-23 RX ADMIN — INSULIN LISPRO 3 UNITS: 100 INJECTION, SOLUTION INTRAVENOUS; SUBCUTANEOUS at 18:14

## 2019-03-23 RX ADMIN — AMLODIPINE BESYLATE 10 MG: 10 TABLET ORAL at 08:39

## 2019-03-24 ENCOUNTER — APPOINTMENT (OUTPATIENT)
Dept: GENERAL RADIOLOGY | Age: 74
DRG: 023 | End: 2019-03-24
Payer: MEDICARE

## 2019-03-24 LAB
ABSOLUTE EOS #: 0.41 K/UL (ref 0–0.44)
ABSOLUTE EOS #: 0.42 K/UL (ref 0–0.44)
ABSOLUTE IMMATURE GRANULOCYTE: 0.34 K/UL (ref 0–0.3)
ABSOLUTE IMMATURE GRANULOCYTE: 0.36 K/UL (ref 0–0.3)
ABSOLUTE LYMPH #: 1.05 K/UL (ref 1.1–3.7)
ABSOLUTE LYMPH #: 1.05 K/UL (ref 1.1–3.7)
ABSOLUTE MONO #: 1.29 K/UL (ref 0.1–1.2)
ABSOLUTE MONO #: 1.38 K/UL (ref 0.1–1.2)
ANION GAP SERPL CALCULATED.3IONS-SCNC: 14 MMOL/L (ref 9–17)
BASOPHILS # BLD: 1 % (ref 0–2)
BASOPHILS # BLD: 1 % (ref 0–2)
BASOPHILS ABSOLUTE: 0.13 K/UL (ref 0–0.2)
BASOPHILS ABSOLUTE: 0.17 K/UL (ref 0–0.2)
BUN BLDV-MCNC: 29 MG/DL (ref 8–23)
BUN/CREAT BLD: ABNORMAL (ref 9–20)
CALCIUM IONIZED: 1.16 MMOL/L (ref 1.13–1.33)
CALCIUM SERPL-MCNC: 8.4 MG/DL (ref 8.6–10.4)
CHLORIDE BLD-SCNC: 107 MMOL/L (ref 98–107)
CO2: 23 MMOL/L (ref 20–31)
CREAT SERPL-MCNC: 3.77 MG/DL (ref 0.7–1.2)
DIFFERENTIAL TYPE: ABNORMAL
DIFFERENTIAL TYPE: ABNORMAL
EOSINOPHILS RELATIVE PERCENT: 2 % (ref 1–4)
EOSINOPHILS RELATIVE PERCENT: 2 % (ref 1–4)
GFR AFRICAN AMERICAN: 19 ML/MIN
GFR NON-AFRICAN AMERICAN: 16 ML/MIN
GFR SERPL CREATININE-BSD FRML MDRD: ABNORMAL ML/MIN/{1.73_M2}
GFR SERPL CREATININE-BSD FRML MDRD: ABNORMAL ML/MIN/{1.73_M2}
GLUCOSE BLD-MCNC: 139 MG/DL (ref 75–110)
GLUCOSE BLD-MCNC: 166 MG/DL (ref 75–110)
GLUCOSE BLD-MCNC: 167 MG/DL (ref 75–110)
GLUCOSE BLD-MCNC: 168 MG/DL (ref 70–99)
GLUCOSE BLD-MCNC: 298 MG/DL (ref 75–110)
HCT VFR BLD CALC: 25.4 % (ref 40.7–50.3)
HCT VFR BLD CALC: 27.2 % (ref 40.7–50.3)
HEMOGLOBIN: 8.5 G/DL (ref 13–17)
HEMOGLOBIN: 8.7 G/DL (ref 13–17)
IMMATURE GRANULOCYTES: 2 %
IMMATURE GRANULOCYTES: 2 %
INR BLD: 1.5
LYMPHOCYTES # BLD: 5 % (ref 24–43)
LYMPHOCYTES # BLD: 5 % (ref 24–43)
MAGNESIUM: 2 MG/DL (ref 1.6–2.6)
MCH RBC QN AUTO: 30.2 PG (ref 25.2–33.5)
MCH RBC QN AUTO: 30.6 PG (ref 25.2–33.5)
MCHC RBC AUTO-ENTMCNC: 32 G/DL (ref 28.4–34.8)
MCHC RBC AUTO-ENTMCNC: 33.5 G/DL (ref 28.4–34.8)
MCV RBC AUTO: 91.4 FL (ref 82.6–102.9)
MCV RBC AUTO: 94.4 FL (ref 82.6–102.9)
MONOCYTES # BLD: 6 % (ref 3–12)
MONOCYTES # BLD: 6 % (ref 3–12)
NRBC AUTOMATED: 0 PER 100 WBC
NRBC AUTOMATED: 0 PER 100 WBC
PATHOLOGIST REVIEW: NORMAL
PDW BLD-RTO: 15.2 % (ref 11.8–14.4)
PDW BLD-RTO: 15.5 % (ref 11.8–14.4)
PHOSPHORUS: 3.6 MG/DL (ref 2.5–4.5)
PLATELET # BLD: 400 K/UL (ref 138–453)
PLATELET # BLD: 514 K/UL (ref 138–453)
PLATELET ESTIMATE: ABNORMAL
PLATELET ESTIMATE: ABNORMAL
PMV BLD AUTO: 10.7 FL (ref 8.1–13.5)
PMV BLD AUTO: 10.8 FL (ref 8.1–13.5)
POTASSIUM SERPL-SCNC: 3 MMOL/L (ref 3.7–5.3)
PROTHROMBIN TIME: 15.2 SEC (ref 9–12)
RBC # BLD: 2.78 M/UL (ref 4.21–5.77)
RBC # BLD: 2.88 M/UL (ref 4.21–5.77)
RBC # BLD: ABNORMAL 10*6/UL
RBC # BLD: ABNORMAL 10*6/UL
SEG NEUTROPHILS: 84 % (ref 36–65)
SEG NEUTROPHILS: 85 % (ref 36–65)
SEGMENTED NEUTROPHILS ABSOLUTE COUNT: 17.58 K/UL (ref 1.5–8.1)
SEGMENTED NEUTROPHILS ABSOLUTE COUNT: 18.83 K/UL (ref 1.5–8.1)
SODIUM BLD-SCNC: 144 MMOL/L (ref 135–144)
WBC # BLD: 20.8 K/UL (ref 3.5–11.3)
WBC # BLD: 22.2 K/UL (ref 3.5–11.3)
WBC # BLD: ABNORMAL 10*3/UL
WBC # BLD: ABNORMAL 10*3/UL

## 2019-03-24 PROCEDURE — 83735 ASSAY OF MAGNESIUM: CPT

## 2019-03-24 PROCEDURE — 36415 COLL VENOUS BLD VENIPUNCTURE: CPT

## 2019-03-24 PROCEDURE — 6370000000 HC RX 637 (ALT 250 FOR IP): Performed by: NEUROLOGICAL SURGERY

## 2019-03-24 PROCEDURE — 71045 X-RAY EXAM CHEST 1 VIEW: CPT

## 2019-03-24 PROCEDURE — 6370000000 HC RX 637 (ALT 250 FOR IP): Performed by: PSYCHIATRY & NEUROLOGY

## 2019-03-24 PROCEDURE — 82947 ASSAY GLUCOSE BLOOD QUANT: CPT

## 2019-03-24 PROCEDURE — 6370000000 HC RX 637 (ALT 250 FOR IP): Performed by: STUDENT IN AN ORGANIZED HEALTH CARE EDUCATION/TRAINING PROGRAM

## 2019-03-24 PROCEDURE — 92507 TX SP LANG VOICE COMM INDIV: CPT

## 2019-03-24 PROCEDURE — 99232 SBSQ HOSP IP/OBS MODERATE 35: CPT | Performed by: INTERNAL MEDICINE

## 2019-03-24 PROCEDURE — 2060000000 HC ICU INTERMEDIATE R&B

## 2019-03-24 PROCEDURE — 85610 PROTHROMBIN TIME: CPT

## 2019-03-24 PROCEDURE — 99232 SBSQ HOSP IP/OBS MODERATE 35: CPT | Performed by: PSYCHIATRY & NEUROLOGY

## 2019-03-24 PROCEDURE — 6370000000 HC RX 637 (ALT 250 FOR IP): Performed by: NURSE PRACTITIONER

## 2019-03-24 PROCEDURE — 2580000003 HC RX 258: Performed by: STUDENT IN AN ORGANIZED HEALTH CARE EDUCATION/TRAINING PROGRAM

## 2019-03-24 PROCEDURE — 85025 COMPLETE CBC W/AUTO DIFF WBC: CPT

## 2019-03-24 PROCEDURE — 6360000002 HC RX W HCPCS: Performed by: STUDENT IN AN ORGANIZED HEALTH CARE EDUCATION/TRAINING PROGRAM

## 2019-03-24 PROCEDURE — 84100 ASSAY OF PHOSPHORUS: CPT

## 2019-03-24 PROCEDURE — 82330 ASSAY OF CALCIUM: CPT

## 2019-03-24 PROCEDURE — 6370000000 HC RX 637 (ALT 250 FOR IP): Performed by: INTERNAL MEDICINE

## 2019-03-24 PROCEDURE — 80048 BASIC METABOLIC PNL TOTAL CA: CPT

## 2019-03-24 RX ORDER — POTASSIUM CHLORIDE 20 MEQ/1
40 TABLET, EXTENDED RELEASE ORAL 2 TIMES DAILY WITH MEALS
Status: COMPLETED | OUTPATIENT
Start: 2019-03-24 | End: 2019-03-24

## 2019-03-24 RX ORDER — CARVEDILOL 12.5 MG/1
12.5 TABLET ORAL 2 TIMES DAILY WITH MEALS
Status: DISCONTINUED | OUTPATIENT
Start: 2019-03-24 | End: 2019-03-26

## 2019-03-24 RX ADMIN — FOLIC ACID 1 MG: 1 TABLET ORAL at 08:41

## 2019-03-24 RX ADMIN — FERROUS SULFATE TAB EC 325 MG (65 MG FE EQUIVALENT) 325 MG: 325 (65 FE) TABLET DELAYED RESPONSE at 17:33

## 2019-03-24 RX ADMIN — CALCIUM CARBONATE-CHOLECALCIFEROL TAB 250 MG-125 UNIT 250 MG: 250-125 TAB at 08:41

## 2019-03-24 RX ADMIN — PANTOPRAZOLE SODIUM 40 MG: 40 TABLET, DELAYED RELEASE ORAL at 08:43

## 2019-03-24 RX ADMIN — PANTOPRAZOLE SODIUM 40 MG: 40 TABLET, DELAYED RELEASE ORAL at 17:34

## 2019-03-24 RX ADMIN — QUETIAPINE FUMARATE 25 MG: 25 TABLET ORAL at 23:11

## 2019-03-24 RX ADMIN — POTASSIUM CHLORIDE 40 MEQ: 1500 TABLET, EXTENDED RELEASE ORAL at 17:33

## 2019-03-24 RX ADMIN — LEVETIRACETAM 500 MG: 500 TABLET, FILM COATED ORAL at 08:41

## 2019-03-24 RX ADMIN — LEVETIRACETAM 500 MG: 500 TABLET, FILM COATED ORAL at 23:11

## 2019-03-24 RX ADMIN — FERROUS SULFATE TAB EC 325 MG (65 MG FE EQUIVALENT) 325 MG: 325 (65 FE) TABLET DELAYED RESPONSE at 08:41

## 2019-03-24 RX ADMIN — INSULIN LISPRO 1 UNITS: 100 INJECTION, SOLUTION INTRAVENOUS; SUBCUTANEOUS at 23:10

## 2019-03-24 RX ADMIN — POTASSIUM CHLORIDE 40 MEQ: 1500 TABLET, EXTENDED RELEASE ORAL at 12:01

## 2019-03-24 RX ADMIN — OXYCODONE HYDROCHLORIDE 5 MG: 5 TABLET ORAL at 16:09

## 2019-03-24 RX ADMIN — CARVEDILOL 6.25 MG: 6.25 TABLET, FILM COATED ORAL at 08:41

## 2019-03-24 RX ADMIN — INSULIN LISPRO 9 UNITS: 100 INJECTION, SOLUTION INTRAVENOUS; SUBCUTANEOUS at 17:38

## 2019-03-24 RX ADMIN — AMLODIPINE BESYLATE 10 MG: 10 TABLET ORAL at 08:41

## 2019-03-24 RX ADMIN — OXYCODONE HYDROCHLORIDE 5 MG: 5 TABLET ORAL at 10:12

## 2019-03-24 RX ADMIN — Medication 100 MG: at 08:43

## 2019-03-24 RX ADMIN — IRON SUCROSE 300 MG: 20 INJECTION, SOLUTION INTRAVENOUS at 08:54

## 2019-03-24 RX ADMIN — INSULIN LISPRO 3 UNITS: 100 INJECTION, SOLUTION INTRAVENOUS; SUBCUTANEOUS at 08:55

## 2019-03-24 RX ADMIN — CARVEDILOL 12.5 MG: 12.5 TABLET, FILM COATED ORAL at 17:35

## 2019-03-24 ASSESSMENT — PAIN DESCRIPTION - PROGRESSION
CLINICAL_PROGRESSION: GRADUALLY IMPROVING
CLINICAL_PROGRESSION: GRADUALLY IMPROVING

## 2019-03-24 ASSESSMENT — PAIN SCALES - GENERAL
PAINLEVEL_OUTOF10: 6
PAINLEVEL_OUTOF10: 2
PAINLEVEL_OUTOF10: 0
PAINLEVEL_OUTOF10: 6
PAINLEVEL_OUTOF10: 6

## 2019-03-24 ASSESSMENT — PAIN DESCRIPTION - ORIENTATION: ORIENTATION: LEFT

## 2019-03-24 ASSESSMENT — PAIN DESCRIPTION - LOCATION: LOCATION: ABDOMEN

## 2019-03-24 ASSESSMENT — PAIN DESCRIPTION - FREQUENCY: FREQUENCY: INTERMITTENT

## 2019-03-24 ASSESSMENT — PAIN DESCRIPTION - PAIN TYPE: TYPE: ACUTE PAIN

## 2019-03-25 LAB
ABSOLUTE EOS #: 0.47 K/UL (ref 0–0.44)
ABSOLUTE IMMATURE GRANULOCYTE: 0.38 K/UL (ref 0–0.3)
ABSOLUTE LYMPH #: 1.13 K/UL (ref 1.1–3.7)
ABSOLUTE MONO #: 1.39 K/UL (ref 0.1–1.2)
ANION GAP SERPL CALCULATED.3IONS-SCNC: 11 MMOL/L (ref 9–17)
BASOPHILS # BLD: 1 % (ref 0–2)
BASOPHILS ABSOLUTE: 0.14 K/UL (ref 0–0.2)
BUN BLDV-MCNC: 26 MG/DL (ref 8–23)
BUN/CREAT BLD: ABNORMAL (ref 9–20)
CALCIUM IONIZED: 1.15 MMOL/L (ref 1.13–1.33)
CALCIUM SERPL-MCNC: 8 MG/DL (ref 8.6–10.4)
CHLORIDE BLD-SCNC: 110 MMOL/L (ref 98–107)
CO2: 22 MMOL/L (ref 20–31)
CREAT SERPL-MCNC: 3.31 MG/DL (ref 0.7–1.2)
DIFFERENTIAL TYPE: ABNORMAL
EOSINOPHILS RELATIVE PERCENT: 2 % (ref 1–4)
GFR AFRICAN AMERICAN: 22 ML/MIN
GFR NON-AFRICAN AMERICAN: 19 ML/MIN
GFR SERPL CREATININE-BSD FRML MDRD: ABNORMAL ML/MIN/{1.73_M2}
GFR SERPL CREATININE-BSD FRML MDRD: ABNORMAL ML/MIN/{1.73_M2}
GLUCOSE BLD-MCNC: 128 MG/DL (ref 75–110)
GLUCOSE BLD-MCNC: 133 MG/DL (ref 75–110)
GLUCOSE BLD-MCNC: 149 MG/DL (ref 70–99)
GLUCOSE BLD-MCNC: 155 MG/DL (ref 75–110)
GLUCOSE BLD-MCNC: 162 MG/DL (ref 75–110)
HCT VFR BLD CALC: 25.4 % (ref 40.7–50.3)
HEMOGLOBIN: 8.4 G/DL (ref 13–17)
IMMATURE GRANULOCYTES: 2 %
INR BLD: 1.3
LYMPHOCYTES # BLD: 6 % (ref 24–43)
MAGNESIUM: 1.8 MG/DL (ref 1.6–2.6)
MCH RBC QN AUTO: 31 PG (ref 25.2–33.5)
MCHC RBC AUTO-ENTMCNC: 33.1 G/DL (ref 28.4–34.8)
MCV RBC AUTO: 93.7 FL (ref 82.6–102.9)
MONOCYTES # BLD: 7 % (ref 3–12)
NRBC AUTOMATED: 0 PER 100 WBC
PDW BLD-RTO: 15.4 % (ref 11.8–14.4)
PHOSPHORUS: 3.3 MG/DL (ref 2.5–4.5)
PLATELET # BLD: 502 K/UL (ref 138–453)
PLATELET ESTIMATE: ABNORMAL
PMV BLD AUTO: 9.8 FL (ref 8.1–13.5)
POTASSIUM SERPL-SCNC: 4 MMOL/L (ref 3.7–5.3)
PROTHROMBIN TIME: 13.7 SEC (ref 9–12)
RBC # BLD: 2.71 M/UL (ref 4.21–5.77)
RBC # BLD: ABNORMAL 10*6/UL
SEG NEUTROPHILS: 82 % (ref 36–65)
SEGMENTED NEUTROPHILS ABSOLUTE COUNT: 16.44 K/UL (ref 1.5–8.1)
SODIUM BLD-SCNC: 143 MMOL/L (ref 135–144)
WBC # BLD: 20 K/UL (ref 3.5–11.3)
WBC # BLD: ABNORMAL 10*3/UL

## 2019-03-25 PROCEDURE — 6370000000 HC RX 637 (ALT 250 FOR IP): Performed by: PSYCHIATRY & NEUROLOGY

## 2019-03-25 PROCEDURE — 82330 ASSAY OF CALCIUM: CPT

## 2019-03-25 PROCEDURE — 6370000000 HC RX 637 (ALT 250 FOR IP): Performed by: STUDENT IN AN ORGANIZED HEALTH CARE EDUCATION/TRAINING PROGRAM

## 2019-03-25 PROCEDURE — 2580000003 HC RX 258: Performed by: STUDENT IN AN ORGANIZED HEALTH CARE EDUCATION/TRAINING PROGRAM

## 2019-03-25 PROCEDURE — 99233 SBSQ HOSP IP/OBS HIGH 50: CPT | Performed by: INTERNAL MEDICINE

## 2019-03-25 PROCEDURE — 6370000000 HC RX 637 (ALT 250 FOR IP): Performed by: NEUROLOGICAL SURGERY

## 2019-03-25 PROCEDURE — 85025 COMPLETE CBC W/AUTO DIFF WBC: CPT

## 2019-03-25 PROCEDURE — 82947 ASSAY GLUCOSE BLOOD QUANT: CPT

## 2019-03-25 PROCEDURE — 6370000000 HC RX 637 (ALT 250 FOR IP): Performed by: INTERNAL MEDICINE

## 2019-03-25 PROCEDURE — 94762 N-INVAS EAR/PLS OXIMTRY CONT: CPT

## 2019-03-25 PROCEDURE — 97110 THERAPEUTIC EXERCISES: CPT

## 2019-03-25 PROCEDURE — 83735 ASSAY OF MAGNESIUM: CPT

## 2019-03-25 PROCEDURE — 36415 COLL VENOUS BLD VENIPUNCTURE: CPT

## 2019-03-25 PROCEDURE — 97116 GAIT TRAINING THERAPY: CPT

## 2019-03-25 PROCEDURE — 2060000000 HC ICU INTERMEDIATE R&B

## 2019-03-25 PROCEDURE — 84100 ASSAY OF PHOSPHORUS: CPT

## 2019-03-25 PROCEDURE — 80048 BASIC METABOLIC PNL TOTAL CA: CPT

## 2019-03-25 PROCEDURE — 85610 PROTHROMBIN TIME: CPT

## 2019-03-25 PROCEDURE — 2700000000 HC OXYGEN THERAPY PER DAY

## 2019-03-25 PROCEDURE — 6370000000 HC RX 637 (ALT 250 FOR IP): Performed by: NURSE PRACTITIONER

## 2019-03-25 RX ORDER — OXYCODONE HYDROCHLORIDE 5 MG/1
5 TABLET ORAL EVERY 4 HOURS PRN
Qty: 10 TABLET | Refills: 0 | Status: ON HOLD | OUTPATIENT
Start: 2019-03-25 | End: 2019-04-16 | Stop reason: HOSPADM

## 2019-03-25 RX ORDER — QUETIAPINE FUMARATE 25 MG/1
25 TABLET, FILM COATED ORAL NIGHTLY
Status: CANCELLED | OUTPATIENT
Start: 2019-03-25

## 2019-03-25 RX ORDER — AMLODIPINE BESYLATE 10 MG/1
10 TABLET ORAL DAILY
Status: CANCELLED | OUTPATIENT
Start: 2019-03-26

## 2019-03-25 RX ORDER — LIDOCAINE HYDROCHLORIDE 40 MG/ML
4 INJECTION, SOLUTION RETROBULBAR; TOPICAL
Status: CANCELLED | OUTPATIENT
Start: 2019-03-25

## 2019-03-25 RX ORDER — OXYCODONE HYDROCHLORIDE 10 MG/1
10 TABLET ORAL EVERY 4 HOURS PRN
Qty: 10 TABLET | Refills: 0 | Status: ON HOLD | OUTPATIENT
Start: 2019-03-25 | End: 2019-04-16 | Stop reason: HOSPADM

## 2019-03-25 RX ORDER — FOLIC ACID 1 MG/1
1 TABLET ORAL DAILY
Status: CANCELLED | OUTPATIENT
Start: 2019-03-26

## 2019-03-25 RX ORDER — OXYCODONE HYDROCHLORIDE 5 MG/1
5 TABLET ORAL EVERY 4 HOURS PRN
Status: CANCELLED | OUTPATIENT
Start: 2019-03-25

## 2019-03-25 RX ORDER — CARVEDILOL 12.5 MG/1
12.5 TABLET ORAL 2 TIMES DAILY WITH MEALS
Qty: 60 TABLET | Refills: 3 | Status: SHIPPED | OUTPATIENT
Start: 2019-03-25 | End: 2019-03-26

## 2019-03-25 RX ORDER — LEVETIRACETAM 500 MG/1
500 TABLET ORAL 2 TIMES DAILY
Qty: 60 TABLET | Refills: 3 | Status: ON HOLD | OUTPATIENT
Start: 2019-03-25 | End: 2019-04-16 | Stop reason: HOSPADM

## 2019-03-25 RX ORDER — PANTOPRAZOLE SODIUM 40 MG/1
40 TABLET, DELAYED RELEASE ORAL
Qty: 30 TABLET | Refills: 3 | Status: ON HOLD | OUTPATIENT
Start: 2019-03-25 | End: 2019-04-16 | Stop reason: HOSPADM

## 2019-03-25 RX ORDER — OXYCODONE HYDROCHLORIDE 5 MG/1
10 TABLET ORAL EVERY 4 HOURS PRN
Status: CANCELLED | OUTPATIENT
Start: 2019-03-25

## 2019-03-25 RX ORDER — CARVEDILOL 12.5 MG/1
12.5 TABLET ORAL 2 TIMES DAILY WITH MEALS
Status: CANCELLED | OUTPATIENT
Start: 2019-03-25

## 2019-03-25 RX ORDER — ACETAMINOPHEN 650 MG/1
650 SUPPOSITORY RECTAL EVERY 4 HOURS PRN
Status: CANCELLED | OUTPATIENT
Start: 2019-03-25

## 2019-03-25 RX ORDER — QUETIAPINE FUMARATE 25 MG/1
25 TABLET, FILM COATED ORAL NIGHTLY
Qty: 60 TABLET | Refills: 3 | Status: ON HOLD | OUTPATIENT
Start: 2019-03-25 | End: 2019-04-16 | Stop reason: HOSPADM

## 2019-03-25 RX ORDER — PANTOPRAZOLE SODIUM 40 MG/1
40 TABLET, DELAYED RELEASE ORAL
Status: CANCELLED | OUTPATIENT
Start: 2019-03-25

## 2019-03-25 RX ORDER — CALCIUM CARBONATE/VITAMIN D3 250-3.125
1 TABLET ORAL DAILY
Status: CANCELLED | OUTPATIENT
Start: 2019-03-26

## 2019-03-25 RX ORDER — CALCIUM CARBONATE/VITAMIN D3 250-3.125
1 TABLET ORAL DAILY
Qty: 30 TABLET | Refills: 3 | Status: SHIPPED | OUTPATIENT
Start: 2019-03-26 | End: 2019-04-25 | Stop reason: ALTCHOICE

## 2019-03-25 RX ORDER — LANOLIN ALCOHOL/MO/W.PET/CERES
325 CREAM (GRAM) TOPICAL 2 TIMES DAILY WITH MEALS
Status: CANCELLED | OUTPATIENT
Start: 2019-03-25

## 2019-03-25 RX ORDER — SENNA AND DOCUSATE SODIUM 50; 8.6 MG/1; MG/1
2 TABLET, FILM COATED ORAL DAILY
Qty: 60 TABLET | Refills: 2 | Status: SHIPPED | OUTPATIENT
Start: 2019-03-26 | End: 2019-04-25 | Stop reason: ALTCHOICE

## 2019-03-25 RX ORDER — THIAMINE HCL 100 MG
100 TABLET ORAL DAILY
Status: CANCELLED | OUTPATIENT
Start: 2019-03-26

## 2019-03-25 RX ORDER — LEVETIRACETAM 500 MG/1
500 TABLET ORAL 2 TIMES DAILY
Status: CANCELLED | OUTPATIENT
Start: 2019-03-25

## 2019-03-25 RX ORDER — POLYETHYLENE GLYCOL 3350 17 G/17G
17 POWDER, FOR SOLUTION ORAL DAILY
Qty: 527 G | Refills: 1 | Status: SHIPPED | OUTPATIENT
Start: 2019-03-26 | End: 2019-04-25 | Stop reason: ALTCHOICE

## 2019-03-25 RX ORDER — POLYETHYLENE GLYCOL 3350 17 G/17G
17 POWDER, FOR SOLUTION ORAL DAILY
Status: CANCELLED | OUTPATIENT
Start: 2019-03-26

## 2019-03-25 RX ORDER — LANOLIN ALCOHOL/MO/W.PET/CERES
325 CREAM (GRAM) TOPICAL 2 TIMES DAILY WITH MEALS
Qty: 90 TABLET | Refills: 3 | Status: ON HOLD | OUTPATIENT
Start: 2019-03-25 | End: 2019-04-16 | Stop reason: HOSPADM

## 2019-03-25 RX ADMIN — CARVEDILOL 12.5 MG: 12.5 TABLET, FILM COATED ORAL at 17:32

## 2019-03-25 RX ADMIN — CARVEDILOL 12.5 MG: 12.5 TABLET, FILM COATED ORAL at 08:21

## 2019-03-25 RX ADMIN — LEVETIRACETAM 500 MG: 500 TABLET, FILM COATED ORAL at 08:21

## 2019-03-25 RX ADMIN — FERROUS SULFATE TAB EC 325 MG (65 MG FE EQUIVALENT) 325 MG: 325 (65 FE) TABLET DELAYED RESPONSE at 17:32

## 2019-03-25 RX ADMIN — Medication 100 MG: at 08:21

## 2019-03-25 RX ADMIN — FOLIC ACID 1 MG: 1 TABLET ORAL at 08:21

## 2019-03-25 RX ADMIN — QUETIAPINE FUMARATE 25 MG: 25 TABLET ORAL at 21:05

## 2019-03-25 RX ADMIN — FERROUS SULFATE TAB EC 325 MG (65 MG FE EQUIVALENT) 325 MG: 325 (65 FE) TABLET DELAYED RESPONSE at 08:22

## 2019-03-25 RX ADMIN — PANTOPRAZOLE SODIUM 40 MG: 40 TABLET, DELAYED RELEASE ORAL at 17:32

## 2019-03-25 RX ADMIN — INSULIN LISPRO 3 UNITS: 100 INJECTION, SOLUTION INTRAVENOUS; SUBCUTANEOUS at 13:49

## 2019-03-25 RX ADMIN — Medication 10 ML: at 21:06

## 2019-03-25 RX ADMIN — Medication 10 ML: at 21:05

## 2019-03-25 RX ADMIN — PANTOPRAZOLE SODIUM 40 MG: 40 TABLET, DELAYED RELEASE ORAL at 08:21

## 2019-03-25 RX ADMIN — ACETAMINOPHEN 650 MG: 325 TABLET ORAL at 08:31

## 2019-03-25 RX ADMIN — AMLODIPINE BESYLATE 10 MG: 10 TABLET ORAL at 08:21

## 2019-03-25 RX ADMIN — LEVETIRACETAM 500 MG: 500 TABLET, FILM COATED ORAL at 21:05

## 2019-03-25 RX ADMIN — INSULIN LISPRO 2 UNITS: 100 INJECTION, SOLUTION INTRAVENOUS; SUBCUTANEOUS at 21:05

## 2019-03-25 ASSESSMENT — PAIN SCALES - GENERAL: PAINLEVEL_OUTOF10: 6

## 2019-03-26 ENCOUNTER — HOSPITAL ENCOUNTER (INPATIENT)
Age: 74
LOS: 21 days | Discharge: HOME HEALTH | DRG: 949 | End: 2019-04-16
Attending: PHYSICAL MEDICINE & REHABILITATION | Admitting: PHYSICAL MEDICINE & REHABILITATION
Payer: MEDICARE

## 2019-03-26 VITALS
TEMPERATURE: 98.1 F | HEART RATE: 90 BPM | DIASTOLIC BLOOD PRESSURE: 90 MMHG | RESPIRATION RATE: 27 BRPM | OXYGEN SATURATION: 96 % | BODY MASS INDEX: 22.01 KG/M2 | HEIGHT: 67 IN | SYSTOLIC BLOOD PRESSURE: 163 MMHG | WEIGHT: 140.21 LBS

## 2019-03-26 DIAGNOSIS — S06.359D: ICD-10-CM
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
## 2019-03-26 PROBLEM — G93.89 BRAIN DYSFUNCTION: Status: ACTIVE | Noted: 2019-03-26

## 2019-03-26 LAB
ABSOLUTE EOS #: 0.42 K/UL (ref 0–0.44)
ABSOLUTE IMMATURE GRANULOCYTE: 0.33 K/UL (ref 0–0.3)
ABSOLUTE LYMPH #: 0.96 K/UL (ref 1.1–3.7)
ABSOLUTE MONO #: 1.05 K/UL (ref 0.1–1.2)
ANION GAP SERPL CALCULATED.3IONS-SCNC: 12 MMOL/L (ref 9–17)
ANTICARDIOLIPIN IGA ANTIBODY: 5 APU
ANTICARDIOLIPIN IGG ANTIBODY: 9.3 GPU
BASOPHILS # BLD: 1 % (ref 0–2)
BASOPHILS ABSOLUTE: 0.14 K/UL (ref 0–0.2)
BUN BLDV-MCNC: 26 MG/DL (ref 8–23)
BUN/CREAT BLD: ABNORMAL (ref 9–20)
CALCIUM IONIZED: 1.15 MMOL/L (ref 1.13–1.33)
CALCIUM SERPL-MCNC: 8.3 MG/DL (ref 8.6–10.4)
CARDIOLIPIN AB IGM: 14.2 MPU
CHLORIDE BLD-SCNC: 106 MMOL/L (ref 98–107)
CO2: 23 MMOL/L (ref 20–31)
CREAT SERPL-MCNC: 3.3 MG/DL (ref 0.7–1.2)
DIFFERENTIAL TYPE: ABNORMAL
EOSINOPHILS RELATIVE PERCENT: 2 % (ref 1–4)
GFR AFRICAN AMERICAN: 23 ML/MIN
GFR NON-AFRICAN AMERICAN: 19 ML/MIN
GFR SERPL CREATININE-BSD FRML MDRD: ABNORMAL ML/MIN/{1.73_M2}
GFR SERPL CREATININE-BSD FRML MDRD: ABNORMAL ML/MIN/{1.73_M2}
GLUCOSE BLD-MCNC: 162 MG/DL (ref 75–110)
GLUCOSE BLD-MCNC: 176 MG/DL (ref 70–99)
GLUCOSE BLD-MCNC: 180 MG/DL (ref 75–110)
HCT VFR BLD CALC: 25.8 % (ref 40.7–50.3)
HEMOGLOBIN: 8.6 G/DL (ref 13–17)
IMMATURE GRANULOCYTES: 2 %
INR BLD: 1.2
LYMPHOCYTES # BLD: 5 % (ref 24–43)
MAGNESIUM: 1.7 MG/DL (ref 1.6–2.6)
MCH RBC QN AUTO: 31.4 PG (ref 25.2–33.5)
MCHC RBC AUTO-ENTMCNC: 33.3 G/DL (ref 28.4–34.8)
MCV RBC AUTO: 94.2 FL (ref 82.6–102.9)
MONOCYTES # BLD: 5 % (ref 3–12)
NRBC AUTOMATED: 0 PER 100 WBC
PDW BLD-RTO: 15.6 % (ref 11.8–14.4)
PHOSPHORUS: 3 MG/DL (ref 2.5–4.5)
PLATELET # BLD: 454 K/UL (ref 138–453)
PLATELET ESTIMATE: ABNORMAL
PMV BLD AUTO: 10.5 FL (ref 8.1–13.5)
POTASSIUM SERPL-SCNC: 3.7 MMOL/L (ref 3.7–5.3)
PROTHROMBIN TIME: 12.1 SEC (ref 9–12)
RBC # BLD: 2.74 M/UL (ref 4.21–5.77)
RBC # BLD: ABNORMAL 10*6/UL
SEG NEUTROPHILS: 85 % (ref 36–65)
SEGMENTED NEUTROPHILS ABSOLUTE COUNT: 17.58 K/UL (ref 1.5–8.1)
SODIUM BLD-SCNC: 141 MMOL/L (ref 135–144)
WBC # BLD: 20.5 K/UL (ref 3.5–11.3)
WBC # BLD: ABNORMAL 10*3/UL

## 2019-03-26 PROCEDURE — 6370000000 HC RX 637 (ALT 250 FOR IP): Performed by: STUDENT IN AN ORGANIZED HEALTH CARE EDUCATION/TRAINING PROGRAM

## 2019-03-26 PROCEDURE — 82947 ASSAY GLUCOSE BLOOD QUANT: CPT

## 2019-03-26 PROCEDURE — 82330 ASSAY OF CALCIUM: CPT

## 2019-03-26 PROCEDURE — 83735 ASSAY OF MAGNESIUM: CPT

## 2019-03-26 PROCEDURE — 6370000000 HC RX 637 (ALT 250 FOR IP): Performed by: PSYCHIATRY & NEUROLOGY

## 2019-03-26 PROCEDURE — 36415 COLL VENOUS BLD VENIPUNCTURE: CPT

## 2019-03-26 PROCEDURE — 84100 ASSAY OF PHOSPHORUS: CPT

## 2019-03-26 PROCEDURE — 6370000000 HC RX 637 (ALT 250 FOR IP): Performed by: NEUROLOGICAL SURGERY

## 2019-03-26 PROCEDURE — 99239 HOSP IP/OBS DSCHRG MGMT >30: CPT | Performed by: INTERNAL MEDICINE

## 2019-03-26 PROCEDURE — 2580000003 HC RX 258: Performed by: STUDENT IN AN ORGANIZED HEALTH CARE EDUCATION/TRAINING PROGRAM

## 2019-03-26 PROCEDURE — 6370000000 HC RX 637 (ALT 250 FOR IP): Performed by: NURSE PRACTITIONER

## 2019-03-26 PROCEDURE — 6370000000 HC RX 637 (ALT 250 FOR IP): Performed by: INTERNAL MEDICINE

## 2019-03-26 PROCEDURE — 94762 N-INVAS EAR/PLS OXIMTRY CONT: CPT

## 2019-03-26 PROCEDURE — 93970 EXTREMITY STUDY: CPT

## 2019-03-26 PROCEDURE — 80048 BASIC METABOLIC PNL TOTAL CA: CPT

## 2019-03-26 PROCEDURE — 1180000000 HC REHAB R&B

## 2019-03-26 PROCEDURE — 2700000000 HC OXYGEN THERAPY PER DAY

## 2019-03-26 PROCEDURE — 85610 PROTHROMBIN TIME: CPT

## 2019-03-26 PROCEDURE — 85025 COMPLETE CBC W/AUTO DIFF WBC: CPT

## 2019-03-26 RX ORDER — CARVEDILOL 25 MG/1
25 TABLET ORAL 2 TIMES DAILY WITH MEALS
Status: DISCONTINUED | OUTPATIENT
Start: 2019-03-26 | End: 2019-03-26 | Stop reason: HOSPADM

## 2019-03-26 RX ORDER — ACETAMINOPHEN 325 MG/1
650 TABLET ORAL EVERY 4 HOURS PRN
Status: DISCONTINUED | OUTPATIENT
Start: 2019-03-26 | End: 2019-04-16 | Stop reason: HOSPADM

## 2019-03-26 RX ORDER — OXYCODONE HYDROCHLORIDE 5 MG/1
10 TABLET ORAL EVERY 4 HOURS PRN
Status: DISCONTINUED | OUTPATIENT
Start: 2019-03-26 | End: 2019-03-28

## 2019-03-26 RX ORDER — PANTOPRAZOLE SODIUM 40 MG/1
40 TABLET, DELAYED RELEASE ORAL
Status: DISCONTINUED | OUTPATIENT
Start: 2019-03-27 | End: 2019-04-16 | Stop reason: HOSPADM

## 2019-03-26 RX ORDER — LEVETIRACETAM 500 MG/1
500 TABLET ORAL 2 TIMES DAILY
Status: DISCONTINUED | OUTPATIENT
Start: 2019-03-26 | End: 2019-04-16 | Stop reason: HOSPADM

## 2019-03-26 RX ORDER — ACETAMINOPHEN 650 MG/1
650 SUPPOSITORY RECTAL EVERY 4 HOURS PRN
Status: DISCONTINUED | OUTPATIENT
Start: 2019-03-26 | End: 2019-03-27

## 2019-03-26 RX ORDER — AMLODIPINE BESYLATE 10 MG/1
10 TABLET ORAL DAILY
Status: DISCONTINUED | OUTPATIENT
Start: 2019-03-27 | End: 2019-04-16 | Stop reason: HOSPADM

## 2019-03-26 RX ORDER — OXYCODONE HYDROCHLORIDE 5 MG/1
5 TABLET ORAL EVERY 4 HOURS PRN
Status: DISCONTINUED | OUTPATIENT
Start: 2019-03-26 | End: 2019-04-16 | Stop reason: HOSPADM

## 2019-03-26 RX ORDER — CARVEDILOL 25 MG/1
25 TABLET ORAL 2 TIMES DAILY WITH MEALS
Qty: 30 TABLET | Refills: 2 | Status: ON HOLD | OUTPATIENT
Start: 2019-03-26 | End: 2019-04-16 | Stop reason: HOSPADM

## 2019-03-26 RX ORDER — FOLIC ACID 1 MG/1
1 TABLET ORAL DAILY
Status: DISCONTINUED | OUTPATIENT
Start: 2019-03-27 | End: 2019-04-16 | Stop reason: HOSPADM

## 2019-03-26 RX ORDER — CARVEDILOL 12.5 MG/1
25 TABLET ORAL 2 TIMES DAILY WITH MEALS
Qty: 60 TABLET | Refills: 3 | Status: SHIPPED | OUTPATIENT
Start: 2019-03-26 | End: 2019-03-26

## 2019-03-26 RX ORDER — FERROUS SULFATE 325(65) MG
325 TABLET ORAL 2 TIMES DAILY WITH MEALS
Status: DISCONTINUED | OUTPATIENT
Start: 2019-03-26 | End: 2019-04-16 | Stop reason: HOSPADM

## 2019-03-26 RX ORDER — CALCIUM CARBONATE/VITAMIN D3 250-3.125
1 TABLET ORAL DAILY
Status: DISCONTINUED | OUTPATIENT
Start: 2019-03-27 | End: 2019-03-31

## 2019-03-26 RX ORDER — POLYETHYLENE GLYCOL 3350 17 G/17G
17 POWDER, FOR SOLUTION ORAL DAILY
Status: DISCONTINUED | OUTPATIENT
Start: 2019-03-27 | End: 2019-04-16 | Stop reason: HOSPADM

## 2019-03-26 RX ORDER — LIDOCAINE HYDROCHLORIDE 40 MG/ML
4 INJECTION, SOLUTION RETROBULBAR; TOPICAL
Status: DISCONTINUED | OUTPATIENT
Start: 2019-03-26 | End: 2019-04-16 | Stop reason: HOSPADM

## 2019-03-26 RX ORDER — THIAMINE MONONITRATE (VIT B1) 100 MG
100 TABLET ORAL DAILY
Status: DISCONTINUED | OUTPATIENT
Start: 2019-03-27 | End: 2019-04-16 | Stop reason: HOSPADM

## 2019-03-26 RX ORDER — QUETIAPINE FUMARATE 25 MG/1
25 TABLET, FILM COATED ORAL NIGHTLY
Status: DISCONTINUED | OUTPATIENT
Start: 2019-03-26 | End: 2019-03-29

## 2019-03-26 RX ADMIN — Medication 10 ML: at 10:02

## 2019-03-26 RX ADMIN — LEVETIRACETAM 500 MG: 500 TABLET, FILM COATED ORAL at 10:01

## 2019-03-26 RX ADMIN — FOLIC ACID 1 MG: 1 TABLET ORAL at 10:00

## 2019-03-26 RX ADMIN — SENNOSIDES AND DOCUSATE SODIUM 2 TABLET: 8.6; 5 TABLET ORAL at 09:59

## 2019-03-26 RX ADMIN — CALCIUM CARBONATE-CHOLECALCIFEROL TAB 250 MG-125 UNIT 250 MG: 250-125 TAB at 10:00

## 2019-03-26 RX ADMIN — FERROUS SULFATE TAB EC 325 MG (65 MG FE EQUIVALENT) 325 MG: 325 (65 FE) TABLET DELAYED RESPONSE at 10:20

## 2019-03-26 RX ADMIN — CARVEDILOL 25 MG: 12.5 TABLET, FILM COATED ORAL at 09:59

## 2019-03-26 RX ADMIN — PANTOPRAZOLE SODIUM 40 MG: 40 TABLET, DELAYED RELEASE ORAL at 10:20

## 2019-03-26 RX ADMIN — OXYCODONE HYDROCHLORIDE 5 MG: 5 TABLET ORAL at 23:41

## 2019-03-26 RX ADMIN — INSULIN LISPRO 3 UNITS: 100 INJECTION, SOLUTION INTRAVENOUS; SUBCUTANEOUS at 23:43

## 2019-03-26 RX ADMIN — QUETIAPINE FUMARATE 25 MG: 25 TABLET ORAL at 23:41

## 2019-03-26 RX ADMIN — LEVETIRACETAM 500 MG: 500 TABLET, FILM COATED ORAL at 23:41

## 2019-03-26 RX ADMIN — Medication 100 MG: at 09:59

## 2019-03-26 RX ADMIN — AMLODIPINE BESYLATE 10 MG: 10 TABLET ORAL at 09:59

## 2019-03-26 RX ADMIN — MAGNESIUM GLUCONATE 500 MG ORAL TABLET 400 MG: 500 TABLET ORAL at 10:01

## 2019-03-26 RX ADMIN — INSULIN LISPRO 3 UNITS: 100 INJECTION, SOLUTION INTRAVENOUS; SUBCUTANEOUS at 10:01

## 2019-03-26 ASSESSMENT — PAIN DESCRIPTION - LOCATION
LOCATION: CHEST;HIP
LOCATION: SHOULDER;HIP

## 2019-03-26 ASSESSMENT — PAIN SCALES - GENERAL
PAINLEVEL_OUTOF10: 3
PAINLEVEL_OUTOF10: 5
PAINLEVEL_OUTOF10: 4
PAINLEVEL_OUTOF10: 4
PAINLEVEL_OUTOF10: 9

## 2019-03-26 ASSESSMENT — PAIN DESCRIPTION - PROGRESSION

## 2019-03-26 ASSESSMENT — PAIN DESCRIPTION - PAIN TYPE
TYPE: ACUTE PAIN

## 2019-03-26 ASSESSMENT — PAIN DESCRIPTION - DESCRIPTORS
DESCRIPTORS: SHARP;SHOOTING
DESCRIPTORS: ACHING

## 2019-03-26 ASSESSMENT — PAIN DESCRIPTION - ONSET
ONSET: ON-GOING

## 2019-03-26 ASSESSMENT — PAIN DESCRIPTION - FREQUENCY
FREQUENCY: CONTINUOUS

## 2019-03-27 ENCOUNTER — APPOINTMENT (OUTPATIENT)
Dept: GENERAL RADIOLOGY | Age: 74
DRG: 949 | End: 2019-03-27
Attending: PHYSICAL MEDICINE & REHABILITATION
Payer: MEDICARE

## 2019-03-27 PROBLEM — E43 SEVERE MALNUTRITION (HCC): Status: ACTIVE | Noted: 2019-03-27

## 2019-03-27 LAB
ANION GAP SERPL CALCULATED.3IONS-SCNC: 17 MMOL/L (ref 9–17)
BUN BLDV-MCNC: 26 MG/DL (ref 8–23)
BUN/CREAT BLD: ABNORMAL (ref 9–20)
CALCIUM SERPL-MCNC: 8.7 MG/DL (ref 8.6–10.4)
CHLORIDE BLD-SCNC: 105 MMOL/L (ref 98–107)
CO2: 22 MMOL/L (ref 20–31)
CREAT SERPL-MCNC: 3.16 MG/DL (ref 0.7–1.2)
GFR AFRICAN AMERICAN: 24 ML/MIN
GFR NON-AFRICAN AMERICAN: 20 ML/MIN
GFR SERPL CREATININE-BSD FRML MDRD: ABNORMAL ML/MIN/{1.73_M2}
GFR SERPL CREATININE-BSD FRML MDRD: ABNORMAL ML/MIN/{1.73_M2}
GLUCOSE BLD-MCNC: 149 MG/DL (ref 75–110)
GLUCOSE BLD-MCNC: 154 MG/DL (ref 75–110)
GLUCOSE BLD-MCNC: 157 MG/DL (ref 70–99)
GLUCOSE BLD-MCNC: 159 MG/DL (ref 75–110)
GLUCOSE BLD-MCNC: 221 MG/DL (ref 75–110)
POTASSIUM SERPL-SCNC: 3.2 MMOL/L (ref 3.7–5.3)
SODIUM BLD-SCNC: 144 MMOL/L (ref 135–144)

## 2019-03-27 PROCEDURE — 97535 SELF CARE MNGMENT TRAINING: CPT

## 2019-03-27 PROCEDURE — 97162 PT EVAL MOD COMPLEX 30 MIN: CPT

## 2019-03-27 PROCEDURE — 99223 1ST HOSP IP/OBS HIGH 75: CPT | Performed by: PHYSICAL MEDICINE & REHABILITATION

## 2019-03-27 PROCEDURE — 1180000000 HC REHAB R&B

## 2019-03-27 PROCEDURE — 6370000000 HC RX 637 (ALT 250 FOR IP): Performed by: PHYSICAL MEDICINE & REHABILITATION

## 2019-03-27 PROCEDURE — 71046 X-RAY EXAM CHEST 2 VIEWS: CPT

## 2019-03-27 PROCEDURE — 97166 OT EVAL MOD COMPLEX 45 MIN: CPT

## 2019-03-27 PROCEDURE — 97116 GAIT TRAINING THERAPY: CPT

## 2019-03-27 PROCEDURE — 92523 SPEECH SOUND LANG COMPREHEN: CPT

## 2019-03-27 PROCEDURE — 97110 THERAPEUTIC EXERCISES: CPT

## 2019-03-27 PROCEDURE — 6370000000 HC RX 637 (ALT 250 FOR IP): Performed by: STUDENT IN AN ORGANIZED HEALTH CARE EDUCATION/TRAINING PROGRAM

## 2019-03-27 PROCEDURE — 80048 BASIC METABOLIC PNL TOTAL CA: CPT

## 2019-03-27 PROCEDURE — 99222 1ST HOSP IP/OBS MODERATE 55: CPT | Performed by: INTERNAL MEDICINE

## 2019-03-27 PROCEDURE — 36415 COLL VENOUS BLD VENIPUNCTURE: CPT

## 2019-03-27 RX ORDER — POTASSIUM CHLORIDE 20 MEQ/1
20 TABLET, EXTENDED RELEASE ORAL ONCE
Status: COMPLETED | OUTPATIENT
Start: 2019-03-27 | End: 2019-03-27

## 2019-03-27 RX ORDER — DEXTROSE MONOHYDRATE 25 G/50ML
12.5 INJECTION, SOLUTION INTRAVENOUS PRN
Status: DISCONTINUED | OUTPATIENT
Start: 2019-03-27 | End: 2019-04-16 | Stop reason: HOSPADM

## 2019-03-27 RX ORDER — DEXTROSE MONOHYDRATE 50 MG/ML
100 INJECTION, SOLUTION INTRAVENOUS PRN
Status: DISCONTINUED | OUTPATIENT
Start: 2019-03-27 | End: 2019-04-16 | Stop reason: HOSPADM

## 2019-03-27 RX ORDER — POLYETHYLENE GLYCOL 3350 17 G/17G
17 POWDER, FOR SOLUTION ORAL DAILY PRN
Status: DISCONTINUED | OUTPATIENT
Start: 2019-03-27 | End: 2019-03-27

## 2019-03-27 RX ORDER — BISACODYL 10 MG
10 SUPPOSITORY, RECTAL RECTAL DAILY PRN
Status: DISCONTINUED | OUTPATIENT
Start: 2019-03-27 | End: 2019-04-16 | Stop reason: HOSPADM

## 2019-03-27 RX ORDER — NICOTINE POLACRILEX 4 MG
15 LOZENGE BUCCAL PRN
Status: DISCONTINUED | OUTPATIENT
Start: 2019-03-27 | End: 2019-04-16 | Stop reason: HOSPADM

## 2019-03-27 RX ORDER — DOCUSATE SODIUM 100 MG/1
100 CAPSULE, LIQUID FILLED ORAL 2 TIMES DAILY
Status: DISCONTINUED | OUTPATIENT
Start: 2019-03-27 | End: 2019-04-01

## 2019-03-27 RX ADMIN — Medication 400 MG: at 09:31

## 2019-03-27 RX ADMIN — INSULIN LISPRO 3 UNITS: 100 INJECTION, SOLUTION INTRAVENOUS; SUBCUTANEOUS at 18:45

## 2019-03-27 RX ADMIN — PANTOPRAZOLE SODIUM 40 MG: 40 TABLET, DELAYED RELEASE ORAL at 06:43

## 2019-03-27 RX ADMIN — THIAMINE HCL TAB 100 MG 100 MG: 100 TAB at 09:31

## 2019-03-27 RX ADMIN — LEVETIRACETAM 500 MG: 500 TABLET, FILM COATED ORAL at 21:37

## 2019-03-27 RX ADMIN — FERROUS SULFATE TAB 325 MG (65 MG ELEMENTAL FE) 325 MG: 325 (65 FE) TAB at 09:31

## 2019-03-27 RX ADMIN — POTASSIUM CHLORIDE 20 MEQ: 1500 TABLET, EXTENDED RELEASE ORAL at 15:38

## 2019-03-27 RX ADMIN — POLYETHYLENE GLYCOL 3350 17 G: 17 POWDER, FOR SOLUTION ORAL at 09:31

## 2019-03-27 RX ADMIN — OXYCODONE HYDROCHLORIDE 5 MG: 5 TABLET ORAL at 21:37

## 2019-03-27 RX ADMIN — FOLIC ACID 1 MG: 1 TABLET ORAL at 09:31

## 2019-03-27 RX ADMIN — DOCUSATE SODIUM 100 MG: 100 CAPSULE, LIQUID FILLED ORAL at 09:38

## 2019-03-27 RX ADMIN — INSULIN LISPRO 3 UNITS: 100 INJECTION, SOLUTION INTRAVENOUS; SUBCUTANEOUS at 09:57

## 2019-03-27 RX ADMIN — LEVETIRACETAM 500 MG: 500 TABLET, FILM COATED ORAL at 09:31

## 2019-03-27 RX ADMIN — INSULIN LISPRO 3 UNITS: 100 INJECTION, SOLUTION INTRAVENOUS; SUBCUTANEOUS at 13:58

## 2019-03-27 RX ADMIN — QUETIAPINE FUMARATE 25 MG: 25 TABLET ORAL at 21:37

## 2019-03-27 RX ADMIN — AMLODIPINE BESYLATE 10 MG: 10 TABLET ORAL at 09:31

## 2019-03-27 RX ADMIN — PANTOPRAZOLE SODIUM 40 MG: 40 TABLET, DELAYED RELEASE ORAL at 15:38

## 2019-03-27 RX ADMIN — OXYCODONE HYDROCHLORIDE 10 MG: 5 TABLET ORAL at 09:55

## 2019-03-27 RX ADMIN — CALCIUM CARBONATE-CHOLECALCIFEROL TAB 250 MG-125 UNIT 250 MG: 250-125 TAB at 09:31

## 2019-03-27 RX ADMIN — DOCUSATE SODIUM 100 MG: 100 CAPSULE, LIQUID FILLED ORAL at 21:37

## 2019-03-27 RX ADMIN — FERROUS SULFATE TAB 325 MG (65 MG ELEMENTAL FE) 325 MG: 325 (65 FE) TAB at 18:45

## 2019-03-27 ASSESSMENT — PAIN - FUNCTIONAL ASSESSMENT: PAIN_FUNCTIONAL_ASSESSMENT: 0-10

## 2019-03-27 ASSESSMENT — PAIN SCALES - GENERAL
PAINLEVEL_OUTOF10: 9
PAINLEVEL_OUTOF10: 0
PAINLEVEL_OUTOF10: 5
PAINLEVEL_OUTOF10: 9
PAINLEVEL_OUTOF10: 0
PAINLEVEL_OUTOF10: 8
PAINLEVEL_OUTOF10: 0

## 2019-03-27 ASSESSMENT — PAIN DESCRIPTION - PROGRESSION
CLINICAL_PROGRESSION: GRADUALLY IMPROVING

## 2019-03-27 ASSESSMENT — PAIN DESCRIPTION - LOCATION
LOCATION: ABDOMEN
LOCATION: ABDOMEN

## 2019-03-27 ASSESSMENT — PAIN DESCRIPTION - DESCRIPTORS: DESCRIPTORS: SHARP;SHOOTING

## 2019-03-27 ASSESSMENT — PAIN DESCRIPTION - PAIN TYPE: TYPE: ACUTE PAIN

## 2019-03-27 NOTE — PLAN OF CARE
Problem: Risk for Impaired Skin Integrity  Goal: Tissue integrity - skin and mucous membranes  Description  Structural intactness and normal physiological function of skin and  mucous membranes.   3/27/2019 1804 by Dudley Grimm RN  Outcome: Ongoing  3/27/2019 0645 by Hadley Nuñez RN  Outcome: Ongoing  3/27/2019 0608 by Hadley Nuñez RN  Outcome: Ongoing     Problem: Falls - Risk of:  Goal: Will remain free from falls  Description  Will remain free from falls  3/27/2019 1804 by Dudley Grimm RN  Outcome: Ongoing  3/27/2019 0645 by Hadley Nuñez RN  Outcome: Ongoing  3/27/2019 0608 by Hadley Nuñez RN  Outcome: Ongoing  Goal: Absence of physical injury  Description  Absence of physical injury  Outcome: Ongoing     Problem: Pain:  Goal: Pain level will decrease  Description  Pain level will decrease  Outcome: Ongoing  Goal: Control of acute pain  Description  Control of acute pain  3/27/2019 1804 by Dudley Grimm RN  Outcome: Ongoing  3/27/2019 0645 by Hadley Nuñez RN  Outcome: Ongoing  3/27/2019 0608 by Hadley Nuñez RN  Outcome: Ongoing  Goal: Control of chronic pain  Description  Control of chronic pain  Outcome: Ongoing     Problem: Nutrition  Goal: Optimal nutrition therapy  3/27/2019 1804 by Dudley Grimm RN  Outcome: Ongoing  3/27/2019 1747 by Laz Perez RD, LD  Outcome: Ongoing
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
## 2019-03-27 NOTE — PLAN OF CARE
Nutrition Problem: Inadequate oral intake  Intervention: Food and/or Nutrient Delivery: Continue current diet, Start ONS  Nutritional Goals: PO intake % of meals and supplements
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
## 2019-03-27 NOTE — PROGRESS NOTES
Physical Therapy  DATE: 3/27/2019    NAME: Erica Rees  MRN: [id number]   : 1945    Patient not seen this date for Physical Therapy due to:  [] Blood transfusion in progress  [] Hemodialysis  []  Patient Declined  [] Spine Precautions   [] Strict Bedrest  [] Surgery/ Procedure  [] Testing      [x] Other Initiated PT evaluation, made 2 attempts this morning, pt refusing therapy, \"Let me rest\", I have too much abdominal pain, RN in the room encouraged pt too, pt not willing to particpate. Pt confused and gets irritated. Will attempt again in P:M.         [] PT being discontinued at this time. Patient independent. No further needs. [] PT being discontinued at this time as the patient has been transferred to palliative care. No further needs.     Vance Juares, PT

## 2019-03-27 NOTE — FLOWSHEET NOTE
03/26/19 1915   Encounter Summary   Services provided to: Patient   Referral/Consult From: Rounding   Complexity of Encounter Low   Length of Encounter 15 minutes   Spiritual/Caodaism   Type Spiritual support   Assessment Sleeping   Intervention Prayer   Outcome Did not respond   Visited by Sofiya Cruz
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
## 2019-03-27 NOTE — CONSULTS
Nutrition Assessment    Type and Reason for Visit: Initial, Positive Nutrition Screen, Consult(Poor appetite and intake; Consult to Evaluate and Treat)    Nutrition Recommendations: Add Ensure Enlive twice daily. Continue current diet unless dysphagia adjustments indicated. Nutrition Assessment: Pt severely malnourished upon admission as evidenced by poor oral intake; wt, fat, and muscle loss. Pt at risk for further nutrition compromise due to continued poor intake. Will add Ensure Enlive twice daily. Monitor intake, labs, and overall nutrition progression. Malnutrition Assessment:  · Malnutrition Status: Meets the criteria for severe malnutrition  · Context: Acute illness or injury  · Findings of the 6 clinical characteristics of malnutrition (Minimum of 2 out of 6 clinical characteristics is required to make the diagnosis of moderate or severe Protein Calorie Malnutrition based on AND/ASPEN Guidelines):  1. Energy Intake-Less than or equal to 50% of estimated energy requirement, Greater than or equal to 7 days    2. Weight Loss-5% loss or greater, (3 weeks)  3. Fat Loss-Mild subcutaneous fat loss, Orbital  4. Muscle Loss-Mild muscle mass loss, Temples (temporalis muscle)  5. Fluid Accumulation-No significant fluid accumulation, Extremities  6.  Strength-Not measured    Nutrition Risk Level: High    Nutrient Needs:  · Estimated Daily Total Kcal: 2650-5595 based on Admission wt with Jennifer Flatten with 1.2-1.3 factor  · Estimated Daily Protein (g): 76-95 based on 1.2-1.5 gm per kg of admission wt    Nutrition Diagnosis:   · Problem: Inadequate oral intake  · Etiology: related to Cognitive or neurological impairment, Acute injury/trauma     Signs and symptoms:  as evidenced by Intake 0-25%, Weight loss, Mild loss of subcutaneous fat, Mild muscle loss    Objective Information:  · Nutrition-Focused Physical Findings: Distended abdomen. No edema.    · Wound Type: Surgical Wound(Incision to head)  · Current Nutrition Therapies:  · Oral Diet Orders: General   · Oral Diet intake: 1-25%  · Anthropometric Measures:  · Ht: 5' 7\" (170.2 cm)   · Current Body Wt: 139 lb 15.9 oz (63.5 kg)  · Admission Body Wt: 139 lb 15.9 oz (63.5 kg)  · Usual Body Wt: 149 lb 14.6 oz (68 kg)(3-8-19)  · % Weight Change:  ,  6% loss in 3 weeks  · Ideal Body Wt: 148 lb (67.1 kg), % Ideal Body 95%  · BMI Classification: BMI 18.5 - 24.9 Normal Weight    Nutrition Interventions:   Continue current diet, Start ONS  Continued Inpatient Monitoring, Education not appropriate at this time    Nutrition Evaluation:   · Evaluation: Goals set   · Goals: PO intake % of meals and supplements    · Monitoring: Nutrition Progression, Meal Intake, Supplement Intake, Diet Tolerance, Skin Integrity, I&O, Weight, Pertinent Labs, Chewing/Swallowing    Ros Perez R.D., L.D.   Pager: [phone]
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
## 2019-03-27 NOTE — PLAN OF CARE
Problem: Risk for Impaired Skin Integrity  Goal: Tissue integrity - skin and mucous membranes  Description  Structural intactness and normal physiological function of skin and  mucous membranes. Outcome: Ongoing   Skin assessment completed this shift. Nutrition and Hydration status assessed with adequate intake. Osito Score as charted. Pressure Relief Overlay remains intact and inflated to patient's bed throughout the shift. Bilateral heels remain elevated on pillows throughout the shift. Patient tolerates repositioning by staff at least every 2 hours. Patient able to reposition self for comfort and to prevent breakdown. Patient verbalizes understanding of pressure ulcer prevention measures. Skin integrity maintained. No new skin breakdown noted. Skin to high risk pressure areas including coccyx and heels are clear. Darby / Incontinence care provided as needed throughout the shift. Aloe Vesta Moisture Barrier ointment applied to buttocks as a preventative measure. Problem: Falls - Risk of:  Goal: Will remain free from falls  Description  Will remain free from falls  Outcome: Ongoing   No falls or injury this shift. Bed is maintained at the lowest level, brakes engaged, call light and assistive devices in reach. Room is clutter free, adequate lighting for safe transfers and ambulation. Assistance provided for transfers and toileting. Problem: Pain:  Goal: Control of acute pain  Description  Control of acute pain  Outcome: Ongoing     Problem: Pain:  Goal: Control of acute pain  Description  Control of acute pain  Outcome: Ongoing     Problem: Pain:  Goal: Control of acute pain  Description  Control of acute pain  Outcome: Ongoing   Patient assessed for pain, medicated per orders. Patient reports an acceptable level of discomfort with the current medications. Patient was repositioned and cold/heat therapy applied.

## 2019-03-27 NOTE — PROGRESS NOTES
[facility]    OCCUPATIONAL THERAPY MISSED TREATMENT NOTE   ACUTE REHAB  Date: 3/27/19  Patient Name: Eladia Vargas       Room: [facility]4939-  MRN: [id number]   Account #: [de-identified]    : 1945  (72 y.o.)  Gender: male   Referring Practitioner: Dr. Zacarias Kwok  Diagnosis: L intercerebral hemorrhage             REASON FOR MISSED TREATMENT:  Patient refusal   -    Pt repeatedly states \"I don't know, I don't know\", this writer encouraging pt to participate in therapies, pt continues to repeat \"I don't know\".          EFE Ramirez

## 2019-03-28 ENCOUNTER — APPOINTMENT (OUTPATIENT)
Dept: CT IMAGING | Age: 74
DRG: 949 | End: 2019-03-28
Attending: PHYSICAL MEDICINE & REHABILITATION
Payer: MEDICARE

## 2019-03-28 LAB
ABSOLUTE EOS #: 0.1 K/UL (ref 0–0.4)
ABSOLUTE IMMATURE GRANULOCYTE: ABNORMAL K/UL (ref 0–0.3)
ABSOLUTE LYMPH #: 0.5 K/UL (ref 1–4.8)
ABSOLUTE MONO #: 0.7 K/UL (ref 0.1–1.3)
ALBUMIN SERPL-MCNC: 2.7 G/DL (ref 3.5–5.2)
ALBUMIN/GLOBULIN RATIO: ABNORMAL (ref 1–2.5)
ALP BLD-CCNC: 77 U/L (ref 40–129)
ALT SERPL-CCNC: 8 U/L (ref 5–41)
ANION GAP SERPL CALCULATED.3IONS-SCNC: 15 MMOL/L (ref 9–17)
AST SERPL-CCNC: 12 U/L
BASOPHILS # BLD: 1 % (ref 0–2)
BASOPHILS ABSOLUTE: 0 K/UL (ref 0–0.2)
BILIRUB SERPL-MCNC: 0.61 MG/DL (ref 0.3–1.2)
BUN BLDV-MCNC: 23 MG/DL (ref 8–23)
BUN/CREAT BLD: ABNORMAL (ref 9–20)
CALCIUM SERPL-MCNC: 8.9 MG/DL (ref 8.6–10.4)
CHLORIDE BLD-SCNC: 107 MMOL/L (ref 98–107)
CO2: 22 MMOL/L (ref 20–31)
CREAT SERPL-MCNC: 2.9 MG/DL (ref 0.7–1.2)
DIFFERENTIAL TYPE: ABNORMAL
EOSINOPHILS RELATIVE PERCENT: 2 % (ref 0–4)
ESTIMATED AVERAGE GLUCOSE: 128 MG/DL
GFR AFRICAN AMERICAN: 26 ML/MIN
GFR NON-AFRICAN AMERICAN: 22 ML/MIN
GFR SERPL CREATININE-BSD FRML MDRD: ABNORMAL ML/MIN/{1.73_M2}
GFR SERPL CREATININE-BSD FRML MDRD: ABNORMAL ML/MIN/{1.73_M2}
GLUCOSE BLD-MCNC: 131 MG/DL (ref 75–110)
GLUCOSE BLD-MCNC: 154 MG/DL (ref 70–99)
GLUCOSE BLD-MCNC: 181 MG/DL (ref 75–110)
GLUCOSE BLD-MCNC: 188 MG/DL (ref 75–110)
GLUCOSE BLD-MCNC: 235 MG/DL (ref 75–110)
HBA1C MFR BLD: 6.1 % (ref 4–6)
HCT VFR BLD CALC: 26.1 % (ref 41–53)
HEMOGLOBIN: 8.8 G/DL (ref 13.5–17.5)
IMMATURE GRANULOCYTES: ABNORMAL %
LYMPHOCYTES # BLD: 6 % (ref 24–44)
MCH RBC QN AUTO: 31.3 PG (ref 26–34)
MCHC RBC AUTO-ENTMCNC: 33.5 G/DL (ref 31–37)
MCV RBC AUTO: 93.3 FL (ref 80–100)
MONOCYTES # BLD: 8 % (ref 1–7)
NRBC AUTOMATED: ABNORMAL PER 100 WBC
PDW BLD-RTO: 16.6 % (ref 11.5–14.9)
PLATELET # BLD: 400 K/UL (ref 150–450)
PLATELET ESTIMATE: ABNORMAL
PMV BLD AUTO: 7.9 FL (ref 6–12)
POTASSIUM SERPL-SCNC: 3.3 MMOL/L (ref 3.7–5.3)
RBC # BLD: 2.8 M/UL (ref 4.5–5.9)
RBC # BLD: ABNORMAL 10*6/UL
SEG NEUTROPHILS: 83 % (ref 36–66)
SEGMENTED NEUTROPHILS ABSOLUTE COUNT: 7.8 K/UL (ref 1.3–9.1)
SODIUM BLD-SCNC: 144 MMOL/L (ref 135–144)
TOTAL PROTEIN: 5.8 G/DL (ref 6.4–8.3)
WBC # BLD: 9.3 K/UL (ref 3.5–11)
WBC # BLD: ABNORMAL 10*3/UL

## 2019-03-28 PROCEDURE — 83036 HEMOGLOBIN GLYCOSYLATED A1C: CPT

## 2019-03-28 PROCEDURE — 80053 COMPREHEN METABOLIC PANEL: CPT

## 2019-03-28 PROCEDURE — 99231 SBSQ HOSP IP/OBS SF/LOW 25: CPT | Performed by: INTERNAL MEDICINE

## 2019-03-28 PROCEDURE — APPNB30 APP NON BILLABLE TIME 0-30 MINS: Performed by: NURSE PRACTITIONER

## 2019-03-28 PROCEDURE — 6370000000 HC RX 637 (ALT 250 FOR IP): Performed by: NURSE PRACTITIONER

## 2019-03-28 PROCEDURE — 36415 COLL VENOUS BLD VENIPUNCTURE: CPT

## 2019-03-28 PROCEDURE — 6370000000 HC RX 637 (ALT 250 FOR IP): Performed by: PHYSICAL MEDICINE & REHABILITATION

## 2019-03-28 PROCEDURE — 99232 SBSQ HOSP IP/OBS MODERATE 35: CPT | Performed by: PHYSICAL MEDICINE & REHABILITATION

## 2019-03-28 PROCEDURE — 82947 ASSAY GLUCOSE BLOOD QUANT: CPT

## 2019-03-28 PROCEDURE — 85025 COMPLETE CBC W/AUTO DIFF WBC: CPT

## 2019-03-28 PROCEDURE — 6370000000 HC RX 637 (ALT 250 FOR IP): Performed by: STUDENT IN AN ORGANIZED HEALTH CARE EDUCATION/TRAINING PROGRAM

## 2019-03-28 PROCEDURE — 97127 HC SP THER IVNTJ W/FOCUS COG FUNCJ: CPT

## 2019-03-28 PROCEDURE — 1180000000 HC REHAB R&B

## 2019-03-28 PROCEDURE — 6370000000 HC RX 637 (ALT 250 FOR IP): Performed by: INTERNAL MEDICINE

## 2019-03-28 PROCEDURE — 74176 CT ABD & PELVIS W/O CONTRAST: CPT

## 2019-03-28 PROCEDURE — 97535 SELF CARE MNGMENT TRAINING: CPT

## 2019-03-28 RX ORDER — DONEPEZIL HYDROCHLORIDE 5 MG/1
5 TABLET, FILM COATED ORAL NIGHTLY
Status: DISCONTINUED | OUTPATIENT
Start: 2019-03-28 | End: 2019-04-16 | Stop reason: HOSPADM

## 2019-03-28 RX ORDER — SUCRALFATE 1 G/1
1 TABLET ORAL EVERY 6 HOURS SCHEDULED
Status: DISCONTINUED | OUTPATIENT
Start: 2019-03-28 | End: 2019-04-16 | Stop reason: HOSPADM

## 2019-03-28 RX ORDER — LABETALOL 100 MG/1
200 TABLET, FILM COATED ORAL EVERY 6 HOURS PRN
Status: DISCONTINUED | OUTPATIENT
Start: 2019-03-28 | End: 2019-04-16 | Stop reason: HOSPADM

## 2019-03-28 RX ORDER — POTASSIUM CHLORIDE 20 MEQ/1
40 TABLET, EXTENDED RELEASE ORAL ONCE
Status: COMPLETED | OUTPATIENT
Start: 2019-03-28 | End: 2019-03-28

## 2019-03-28 RX ADMIN — INSULIN LISPRO 6 UNITS: 100 INJECTION, SOLUTION INTRAVENOUS; SUBCUTANEOUS at 17:30

## 2019-03-28 RX ADMIN — LEVETIRACETAM 500 MG: 500 TABLET, FILM COATED ORAL at 08:25

## 2019-03-28 RX ADMIN — DOCUSATE SODIUM 100 MG: 100 CAPSULE, LIQUID FILLED ORAL at 08:25

## 2019-03-28 RX ADMIN — SUCRALFATE 1 G: 1 TABLET ORAL at 17:31

## 2019-03-28 RX ADMIN — PANTOPRAZOLE SODIUM 40 MG: 40 TABLET, DELAYED RELEASE ORAL at 08:25

## 2019-03-28 RX ADMIN — OXYCODONE HYDROCHLORIDE 5 MG: 5 TABLET ORAL at 22:45

## 2019-03-28 RX ADMIN — DONEPEZIL HYDROCHLORIDE 5 MG: 5 TABLET, FILM COATED ORAL at 23:32

## 2019-03-28 RX ADMIN — FOLIC ACID 1 MG: 1 TABLET ORAL at 08:25

## 2019-03-28 RX ADMIN — THIAMINE HCL TAB 100 MG 100 MG: 100 TAB at 08:25

## 2019-03-28 RX ADMIN — SUCRALFATE 1 G: 1 TABLET ORAL at 11:40

## 2019-03-28 RX ADMIN — LEVETIRACETAM 500 MG: 500 TABLET, FILM COATED ORAL at 22:45

## 2019-03-28 RX ADMIN — OXYCODONE HYDROCHLORIDE 10 MG: 5 TABLET ORAL at 10:18

## 2019-03-28 RX ADMIN — CALCIUM CARBONATE-CHOLECALCIFEROL TAB 250 MG-125 UNIT 250 MG: 250-125 TAB at 08:25

## 2019-03-28 RX ADMIN — POTASSIUM CHLORIDE 40 MEQ: 20 TABLET, EXTENDED RELEASE ORAL at 11:40

## 2019-03-28 RX ADMIN — POLYETHYLENE GLYCOL 3350 17 G: 17 POWDER, FOR SOLUTION ORAL at 08:25

## 2019-03-28 RX ADMIN — FERROUS SULFATE TAB 325 MG (65 MG ELEMENTAL FE) 325 MG: 325 (65 FE) TAB at 08:25

## 2019-03-28 RX ADMIN — PANTOPRAZOLE SODIUM 40 MG: 40 TABLET, DELAYED RELEASE ORAL at 17:31

## 2019-03-28 RX ADMIN — OXYCODONE HYDROCHLORIDE 5 MG: 5 TABLET ORAL at 17:31

## 2019-03-28 RX ADMIN — DOCUSATE SODIUM 100 MG: 100 CAPSULE, LIQUID FILLED ORAL at 22:45

## 2019-03-28 RX ADMIN — QUETIAPINE FUMARATE 25 MG: 25 TABLET ORAL at 22:45

## 2019-03-28 RX ADMIN — AMLODIPINE BESYLATE 10 MG: 10 TABLET ORAL at 08:25

## 2019-03-28 RX ADMIN — Medication 400 MG: at 08:25

## 2019-03-28 RX ADMIN — FERROUS SULFATE TAB 325 MG (65 MG ELEMENTAL FE) 325 MG: 325 (65 FE) TAB at 17:31

## 2019-03-28 ASSESSMENT — PAIN DESCRIPTION - DESCRIPTORS
DESCRIPTORS: ACHING
DESCRIPTORS: ACHING

## 2019-03-28 ASSESSMENT — PAIN SCALES - GENERAL
PAINLEVEL_OUTOF10: 6
PAINLEVEL_OUTOF10: 8
PAINLEVEL_OUTOF10: 0
PAINLEVEL_OUTOF10: 0
PAINLEVEL_OUTOF10: 10
PAINLEVEL_OUTOF10: 0

## 2019-03-28 ASSESSMENT — PAIN DESCRIPTION - PAIN TYPE
TYPE: CHRONIC PAIN
TYPE: ACUTE PAIN

## 2019-03-28 ASSESSMENT — PAIN DESCRIPTION - LOCATION
LOCATION: ABDOMEN;BACK
LOCATION: ABDOMEN;BACK

## 2019-03-28 ASSESSMENT — PAIN DESCRIPTION - FREQUENCY
FREQUENCY: INTERMITTENT
FREQUENCY: INTERMITTENT
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
## 2019-03-28 NOTE — FLOWSHEET NOTE
03/27/19 1842   Encounter Summary   Services provided to: Patient   Referral/Consult From: Rounding   Complexity of Encounter Low   Length of Encounter 15 minutes   Spiritual/Jewish   Type Spiritual support   Assessment Sleeping   Intervention Prayer   Outcome Did not respond   Visited by Kevin Fields
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
## 2019-03-28 NOTE — PLAN OF CARE
during hospitalization and Complete education with patient/family with understanding demonstrated regarding disease process and resultant impairment     In order to achieve these goals, nursing interventions may include bowel/bladder training, education for medical assistive devices, medication education, O2 saturation management, energy conservation, stress management techniques, fall prevention, alarms protocol, seating and positioning, skin/wound care, pressure relief instruction, dressing changes, infection protection, DVT prophylaxis, assistance with safe transfers , and/or assistance with bathroom activities and hygiene. PHYSICAL THERAPY:  Goals:        Short term goals  Time Frame for Short term goals: 1 week  Short term goal 1: Pt to perform bed mobility mod I with use of hand rail  Short term goal 2: Pt to perform bed mobility CGA with RW  Short term goal 3: Pt to amb 300 ft with RW on level surface CGA  Short term goal 4: Pt to ascende/descend 5 steps with bilateral hand rail min A  Long term goals  Time Frame for Long term goals : by discharge  Long term goal 1: Pt to perform bed mobility independent  Long term goal 2: Pt to perform transfers with RW supervision  Long term goal 3: Pt to amb 300 ft with least restrictive DME on level surface supervision  Long term goal 4: Pt to ascend/descend one flight of stairs    Plan of Care: Plan Of Care:  Due to impaired functional endurance and/or medical issues, the patient is to be seen for a combined total of at least a  900 minutes over 7 days of Physical, Occupational and/or Speech Therapy. Anticipated interventions may include therapeutic exercises, gait training, neuromuscular re-ed, transfer training, community reintegration, bed mobility, w/c mobility and training.       OCCUPATIONAL THERAPY:  Goals:             Short term goals  Time Frame for Short term goals: in 1 week pt will   Short term goal 1: demo UB ADLs with set-up and min vcs to attent to task  Short term goal 2: demo LB ADLs with moderate assistance with minimal vcs to attent to task  Short term goal 3: demo toilet transfer with minimal assistance, using least restrictive device  Short term goal 4: demo 20+ minutes of activity participation during functional activity to increase participation in ADLs and functional mobility   Long term goals  Time Frame for Long term goals : by d/c pt will  Long term goal 1: demo BADLs with set-up for safety   Long term goal 2: participate in functional tasks for 30+ minutes with min verbal cueing to redirect attention  Long term goal 3: demo functional transfers with set-up  Long term goal 4: demo safety awareness during all ADLs/functional mobility with minimal verbal cueing    Plan of [address]:  Due to impaired functional endurance and/or medical issues, the patient is to be seen for a combined total of at least a  900 minutes over 7 days of Physical, Occupational and/or Speech Therapy. Anticipated interventions may include ADL and IADL retraining, strengthening, safety education and training, patient/caregiver education and training, equipment evaluation/ training/procurement, neuromuscular reeducation, wheelchair mobility training. SPEECH THERAPY:   Goals:             Short-term Goals  Goal 1: Increase receptive langauge to 70%  Goal 2: Increase orientation to 70%  Goal 3: Increase expression for category naming, convergent categorization and responsive naming to 70%. Plan of Care: Plan Of Care:  Due to impaired functional endurance and/or medical issues, the patient is to be seen for a combined total of at least a  900 minutes over 7 days of Physical, Occupational and/or Speech Therapy. Anticipated interventions may include speech/language/communication therapy, cognitive training, group therapy, education, and/or dysphagia therapy based on the above goals.       CASE MANAGEMENT:  Goals:   Assist patient/family with discharge planning, patient/family counseling,  and coordination with insurance during the inpatient rehabilitation stay. Other members of the multidisciplinary rehabilitation team that will be involved in the patient's plan of care include recreational therapy, dietary, respiratory therapy, and neuropsychology. Medical issues being managed closely and that require 24 hour availability of a physician:  Bowel/Bladder function, Wound care, Pain management, Infection protection, DVT prophylaxis, Fall precautions, Fluid/Electrolyte balance, Nutritional status, Respiratory needs, Anemia and History of heart disease                                           Physician anticipated functional outcomes: Improved independence with functional measures   Estimated length of stay for this admission 2 weeks  Medical Prognosis: Fair  Anticipated disposition: Home with [address] [facility]. The potential to achieve the above medical and rehabilitative goals is good. This plan of care has been developed with the assistance and input of the multidisciplinary rehabilitation team.  The plan was reviewed with the patient on 3/28/2019. The patient has had the opportunity to provide input to the therapy team.    I have reviewed this Individualized Plan of Care and agree with its contents. Above documentation has been expanded, modified, adjusted to reflect the findings of my evaluations and goals for the patient. Physician:  Electronically signed by Ketan Maza. Doyle Meraz MD on 3/28/19 at 3:40 PM

## 2019-03-28 NOTE — PROGRESS NOTES
[address]    ACUTE REHABILITATION OCCUPATIONAL THERAPY  DAILY NOTE    Date: 3/28/19  Patient Name: Fifi Pacheco      Room: 1029/6702-91    MRN: [id number]   : 1945  (77 y.o.)  Gender: male   Referring Practitioner: Dr. Lorne Mi  Diagnosis: L intercerebral hemorrhage  Additional Pertinent Hx: Alcohol abuse,     Restrictions  Restrictions/Precautions: Seizure, General Precautions, Surgical Protocols, Fall Risk(tele sitter)  Implants present? : Metal implants  Required Braces or Orthoses?: No    Subjective  Subjective: \"no, please not right now\" pt states initially for ADL tasks/washing up this AM, pt agreeable to dressing tasks c encouragement, pt sat EOB c SBA, no LOB noted, pt states \"I lived up in the [address]" when asked if he wanted to eat breakfast, pt also states \"I'm 72years old\" when encouragement provided to participate in therapies this AM  Comments: pt cooperative c increased time and encouragement  Pain Level: 0  Restrictions/Precautions: Seizure;General Precautions; Fall Risk(bed alarm, telesitter)     Patient Observation  Observations: 2L O2, telesitter, bed alarm, pt reports SOB post ambulation, HR and O2 sats monitored, pt 92 on RA c HR of 108, NSG notified  Pain Assessment  Pain Level: 0    Objective  Cognition  Overall Cognitive Status: Impaired  Arousal/Alertness: Inconsistent responses to stimuli  Following Directions: Follows one step commands  Attention Span: Difficulty attending to directions  Memory: Decreased recall of biographical information;Decreased recall of precautions  Following Commands:  Follows one step commands with repetition  Safety Judgement: Decreased awareness of need for assistance  Insights: Decreased awareness of deficits  Perception  Overall Perceptual Status: Impaired  Initiation: Cues to initiate tasks  Motor Planning: (further assessment warranted)  Balance  Sitting Balance: Stand by assistance(pt sat EOB ~10 min)  Standing Balance: Contact guard assistance(CGA-SBA, static)  Bed mobility  Rolling to Left: Supervision  Rolling to Right: Supervision  Supine to Sit: Minimal assistance(hand held A)  Sit to Supine: Supervision  Scooting: Stand by assistance(to HOB)  Comment: vc's req for tech, F return noted  Transfers  Stand Step Transfers: Contact guard assistance(hand held A)  Sit to stand: Contact guard assistance  Stand to sit: Contact guard assistance  Standing Balance  Time: AM: 1 min; PM: 1-2 min, 2-3 min  Activity: AM: dressing tasks; PM: functional mobility in room/bathroom, toilet transfer/toileting tasks, hand washing sinkside, stand step to move HOB  Sit to stand: Contact guard assistance  Stand to sit: Contact guard assistance  Comment: no LOB noted, vc's for safety  Functional Mobility  Functional - Mobility Device: Rolling Walker  Activity: To/from bathroom; Other  Assist Level: Contact guard assistance  Functional Mobility Comments: no LOB noted, slow to ambulate, V/T cues for safety/tech and visual scanning c ambulation                                        OT FIM:   Groomin - Able to perform 3-4 tasks with touching help(CGA-SBA in standing, V/T cues for hand washing sinkside)  Bathin - Did not occur(pt declines)  Dressing-Upper: 5 - Requires setup/supervision/cues and/or requires assist with presthesis/brace only(pt req s/u and vc's to dolly OH shirt)  Dressing-Lower: 2 - Requires assist with 4-5 parts of dressing(TA to dolly/doff footies, A to thread over BLE's/able to pull over hips, A to tie pants)  Toiletin - Requires steadying assistance only(CGA-SBA in standing, A to tie pants, vc's for clothing mgmt)  Toilet Transfer: 4 - Requires steadying assistance only < 25% assist(CGA-SBA)  Primary Mode: Shower  Tub Transfer: 0 - Activity does not occur  Shower Transfer: 0 - Activity does not occur(pt declines showering/washing up this date)               Assessment  Performance deficits / Impairments: Decreased functional mobility ; Decreased ADL status; Decreased strength;Decreased safe awareness;Decreased cognition;Decreased endurance;Decreased balance;Decreased high-level IADLs  Assessment: pt sat EOB for dressing tasks c SBA, vc's for task initiation and carryover, s/u req for dressing tasks, pt declines mobility or further ADL tasks  Prognosis: Good  Discharge Recommendations: 24 hour supervision or assist  Activity Tolerance: Treatment limited secondary to decreased cognition  Safety Devices in place: Yes  Type of devices: Nurse notified; Left in bed;Patient at risk for falls;Call light within reach; Bed alarm in place(telesitter)  Equipment Recommendations  Equipment Needed: (TBD)       Patient Education:     Patient Education: OT POC, ADL tasks, bed mobility, therapy participation  Barriers to Learning: cognition  Learner:patient  Method: demonstration and explanation       Outcome: needs reinforcement     Plan  Plan  Times per week: 900/7  Times per day: Twice a day  Short term goals  Time Frame for Short term goals: in 1 week pt will   Short term goal 1: demo UB ADLs with set-up and min vcs to attent to task  Short term goal 2: demo LB ADLs with moderate assistance with minimal vcs to attent to task  Short term goal 3: demo toilet transfer with minimal assistance, using least restrictive device  Short term goal 4: demo 20+ minutes of activity participation during functional activity to increase participation in ADLs and functional mobility   Long term goals  Time Frame for Long term goals : by d/c pt will  Long term goal 1: demo BADLs with set-up for safety   Long term goal 2: participate in functional tasks for 30+ minutes with min verbal cueing to redirect attention  Long term goal 3: demo functional transfers with set-up  Long term goal 4: demo safety awareness during all ADLs/functional mobility with minimal verbal cueing        03/28/19 1311 03/28/19 1537   OT Individual Minutes   Time In [place] [address] Time Out [id number]   Minutes 23 18     Electronically signed by EFE Durbin on 3/28/19 at 3:43 PM

## 2019-03-28 NOTE — PLAN OF CARE
Problem: Risk for Impaired Skin Integrity  Goal: Tissue integrity - skin and mucous membranes  Description  Structural intactness and normal physiological function of skin and  mucous membranes. 3/28/2019 0615 by Stu Fernandez RN  Outcome: Ongoing  3/27/2019 1804 by Rachid George RN  Outcome: Ongoing   Skin assessment completed this shift. Nutrition and Hydration status assessed with adequate intake. Osito Score as charted. Pressure Relief Overlay remains intact and inflated to patient's bed throughout the shift. Bilateral heels remain elevated on pillows throughout the shift. Patient tolerates repositioning by staff at least every 2 hours. Patient able to reposition self for comfort and to prevent breakdown. Patient verbalizes understanding of pressure ulcer prevention measures. Skin integrity maintained. No new skin breakdown noted. Skin to high risk pressure areas including coccyx and heels are clear. Darby / Incontinence care provided as needed throughout the shift. Aloe Vesta Moisture Barrier ointment applied to buttocks as a preventative measure. Problem: Falls - Risk of:  Goal: Absence of physical injury  Description  Absence of physical injury  3/27/2019 1804 by Rcahid George RN  Outcome: Ongoing   No falls or injury this shift. Bed is maintained at the lowest level, brakes engaged, call light and assistive devices in reach. Room is clutter free, adequate lighting for safe transfers and ambulation. Assistance provided for transfers and toileting. Problem: Pain:  Goal: Control of chronic pain  Description  Control of chronic pain  3/27/2019 1804 by Rachid George RN  Outcome: Ongoing   Patient assessed for pain, medicated per orders. Patient reports an acceptable level of discomfort with the current medications. Patient was repositioned and cold/heat therapy applied.
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
## 2019-03-28 NOTE — PLAN OF CARE
Problem: Risk for Impaired Skin Integrity  Goal: Tissue integrity - skin and mucous membranes  Description  Structural intactness and normal physiological function of skin and  mucous membranes. 3/28/2019 1135 by Gabe García RN  Outcome: Ongoing   Pt has no new altered skin this shift. will continue to monitor. Problem: Falls - Risk of:  Goal: Will remain free from falls  Description  Will remain free from falls  3/28/2019 1135 by Gabe García RN  Outcome: Ongoing   No falls this shift. Problem: Falls - Risk of:  Goal: Absence of physical injury  Description  Absence of physical injury  Outcome: Ongoing   No injury this shift. Problem: Pain:  Goal: Pain level will decrease  Description  Pain level will decrease  Outcome: Ongoing   Pt rated elevated pain generalized. Roxicodone effective for pain control. Problem: Nutrition  Goal: Optimal nutrition therapy  Outcome: Ongoing   Pt refusing po intake this shift, but encouraging po intake this shift.

## 2019-03-29 LAB
ANION GAP SERPL CALCULATED.3IONS-SCNC: 16 MMOL/L (ref 9–17)
BUN BLDV-MCNC: 31 MG/DL (ref 8–23)
BUN/CREAT BLD: ABNORMAL (ref 9–20)
CALCIUM SERPL-MCNC: 8.9 MG/DL (ref 8.6–10.4)
CHLORIDE BLD-SCNC: 97 MMOL/L (ref 98–107)
CO2: 25 MMOL/L (ref 20–31)
CREAT SERPL-MCNC: 2.34 MG/DL (ref 0.7–1.2)
GFR AFRICAN AMERICAN: 33 ML/MIN
GFR NON-AFRICAN AMERICAN: 28 ML/MIN
GFR SERPL CREATININE-BSD FRML MDRD: ABNORMAL ML/MIN/{1.73_M2}
GFR SERPL CREATININE-BSD FRML MDRD: ABNORMAL ML/MIN/{1.73_M2}
GLUCOSE BLD-MCNC: 171 MG/DL (ref 75–110)
GLUCOSE BLD-MCNC: 173 MG/DL (ref 75–110)
GLUCOSE BLD-MCNC: 246 MG/DL (ref 75–110)
GLUCOSE BLD-MCNC: 268 MG/DL (ref 70–99)
GLUCOSE BLD-MCNC: 281 MG/DL (ref 75–110)
POTASSIUM SERPL-SCNC: 3.4 MMOL/L (ref 3.7–5.3)
SODIUM BLD-SCNC: 138 MMOL/L (ref 135–144)

## 2019-03-29 PROCEDURE — 99232 SBSQ HOSP IP/OBS MODERATE 35: CPT | Performed by: INTERNAL MEDICINE

## 2019-03-29 PROCEDURE — 97535 SELF CARE MNGMENT TRAINING: CPT

## 2019-03-29 PROCEDURE — 97127 HC SP THER IVNTJ W/FOCUS COG FUNCJ: CPT

## 2019-03-29 PROCEDURE — 99232 SBSQ HOSP IP/OBS MODERATE 35: CPT | Performed by: PHYSICAL MEDICINE & REHABILITATION

## 2019-03-29 PROCEDURE — 36415 COLL VENOUS BLD VENIPUNCTURE: CPT

## 2019-03-29 PROCEDURE — 6370000000 HC RX 637 (ALT 250 FOR IP): Performed by: PHYSICAL MEDICINE & REHABILITATION

## 2019-03-29 PROCEDURE — 97530 THERAPEUTIC ACTIVITIES: CPT

## 2019-03-29 PROCEDURE — 90792 PSYCH DIAG EVAL W/MED SRVCS: CPT | Performed by: PSYCHIATRY & NEUROLOGY

## 2019-03-29 PROCEDURE — 1180000000 HC REHAB R&B

## 2019-03-29 PROCEDURE — 82947 ASSAY GLUCOSE BLOOD QUANT: CPT

## 2019-03-29 PROCEDURE — 6370000000 HC RX 637 (ALT 250 FOR IP): Performed by: STUDENT IN AN ORGANIZED HEALTH CARE EDUCATION/TRAINING PROGRAM

## 2019-03-29 PROCEDURE — APPNB30 APP NON BILLABLE TIME 0-30 MINS: Performed by: NURSE PRACTITIONER

## 2019-03-29 PROCEDURE — 6370000000 HC RX 637 (ALT 250 FOR IP): Performed by: NURSE PRACTITIONER

## 2019-03-29 PROCEDURE — 80048 BASIC METABOLIC PNL TOTAL CA: CPT

## 2019-03-29 PROCEDURE — 6370000000 HC RX 637 (ALT 250 FOR IP): Performed by: INTERNAL MEDICINE

## 2019-03-29 RX ORDER — POTASSIUM CHLORIDE 750 MG/1
20 CAPSULE, EXTENDED RELEASE ORAL ONCE
Status: COMPLETED | OUTPATIENT
Start: 2019-03-29 | End: 2019-03-29

## 2019-03-29 RX ORDER — DICYCLOMINE HYDROCHLORIDE 10 MG/1
10 CAPSULE ORAL
Status: DISCONTINUED | OUTPATIENT
Start: 2019-03-29 | End: 2019-04-16 | Stop reason: HOSPADM

## 2019-03-29 RX ORDER — METOCLOPRAMIDE 10 MG/1
5 TABLET ORAL
Status: DISCONTINUED | OUTPATIENT
Start: 2019-03-29 | End: 2019-04-04

## 2019-03-29 RX ORDER — POTASSIUM CHLORIDE 20 MEQ/1
20 TABLET, EXTENDED RELEASE ORAL ONCE
Status: COMPLETED | OUTPATIENT
Start: 2019-03-29 | End: 2019-03-29

## 2019-03-29 RX ORDER — ESCITALOPRAM OXALATE 10 MG/1
5 TABLET ORAL DAILY
Status: DISCONTINUED | OUTPATIENT
Start: 2019-03-29 | End: 2019-04-02

## 2019-03-29 RX ADMIN — POTASSIUM CHLORIDE 20 MEQ: 20 TABLET, EXTENDED RELEASE ORAL at 18:33

## 2019-03-29 RX ADMIN — FERROUS SULFATE TAB 325 MG (65 MG ELEMENTAL FE) 325 MG: 325 (65 FE) TAB at 18:25

## 2019-03-29 RX ADMIN — ESCITALOPRAM OXALATE 5 MG: 10 TABLET ORAL at 15:57

## 2019-03-29 RX ADMIN — DOCUSATE SODIUM 100 MG: 100 CAPSULE, LIQUID FILLED ORAL at 08:37

## 2019-03-29 RX ADMIN — AMLODIPINE BESYLATE 10 MG: 10 TABLET ORAL at 08:37

## 2019-03-29 RX ADMIN — POTASSIUM CHLORIDE 20 MEQ: 750 CAPSULE, EXTENDED RELEASE ORAL at 18:25

## 2019-03-29 RX ADMIN — CALCIUM CARBONATE-CHOLECALCIFEROL TAB 250 MG-125 UNIT 250 MG: 250-125 TAB at 08:37

## 2019-03-29 RX ADMIN — DICYCLOMINE HYDROCHLORIDE 10 MG: 10 CAPSULE ORAL at 12:50

## 2019-03-29 RX ADMIN — FERROUS SULFATE TAB 325 MG (65 MG ELEMENTAL FE) 325 MG: 325 (65 FE) TAB at 08:37

## 2019-03-29 RX ADMIN — SUCRALFATE 1 G: 1 TABLET ORAL at 18:25

## 2019-03-29 RX ADMIN — OXYCODONE HYDROCHLORIDE 5 MG: 5 TABLET ORAL at 21:23

## 2019-03-29 RX ADMIN — THIAMINE HCL TAB 100 MG 100 MG: 100 TAB at 08:37

## 2019-03-29 RX ADMIN — Medication 1 MG: at 21:23

## 2019-03-29 RX ADMIN — FOLIC ACID 1 MG: 1 TABLET ORAL at 08:37

## 2019-03-29 RX ADMIN — OXYCODONE HYDROCHLORIDE 5 MG: 5 TABLET ORAL at 08:37

## 2019-03-29 RX ADMIN — INSULIN LISPRO 6 UNITS: 100 INJECTION, SOLUTION INTRAVENOUS; SUBCUTANEOUS at 11:43

## 2019-03-29 RX ADMIN — INSULIN LISPRO 2 UNITS: 100 INJECTION, SOLUTION INTRAVENOUS; SUBCUTANEOUS at 21:23

## 2019-03-29 RX ADMIN — SUCRALFATE 1 G: 1 TABLET ORAL at 11:43

## 2019-03-29 RX ADMIN — POLYETHYLENE GLYCOL 3350 17 G: 17 POWDER, FOR SOLUTION ORAL at 08:37

## 2019-03-29 RX ADMIN — LEVETIRACETAM 500 MG: 500 TABLET, FILM COATED ORAL at 21:23

## 2019-03-29 RX ADMIN — OXYCODONE HYDROCHLORIDE 5 MG: 5 TABLET ORAL at 15:57

## 2019-03-29 RX ADMIN — DOCUSATE SODIUM 100 MG: 100 CAPSULE, LIQUID FILLED ORAL at 21:23

## 2019-03-29 RX ADMIN — Medication 400 MG: at 08:37

## 2019-03-29 RX ADMIN — SUCRALFATE 1 G: 1 TABLET ORAL at 08:41

## 2019-03-29 RX ADMIN — INSULIN LISPRO 9 UNITS: 100 INJECTION, SOLUTION INTRAVENOUS; SUBCUTANEOUS at 18:24

## 2019-03-29 RX ADMIN — DONEPEZIL HYDROCHLORIDE 5 MG: 5 TABLET, FILM COATED ORAL at 21:30

## 2019-03-29 RX ADMIN — METOCLOPRAMIDE 5 MG: 10 TABLET ORAL at 15:57

## 2019-03-29 RX ADMIN — PANTOPRAZOLE SODIUM 40 MG: 40 TABLET, DELAYED RELEASE ORAL at 15:57

## 2019-03-29 RX ADMIN — PANTOPRAZOLE SODIUM 40 MG: 40 TABLET, DELAYED RELEASE ORAL at 08:37

## 2019-03-29 RX ADMIN — DICYCLOMINE HYDROCHLORIDE 10 MG: 10 CAPSULE ORAL at 18:28

## 2019-03-29 RX ADMIN — LEVETIRACETAM 500 MG: 500 TABLET, FILM COATED ORAL at 08:37

## 2019-03-29 ASSESSMENT — PAIN DESCRIPTION - LOCATION
LOCATION: HEAD
LOCATION: BACK
LOCATION: ABDOMEN;HEAD
LOCATION: HEAD

## 2019-03-29 ASSESSMENT — PAIN DESCRIPTION - PAIN TYPE
TYPE: CHRONIC PAIN
TYPE: ACUTE PAIN

## 2019-03-29 ASSESSMENT — PAIN SCALES - GENERAL
PAINLEVEL_OUTOF10: 2
PAINLEVEL_OUTOF10: 6
PAINLEVEL_OUTOF10: 0
PAINLEVEL_OUTOF10: 10
PAINLEVEL_OUTOF10: 0
PAINLEVEL_OUTOF10: 0
PAINLEVEL_OUTOF10: 2
PAINLEVEL_OUTOF10: 0
PAINLEVEL_OUTOF10: 6

## 2019-03-29 ASSESSMENT — PAIN DESCRIPTION - ONSET: ONSET: ON-GOING

## 2019-03-29 ASSESSMENT — PAIN DESCRIPTION - FREQUENCY
FREQUENCY: INTERMITTENT

## 2019-03-29 ASSESSMENT — PAIN DESCRIPTION - DESCRIPTORS
DESCRIPTORS: ACHING

## 2019-03-29 NOTE — PLAN OF CARE
Problem: Risk for Impaired Skin Integrity  Goal: Tissue integrity - skin and mucous membranes  Description  Structural intactness and normal physiological function of skin and  mucous membranes. Outcome: Ongoing   Pt has no new altered skin this shift. will continue to monitor. Problem: Falls - Risk of:  Goal: Will remain free from falls  Description  Will remain free from falls  Outcome: Ongoing   No falls this shift. Problem: Falls - Risk of:  Goal: Absence of physical injury  Description  Absence of physical injury  Outcome: Ongoing   No injury this shift. Problem: Pain:  Goal: Pain level will decrease  Description  Pain level will decrease  Outcome: Ongoing   Pt rated elevated pain generalized. Roxicodone effective for pain control. Problem: Nutrition  Goal: Optimal nutrition therapy  Outcome: Ongoing   Pt refusing po intake this shift, but encouraging po intake this shift.
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
## 2019-03-29 NOTE — PROGRESS NOTES
[facility]    Gastroenterology Daily Progress Note      Patient:   Anirudh Beth   :    1945   Facility:   [facility]  Date:     3/29/2019  Consultant:   Corey Jeronimo, CNP      SUBJECTIVE  72 y.o. male admitted 3/26/2019 with Brain dysfunction [G93.89]  Brain dysfunction [G93.89]  Brain dysfunction [G93.89] and seen for abdominal pain. The pt was seen and examined. He continues to have intermittent epigastric pain but his appetite is slightly better than yesterday. He denies nausea and has not had diarrhea. His follow up abdominal CT did not show any increase in the retroperitoneal hematoma, he does have cholelithiasis and bilateral nephrolithiasis. He has not had overt bleeding.          OBJECTIVE  Scheduled Meds:   dicyclomine  10 mg Oral TID AC    sucralfate  1 g Oral 4 times per day    donepezil  5 mg Oral Nightly    docusate sodium  100 mg Oral BID    amLODIPine  10 mg Oral Daily    ferrous sulfate  325 mg Oral BID WC    folic acid  1 mg Oral Daily    insulin lispro  0-18 Units Subcutaneous TID WC    insulin lispro  0-9 Units Subcutaneous Nightly    levETIRAcetam  500 mg Oral BID    magnesium oxide  400 mg Oral Daily    oyster shell calcium/vitamin D  1 tablet Oral Daily    pantoprazole  40 mg Oral BID AC    polyethylene glycol  17 g Oral Daily    QUEtiapine  25 mg Oral Nightly    vitamin B-1  100 mg Oral Daily       Vital Signs:  BP (!) 153/73   Pulse 82   Temp 98.1 °F (36.7 °C) (Oral)   Resp 16   Ht 5' 7\" (1.702 m)   Wt 140 lb (63.5 kg)   SpO2 92%   BMI 21.93 kg/m²      Physical Exam:   General appearance: Alert & oriented, NAD  Lungs: CTA bilaterally    Heart: S1S2 RRR  Abdomen: Soft, Nontender, Not distended, BS WNL  Skin: No jaundice, No clubbing, No cyanosis    Lab and Imaging Review     CBC  Recent Labs     19  1407   WBC 9.3   HGB 8.8*   HCT 26.1*   MCV 93.3          BMP  Recent Labs     19  0631 19  0658    144 K 3.2* 3.3*    107   CO2 22 22   BUN 26* 23   CREATININE 3.16* 2.90*   GLUCOSE 157* 154*   CALCIUM 8.7 8.9       LFTS  Recent Labs     03/28/19  0658   ALKPHOS 77   ALT 8   AST 12   PROT 5.8*   BILITOT 0.61   LABALBU 2.7*     FINDINGS:ct abd 3/28/19   Lower Chest: Small pleural effusions, left greater than right, are again   noted and larger in the interval.  New airspace disease in the posterior   lingula with air bronchograms is noted.       Organs: Evaluation of the abdominal and pelvic viscera is limited in the   absence of contrast.  Calcified stone in the gallbladder fundus.  No wall   thickening or biliary dilatation.  The liver, pancreas, spleen and adrenals   reveal no abnormality.  Bilateral nephrolithiasis is noted.  No   hydronephrosis.  3 cm right renal cyst and small hemorrhagic or proteinaceous   cyst in the lower pole the right kidney again noted.  The left kidney is   displaced by the retroperitoneal hematoma, as seen previously.       GI/Bowel: No bowel dilatation or wall thickening identified.  Diverticulosis.       Pelvis: No acute findings.  The bladder is well distended.       Peritoneum/Retroperitoneum: Retroperitoneal hematoma posterolaterally on the   left side is again demonstrated without significant change measuring   approximately 9.7 x 7.6 x 20 cm.  Advil of mint of the right psoas muscle is   also noted without appreciable change.  Trace amount of simple pericolic   fluid and perihepatic fluid is noted with little interval change.  No free   air.  No loculated fluid collection otherwise appreciated.  The aorta is   normal in caliber.  Calcified atheromatous plaque.  No lymphadenopathy.       Bones/Soft Tissues: Anterior wedging of L3.  Multilevel degenerative change   in the spine.  No new soft tissue or osseous abnormality appreciated.           Impression   No significant change in large left retroperitoneal hematoma.       Small pleural effusions and dependent atelectasis, left greater than right,   more prominent in the interval.  New atelectasis or consolidation in the   posterior lingula.       No new findings to explain right-sided pain.       Bilateral nephrolithiasis.       Cholelithiasis.         [facility]      PROCEDURE DATE: 03/14/19      ATTENDING PHYSICIAN: Melissa Garcia MD       PREOPERATIVE DIAGNOSIS: Hematemesis.      PROCEDURES:   1) Transoral Upper Endoscopy.      POSTOPERATIVE DIAGNOSIS:   Low risk gastric ulcers  Moderate esophagitis  No blood       FINDINGS:   Esophagus: The esophagus was inspected to the Z-line. The endoscopic exam showed moderate esophagitis in distal and mid esophagus.      Stomach: The stomach was inspected in both forward and retroflex fashion and was appropriately distensible. The cardia, fundus, incisura, antrum and pylorus were identified via direct visualization. The endoscopic exam showed two ulcers in cardia (8-12 mm)- low risk.      Duodenum: The proximal small bowel was inspected through the bulb, sweep, and second portion of the duodenum. The endoscopic exam showed no pathology.      ASSESSMENT/PLAN:  1. Abdominal pain; not much change from yesterday  -will add bentyl and reglan and continue the carafate and ppi  -no increase in the retroperitoneal hematoma and no further hematemesis; will discuss restarting the [facility] with Dr Alberto Mendez; no overt bleeding hgb 8.8  -continue to trend  - ok from a GI standpoint for anticoagulation if needed    This plan was formulated in collaboration with .     Electronically signed by: CARYL Olivia CNP on 3/29/2019 at 10:10 AM

## 2019-03-29 NOTE — PROGRESS NOTES
[address]   ACUTE REHABILITATION OCCUPATIONAL THERAPY  DAILY NOTE    Date: 3/29/19  Patient Name: Dora Engle      Room: 7900/6155-63    MRN: [id number]   : 1945  (72 y.o.)  Gender: male   Referring Practitioner: Dr. Junius Holstein  Diagnosis: L intercerebral hemorrhage  Additional Pertinent Hx: Alcohol abuse,     Restrictions  Restrictions/Precautions: Seizure, General Precautions, Surgical Protocols, Fall Risk(tele sitter)  Implants present? : Metal implants  Required Braces or Orthoses?: No    Subjective  Subjective: \"come back later please\"   Comments: Patient initially agreeable to ADLs this AM and agreeable to put on clothing from [employer] closet. After therapist returned from getting clothing, patient refusing to participate asking if therapist could come back later. Therapist explaining that he has back to back OT/PT short break then SLP and OT could not come back. Patient asks therapist to come back in 10 minutes and he promises to participate in ADLs, therapist came back, patient still refusing until he had to use the toilet. Patient Currently in Pain: Yes  Pain Level: 10  Pain Location: Head(stomach)           Pain Assessment  Pain Assessment: 0-10  Pain Level: 10  Pain Type: Acute pain  Pain Location: Head(stomach)    Objective  Cognition  Overall Cognitive Status: Impaired  Arousal/Alertness: Inconsistent responses to stimuli  Following Directions: Follows one step commands  Attention Span: Difficulty attending to directions  Following Commands:  Follows one step commands with repetition  Safety Judgement: Decreased awareness of need for assistance  Insights: Decreased awareness of deficits  Balance  Sitting Balance: (seated EOB before ambulating to bathroom)  Standing Balance: (CGA)  Bed mobility  Supine to Sit: Minimal assistance(hand held assist)  Sit to Supine: Supervision  Scooting: Stand by assistance(to EOB)  Comment: HOB raised  Transfers  Sit to stand: Contact guard assistance  Stand to sit: Contact guard assistance  Standing Balance  Sit to stand: Contact guard assistance  Stand to sit: Contact guard assistance  Functional Mobility  Functional - Mobility Device: Rolling Walker  Activity: To/from bathroom  Assist Level: Contact guard assistance  Functional Mobility Comments: slow pace, RW   Toilet Transfers  Toilet - Technique: Ambulating  Equipment Used: Standard toilet  Toilet Transfer: Contact guard assistance           Additional Activities: PM: Patient required Max VC/encouragement to participate in therapy both AM/PM. Patient only participating on his own terms ie when he had to use the restroom. Patient resistant to suggestions/education about importance of therapies. Patient is adament that he does not feel good and will not participate. ADL  Additional Comments: see FIMs          OT FIM:   Eatin - Feeds self with setup/supervision/cues and/or requires only setup/supervision/cues to perform tube feedings(set up for beverage management)  Grooming: (refused)  Bathing: (refused)  Dressing-Upper: 5 - Requires setup/supervision/cues and/or requires assist with presthesis/brace only(verbal cues, set up for orientation of shirt, donned shirt while supine/seated EOB)  Dressing-Lower: 3 - Requires assist with 2-3 parts of dressing(A to thread BLE's, steadying for pulling pants/brief over hips)  Toiletin - Requires steadying assistance only(SBA for hygiene-performed while seated, CGA to pull pants over hips)  Toilet Transfer: 4 - Requires steadying assistance only < 25% assist(CGA, used RW)  Shower Transfer: (refused)               Assessment  Performance deficits / Impairments: Decreased functional mobility ; Decreased ADL status; Decreased strength;Decreased safe awareness;Decreased cognition;Decreased endurance;Decreased balance;Decreased high-level IADLs  Assessment: Patient required Max encouragement/VC to participate in ADL tasks this AM. Therapist attempting to get patient to participate, agreeing to come back in 10 minutes. Patient only agreeable to participate when he had to get up to use the toilet   Discharge Recommendations: 24 hour supervision or assist  Activity Tolerance: Treatment limited secondary to decreased cognition  Safety Devices in place: Yes  Type of devices: Left in bed;Patient at risk for falls;Call light within reach; Bed alarm in place(telesitter)  Equipment Recommendations  Equipment Needed: (TBD)       Patient Education:     Patient Education: OT POC/goals, safety, therapy participation, ADL participation importance   Learner:patient  Method: explanation       Outcome: needs reinforcement     Plan  Plan  Times per week: 900/7  Times per day: Twice a day  Short term goals  Time Frame for Short term goals: in 1 week pt will   Short term goal 1: demo UB ADLs with set-up and min vcs to attent to task  Short term goal 2: demo LB ADLs with moderate assistance with minimal vcs to attent to task  Short term goal 3: demo toilet transfer with minimal assistance, using least restrictive device  Short term goal 4: demo 20+ minutes of activity participation during functional activity to increase participation in ADLs and functional mobility   Long term goals  Time Frame for Long term goals : by d/c pt will  Long term goal 1: demo BADLs with set-up for safety   Long term goal 2: participate in functional tasks for 30+ minutes with min verbal cueing to redirect attention  Long term goal 3: demo functional transfers with set-up  Long term goal 4: demo safety awareness during all ADLs/functional mobility with minimal verbal cueing  Timed Code Treatment Minutes: 1800 [employer]  Time in 0808  Time out 1205 [facility]  Time in [address] [facility]  Time out [address] [facility]  Minutes 19    OT Individual Minutes  Time in 1230  Time out [address] [facility]  Minutes 19        Electronically signed by Britney Christian OT on 3/29/19 at 10:44 AM

## 2019-03-29 NOTE — PROGRESS NOTES
quadrant pain       A/P  Pt with TBI intracranial bleed right eye abduction   Complication renal failure seizures PE   Retroperitoneal bleed   DM well controlled     MD BRET Pierre [address].    Phone [phone]   Fax: [phone]  Answering Service: [phone]
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
## 2019-03-29 NOTE — PROGRESS NOTES
[address] [facility]    Date:   3/29/2019  Patient name:  Anirudh Beth  Date of admission:  3/26/2019  4:56 PM  MRN:   [id number]  YOB: 1945      HPI          1) Location/Symptom TBI   2) Timing/Onset: 3/8   3) Context/Setting: intracranial bleed s/p ventricular drain - intubated   4) Quality: weakness   5) Duration: continuous   6) Modifying Factors:   7) Severity: moderate   CT head showed multifocal acute intracranial hemorrhage in the left frontal lobe with parenchymal hematoma, acute on depressed longitudinal fracture left temporal bone extending superiorly into the left parietal bone.      Hospital stay complicated with renal failure   Seizures - keppra PE - retroperitoneal hematoma      3/28  bp controlled  Hb is 8.8       3/29    Neuro stable susu    Cr improving bp elevated    REVIEW OF SYSTEMS:    · Cardiovascular: Negative for lightheadedness/orthostatic symptoms ,chest pain, dyspnea on exertion, palpitations or loss of consciousness. · Respiratory: Negative for cough or wheezing, sputum production, hemoptysis, pleuritic pain. · Gastrointestinal: Negative for nausea/vomiting, change in bowel habits, abdominal pain, dysphagia/appetite loss, hematemesis, blood in stools. OBJECTIVE:    BP (!) 153/73   Pulse 82   Temp 98.1 °F (36.7 °C) (Oral)   Resp 16   Ht 5' 7\" (1.702 m)   Wt 140 lb (63.5 kg)   SpO2 92%   BMI 21.93 kg/m²      · Lungs: clear to auscultation bilaterally,no wheeze.   · Heart: regular rate and rhythm, S1, S2 normal, no murmur, click, rub or gallop  · Abdomen: soft, non-tender; bowel sounds normal; no masses,  no organomegaly  · Extremities: extremities normal, atraumatic, no cyanosis or edema  · Neurological:  stable  · Skin - no rash, no lump   · Eye- no icterus no redness  · GI-oral mucosa moist,  · Lymphatic system-no lymphadenopathy no splenomegaly  · Mouth- mucous membrane moist  · Head-normocephalic atraumatic  · Neck- supple no carotid bruit,Thyroid not palpable. Laboratory Testing:  CBC:   Recent Labs     03/28/19  1407   WBC 9.3   HGB 8.8*        BMP:    Recent Labs     03/27/19  0631 03/28/19  0658 03/29/19  1253    144 138   K 3.2* 3.3* 3.4*    107 97*   CO2 22 22 25   BUN 26* 23 31*   CREATININE 3.16* 2.90* 2.34*   GLUCOSE 157* 154* 268*     S. Calcium:  Recent Labs     03/29/19  1253   CALCIUM 8.9     S. Ionized Calcium:No results for input(s): IONCA in the last 72 hours. S. Magnesium:No results for input(s): MG in the last 72 hours. S. Phosphorus:No results for input(s): PHOS in the last 72 hours. S. Glucose:  Recent Labs     03/29/19  0636 03/29/19  1105 03/29/19  1613   POCGLU 173* 246* 281*     Glycosylated hemoglobin A1C:   Recent Labs     03/28/19  0658   LABA1C 6.1*     INR: No results for input(s): INR in the last 72 hours. Hepatic functions:   Recent Labs     03/28/19  0658   ALKPHOS 77   ALT 8   AST 12   PROT 5.8*   BILITOT 0.61   LABALBU 2.7*     Pancreatic functions:No results for input(s): LACTA, AMYLASE in the last 72 hours. S. Lactic Acid: No results for input(s): LACTA in the last 72 hours. Cardiac enzymes:No results for input(s): CKTOTAL, CKMB, CKMBINDEX, TROPONINI in the last 72 hours. BNP:No results for input(s): BNP in the last 72 hours. Lipid profile: No results for input(s): CHOL, TRIG, HDL, LDLCALC in the last 72 hours.     Invalid input(s): LDL  Blood Gases: No results found for: PH, PCO2, PO2, HCO3, O2SAT  Thyroid functions:   Lab Results   Component Value Date    TSH 0.73 07/01/2017        Imaging/Diagonstics:  Ct Abdomen Pelvis Wo Contrast Additional Contrast? None    Result Date: 3/28/2019  EXAMINATION: CT OF THE ABDOMEN AND PELVIS WITHOUT CONTRAST 3/28/2019 3:10 pm TECHNIQUE: CT of the abdomen and pelvis was performed without the administration of intravenous contrast. Multiplanar reformatted images are provided for review. Dose modulation, iterative reconstruction, and/or weight based adjustment of the mA/kV was utilized to reduce the radiation dose to as low as reasonably achievable. COMPARISON: Renal ultrasound 03/19/2018. CT chest abdomen pelvis 03/19/2018. HISTORY: ORDERING SYSTEM PROVIDED HISTORY: ABDOMINAL PAIN TECHNOLOGIST PROVIDED HISTORY: Ordering Physician Provided Reason for Exam: patient c/o right sided flank pain FINDINGS: Lower Chest: Small pleural effusions, left greater than right, are again noted and larger in the interval.  New airspace disease in the posterior lingula with air bronchograms is noted. Organs: Evaluation of the abdominal and pelvic viscera is limited in the absence of contrast.  Calcified stone in the gallbladder fundus. No wall thickening or biliary dilatation. The liver, pancreas, spleen and adrenals reveal no abnormality. Bilateral nephrolithiasis is noted. No hydronephrosis. 3 cm right renal cyst and small hemorrhagic or proteinaceous cyst in the lower pole the right kidney again noted. The left kidney is displaced by the retroperitoneal hematoma, as seen previously. GI/Bowel: No bowel dilatation or wall thickening identified. Diverticulosis. Pelvis: No acute findings. The bladder is well distended. Peritoneum/Retroperitoneum: Retroperitoneal hematoma posterolaterally on the left side is again demonstrated without significant change measuring approximately 9.7 x 7.6 x 20 cm. Advil of mint of the right psoas muscle is also noted without appreciable change. Trace amount of simple pericolic fluid and perihepatic fluid is noted with little interval change. No free air. No loculated fluid collection otherwise appreciated. The aorta is normal in caliber. Calcified atheromatous plaque. No lymphadenopathy. Bones/Soft Tissues: Anterior wedging of L3. Multilevel degenerative change in the spine. No new soft tissue or osseous abnormality appreciated.      No significant change in large left retroperitoneal hematoma. Small pleural effusions and dependent atelectasis, left greater than right, more prominent in the interval.  New atelectasis or consolidation in the posterior lingula. No new findings to explain right-sided pain. Bilateral nephrolithiasis. Cholelithiasis. Xr Chest Standard (2 Vw)    Result Date: 3/27/2019  EXAMINATION: TWO VIEWS OF THE CHEST 3/27/2019 5:54 pm COMPARISON: 03/24/2019. HISTORY: ORDERING SYSTEM PROVIDED HISTORY: f/u ? pneumonia TECHNOLOGIST PROVIDED HISTORY: f/u ? pneumonia Ordering Physician Provided Reason for Exam: f/u ? pneumonia Acuity: Unknown Type of Exam: Unknown FINDINGS: The growth there is persistent left basilar opacification and effusion, improved compared to the previous study. Mild dependent changes at the right base with small effusion are also slightly improved. No evidence of superimposed failure. The cardiomediastinal silhouette is stable. Left-sided transvenous pacer. Bibasilar opacification and effusion, left greater than right, with interval improvement. Ct Head Wo Contrast    Result Date: 3/21/2019  EXAMINATION: CT OF THE HEAD WITHOUT CONTRAST  3/21/2019 12:27 pm TECHNIQUE: CT of the head was performed without the administration of intravenous contrast. Dose modulation, iterative reconstruction, and/or weight based adjustment of the mA/kV was utilized to reduce the radiation dose to as low as reasonably achievable. COMPARISON: None. HISTORY: ORDERING SYSTEM PROVIDED HISTORY: re-evaluate hemorrhage TECHNOLOGIST PROVIDED HISTORY: Okay to leave floor without nursing; patient is stepdown FINDINGS: BRAIN/VENTRICLES: Left frontal hematoma smaller and less dense, now measuring 2.7 x 2.2 cm versus 3.2 x 2.7 cm; associated mass effect and rightward 4 mm midline shift unchanged whereas satellite foci of heme inferomedial to the above essentially resolved.   Subdural heme/fluid over the convexities, with linear high attenuation on the right, and scattered subarachnoid hemorrhages appear improved. No new bleed or extra-axial fluid collection. The gray-white differentiation is unchanged without evidence of an acute infarct. Low-attenuation white matter abnormalities, generalized cortical atrophy unchanged. ORBITS: The visualized portion of the orbits demonstrate no acute abnormality. SINUSES: The visualized paranasal sinuses and mastoid air cells demonstrate no acute abnormality; fluid/soft tissue within the sphenoid, left maxillary sinus and left mastoids again noted. Hershell Broach SOFT TISSUES/SKULL: Stable left temporal fracture. No acute abnormality of the visualized skull or soft tissues; bilateral david-holes again demonstrated. Interval improvement left frontal and of additional intracranial hemorrhages; associated mass effect/rightward midline unchanged. Additional findings appear stable, as detailed above. Ct Head Wo Contrast    Result Date: 3/14/2019  EXAMINATION: CT OF THE HEAD WITHOUT CONTRAST  3/14/2019 10:11 am TECHNIQUE: CT of the head was performed without the administration of intravenous contrast. Dose modulation, iterative reconstruction, and/or weight based adjustment of the mA/kV was utilized to reduce the radiation dose to as low as reasonably achievable. COMPARISON: 13 March 2019 at 0410 hours. HISTORY: ORDERING SYSTEM PROVIDED HISTORY: FACIAL FRACTURE(S) TECHNOLOGIST PROVIDED HISTORY: FINDINGS: BRAIN/VENTRICLES: There is minimal interval change in left frontal parenchymal hematoma now measuring 3.2 x 2.7 cm, previously 3.5 x 2.7 cm. Satellite area of hematoma is noted just medial to the major blood collection, essentially unchanged in size but slightly less dense compared to prior examination. A subdural collection is present in the right frontal area with slightly increased attenuation consistent with subdural hematoma of approximately 5 mm. Small left-sided subdural collection without appreciable hemorrhage of 4 mm is noted. Left-to-right subfalcine shift of approximately 4 mm is noted. No new areas of intraparenchymal hemorrhage are noted. Small subarachnoid hemorrhage is noted over the parietal lobe sulci, unchanged. ORBITS: The visualized portion of the orbits demonstrate no acute abnormality. SINUSES: Air-fluid level is present in the left maxillary sinus, small representing interval change. Mucoperiosteal thickening is present in the left maxillary and ethmoid sinuses. Air-fluid levels are present in the sphenoid sinuses. Mastoid air cells are aerated and unchanged. SOFT TISSUES/SKULL:  Multiple david holes are noted in the skull. Left temporal bone fracture is unchanged. Estimated biologic radiation dose for this procedure:916.73 mGy/cm2. Minimal decrease in size of left frontal intraparenchymal hematoma compared to prior study of 1 day earlier. No change in satellite hematoma, subdural fluid collections or subarachnoid blood as above. Stable left temporal fracture. No new areas of hemorrhage. Stable subfalcine shift as above. Sinus inflammation as above. Ct Head Wo Contrast    Result Date: 3/13/2019  EXAMINATION: CT OF THE HEAD WITHOUT CONTRAST  3/13/2019 4:02 am TECHNIQUE: CT of the head was performed without the administration of intravenous contrast. Dose modulation, iterative reconstruction, and/or weight based adjustment of the mA/kV was utilized to reduce the radiation dose to as low as reasonably achievable. COMPARISON: 03/11/2019 HISTORY: ORDERING SYSTEM PROVIDED HISTORY: SDH- neuro change TECHNOLOGIST PROVIDED HISTORY: FINDINGS: BRAIN/VENTRICLES: Again seen is left anterior inferior frontal acute intraparenchymal hematoma, 3.5 x 2.7 cm with another focus of acute hemorrhage medially. Surrounding edema is unchanged. There is 4 mm of left-to-right subfalcine herniation. Status post removal of right frontal approach ventricular catheter mild contusion along the tract. No hydrocephalus.   Low-density extra-axial collections, 5 and 4 mm on the right and left respectively. Small amount pneumocephalus. Small amount subarachnoid hemorrhage again noted over parietal lobe sulci. Posterior fossa is unremarkable. ORBITS: The visualized portion of the orbits demonstrate no acute abnormality. SINUSES: Left mastoid air cells are opacified. Paranasal sinuses are patent. SOFT TISSUES/SKULL:  Left temporal bone fracture detailed previously. No significant change in left frontal lobe hematoma. Small extra-axial collections are stable. Status post removal of ventricular catheter. Trace SAH, stable. Ct Head Wo Contrast    Result Date: 3/11/2019  EXAMINATION: CT OF THE HEAD WITHOUT CONTRAST  3/11/2019 1:26 am TECHNIQUE: CT of the head was performed without the administration of intravenous contrast. Dose modulation, iterative reconstruction, and/or weight based adjustment of the mA/kV was utilized to reduce the radiation dose to as low as reasonably achievable. COMPARISON: 03/09/2019 HISTORY: ORDERING SYSTEM PROVIDED HISTORY: interval TECHNOLOGIST PROVIDED HISTORY: FINDINGS: BRAIN/VENTRICLES: Again seen is left inferior frontal intraparenchymal hematoma with surrounding edema. Acute component hemorrhage measured at 3.4 x 2.1 cm, slightly reduced in size. Persistent 5 mm sub falcine left-to-right midline shift. Mixed density subdural hematomas again noted, 6 and 3 mm in maximal thickness on the right and left respectively. Scattered subarachnoid hemorrhage. Intraventricular hemorrhage. No downward herniation pattern. Right frontal approach ventriculostomy catheter has been placed in the interval, tip in midline adjacent septum pellucidum. No hydrocephalus. Pneumocephalus. ORBITS: The visualized portion of the orbits demonstrate no acute abnormality. SINUSES: Partial opacification left mastoid air cells. Trace fluid left maxillary sinus. SOFT TISSUES/SKULL:  Left temporal bone fracture as detailed previously. Multiple david holes. Left inferior frontal intraparenchymal hematoma slightly smaller in the interval. Persistent small bilateral cerebral convexity subdural collections. Interval increase subarachnoid hemorrhage. Intraventricular hemorrhage. Status post right frontal approach ventricular catheter as described. Ct Head Wo Contrast    Result Date: 3/9/2019  EXAMINATION: CT OF THE HEAD WITHOUT CONTRAST  3/9/2019 1:51 am TECHNIQUE: CT of the head was performed without the administration of intravenous contrast. Dose modulation, iterative reconstruction, and/or weight based adjustment of the mA/kV was utilized to reduce the radiation dose to as low as reasonably achievable. COMPARISON: 03/08/2019, 7 hours prior HISTORY: ORDERING SYSTEM PROVIDED HISTORY: ICH, repeat scan TECHNOLOGIST PROVIDED HISTORY: FINDINGS: BRAIN/VENTRICLES: Again seen is left inferior frontal intraparenchymal hematoma, measured at 3.6 x 3.1 cm with other smaller foci in the inferomedial portion, overall grossly measured at 3.1 x 1.5 cm. Associated edema results in left-to-right midline sub fall seen herniation of 6 mm as measured in the coronal plane, slightly increased from 4 mm measured at a similar location. Subdural collections over the right and left cerebral convexities are measured at up to 4 and 7 mm in thickness maximally, again in the coronal plane, perhaps slightly more prominent and increased in density compared to recent prior study. Ventricular configuration is stable. Basilar cisterns are patent. Posterior fossa is unremarkable. ORBITS: The visualized portion of the orbits demonstrate no acute abnormality. SINUSES: Paranasal sinuses are patent. Left mastoid air cells are partially opacified. SOFT TISSUES/SKULL:  Bifrontal david holes again noted. Longitudinal left temporal bone fracture. Scalp hematoma.      Edema associated with left inferior frontal lobe hemorrhage results in slightly increased left-to-right midline frontal lobe essentially stable. Local edema and slight midline shift left frontal lobe. Minute anterior inter hemispheric subdural hematoma less conspicuous. Left temporal bone fracture. Follow-up high-resolution temporal bone CT may be helpful. Ct Head Wo Contrast    Result Date: 3/8/2019  EXAMINATION: CT OF THE HEAD WITHOUT CONTRAST  3/8/2019 5:25 pm TECHNIQUE: CT of the head was performed without the administration of intravenous contrast. Dose modulation, iterative reconstruction, and/or weight based adjustment of the mA/kV was utilized to reduce the radiation dose to as low as reasonably achievable. COMPARISON: 08/16/2018 HISTORY: ORDERING SYSTEM PROVIDED HISTORY: AMS TECHNOLOGIST PROVIDED HISTORY: Ordering Physician Provided Reason for Exam: patient states he fell and hit the back of his red today FINDINGS: BRAIN/VENTRICLES: Large focus of parenchymal hemorrhage in the left inferior frontal lobe measuring approximately 3.2 cm with associated scattered subarachnoid hemorrhage as well as subdural hemorrhage along the anterior interhemispheric fissure. Small amount of scattered subarachnoid hemorrhage is also seen elsewhere in the superior sulci of the right frontoparietal lobe. No evidence of mass effect or midline shift. Lucencies within the periventricular and subcortical white matter likely represent chronic microvascular ischemic changes. Gray-white matter differentiation is preserved. No hydrocephalus. Stable dural thickening and calcification along the right superior convexity. ORBITS: The visualized portion of the orbits demonstrate no acute abnormality. SINUSES: The visualized paranasal sinuses and mastoid air cells demonstrate no acute abnormality. SOFT TISSUES/SKULL: Acute nondepressed longitudinal fracture of the left temporal bone the fracture extends superiorly into the left parietal bone. Stable appearance of previous bilateral david holes.   Superficial scalp hematomas along the temporal bone fracture is nondisplaced and spares the otic capsule and appears to spare the course of the facial nerve. The internal auditory canal and vestibular aqueduct appear unremarkable. The carotid canal is normal in appearance. The jugular bulb is unremarkable. BRAIN: The visualized portion of the intracranial contents appear unremarkable. ORBITS: The visualized portion of the orbits demonstrate no acute abnormality. SINUSES: The visualized paranasal sinuses are clear apart from minimal mucosal thickening floor of right maxillary antrum. Longitudinal fracture left temporal bone spares ossicular chain and otic capsule. Opacification of left middle ear cavity and mastoid air cells. Ct Cervical Spine Wo Contrast    Result Date: 3/8/2019  EXAMINATION: CT OF THE CERVICAL SPINE WITHOUT CONTRAST 3/8/2019 6:54 pm TECHNIQUE: CT of the cervical spine was performed without the administration of intravenous contrast. Multiplanar reformatted images are provided for review. Dose modulation, iterative reconstruction, and/or weight based adjustment of the mA/kV was utilized to reduce the radiation dose to as low as reasonably achievable. COMPARISON: April 17, 2018 HISTORY: ORDERING SYSTEM PROVIDED HISTORY: Trauma FINDINGS: BONES/ALIGNMENT: There is no evidence of an acute cervical spine fracture. There is normal alignment of the cervical spine. DEGENERATIVE CHANGES: Multilevel moderately severe disc disease is present throughout the cervical spine. Multilevel mild facet arthropathy is noted. Degenerative change at the atlantoaxial interface is noted. SOFT TISSUES: There is no prevertebral soft tissue swelling. Left subclavian pacer partially visualized. The lung apices reveal no acute findings. No acute abnormality of the cervical spine.      Ct Thoracic Spine Wo Contrast    Result Date: 3/8/2019  EXAMINATION: CT OF THE CHEST, ABDOMEN, AND PELVIS WITH CONTRAST; CT OF THE THORACIC SPINE WITHOUT CONTRAST; CT size criteria. Bones/Soft Tissues: Osseous structures appear unremarkable. THORACIC AND LUMBAR SPINE: No evidence of acute thoracic or lumbar spine fracture dislocation. Mild superior endplate deformity of the L2 vertebral body appears remote. No significant degenerative changes. Alignment is within normal limits. No CT evidence of traumatic injury in the chest abdomen or pelvis. No evidence of acute thoracic or lumbar spine fracture dislocation. Remote mild superior endplate deformity of L2. Ct Lumbar Spine Wo Contrast    Result Date: 3/8/2019  EXAMINATION: CT OF THE CHEST, ABDOMEN, AND PELVIS WITH CONTRAST; CT OF THE THORACIC SPINE WITHOUT CONTRAST; CT OF THE LUMBAR SPINE WITHOUT CONTRAST 3/8/2019 6:54 pm TECHNIQUE: CT of the chest, abdomen and pelvis was performed with the administration of intravenous contrast. Multiplanar reformatted images are provided for review. Dose modulation, iterative reconstruction, and/or weight based adjustment of the mA/kV was utilized to reduce the radiation dose to as low as reasonably achievable.; CT of the thoracic spine was performed without the administration of intravenous contrast. Multiplanar reformatted images are provided for review. Dose modulation, iterative reconstruction, and/or weight based adjustment of the mA/kV was utilized to reduce the radiation dose to as low as reasonably achievable.; CT of the lumbar spine was performed without the administration of intravenous contrast. Multiplanar reformatted images are provided for review. Dose modulation, iterative reconstruction, and/or weight based adjustment of the mA/kV was utilized to reduce the radiation dose to as low as reasonably achievable. COMPARISON: None HISTORY: ORDERING SYSTEM PROVIDED HISTORY: SDH TECHNOLOGIST PROVIDED HISTORY: FINDINGS: Chest: Mediastinum: Thoracic aorta is normal in caliber. No mediastinal hemorrhage. Mild cardiomegaly. No pericardial effusion.   No mediastinal, hilar, or axillary lymphadenopathy. Lungs/pleura: No focal consolidation, pleural effusion, or pneumothorax. Soft Tissues/Bones: No acute osseous abnormality in the thorax. Abdomen/Pelvis: Organs: Liver, spleen, pancreas, and adrenal glands are unremarkable. Multiple hypodense lesions in both kidneys measuring up to 3.2 cm on the right side and 1 cm on the left side are compatible with cysts and probable cysts. No evidence of kidney injury. No radiodense gallstones. GI/Bowel: Bowel appears unremarkable. Nonvisualized appendix. Pelvis: Urinary bladder and prostate gland appear unremarkable. Peritoneum/Retroperitoneum: No free intraperitoneal air, fluid, or lymphadenopathy by size criteria. Bones/Soft Tissues: Osseous structures appear unremarkable. THORACIC AND LUMBAR SPINE: No evidence of acute thoracic or lumbar spine fracture dislocation. Mild superior endplate deformity of the L2 vertebral body appears remote. No significant degenerative changes. Alignment is within normal limits. No CT evidence of traumatic injury in the chest abdomen or pelvis. No evidence of acute thoracic or lumbar spine fracture dislocation. Remote mild superior endplate deformity of L2. Us Renal Complete    Result Date: 3/21/2019  EXAMINATION: RETROPERITONEAL ULTRASOUND OF THE KIDNEYS AND URINARY BLADDER 3/21/2019 COMPARISON: 03/19/2019 HISTORY: ORDERING SYSTEM PROVIDED HISTORY: Retroperitoneal bleed FINDINGS: Right kidney measures 12.9 cm. Left kidney measures 12.9 cm. Left kidney is displaced by a retroperitoneal hematoma. No hydronephrosis. Mild increased echogenicity of the right kidney. Normal left renal echogenicity. Renal cysts. Small amount of free fluid within the right upper quadrant. Left pleural effusion. Retroperitoneal hematoma. Reed catheter within the urinary bladder making evaluation nondiagnostic. No hydronephrosis. Mild increased right renal echogenicity can be seen with medical renal disease. Retroperitoneal hematoma is not well evaluated by sonography. Xr Chest Portable    Result Date: 3/24/2019  EXAMINATION: SINGLE XRAY VIEW OF THE CHEST 3/24/2019 3:01 pm COMPARISON: Chest radiograph performed 03/22/2019. HISTORY: ORDERING SYSTEM PROVIDED HISTORY: infiltrate TECHNOLOGIST PROVIDED HISTORY: infiltrate FINDINGS: There is left basilar effusion with adjacent infiltrate. There is no pneumothorax. The mediastinal structures are stable with stable cardiac leads. The upper abdomen is unremarkable. The extrathoracic soft tissues are unremarkable. Left basilar effusion with adjacent infiltrate representing atelectasis versus pneumonia. Xr Chest Portable    Result Date: 3/22/2019  EXAMINATION: SINGLE XRAY VIEW OF THE CHEST 3/22/2019 7:18 am COMPARISON: 03/17/2019 HISTORY: ORDERING SYSTEM PROVIDED HISTORY: F/U infiltrates TECHNOLOGIST PROVIDED HISTORY: F/U infiltrates FINDINGS: Implanted cardiac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

## 2019-03-29 NOTE — PROGRESS NOTES
quadrants. Musculoskeletal: Active ROM x 4 extremities. No cyanosis or clubbing. Integumentary: Pink, warm and dry. Free from rash or lesions. Skin turgor normal.  CNS: Speech clear. Face symmetrical. No tremor. Data:  CBC:   Lab Results   Component Value Date    WBC 9.3 03/28/2019    HGB 8.8 (L) 03/28/2019    HCT 26.1 (L) 03/28/2019    MCV 93.3 03/28/2019     03/28/2019     BMP:    Lab Results   Component Value Date     03/28/2019    K 3.3 (L) 03/28/2019     03/28/2019    CO2 22 03/28/2019    BUN 23 03/28/2019    CREATININE 2.90 (H) 03/28/2019    GLUCOSE 154 (H) 03/28/2019     CMP:   Lab Results   Component Value Date     03/28/2019    K 3.3 (L) 03/28/2019     03/28/2019    CO2 22 03/28/2019    BUN 23 03/28/2019    CREATININE 2.90 (H) 03/28/2019    GLUCOSE 154 (H) 03/28/2019    CALCIUM 8.9 03/28/2019    PROT 5.8 (L) 03/28/2019    LABALBU 2.7 (L) 03/28/2019    BILITOT 0.61 03/28/2019    ALKPHOS 77 03/28/2019    AST 12 03/28/2019    ALT 8 03/28/2019      Hepatic:   Lab Results   Component Value Date    AST 12 03/28/2019    ALT 8 03/28/2019    BILITOT 0.61 03/28/2019    ALKPHOS 77 03/28/2019     BNP: No results found for: BNP  Lipids: No results found for: CHOL, HDL  INR:   Lab Results   Component Value Date    INR 1.2 03/26/2019     PTH: No results found for: PTH  Phosphorus:    Lab Results   Component Value Date    PHOS 3.0 03/26/2019     Ionized Calcium: No results found for: IONCA  Magnesium:   Lab Results   Component Value Date    MG 1.7 03/26/2019     Albumin:   Lab Results   Component Value Date    LABALBU 2.7 (L) 03/28/2019     Last 3 CK, CKMB, Troponin: @LABRCNT(CKTOTAL:3,CKMB:3,TROPONINI:3)       URINE:)No results found for: Tesfaye Paul    Radiology:   Reviewed. Assessment:  1. Acute kidney injury. No labs available today for review. Renal function was improving. 2. Hypokalemia. 3. HTN with worsening BP when in pain. 4. Intracranial hemorrhage. 5. Anemia.   6. Borderline hypernatremia. Plan:  Offer free water 250 ml Q 4 while awake. Monitor BP. Continue protein supplements. Avoid hypotension, nephrotoxic drugs, Lovenox, Fleets enema and IV contrast exposure. Follow up labs ordered for AM.     Please do not hesitate to call with questions. We will follow with you. Electronically signed by Darren Hays MD  on 3/29/2019 at 9:54 AM  [facility].   Ph: [phone]

## 2019-03-29 NOTE — PROGRESS NOTES
Talked with Dr. Whitney Lopez and patient during telepsych consult. Awaiting Dr. Lisa Rowe progress note for medication needs for patient.

## 2019-03-29 NOTE — CARE COORDINATION
Case Management Initial Discharge Plan  Dora Engle,         Met with:patient  Information verified: address, contacts, phone number, , insurance Yes  PCP: Nadine Herrmann MD  Date of last visit: not sure exactly per pt  Pharmacy: [facility]       Discharge Planning  Current Residence:     Living Arrangements:      Home has 2 stories/ 12 stairs to climb  Support Systems:     Current Services PTA:    Supplier: n/a  Patient able to perform ADL's:Assisted  DME used prior to admission: no dme already at home per pt /during admission wheelchair for long transfers     Potential Assistance Needed:     Potential Assistance Purchasing Medications:     Does patient want to participate in local refill/ meds to beds program?       Patient agreeable to home care: Yes  Type of Home Care Services:     Patient expects to be discharged to:       Prior SNF/Rehab Placement and Facility: no  Agreeable to SNF/Rehab: No    Expected Discharge date: Follow Up Appointment: Best Day/ Time:        Transportation provider: Jae westfall or nicole Gauthier  Transportation arrangements for discharge: Yes    Discharge Plan: home to pt.'s own home and he has asked jae Butts to stay with him temporarily.      Evaluation: yes    Electronically signed by Jacki Vargas MSW, LSW on 3/29/19 at 4:36 PM

## 2019-03-29 NOTE — CARE COORDINATION
Social work; no depression experienced at this time per pt. Pt lives with son Gosia Reich temporarily per pt. He has a daughter Marty Emmanuel also in the area. 2 story home bed and bath upstairs. Pt uses [facility] on [address]  Pt has no dme at this time. Pt goes by the name of \"Neil\".   Susan uribe

## 2019-03-29 NOTE — PROGRESS NOTES
DATE: 3/29/2019    NAME: Héctor Taylor  MRN: [id number]   : 1945    Patient not seen this date for Physical Therapy due to:  [] Blood transfusion in progress  [] Hemodialysis  [x]  Patient Declined am and pm sessions  [] Spine Precautions   [] Strict Bedrest  [] Surgery/ Procedure  [] Testing      [] Other        [] PT being discontinued at this time. Patient independent. No further needs. [] PT being discontinued at this time as the patient has been transferred to palliative care. No further needs.     Narda March, PTA

## 2019-03-30 LAB
ABSOLUTE EOS #: 0.1 K/UL (ref 0–0.4)
ABSOLUTE IMMATURE GRANULOCYTE: ABNORMAL K/UL (ref 0–0.3)
ABSOLUTE LYMPH #: 0.8 K/UL (ref 1–4.8)
ABSOLUTE MONO #: 0.6 K/UL (ref 0.1–1.3)
ANION GAP SERPL CALCULATED.3IONS-SCNC: 13 MMOL/L (ref 9–17)
BASOPHILS # BLD: 1 % (ref 0–2)
BASOPHILS ABSOLUTE: 0.1 K/UL (ref 0–0.2)
BUN BLDV-MCNC: 29 MG/DL (ref 8–23)
BUN/CREAT BLD: ABNORMAL (ref 9–20)
CALCIUM SERPL-MCNC: 8.7 MG/DL (ref 8.6–10.4)
CHLORIDE BLD-SCNC: 98 MMOL/L (ref 98–107)
CO2: 26 MMOL/L (ref 20–31)
CREAT SERPL-MCNC: 1.88 MG/DL (ref 0.7–1.2)
DIFFERENTIAL TYPE: ABNORMAL
EOSINOPHILS RELATIVE PERCENT: 2 % (ref 0–4)
GFR AFRICAN AMERICAN: 43 ML/MIN
GFR NON-AFRICAN AMERICAN: 36 ML/MIN
GFR SERPL CREATININE-BSD FRML MDRD: ABNORMAL ML/MIN/{1.73_M2}
GFR SERPL CREATININE-BSD FRML MDRD: ABNORMAL ML/MIN/{1.73_M2}
GLUCOSE BLD-MCNC: 142 MG/DL (ref 75–110)
GLUCOSE BLD-MCNC: 183 MG/DL (ref 70–99)
GLUCOSE BLD-MCNC: 189 MG/DL (ref 75–110)
GLUCOSE BLD-MCNC: 199 MG/DL (ref 75–110)
GLUCOSE BLD-MCNC: 210 MG/DL (ref 75–110)
HCT VFR BLD CALC: 28 % (ref 41–53)
HEMOGLOBIN: 9.5 G/DL (ref 13.5–17.5)
IMMATURE GRANULOCYTES: ABNORMAL %
LYMPHOCYTES # BLD: 11 % (ref 24–44)
MAGNESIUM: 1.3 MG/DL (ref 1.6–2.6)
MCH RBC QN AUTO: 31.1 PG (ref 26–34)
MCHC RBC AUTO-ENTMCNC: 33.9 G/DL (ref 31–37)
MCV RBC AUTO: 91.9 FL (ref 80–100)
MONOCYTES # BLD: 9 % (ref 1–7)
NRBC AUTOMATED: ABNORMAL PER 100 WBC
PDW BLD-RTO: 15.9 % (ref 11.5–14.9)
PHOSPHORUS: 2.7 MG/DL (ref 2.5–4.5)
PLATELET # BLD: 314 K/UL (ref 150–450)
PLATELET ESTIMATE: ABNORMAL
PMV BLD AUTO: 8.3 FL (ref 6–12)
POTASSIUM SERPL-SCNC: 3.3 MMOL/L (ref 3.7–5.3)
RBC # BLD: 3.05 M/UL (ref 4.5–5.9)
RBC # BLD: ABNORMAL 10*6/UL
SEG NEUTROPHILS: 77 % (ref 36–66)
SEGMENTED NEUTROPHILS ABSOLUTE COUNT: 5.5 K/UL (ref 1.3–9.1)
SODIUM BLD-SCNC: 137 MMOL/L (ref 135–144)
VITAMIN D 25-HYDROXY: 18.3 NG/ML (ref 30–100)
WBC # BLD: 7.1 K/UL (ref 3.5–11)
WBC # BLD: ABNORMAL 10*3/UL

## 2019-03-30 PROCEDURE — 85025 COMPLETE CBC W/AUTO DIFF WBC: CPT

## 2019-03-30 PROCEDURE — 99232 SBSQ HOSP IP/OBS MODERATE 35: CPT | Performed by: INTERNAL MEDICINE

## 2019-03-30 PROCEDURE — 97530 THERAPEUTIC ACTIVITIES: CPT

## 2019-03-30 PROCEDURE — 6370000000 HC RX 637 (ALT 250 FOR IP): Performed by: INTERNAL MEDICINE

## 2019-03-30 PROCEDURE — 99232 SBSQ HOSP IP/OBS MODERATE 35: CPT | Performed by: PHYSICAL MEDICINE & REHABILITATION

## 2019-03-30 PROCEDURE — 6370000000 HC RX 637 (ALT 250 FOR IP): Performed by: PHYSICAL MEDICINE & REHABILITATION

## 2019-03-30 PROCEDURE — 97535 SELF CARE MNGMENT TRAINING: CPT

## 2019-03-30 PROCEDURE — 36415 COLL VENOUS BLD VENIPUNCTURE: CPT

## 2019-03-30 PROCEDURE — 97116 GAIT TRAINING THERAPY: CPT

## 2019-03-30 PROCEDURE — 82306 VITAMIN D 25 HYDROXY: CPT

## 2019-03-30 PROCEDURE — 6370000000 HC RX 637 (ALT 250 FOR IP): Performed by: STUDENT IN AN ORGANIZED HEALTH CARE EDUCATION/TRAINING PROGRAM

## 2019-03-30 PROCEDURE — 1180000000 HC REHAB R&B

## 2019-03-30 PROCEDURE — 80048 BASIC METABOLIC PNL TOTAL CA: CPT

## 2019-03-30 PROCEDURE — 6370000000 HC RX 637 (ALT 250 FOR IP): Performed by: NURSE PRACTITIONER

## 2019-03-30 PROCEDURE — 83735 ASSAY OF MAGNESIUM: CPT

## 2019-03-30 PROCEDURE — 84100 ASSAY OF PHOSPHORUS: CPT

## 2019-03-30 PROCEDURE — 82947 ASSAY GLUCOSE BLOOD QUANT: CPT

## 2019-03-30 RX ORDER — POTASSIUM CHLORIDE 750 MG/1
20 CAPSULE, EXTENDED RELEASE ORAL ONCE
Status: COMPLETED | OUTPATIENT
Start: 2019-03-30 | End: 2019-03-30

## 2019-03-30 RX ORDER — OXYCODONE HCL 10 MG/1
10 TABLET, FILM COATED, EXTENDED RELEASE ORAL EVERY 12 HOURS SCHEDULED
Status: DISCONTINUED | OUTPATIENT
Start: 2019-03-31 | End: 2019-03-30

## 2019-03-30 RX ORDER — TAMSULOSIN HYDROCHLORIDE 0.4 MG/1
0.4 CAPSULE ORAL DAILY
Status: DISCONTINUED | OUTPATIENT
Start: 2019-03-31 | End: 2019-03-30

## 2019-03-30 RX ORDER — CLONIDINE HYDROCHLORIDE 0.1 MG/1
0.1 TABLET ORAL 2 TIMES DAILY
Status: DISCONTINUED | OUTPATIENT
Start: 2019-03-30 | End: 2019-03-30

## 2019-03-30 RX ORDER — PANTOPRAZOLE SODIUM 40 MG/1
40 GRANULE, DELAYED RELEASE ORAL
Status: DISCONTINUED | OUTPATIENT
Start: 2019-03-31 | End: 2019-03-30

## 2019-03-30 RX ORDER — POTASSIUM CHLORIDE 20 MEQ/1
20 TABLET, EXTENDED RELEASE ORAL ONCE
Status: DISCONTINUED | OUTPATIENT
Start: 2019-03-30 | End: 2019-03-31

## 2019-03-30 RX ORDER — CYCLOBENZAPRINE HCL 10 MG
5 TABLET ORAL 3 TIMES DAILY
Status: DISCONTINUED | OUTPATIENT
Start: 2019-03-30 | End: 2019-03-30

## 2019-03-30 RX ORDER — FOLIC ACID 1 MG/1
1 TABLET ORAL DAILY
Status: DISCONTINUED | OUTPATIENT
Start: 2019-03-31 | End: 2019-03-30

## 2019-03-30 RX ORDER — SENNA PLUS 8.6 MG/1
2 TABLET ORAL NIGHTLY
Status: DISCONTINUED | OUTPATIENT
Start: 2019-03-30 | End: 2019-03-30

## 2019-03-30 RX ADMIN — DONEPEZIL HYDROCHLORIDE 5 MG: 5 TABLET, FILM COATED ORAL at 22:30

## 2019-03-30 RX ADMIN — LEVETIRACETAM 500 MG: 500 TABLET, FILM COATED ORAL at 22:20

## 2019-03-30 RX ADMIN — INSULIN LISPRO 2 UNITS: 100 INJECTION, SOLUTION INTRAVENOUS; SUBCUTANEOUS at 22:23

## 2019-03-30 RX ADMIN — SUCRALFATE 1 G: 1 TABLET ORAL at 06:05

## 2019-03-30 RX ADMIN — DOCUSATE SODIUM 100 MG: 100 CAPSULE, LIQUID FILLED ORAL at 08:16

## 2019-03-30 RX ADMIN — DICYCLOMINE HYDROCHLORIDE 10 MG: 10 CAPSULE ORAL at 06:05

## 2019-03-30 RX ADMIN — SUCRALFATE 1 G: 1 TABLET ORAL at 11:24

## 2019-03-30 RX ADMIN — METOCLOPRAMIDE 5 MG: 10 TABLET ORAL at 15:59

## 2019-03-30 RX ADMIN — Medication 400 MG: at 22:20

## 2019-03-30 RX ADMIN — INSULIN LISPRO 3 UNITS: 100 INJECTION, SOLUTION INTRAVENOUS; SUBCUTANEOUS at 15:56

## 2019-03-30 RX ADMIN — SUCRALFATE 1 G: 1 TABLET ORAL at 22:20

## 2019-03-30 RX ADMIN — METOCLOPRAMIDE 5 MG: 10 TABLET ORAL at 11:24

## 2019-03-30 RX ADMIN — INSULIN LISPRO 6 UNITS: 100 INJECTION, SOLUTION INTRAVENOUS; SUBCUTANEOUS at 07:00

## 2019-03-30 RX ADMIN — INSULIN LISPRO 3 UNITS: 100 INJECTION, SOLUTION INTRAVENOUS; SUBCUTANEOUS at 11:25

## 2019-03-30 RX ADMIN — Medication 400 MG: at 08:16

## 2019-03-30 RX ADMIN — METOCLOPRAMIDE 5 MG: 10 TABLET ORAL at 06:05

## 2019-03-30 RX ADMIN — ESCITALOPRAM OXALATE 5 MG: 10 TABLET ORAL at 08:15

## 2019-03-30 RX ADMIN — FERROUS SULFATE TAB 325 MG (65 MG ELEMENTAL FE) 325 MG: 325 (65 FE) TAB at 15:59

## 2019-03-30 RX ADMIN — LEVETIRACETAM 500 MG: 500 TABLET, FILM COATED ORAL at 08:14

## 2019-03-30 RX ADMIN — Medication 1 MG: at 22:20

## 2019-03-30 RX ADMIN — AMLODIPINE BESYLATE 10 MG: 10 TABLET ORAL at 08:15

## 2019-03-30 RX ADMIN — FERROUS SULFATE TAB 325 MG (65 MG ELEMENTAL FE) 325 MG: 325 (65 FE) TAB at 08:15

## 2019-03-30 RX ADMIN — DOCUSATE SODIUM 100 MG: 100 CAPSULE, LIQUID FILLED ORAL at 22:20

## 2019-03-30 RX ADMIN — DICYCLOMINE HYDROCHLORIDE 10 MG: 10 CAPSULE ORAL at 11:24

## 2019-03-30 RX ADMIN — PANTOPRAZOLE SODIUM 40 MG: 40 TABLET, DELAYED RELEASE ORAL at 15:58

## 2019-03-30 RX ADMIN — POTASSIUM CHLORIDE 20 MEQ: 750 CAPSULE, EXTENDED RELEASE ORAL at 14:50

## 2019-03-30 RX ADMIN — SUCRALFATE 1 G: 1 TABLET ORAL at 19:06

## 2019-03-30 RX ADMIN — CALCIUM CARBONATE-CHOLECALCIFEROL TAB 250 MG-125 UNIT 250 MG: 250-125 TAB at 08:15

## 2019-03-30 RX ADMIN — FOLIC ACID 1 MG: 1 TABLET ORAL at 08:14

## 2019-03-30 RX ADMIN — THIAMINE HCL TAB 100 MG 100 MG: 100 TAB at 08:15

## 2019-03-30 RX ADMIN — PANTOPRAZOLE SODIUM 40 MG: 40 TABLET, DELAYED RELEASE ORAL at 06:05

## 2019-03-30 RX ADMIN — OXYCODONE HYDROCHLORIDE 5 MG: 5 TABLET ORAL at 06:07

## 2019-03-30 RX ADMIN — DICYCLOMINE HYDROCHLORIDE 10 MG: 10 CAPSULE ORAL at 15:58

## 2019-03-30 ASSESSMENT — PAIN DESCRIPTION - DESCRIPTORS: DESCRIPTORS: ACHING

## 2019-03-30 ASSESSMENT — PAIN DESCRIPTION - PAIN TYPE
TYPE: ACUTE PAIN
TYPE: CHRONIC PAIN

## 2019-03-30 ASSESSMENT — PAIN DESCRIPTION - FREQUENCY: FREQUENCY: INTERMITTENT

## 2019-03-30 ASSESSMENT — PAIN SCALES - GENERAL
PAINLEVEL_OUTOF10: 9
PAINLEVEL_OUTOF10: 0
PAINLEVEL_OUTOF10: 7

## 2019-03-30 ASSESSMENT — PAIN SCALES - WONG BAKER: WONGBAKER_NUMERICALRESPONSE: 4

## 2019-03-30 ASSESSMENT — PAIN DESCRIPTION - ONSET: ONSET: ON-GOING

## 2019-03-30 ASSESSMENT — PAIN DESCRIPTION - ORIENTATION: ORIENTATION: LOWER

## 2019-03-30 ASSESSMENT — PAIN DESCRIPTION - LOCATION
LOCATION: BACK
LOCATION: HEAD

## 2019-03-30 NOTE — PROGRESS NOTES
Nurse called Dr. Dann Ahumada called about medication reconciliation to [address] . Medications restarted.
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
## 2019-03-30 NOTE — PROGRESS NOTES
EOB<>w/c, attempting standing task in PM but reports increased dizziness requesting to sit  Sit to stand: Contact guard assistance  Stand to sit: Contact guard assistance  Comment: no LOB noted, vc's for safety              Additional Activities: PM: pt attempts ADL lacing board to address FM skills for functional tasks, MAX cuing req but pt unable to complete; static sitting to fold laundry c s/u and vc's, pt unable to tolerate task in standing d/t increased dizziness, increased time req c vc's for task continuation throughout                            OT FIM:   Eatin - Feeds self with setup/supervision/cues and/or requires only setup/supervision/cues to perform tube feedings(per staff)  Groomin - Requires setup/cues to do all tasks(s/u to wash face)  Bathin - Able to bathe 8-9 areas(A to wash B feet, declines lacey-care, s/u c vc's req)  Dressing-Upper: 5 - Requires setup/supervision/cues and/or requires assist with presthesis/brace only(s/u req)  Dressing-Lower: 3 - Requires assist with 2-3 parts of dressing(TA to doff footies, pt able to don, A threading over feet, pt able to pull over hips and tie c CGA)  Toiletin - Did not occur  Toilet Transfer: 0 - Did not occur  Primary Mode: Shower  Tub Transfer: 0 - Activity does not occur  Shower Transfer: 0 - Activity does not occur(pt declines shower, ADL sitting EOB)    Social Interaction: 2 - Patient appropriate  25%-49% of the time  Problem Solvin - Patient solves simple/routine tasks < 25%  Memory: 1 - Patient remembers < 25% of the time    Assessment  Performance deficits / Impairments: Decreased functional mobility ; Decreased ADL status; Decreased strength;Decreased safe awareness;Decreased cognition;Decreased endurance;Decreased balance;Decreased high-level IADLs  Assessment: MAX encouragement req for participation  Prognosis: Good  Discharge Recommendations: 24 hour supervision or assist  Activity Tolerance: Treatment limited secondary to decreased cognition  Safety Devices in place: Yes  Type of devices: Left in bed;Patient at risk for falls;Call light within reach; Bed alarm in place(telesitter)  Equipment Recommendations  Equipment Needed: (TBD)       Patient Education:     Patient Education: OT POC, safety, therapy participation, ADL tasks  Barriers to Learning: cognition  Learner:patient  Method: demonstration and explanation       Outcome: needs reinforcement     Plan  Plan  Times per week: 900/7  Times per day: Twice a day  Short term goals  Time Frame for Short term goals: in 1 week pt will   Short term goal 1: demo UB ADLs with set-up and min vcs to attent to task  Short term goal 2: demo LB ADLs with moderate assistance with minimal vcs to attent to task  Short term goal 3: demo toilet transfer with minimal assistance, using least restrictive device  Short term goal 4: demo 20+ minutes of activity participation during functional activity to increase participation in ADLs and functional mobility   Long term goals  Time Frame for Long term goals : by d/c pt will  Long term goal 1: demo BADLs with set-up for safety   Long term goal 2: participate in functional tasks for 30+ minutes with min verbal cueing to redirect attention  Long term goal 3: demo functional transfers with set-up  Long term goal 4: demo safety awareness during all ADLs/functional mobility with minimal verbal cueing        03/30/19 1230 03/30/19 1544   OT Individual Minutes   Time In [address] [place] [address]   Time Out 1258 1015   Minutes 28 31     Electronically signed by EFE Skniner on 3/30/19 at 3:56 PM

## 2019-03-30 NOTE — PROGRESS NOTES
[address] [facility]    Date:   3/30/2019  Patient name:  Orville Cheek  Date of admission:  3/26/2019  4:56 PM  MRN:   [id number]  YOB: 1945      HPI          1) Location/Symptom TBI   2) Timing/Onset: 3/8   3) Context/Setting: intracranial bleed s/p ventricular drain - intubated   4) Quality: weakness   5) Duration: continuous   6) Modifying Factors:   7) Severity: moderate   CT head showed multifocal acute intracranial hemorrhage in the left frontal lobe with parenchymal hematoma, acute on depressed longitudinal fracture left temporal bone extending superiorly into the left parietal bone.      Hospital stay complicated with renal failure   Seizures - keppra PE - retroperitoneal hematoma      3/28  bp controlled  Hb is 8.8       3/29    Neuro stable susu    Cr improving bp elevated     3/30   stable   kreplaced    No cp     REVIEW OF SYSTEMS:    · Cardiovascular: Negative for lightheadedness/orthostatic symptoms ,chest pain, dyspnea on exertion, palpitations or loss of consciousness. · Respiratory: Negative for cough or wheezing, sputum production, hemoptysis, pleuritic pain. · Gastrointestinal: Negative for nausea/vomiting, change in bowel habits, abdominal pain, dysphagia/appetite loss, hematemesis, blood in stools. OBJECTIVE:    /82   Pulse 90   Temp 98.2 °F (36.8 °C) (Oral)   Resp 18   Ht 5' 7\" (1.702 m)   Wt 140 lb (63.5 kg)   SpO2 96%   BMI 21.93 kg/m²      · Lungs: clear to auscultation bilaterally,no wheeze.   · Heart: regular rate and rhythm, S1, S2 normal, no murmur, click, rub or gallop  · Abdomen: soft, non-tender; bowel sounds normal; no masses,  no organomegaly  · Extremities: extremities normal, atraumatic, no cyanosis or edema  · Neurological:  stable  · Skin - no rash, no lump   · Eye- no icterus no redness  · GI-oral mucosa moist,  · Lymphatic system-no lymphadenopathy no splenomegaly  · Mouth- mucous membrane moist  · Head-normocephalic atraumatic  · Neck- supple no carotid bruit,Thyroid not palpable. Laboratory Testing:  CBC:   Recent Labs     03/30/19  0843   WBC 7.1   HGB 9.5*        BMP:    Recent Labs     03/28/19  0658 03/29/19  1253 03/30/19  0843    138 137   K 3.3* 3.4* 3.3*    97* 98   CO2 22 25 26   BUN 23 31* 29*   CREATININE 2.90* 2.34* 1.88*   GLUCOSE 154* 268* 183*     S. Calcium:  Recent Labs     03/30/19  0843   CALCIUM 8.7     S. Ionized Calcium:No results for input(s): IONCA in the last 72 hours. S. Magnesium:  Recent Labs     03/30/19  0843   MG 1.3*     S. Phosphorus:  Recent Labs     03/30/19  0843   PHOS 2.7     S. Glucose:  Recent Labs     03/30/19  0604 03/30/19  1105 03/30/19  1550   POCGLU 210* 142* 189*     Glycosylated hemoglobin A1C:   Recent Labs     03/28/19  0658   LABA1C 6.1*     INR: No results for input(s): INR in the last 72 hours. Hepatic functions:   Recent Labs     03/28/19  0658   ALKPHOS 77   ALT 8   AST 12   PROT 5.8*   BILITOT 0.61   LABALBU 2.7*     Pancreatic functions:No results for input(s): LACTA, AMYLASE in the last 72 hours. S. Lactic Acid: No results for input(s): LACTA in the last 72 hours. Cardiac enzymes:No results for input(s): CKTOTAL, CKMB, CKMBINDEX, TROPONINI in the last 72 hours. BNP:No results for input(s): BNP in the last 72 hours. Lipid profile: No results for input(s): CHOL, TRIG, HDL, LDLCALC in the last 72 hours.     Invalid input(s): LDL  Blood Gases: No results found for: PH, PCO2, PO2, HCO3, O2SAT  Thyroid functions:   Lab Results   Component Value Date    TSH 0.73 07/01/2017        Imaging/Diagonstics:  Ct Abdomen Pelvis Wo Contrast Additional Contrast? None    Result Date: 3/28/2019  EXAMINATION: CT OF THE ABDOMEN AND PELVIS WITHOUT CONTRAST 3/28/2019 3:10 pm TECHNIQUE: CT of the abdomen and pelvis was performed without the administration of intravenous contrast. Multiplanar reformatted images are provided for review. Dose modulation, iterative reconstruction, and/or weight based adjustment of the mA/kV was utilized to reduce the radiation dose to as low as reasonably achievable. COMPARISON: Renal ultrasound 03/19/2018. CT chest abdomen pelvis 03/19/2018. HISTORY: ORDERING SYSTEM PROVIDED HISTORY: ABDOMINAL PAIN TECHNOLOGIST PROVIDED HISTORY: Ordering Physician Provided Reason for Exam: patient c/o right sided flank pain FINDINGS: Lower Chest: Small pleural effusions, left greater than right, are again noted and larger in the interval.  New airspace disease in the posterior lingula with air bronchograms is noted. Organs: Evaluation of the abdominal and pelvic viscera is limited in the absence of contrast.  Calcified stone in the gallbladder fundus. No wall thickening or biliary dilatation. The liver, pancreas, spleen and adrenals reveal no abnormality. Bilateral nephrolithiasis is noted. No hydronephrosis. 3 cm right renal cyst and small hemorrhagic or proteinaceous cyst in the lower pole the right kidney again noted. The left kidney is displaced by the retroperitoneal hematoma, as seen previously. GI/Bowel: No bowel dilatation or wall thickening identified. Diverticulosis. Pelvis: No acute findings. The bladder is well distended. Peritoneum/Retroperitoneum: Retroperitoneal hematoma posterolaterally on the left side is again demonstrated without significant change measuring approximately 9.7 x 7.6 x 20 cm. Advil of mint of the right psoas muscle is also noted without appreciable change. Trace amount of simple pericolic fluid and perihepatic fluid is noted with little interval change. No free air. No loculated fluid collection otherwise appreciated. The aorta is normal in caliber. Calcified atheromatous plaque. No lymphadenopathy. Bones/Soft Tissues: Anterior wedging of L3. Multilevel degenerative change in the spine.   No new soft tissue or osseous abnormality appreciable hemorrhage of 4 mm is noted. Left-to-right subfalcine shift of approximately 4 mm is noted. No new areas of intraparenchymal hemorrhage are noted. Small subarachnoid hemorrhage is noted over the parietal lobe sulci, unchanged. ORBITS: The visualized portion of the orbits demonstrate no acute abnormality. SINUSES: Air-fluid level is present in the left maxillary sinus, small representing interval change. Mucoperiosteal thickening is present in the left maxillary and ethmoid sinuses. Air-fluid levels are present in the sphenoid sinuses. Mastoid air cells are aerated and unchanged. SOFT TISSUES/SKULL:  Multiple david holes are noted in the skull. Left temporal bone fracture is unchanged. Estimated biologic radiation dose for this procedure:916.73 mGy/cm2. Minimal decrease in size of left frontal intraparenchymal hematoma compared to prior study of 1 day earlier. No change in satellite hematoma, subdural fluid collections or subarachnoid blood as above. Stable left temporal fracture. No new areas of hemorrhage. Stable subfalcine shift as above. Sinus inflammation as above. Ct Head Wo Contrast    Result Date: 3/13/2019  EXAMINATION: CT OF THE HEAD WITHOUT CONTRAST  3/13/2019 4:02 am TECHNIQUE: CT of the head was performed without the administration of intravenous contrast. Dose modulation, iterative reconstruction, and/or weight based adjustment of the mA/kV was utilized to reduce the radiation dose to as low as reasonably achievable. COMPARISON: 03/11/2019 HISTORY: ORDERING SYSTEM PROVIDED HISTORY: SDH- neuro change TECHNOLOGIST PROVIDED HISTORY: FINDINGS: BRAIN/VENTRICLES: Again seen is left anterior inferior frontal acute intraparenchymal hematoma, 3.5 x 2.7 cm with another focus of acute hemorrhage medially. Surrounding edema is unchanged. There is 4 mm of left-to-right subfalcine herniation.   Status post removal of right frontal approach ventricular catheter mild contusion along the bone fracture as detailed previously. Multiple david holes. Left inferior frontal intraparenchymal hematoma slightly smaller in the interval. Persistent small bilateral cerebral convexity subdural collections. Interval increase subarachnoid hemorrhage. Intraventricular hemorrhage. Status post right frontal approach ventricular catheter as described. Ct Head Wo Contrast    Result Date: 3/9/2019  EXAMINATION: CT OF THE HEAD WITHOUT CONTRAST  3/9/2019 1:51 am TECHNIQUE: CT of the head was performed without the administration of intravenous contrast. Dose modulation, iterative reconstruction, and/or weight based adjustment of the mA/kV was utilized to reduce the radiation dose to as low as reasonably achievable. COMPARISON: 03/08/2019, 7 hours prior HISTORY: ORDERING SYSTEM PROVIDED HISTORY: ICH, repeat scan TECHNOLOGIST PROVIDED HISTORY: FINDINGS: BRAIN/VENTRICLES: Again seen is left inferior frontal intraparenchymal hematoma, measured at 3.6 x 3.1 cm with other smaller foci in the inferomedial portion, overall grossly measured at 3.1 x 1.5 cm. Associated edema results in left-to-right midline sub fall seen herniation of 6 mm as measured in the coronal plane, slightly increased from 4 mm measured at a similar location. Subdural collections over the right and left cerebral convexities are measured at up to 4 and 7 mm in thickness maximally, again in the coronal plane, perhaps slightly more prominent and increased in density compared to recent prior study. Ventricular configuration is stable. Basilar cisterns are patent. Posterior fossa is unremarkable. ORBITS: The visualized portion of the orbits demonstrate no acute abnormality. SINUSES: Paranasal sinuses are patent. Left mastoid air cells are partially opacified. SOFT TISSUES/SKULL:  Bifrontal david holes again noted. Longitudinal left temporal bone fracture. Scalp hematoma.      Edema associated with left inferior frontal lobe hemorrhage results in slightly increased left-to-right midline shift of 6 mm. Bifrontal convexity hematomas slightly more prominent however without significant mass effect. Skull fractures as previously noted. Ct Head Wo Contrast    Result Date: 3/8/2019  EXAMINATION: CT OF THE HEAD WITHOUT CONTRAST  3/8/2019 6:54 pm TECHNIQUE: CT of the head was performed without the administration of intravenous contrast. Dose modulation, iterative reconstruction, and/or weight based adjustment of the mA/kV was utilized to reduce the radiation dose to as low as reasonably achievable. COMPARISON: Mon hour ago HISTORY: ORDERING SYSTEM PROVIDED HISTORY: Trauma TECHNOLOGIST PROVIDED HISTORY: FINDINGS: BRAIN/VENTRICLES: Left frontal intraparenchymal hemorrhage appears unchanged in size and configuration. Small amount of subfrontal parenchymal hemorrhage is noted on the right. There is a small anterior inter hemispheric subdural hematoma which appears less conspicuous. Punctate hemorrhages are noted near the vertex on the right. No intraventricular hemorrhage is noted. There edema of the left frontal lobe with slight 2-3 mm midline shift anteriorly. There is no tonsillar or transtentorial herniation. There is moderate atrophy and nonspecific white matter disease. There may be a small area of subarachnoid hemorrhage near the sylvian fissure on the right posteriorly. 9 x 11 mm calcified pineal mass noted. Possible ill-defined cortical hemorrhage left temporal lobe. Intracranial atherosclerosis. ORBITS: The visualized portion of the orbits demonstrate no acute abnormality. SINUSES: The visualized paranasal sinuses and mastoid air cells demonstrate no acute abnormality. SOFT TISSUES/SKULL:  Opacified mastoid air cells on the left. Nondisplaced longitudinal temporal bone fracture on the left. Left posterior parietal pneumocephalus appears less conspicuous. Bilateral remote david holes are noted in frontal and parietal bones.      Multifocal intraparenchymal hemorrhage especially left frontal lobe essentially stable. Local edema and slight midline shift left frontal lobe. Minute anterior inter hemispheric subdural hematoma less conspicuous. Left temporal bone fracture. Follow-up high-resolution temporal bone CT may be helpful. Ct Head Wo Contrast    Result Date: 3/8/2019  EXAMINATION: CT OF THE HEAD WITHOUT CONTRAST  3/8/2019 5:25 pm TECHNIQUE: CT of the head was performed without the administration of intravenous contrast. Dose modulation, iterative reconstruction, and/or weight based adjustment of the mA/kV was utilized to reduce the radiation dose to as low as reasonably achievable. COMPARISON: 08/16/2018 HISTORY: ORDERING SYSTEM PROVIDED HISTORY: AMS TECHNOLOGIST PROVIDED HISTORY: Ordering Physician Provided Reason for Exam: patient states he fell and hit the back of his red today FINDINGS: BRAIN/VENTRICLES: Large focus of parenchymal hemorrhage in the left inferior frontal lobe measuring approximately 3.2 cm with associated scattered subarachnoid hemorrhage as well as subdural hemorrhage along the anterior interhemispheric fissure. Small amount of scattered subarachnoid hemorrhage is also seen elsewhere in the superior sulci of the right frontoparietal lobe. No evidence of mass effect or midline shift. Lucencies within the periventricular and subcortical white matter likely represent chronic microvascular ischemic changes. Gray-white matter differentiation is preserved. No hydrocephalus. Stable dural thickening and calcification along the right superior convexity. ORBITS: The visualized portion of the orbits demonstrate no acute abnormality. SINUSES: The visualized paranasal sinuses and mastoid air cells demonstrate no acute abnormality. SOFT TISSUES/SKULL: Acute nondepressed longitudinal fracture of the left temporal bone the fracture extends superiorly into the left parietal bone. Stable appearance of previous bilateral david holes. Superficial scalp hematomas along the posterior calvarium. Multifocal acute intracranial hemorrhage as described above, most significant within the left inferior frontal lobe with parenchymal hematoma measuring approximately 3.2 cm in maximal dimension. Acute nondepressed longitudinal fracture of the left temporal bone the fracture extends superiorly into the left parietal bone. Trace amount of left-sided pneumocephalus adjacent to the parietal bone fracture. Critical results were called by Dr. Beronica Barnard to [facility] on 3/8/2019 at 17:48. Ct Iac Posterior Fossa Wo Contrast    Result Date: 3/9/2019  EXAMINATION: CT OF THE INTERNAL AUDITORY CANAL WITHOUT CONTRAST 3/9/2019 1:51 am: TECHNIQUE: CT of the internal auditory canal was performed without contrast was performed without the administration of intravenous contrast. Multiplanar reformatted images are provided for review. Dose modulation, iterative reconstruction, and/or weight based adjustment of the mA/kV was utilized to reduce the radiation dose to as low as reasonably achievable. COMPARISON: Routine head CT HISTORY: ORDERING SYSTEM PROVIDED HISTORY: temporal bone fx TECHNOLOGIST PROVIDED HISTORY: temporal bone fx FINDINGS: RIGHT TEMPORAL BONE:  The external auditory canal is clear without evidence of bony erosion. The scutum is intact. The middle ear cavity is clear. The ossicular chain is intact. The mastoid air cells are clear. The inner ear structures appear unremarkable. Normal mineralization of the otic capsule. The internal auditory canal and vestibular aqueduct appear unremarkable. The carotid canal is normal in appearance. The jugular bulb is unremarkable. LEFT TEMPORAL BONE: External auditory canal is intact. The scutum is intact. The middle ear cavity is partially opacified. The ossicular chain is intact. The mastoid air cells are largely opacified. The inner ear structures appear unremarkable.   Normal mineralization of the otic capsule. Longitudinal oriented temporal bone fracture is nondisplaced and spares the otic capsule and appears to spare the course of the facial nerve. The internal auditory canal and vestibular aqueduct appear unremarkable. The carotid canal is normal in appearance. The jugular bulb is unremarkable. BRAIN: The visualized portion of the intracranial contents appear unremarkable. ORBITS: The visualized portion of the orbits demonstrate no acute abnormality. SINUSES: The visualized paranasal sinuses are clear apart from minimal mucosal thickening floor of right maxillary antrum. Longitudinal fracture left temporal bone spares ossicular chain and otic capsule. Opacification of left middle ear cavity and mastoid air cells. Ct Cervical Spine Wo Contrast    Result Date: 3/8/2019  EXAMINATION: CT OF THE CERVICAL SPINE WITHOUT CONTRAST 3/8/2019 6:54 pm TECHNIQUE: CT of the cervical spine was performed without the administration of intravenous contrast. Multiplanar reformatted images are provided for review. Dose modulation, iterative reconstruction, and/or weight based adjustment of the mA/kV was utilized to reduce the radiation dose to as low as reasonably achievable. COMPARISON: April 17, 2018 HISTORY: ORDERING SYSTEM PROVIDED HISTORY: Trauma FINDINGS: BONES/ALIGNMENT: There is no evidence of an acute cervical spine fracture. There is normal alignment of the cervical spine. DEGENERATIVE CHANGES: Multilevel moderately severe disc disease is present throughout the cervical spine. Multilevel mild facet arthropathy is noted. Degenerative change at the atlantoaxial interface is noted. SOFT TISSUES: There is no prevertebral soft tissue swelling. Left subclavian pacer partially visualized. The lung apices reveal no acute findings. No acute abnormality of the cervical spine.      Ct Thoracic Spine Wo Contrast    Result Date: 3/8/2019  EXAMINATION: CT OF THE CHEST, ABDOMEN, AND PELVIS WITH CONTRAST; CT OF THE THORACIC SPINE WITHOUT CONTRAST; CT OF THE LUMBAR SPINE WITHOUT CONTRAST 3/8/2019 6:54 pm TECHNIQUE: CT of the chest, abdomen and pelvis was performed with the administration of intravenous contrast. Multiplanar reformatted images are provided for review. Dose modulation, iterative reconstruction, and/or weight based adjustment of the mA/kV was utilized to reduce the radiation dose to as low as reasonably achievable.; CT of the thoracic spine was performed without the administration of intravenous contrast. Multiplanar reformatted images are provided for review. Dose modulation, iterative reconstruction, and/or weight based adjustment of the mA/kV was utilized to reduce the radiation dose to as low as reasonably achievable.; CT of the lumbar spine was performed without the administration of intravenous contrast. Multiplanar reformatted images are provided for review. Dose modulation, iterative reconstruction, and/or weight based adjustment of the mA/kV was utilized to reduce the radiation dose to as low as reasonably achievable. COMPARISON: None HISTORY: ORDERING SYSTEM PROVIDED HISTORY: SDH TECHNOLOGIST PROVIDED HISTORY: FINDINGS: Chest: Mediastinum: Thoracic aorta is normal in caliber. No mediastinal hemorrhage. Mild cardiomegaly. No pericardial effusion. No mediastinal, hilar, or axillary lymphadenopathy. Lungs/pleura: No focal consolidation, pleural effusion, or pneumothorax. Soft Tissues/Bones: No acute osseous abnormality in the thorax. Abdomen/Pelvis: Organs: Liver, spleen, pancreas, and adrenal glands are unremarkable. Multiple hypodense lesions in both kidneys measuring up to 3.2 cm on the right side and 1 cm on the left side are compatible with cysts and probable cysts. No evidence of kidney injury. No radiodense gallstones. GI/Bowel: Bowel appears unremarkable. Nonvisualized appendix. Pelvis: Urinary bladder and prostate gland appear unremarkable.  Peritoneum/Retroperitoneum: No free intraperitoneal air, fluid, or lymphadenopathy by size criteria. Bones/Soft Tissues: Osseous structures appear unremarkable. THORACIC AND LUMBAR SPINE: No evidence of acute thoracic or lumbar spine fracture dislocation. Mild superior endplate deformity of the L2 vertebral body appears remote. No significant degenerative changes. Alignment is within normal limits. No CT evidence of traumatic injury in the chest abdomen or pelvis. No evidence of acute thoracic or lumbar spine fracture dislocation. Remote mild superior endplate deformity of L2. Ct Lumbar Spine Wo Contrast    Result Date: 3/8/2019  EXAMINATION: CT OF THE CHEST, ABDOMEN, AND PELVIS WITH CONTRAST; CT OF THE THORACIC SPINE WITHOUT CONTRAST; CT OF THE LUMBAR SPINE WITHOUT CONTRAST 3/8/2019 6:54 pm TECHNIQUE: CT of the chest, abdomen and pelvis was performed with the administration of intravenous contrast. Multiplanar reformatted images are provided for review. Dose modulation, iterative reconstruction, and/or weight based adjustment of the mA/kV was utilized to reduce the radiation dose to as low as reasonably achievable.; CT of the thoracic spine was performed without the administration of intravenous contrast. Multiplanar reformatted images are provided for review. Dose modulation, iterative reconstruction, and/or weight based adjustment of the mA/kV was utilized to reduce the radiation dose to as low as reasonably achievable.; CT of the lumbar spine was performed without the administration of intravenous contrast. Multiplanar reformatted images are provided for review. Dose modulation, iterative reconstruction, and/or weight based adjustment of the mA/kV was utilized to reduce the radiation dose to as low as reasonably achievable. COMPARISON: None HISTORY: ORDERING SYSTEM PROVIDED HISTORY: SDH TECHNOLOGIST PROVIDED HISTORY: FINDINGS: Chest: Mediastinum: Thoracic aorta is normal in caliber. No mediastinal hemorrhage. Mild cardiomegaly.   No pericardial effusion. No mediastinal, hilar, or axillary lymphadenopathy. Lungs/pleura: No focal consolidation, pleural effusion, or pneumothorax. Soft Tissues/Bones: No acute osseous abnormality in the thorax. Abdomen/Pelvis: Organs: Liver, spleen, pancreas, and adrenal glands are unremarkable. Multiple hypodense lesions in both kidneys measuring up to 3.2 cm on the right side and 1 cm on the left side are compatible with cysts and probable cysts. No evidence of kidney injury. No radiodense gallstones. GI/Bowel: Bowel appears unremarkable. Nonvisualized appendix. Pelvis: Urinary bladder and prostate gland appear unremarkable. Peritoneum/Retroperitoneum: No free intraperitoneal air, fluid, or lymphadenopathy by size criteria. Bones/Soft Tissues: Osseous structures appear unremarkable. THORACIC AND LUMBAR SPINE: No evidence of acute thoracic or lumbar spine fracture dislocation. Mild superior endplate deformity of the L2 vertebral body appears remote. No significant degenerative changes. Alignment is within normal limits. No CT evidence of traumatic injury in the chest abdomen or pelvis. No evidence of acute thoracic or lumbar spine fracture dislocation. Remote mild superior endplate deformity of L2. Us Renal Complete    Result Date: 3/21/2019  EXAMINATION: RETROPERITONEAL ULTRASOUND OF THE KIDNEYS AND URINARY BLADDER 3/21/2019 COMPARISON: 03/19/2019 HISTORY: ORDERING SYSTEM PROVIDED HISTORY: Retroperitoneal bleed FINDINGS: Right kidney measures 12.9 cm. Left kidney measures 12.9 cm. Left kidney is displaced by a retroperitoneal hematoma. No hydronephrosis. Mild increased echogenicity of the right kidney. Normal left renal echogenicity. Renal cysts. Small amount of free fluid within the right upper quadrant. Left pleural effusion. Retroperitoneal hematoma. Reed catheter within the urinary bladder making evaluation nondiagnostic. No hydronephrosis.  Mild increased right renal echogenicity can be seen with medical renal disease. Retroperitoneal hematoma is not well evaluated by sonography. Xr Chest Portable    Result Date: 3/24/2019  EXAMINATION: SINGLE XRAY VIEW OF THE CHEST 3/24/2019 3:01 pm COMPARISON: Chest radiograph performed 03/22/2019. HISTORY: ORDERING SYSTEM PROVIDED HISTORY: infiltrate TECHNOLOGIST PROVIDED HISTORY: infiltrate FINDINGS: There is left basilar effusion with adjacent infiltrate. There is no pneumothorax. The mediastinal structures are stable with stable cardiac leads. The upper abdomen is unremarkable. The extrathoracic soft tissues are unremarkable. Left basilar effusion with adjacent infiltrate representing atelectasis versus pneumonia. Xr Chest Portable    Result Date: 3/22/2019  EXAMINATION: SINGLE XRAY VIEW OF THE CHEST 3/22/2019 7:18 am COMPARISON: 03/17/2019 HISTORY: ORDERING SYSTEM PROVIDED HISTORY: F/U infiltrates TECHNOLOGIST PROVIDED HISTORY: F/U infiltrates FINDINGS: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

## 2019-03-30 NOTE — PROGRESS NOTES
Abdomen: Soft, non tender to palpation. Active bowel sounds x 4 quadrants. Musculoskeletal: Active ROM x 4 extremities. No cyanosis or clubbing. Integumentary: Pink, warm and dry. Free from rash or lesions. Skin turgor normal.  CNS: Speech clear. Face symmetrical. No tremor. Data:  CBC:   Lab Results   Component Value Date    WBC 9.3 03/28/2019    HGB 8.8 (L) 03/28/2019    HCT 26.1 (L) 03/28/2019    MCV 93.3 03/28/2019     03/28/2019     BMP:    Lab Results   Component Value Date     03/29/2019    K 3.4 (L) 03/29/2019    CL 97 (L) 03/29/2019    CO2 25 03/29/2019    BUN 31 (H) 03/29/2019    CREATININE 2.34 (H) 03/29/2019    GLUCOSE 268 (H) 03/29/2019     CMP:   Lab Results   Component Value Date     03/29/2019    K 3.4 (L) 03/29/2019    CL 97 (L) 03/29/2019    CO2 25 03/29/2019    BUN 31 (H) 03/29/2019    CREATININE 2.34 (H) 03/29/2019    GLUCOSE 268 (H) 03/29/2019    CALCIUM 8.9 03/29/2019    PROT 5.8 (L) 03/28/2019    LABALBU 2.7 (L) 03/28/2019    BILITOT 0.61 03/28/2019    ALKPHOS 77 03/28/2019    AST 12 03/28/2019    ALT 8 03/28/2019      Hepatic:   Lab Results   Component Value Date    AST 12 03/28/2019    ALT 8 03/28/2019    BILITOT 0.61 03/28/2019    ALKPHOS 77 03/28/2019     BNP: No results found for: BNP  Lipids: No results found for: CHOL, HDL  INR:   Lab Results   Component Value Date    INR 1.2 03/26/2019     PTH: No results found for: PTH  Phosphorus:    Lab Results   Component Value Date    PHOS 3.0 03/26/2019     Ionized Calcium: No results found for: IONCA  Magnesium:   Lab Results   Component Value Date    MG 1.7 03/26/2019     Albumin:   Lab Results   Component Value Date    LABALBU 2.7 (L) 03/28/2019     Last 3 CK, CKMB, Troponin: @LABRCNT(CKTOTAL:3,CKMB:3,TROPONINI:3)       URINE:)No results found for: Fortino Baker    Radiology:   Reviewed. Assessment:  1. Acute kidney injury. Renal function is improving. 2. Hypokalemia and hypomagnesemia.   3. HTN with worsening BP when in pain. 4. Intracranial hemorrhage. 5. Anemia. 6. Borderline hypernatremia, improved. Plan:  Replete potassium and magnesium. Check vitamin D level. Monitor BP. Continue protein supplements. Avoid hypotension, nephrotoxic drugs, Lovenox, Fleets enema and IV contrast exposure. Follow up labs ordered for AM.     Please do not hesitate to call with questions. We will follow with you. Pt seen in collaboration with Dr. Kaykay Early. Electronically signed by CARYL Toure CNP  on 3/30/2019 at 8:39 AM  [facility]. Ph: [phone]    Patient seen and examined. Agree with above note. Above note was modified. We will continue to follow up with you. Electronically signed by Nano Arango MD on 3/30/2019 at 2:10 PM  [facility].   Ph: [phone]

## 2019-03-30 NOTE — PROGRESS NOTES
Physical Medicine & Rehabilitation  Progress Note      Subjective:    72year old gentleman with traumatic intracranial hemorrhage    Patient is well, but has had some minor complaints of headaches    ROS:  Denies fevers, chills, sweats. No chest pain, palpitations, lightheadedness. Denies coughing, wheezing or shortness of breath. Denies abdominal pain, nausea, diarrhea or constipation. No new areas of joint pain. Denies new areas of numbness or weakness. Denies new anxiety or depression issues. No new skin problems. Rehabilitation:   Progressing in therapies. PT:  Restrictions/Precautions: Seizure, General Precautions, Surgical Protocols, Fall Risk(tele sitter)  Implants present? : Metal implants   Transfers  Sit to Stand: Minimal Assistance  Stand to sit: Minimal Assistance  Bed to Chair: Minimal assistance  Comment: use of RW  Ambulation 1  Surface: level tile  Device: Rolling Walker  Other Apparatus: Wheelchair follow  Assistance: Minimal assistance  Quality of Gait: Mild unsteadiness initially but improved with distance, slow kristy, decreased heel strike, Rounded shoulders, Leaning into AD, mobility affected by cognitive ability to follow commands  Distance: 230/150  Comments: veering to the R during amb. Pt amb twice this afternoon    Transfers  Sit to Stand: Minimal Assistance  Stand to sit: Minimal Assistance  Bed to Chair: Minimal assistance  Comment: use of RW  Ambulation  Ambulation?: Yes  Ambulation 1  Surface: level tile  Device: Rolling Walker  Other Apparatus: Wheelchair follow  Assistance: Minimal assistance  Quality of Gait: Mild unsteadiness initially but improved with distance, slow kristy, decreased heel strike, Rounded shoulders, Leaning into AD, mobility affected by cognitive ability to follow commands  Distance: 230/150  Comments: veering to the R during amb.  Pt amb twice this afternoon    Surface: level tile  Ambulation 1  Surface: level tile  Device: Rolling Walker  Other Apparatus: Wheelchair follow  Assistance: Minimal assistance  Quality of Gait: Mild unsteadiness initially but improved with distance, slow kristy, decreased heel strike, Rounded shoulders, Leaning into AD, mobility affected by cognitive ability to follow commands  Distance: 230/150  Comments: veering to the R during amb.  Pt amb twice this afternoon    OT:  ADL  Additional Comments: see FIMs         Balance  Sitting Balance: (seated EOB before ambulating to bathroom)  Standing Balance: (CGA)   Standing Balance  Time: AM: 1 min; PM: 1-2 min, 2-3 min  Activity: AM: dressing tasks; PM: functional mobility in room/bathroom, toilet transfer/toileting tasks, hand washing sinkside, stand step to move HOB  Sit to stand: Contact guard assistance  Stand to sit: Contact guard assistance  Comment: no LOB noted, vc's for safety  Functional Mobility  Functional - Mobility Device: Rolling Walker  Activity: To/from bathroom  Assist Level: Contact guard assistance  Functional Mobility Comments: slow pace, RW      Bed mobility  Rolling to Left: Supervision  Rolling to Right: Supervision  Supine to Sit: Minimal assistance(hand held assist)  Sit to Supine: Supervision  Scooting: Stand by assistance  Comment: HOB raised  Transfers  Stand Step Transfers: Contact guard assistance(hand held A)  Sit to stand: Contact guard assistance  Stand to sit: Contact guard assistance   Toilet Transfers  Toilet - Technique: Ambulating  Equipment Used: Standard toilet  Toilet Transfer: Contact guard assistance             FIM ITEMS  SELFCARE GROUP  SELF-CARE  Eatin - Feeds self with setup/supervision/cues and/or requires only setup/supervision/cues to perform tube feedings  GOAL: Eatin  Grooming: (refused)  GOAL: Groomin  Bathing: (refused)  GOAL: Bathin  Dressing-Upper: 5 - Requires setup/supervision/cues and/or requires assist with presthesis/brace only(verbal cues, set up for orientation of shirt, donned shirt while supine/seated EOB)  GOAL: Dressing-Upper: 5  Dressing-Lower: 3 - Requires assist with 2-3 parts of dressing(A to thread BLE's, steadying for pulling pants/brief over hips)  GOAL: Dressing-Lower: 5  Toiletin - Total assist  GOAL: Toiletin      Objective:  BP (!) 141/88   Pulse 97   Temp 98.4 °F (36.9 °C) (Oral)   Resp 18   Ht 5' 7\" (1.702 m)   Wt 140 lb (63.5 kg)   SpO2 95%   BMI 21.93 kg/m²       GEN: well developed, well nourished, NAD  HEENT: NCAT, PERRL, EOMI, mucous membranes pink and moist  CV: RRR, no murmurs, rubs or gallops  PULM: CTAB, no rales or rhonchi. Respirations WNL and unlabored  ABD: soft, NT, ND, BS+ and equal  NEURO: A&O to place and person. Disoriented to time. Sensation intact to light touch. MSK: Functional ROM all extremities. .  Strength 4/5 key muscles. EXTREMITIES: No calf tenderness to palpation bilaterally. No edema BLEs  SKIN: warm dry and intact with good turgor  PSYCH: appropriately interactive. Affect WNL. Diagnostics:     CBC:   Recent Labs     19  1407 19  0843   WBC 9.3 7.1   RBC 2.80* 3.05*   HGB 8.8* 9.5*   HCT 26.1* 28.0*   MCV 93.3 91.9   RDW 16.6* 15.9*    314     BMP:   Recent Labs     19  0658 19  1253 19  0843    138 137   K 3.3* 3.4* 3.3*    97* 98   CO2 22 25 26   PHOS  --   --  2.7   BUN 23 31* 29*   CREATININE 2.90* 2.34* 1.88*   GLUCOSE 154* 268* 183*     BNP: No results for input(s): BNP in the last 72 hours. PT/INR: No results for input(s): PROTIME, INR in the last 72 hours. APTT: No results for input(s): APTT in the last 72 hours. CARDIAC ENZYMES: No results for input(s): CKMB, CKMBINDEX, TROPONINT in the last 72 hours.     Invalid input(s): CKTOTAL;3  FASTING LIPID PANEL:  Lab Results   Component Value Date    TRIG 119 2019     LIVER PROFILE:   Recent Labs     19  0658   AST 12   ALT 8   BILITOT 0.61   ALKPHOS 77        Current Medications:   Current Facility-Administered Medications: dicyclomine (BENTYL) capsule 10 mg, 10 mg, Oral, TID AC  metoclopramide (REGLAN) tablet 5 mg, 5 mg, Oral, TID AC  escitalopram (LEXAPRO) tablet 5 mg, 5 mg, Oral, Daily  melatonin ER tablet 1 mg, 1 mg, Oral, Nightly PRN  sucralfate (CARAFATE) tablet 1 g, 1 g, Oral, 4 times per day  labetalol (NORMODYNE) tablet 200 mg, 200 mg, Oral, Q6H PRN  donepezil (ARICEPT) tablet 5 mg, 5 mg, Oral, Nightly  docusate sodium (COLACE) capsule 100 mg, 100 mg, Oral, BID  bisacodyl (DULCOLAX) suppository 10 mg, 10 mg, Rectal, Daily PRN  glucose (GLUTOSE) 40 % oral gel 15 g, 15 g, Oral, PRN  dextrose 50 % solution 12.5 g, 12.5 g, Intravenous, PRN  glucagon (rDNA) injection 1 mg, 1 mg, Intramuscular, PRN  dextrose 5 % solution, 100 mL/hr, Intravenous, PRN  oxyCODONE (ROXICODONE) immediate release tablet 5 mg, 5 mg, Oral, Q4H PRN **OR** [DISCONTINUED] oxyCODONE (ROXICODONE) immediate release tablet 10 mg, 10 mg, Oral, Q4H PRN  lidocaine PF 4 % injection 4 mL, 4 mL, Inhalation, As Directed RT PRN  amLODIPine (NORVASC) tablet 10 mg, 10 mg, Oral, Daily  ferrous sulfate tablet 325 mg, 325 mg, Oral, BID WC  folic acid (FOLVITE) tablet 1 mg, 1 mg, Oral, Daily  insulin lispro (HUMALOG) injection vial 0-18 Units, 0-18 Units, Subcutaneous, TID WC  insulin lispro (HUMALOG) injection vial 0-9 Units, 0-9 Units, Subcutaneous, Nightly  levETIRAcetam (KEPPRA) tablet 500 mg, 500 mg, Oral, BID  magnesium oxide (MAG-OX) tablet 400 mg, 400 mg, Oral, Daily  oyster shell calcium/vitamin D 250-125 MG-UNIT per tablet 250 mg, 1 tablet, Oral, Daily  pantoprazole (PROTONIX) tablet 40 mg, 40 mg, Oral, BID AC  polyethylene glycol (GLYCOLAX) packet 17 g, 17 g, Oral, Daily  vitamin B-1 (THIAMINE) tablet 100 mg, 100 mg, Oral, Daily  acetaminophen (TYLENOL) tablet 650 mg, 650 mg, Oral, Q4H PRN      Impression/Plan:   Impaired ADLs, gait, and mobility due to:      1.  TBI with frontal, temporal, parietal skull fracture and L frontal lobe hemorrhagic contusion: on aricept,

## 2019-03-30 NOTE — PROGRESS NOTES
Nutrition Assessment    Type and Reason for Visit: Reassess    Nutrition Recommendations: Provide Ensure Enlvie x 4 daily. Continue General diet. Nutrition Assessment: Pt improving somewhat from a nutritional standpoint as evidenced by good intake of oral nutrition supplements. However, intake likely still not fully meeting needs and pt remains at risk for further nutrition compromise. Will continue to monitor nutrition progression. Malnutrition Assessment:  · Malnutrition Status: Meets the criteria for severe malnutrition  · Context: Acute illness or injury  · Findings of the 6 clinical characteristics of malnutrition (Minimum of 2 out of 6 clinical characteristics is required to make the diagnosis of moderate or severe Protein Calorie Malnutrition based on AND/ASPEN Guidelines):  1. Energy Intake-Less than or equal to 50% of estimated energy requirement, Greater than or equal to 7 days    2. Weight Loss-5% loss or greater, (3 weeks)  3. Fat Loss-Mild subcutaneous fat loss, Orbital  4. Muscle Loss-Mild muscle mass loss, Temples (temporalis muscle)  5. Fluid Accumulation-Mild fluid accumulation, Extremities  6.  Strength-Not measured    Nutrition Risk Level: High    Nutrient Needs:  · Estimated Daily Total Kcal: 2000-9228 based on Admission wt with Antonietta Patelrs with 1.2-1.3 factor  · Estimated Daily Protein (g): 76-95 based on 1.2-1.5 gm per kg of admission wt    Nutrition Diagnosis:   · Problem: Inadequate oral intake  · Etiology: related to Cognitive or neurological impairment, Acute injury/trauma     Signs and symptoms:  as evidenced by Intake 0-25%, Weight loss, Mild loss of subcutaneous fat, Mild muscle loss    Objective Information:  · Nutrition-Focused Physical Findings: Nausea 3-29. Edema: trace RLE, LLE.    · Wound Type: Surgical Wound(Incision to head)  · Current Nutrition Therapies:  · Oral Diet Orders: General   · Oral Diet intake: 0%, 1-25%  · Oral Nutrition Supplement (ONS) Orders: Standard High Calorie Oral Supplement  · ONS intake: %   · 4 Ensure Enlive daily provide: 1400 kcal, 80 gm protein. · Anthropometric Measures:  · Ht: 5' 7\" (170.2 cm)   · Current Body Wt: 139 lb 15.9 oz (63.5 kg)  · Admission Body Wt: 139 lb 15.9 oz (63.5 kg)  · Usual Body Wt: 149 lb 14.6 oz (68 kg)(3-8-19)  · % Weight Change:  ,  6% loss in 3 weeks  · Ideal Body Wt: 148 lb (67.1 kg), % Ideal Body 95%  · BMI Classification: BMI 18.5 - 24.9 Normal Weight    Nutrition Interventions:   Continue current diet, Modify current ONS(Clarify provision for 4 Ensure Enlive daily)  Continued Inpatient Monitoring    Nutrition Evaluation:   · Evaluation: Progressing toward goals(Taking supplements well)   · Goals: PO intake % of meals and supplements    · Monitoring: Nutrition Progression, Meal Intake, Supplement Intake, Diet Tolerance, Skin Integrity, I&O, Weight, Pertinent Labs, Chewing/Swallowing    Ros Perez R.D., L.D.   Phone: [phone]

## 2019-03-31 LAB
ABSOLUTE EOS #: 0.1 K/UL (ref 0–0.4)
ABSOLUTE IMMATURE GRANULOCYTE: ABNORMAL K/UL (ref 0–0.3)
ABSOLUTE LYMPH #: 0.9 K/UL (ref 1–4.8)
ABSOLUTE MONO #: 0.5 K/UL (ref 0.1–1.3)
ANION GAP SERPL CALCULATED.3IONS-SCNC: 15 MMOL/L (ref 9–17)
BASOPHILS # BLD: 1 % (ref 0–2)
BASOPHILS ABSOLUTE: 0.1 K/UL (ref 0–0.2)
BUN BLDV-MCNC: 27 MG/DL (ref 8–23)
BUN/CREAT BLD: ABNORMAL (ref 9–20)
CALCIUM SERPL-MCNC: 8.6 MG/DL (ref 8.6–10.4)
CHLORIDE BLD-SCNC: 97 MMOL/L (ref 98–107)
CO2: 25 MMOL/L (ref 20–31)
CREAT SERPL-MCNC: 1.51 MG/DL (ref 0.7–1.2)
DIFFERENTIAL TYPE: ABNORMAL
EOSINOPHILS RELATIVE PERCENT: 2 % (ref 0–4)
GFR AFRICAN AMERICAN: 55 ML/MIN
GFR NON-AFRICAN AMERICAN: 46 ML/MIN
GFR SERPL CREATININE-BSD FRML MDRD: ABNORMAL ML/MIN/{1.73_M2}
GFR SERPL CREATININE-BSD FRML MDRD: ABNORMAL ML/MIN/{1.73_M2}
GLUCOSE BLD-MCNC: 163 MG/DL (ref 75–110)
GLUCOSE BLD-MCNC: 169 MG/DL (ref 75–110)
GLUCOSE BLD-MCNC: 172 MG/DL (ref 75–110)
GLUCOSE BLD-MCNC: 215 MG/DL (ref 75–110)
GLUCOSE BLD-MCNC: 222 MG/DL (ref 70–99)
HCT VFR BLD CALC: 29.1 % (ref 41–53)
HEMOGLOBIN: 9.5 G/DL (ref 13.5–17.5)
IMMATURE GRANULOCYTES: ABNORMAL %
LYMPHOCYTES # BLD: 13 % (ref 24–44)
MAGNESIUM: 1.4 MG/DL (ref 1.6–2.6)
MCH RBC QN AUTO: 30.1 PG (ref 26–34)
MCHC RBC AUTO-ENTMCNC: 32.8 G/DL (ref 31–37)
MCV RBC AUTO: 91.7 FL (ref 80–100)
MONOCYTES # BLD: 8 % (ref 1–7)
NRBC AUTOMATED: ABNORMAL PER 100 WBC
PDW BLD-RTO: 15.7 % (ref 11.5–14.9)
PHOSPHORUS: 3.3 MG/DL (ref 2.5–4.5)
PLATELET # BLD: 307 K/UL (ref 150–450)
PLATELET ESTIMATE: ABNORMAL
PMV BLD AUTO: 8.6 FL (ref 6–12)
POTASSIUM SERPL-SCNC: 3.3 MMOL/L (ref 3.7–5.3)
RBC # BLD: 3.17 M/UL (ref 4.5–5.9)
RBC # BLD: ABNORMAL 10*6/UL
SEG NEUTROPHILS: 76 % (ref 36–66)
SEGMENTED NEUTROPHILS ABSOLUTE COUNT: 5.4 K/UL (ref 1.3–9.1)
SODIUM BLD-SCNC: 137 MMOL/L (ref 135–144)
WBC # BLD: 7 K/UL (ref 3.5–11)
WBC # BLD: ABNORMAL 10*3/UL

## 2019-03-31 PROCEDURE — 36415 COLL VENOUS BLD VENIPUNCTURE: CPT

## 2019-03-31 PROCEDURE — 6370000000 HC RX 637 (ALT 250 FOR IP): Performed by: NURSE PRACTITIONER

## 2019-03-31 PROCEDURE — 97530 THERAPEUTIC ACTIVITIES: CPT

## 2019-03-31 PROCEDURE — 82947 ASSAY GLUCOSE BLOOD QUANT: CPT

## 2019-03-31 PROCEDURE — 6370000000 HC RX 637 (ALT 250 FOR IP): Performed by: INTERNAL MEDICINE

## 2019-03-31 PROCEDURE — 6370000000 HC RX 637 (ALT 250 FOR IP): Performed by: PHYSICAL MEDICINE & REHABILITATION

## 2019-03-31 PROCEDURE — 99232 SBSQ HOSP IP/OBS MODERATE 35: CPT | Performed by: PHYSICAL MEDICINE & REHABILITATION

## 2019-03-31 PROCEDURE — 85025 COMPLETE CBC W/AUTO DIFF WBC: CPT

## 2019-03-31 PROCEDURE — 97116 GAIT TRAINING THERAPY: CPT

## 2019-03-31 PROCEDURE — 6370000000 HC RX 637 (ALT 250 FOR IP): Performed by: STUDENT IN AN ORGANIZED HEALTH CARE EDUCATION/TRAINING PROGRAM

## 2019-03-31 PROCEDURE — 83735 ASSAY OF MAGNESIUM: CPT

## 2019-03-31 PROCEDURE — 1180000000 HC REHAB R&B

## 2019-03-31 PROCEDURE — 80048 BASIC METABOLIC PNL TOTAL CA: CPT

## 2019-03-31 PROCEDURE — 97110 THERAPEUTIC EXERCISES: CPT

## 2019-03-31 PROCEDURE — 99232 SBSQ HOSP IP/OBS MODERATE 35: CPT | Performed by: INTERNAL MEDICINE

## 2019-03-31 PROCEDURE — 84100 ASSAY OF PHOSPHORUS: CPT

## 2019-03-31 RX ORDER — POTASSIUM CHLORIDE 750 MG/1
20 CAPSULE, EXTENDED RELEASE ORAL ONCE
Status: COMPLETED | OUTPATIENT
Start: 2019-03-31 | End: 2019-03-31

## 2019-03-31 RX ADMIN — FERROUS SULFATE TAB 325 MG (65 MG ELEMENTAL FE) 325 MG: 325 (65 FE) TAB at 08:04

## 2019-03-31 RX ADMIN — INSULIN LISPRO 3 UNITS: 100 INJECTION, SOLUTION INTRAVENOUS; SUBCUTANEOUS at 16:09

## 2019-03-31 RX ADMIN — METOCLOPRAMIDE 5 MG: 10 TABLET ORAL at 16:09

## 2019-03-31 RX ADMIN — LEVETIRACETAM 500 MG: 500 TABLET, FILM COATED ORAL at 09:13

## 2019-03-31 RX ADMIN — LEVETIRACETAM 500 MG: 500 TABLET, FILM COATED ORAL at 20:07

## 2019-03-31 RX ADMIN — DICYCLOMINE HYDROCHLORIDE 10 MG: 10 CAPSULE ORAL at 05:33

## 2019-03-31 RX ADMIN — POTASSIUM CHLORIDE 20 MEQ: 750 CAPSULE, EXTENDED RELEASE ORAL at 09:13

## 2019-03-31 RX ADMIN — SUCRALFATE 1 G: 1 TABLET ORAL at 05:34

## 2019-03-31 RX ADMIN — OXYCODONE HYDROCHLORIDE 5 MG: 5 TABLET ORAL at 09:13

## 2019-03-31 RX ADMIN — OXYCODONE HYDROCHLORIDE 5 MG: 5 TABLET ORAL at 20:07

## 2019-03-31 RX ADMIN — DONEPEZIL HYDROCHLORIDE 5 MG: 5 TABLET, FILM COATED ORAL at 20:08

## 2019-03-31 RX ADMIN — Medication 400 MG: at 08:04

## 2019-03-31 RX ADMIN — FOLIC ACID 1 MG: 1 TABLET ORAL at 08:04

## 2019-03-31 RX ADMIN — FERROUS SULFATE TAB 325 MG (65 MG ELEMENTAL FE) 325 MG: 325 (65 FE) TAB at 16:09

## 2019-03-31 RX ADMIN — METOCLOPRAMIDE 5 MG: 10 TABLET ORAL at 08:05

## 2019-03-31 RX ADMIN — AMLODIPINE BESYLATE 10 MG: 10 TABLET ORAL at 08:07

## 2019-03-31 RX ADMIN — PANTOPRAZOLE SODIUM 40 MG: 40 TABLET, DELAYED RELEASE ORAL at 05:34

## 2019-03-31 RX ADMIN — SUCRALFATE 1 G: 1 TABLET ORAL at 11:10

## 2019-03-31 RX ADMIN — VITAMIN D, TAB 1000IU (100/BT) 2000 UNITS: 25 TAB at 09:12

## 2019-03-31 RX ADMIN — DICYCLOMINE HYDROCHLORIDE 10 MG: 10 CAPSULE ORAL at 11:10

## 2019-03-31 RX ADMIN — DOCUSATE SODIUM 100 MG: 100 CAPSULE, LIQUID FILLED ORAL at 08:04

## 2019-03-31 RX ADMIN — SUCRALFATE 1 G: 1 TABLET ORAL at 16:14

## 2019-03-31 RX ADMIN — THIAMINE HCL TAB 100 MG 100 MG: 100 TAB at 08:04

## 2019-03-31 RX ADMIN — ESCITALOPRAM OXALATE 5 MG: 10 TABLET ORAL at 08:04

## 2019-03-31 RX ADMIN — DOCUSATE SODIUM 100 MG: 100 CAPSULE, LIQUID FILLED ORAL at 20:07

## 2019-03-31 RX ADMIN — DICYCLOMINE HYDROCHLORIDE 10 MG: 10 CAPSULE ORAL at 16:09

## 2019-03-31 RX ADMIN — POLYETHYLENE GLYCOL 3350 17 G: 17 POWDER, FOR SOLUTION ORAL at 09:13

## 2019-03-31 RX ADMIN — PANTOPRAZOLE SODIUM 40 MG: 40 TABLET, DELAYED RELEASE ORAL at 16:12

## 2019-03-31 RX ADMIN — INSULIN LISPRO 3 UNITS: 100 INJECTION, SOLUTION INTRAVENOUS; SUBCUTANEOUS at 11:10

## 2019-03-31 RX ADMIN — INSULIN LISPRO 6 UNITS: 100 INJECTION, SOLUTION INTRAVENOUS; SUBCUTANEOUS at 08:04

## 2019-03-31 RX ADMIN — METOCLOPRAMIDE 5 MG: 10 TABLET ORAL at 11:10

## 2019-03-31 RX ADMIN — Medication 400 MG: at 20:07

## 2019-03-31 ASSESSMENT — PAIN SCALES - GENERAL
PAINLEVEL_OUTOF10: 5
PAINLEVEL_OUTOF10: 9
PAINLEVEL_OUTOF10: 9
PAINLEVEL_OUTOF10: 0

## 2019-03-31 ASSESSMENT — PAIN DESCRIPTION - FREQUENCY: FREQUENCY: INTERMITTENT

## 2019-03-31 ASSESSMENT — PAIN DESCRIPTION - ONSET: ONSET: ON-GOING

## 2019-03-31 ASSESSMENT — PAIN DESCRIPTION - DESCRIPTORS
DESCRIPTORS: ACHING
DESCRIPTORS: ACHING

## 2019-03-31 ASSESSMENT — PAIN DESCRIPTION - LOCATION
LOCATION: ABDOMEN
LOCATION: BACK;ABDOMEN
LOCATION: ABDOMEN

## 2019-03-31 NOTE — PLAN OF CARE
Problem: Risk for Impaired Skin Integrity  Goal: Tissue integrity - skin and mucous membranes  Description  Structural intactness and normal physiological function of skin and  mucous membranes. 3/31/2019 1457 by Trevor Horne RN  Outcome: Ongoing  Note:   Skin assessment completed this shift. Skin integrity maintained this shift. See head to toe. Problem: Falls - Risk of:  Goal: Will remain free from falls  Description  Will remain free from falls  3/31/2019 1457 by Trevor Horne RN  Outcome: Ongoing  Note:   Patient is alert and oriented x3 this shift. Bed is locked and in lowest position. Call light is within reach. Avasys in place for safety. Will continue to monitor. Problem: Pain:  Description  Pain management should include both nonpharmacologic and pharmacologic interventions. Goal: Control of acute pain  Description  Control of acute pain  Outcome: Ongoing  Note:   Adequate pain control maintained this shift. Medicated per PRN orders once this shift. Patient currently resting in bed. No distress noted. Will continue to monitor.

## 2019-03-31 NOTE — PROGRESS NOTES
Reason for Follow up: REGGIE. HISTORY:    Feels well. Creatinine improving. SNa 137. Review Of Systems:   Constitutional: No fever, chills, lethargy, weakness or wt loss. Cardiac:  No chest pain, dyspnea, orthopnea or PND. Pulmonary:  No cough, phlegm or wheezing. Abdomen:  No abdominal pain, nausea, vomiting or diarrhea. :   No hematuria, pyuria, dysuria or flank pain. Extremities:  No swelling or joint pains. Scheduled Meds:   potassium chloride  20 mEq Oral Once    vitamin D3  2,000 Units Oral Daily    magnesium oxide  400 mg Oral BID    dicyclomine  10 mg Oral TID AC    metoclopramide  5 mg Oral TID AC    escitalopram  5 mg Oral Daily    sucralfate  1 g Oral 4 times per day    donepezil  5 mg Oral Nightly    docusate sodium  100 mg Oral BID    amLODIPine  10 mg Oral Daily    ferrous sulfate  325 mg Oral BID WC    folic acid  1 mg Oral Daily    insulin lispro  0-18 Units Subcutaneous TID WC    insulin lispro  0-9 Units Subcutaneous Nightly    levETIRAcetam  500 mg Oral BID    pantoprazole  40 mg Oral BID AC    polyethylene glycol  17 g Oral Daily    vitamin B-1  100 mg Oral Daily   Continuous Infusions:   dextrose       PRN Meds:melatonin ER, labetalol, bisacodyl, glucose, dextrose, glucagon (rDNA), dextrose, oxyCODONE **OR** [DISCONTINUED] oxyCODONE, lidocaine PF, acetaminophen    Allergies   Allergen Reactions    Celebrex [Celecoxib] Swelling       Physical Exam:  Blood pressure 138/82, pulse 90, temperature 98.2 °F (36.8 °C), temperature source Oral, resp. rate 18, height 5' 7\" (1.702 m), weight 140 lb (63.5 kg), SpO2 96 %. In: -   Out: 550   In: -   Out: 550 [Urine:550]    General:  Awake, alert, not in distress. Appears to be stated age. HEENT: Atraumatic, normocephalic. Anicteric sclera. Pink and moist oral mucosa. No JVD. Chest: Bilateral air entry, clear to auscultation, no wheezing, rhonchi or rales. Cardiovascular: RRR, S1S2, no murmur, rub or gallop.  No lower pain.  4. Intracranial hemorrhage. 5. Anemia. 6. Borderline hypernatremia, improved. 7. Hypomagnesemia and hypovitaminosis D. Plan:  Replete potassium and magnesium. Start vitamin D supplementation. BP stable. Continue protein supplements. Avoid hypotension, nephrotoxic drugs, Lovenox, Fleets enema and IV contrast exposure. Follow up labs ordered for AM.     Please do not hesitate to call with questions. We will follow with you. Pt seen in collaboration with Dr. Alex Lau. Electronically signed by Brand Halsted, APRN - CNP  on 3/31/2019 at 7:29 AM  [facility]. Ph: [phone]    Patient seen and examined. Agree with above note. Above note was modified. We will continue to follow up with you. Electronically signed by Crystal Cates MD on 3/31/2019 at [address] [facility].   Ph: [phone]

## 2019-03-31 NOTE — PROGRESS NOTES
[address] [facility]    Date:   3/31/2019  Patient name:  Fifi Pacheco  Date of admission:  3/26/2019  4:56 PM  MRN:   [id number]  YOB: 1945      HPI          1) Location/Symptom TBI   2) Timing/Onset: 3/8   3) Context/Setting: intracranial bleed s/p ventricular drain - intubated   4) Quality: weakness   5) Duration: continuous   6) Modifying Factors:   7) Severity: moderate   CT head showed multifocal acute intracranial hemorrhage in the left frontal lobe with parenchymal hematoma, acute on depressed longitudinal fracture left temporal bone extending superiorly into the left parietal bone.      Hospital stay complicated with renal failure   Seizures - keppra PE - retroperitoneal hematoma      3/28  bp controlled  Hb is 8.8       3/29    Neuro stable susu    Cr improving bp elevated     3/30   stable   kreplaced    No cp       3/31   improvinh    k 3.3 cr better   REVIEW OF SYSTEMS:    · Cardiovascular: Negative for lightheadedness/orthostatic symptoms ,chest pain, dyspnea on exertion, palpitations or loss of consciousness. · Respiratory: Negative for cough or wheezing, sputum production, hemoptysis, pleuritic pain. · Gastrointestinal: Negative for nausea/vomiting, change in bowel habits, abdominal pain, dysphagia/appetite loss, hematemesis, blood in stools. OBJECTIVE:    /72   Pulse 68   Temp 97.4 °F (36.3 °C) (Oral)   Resp 16   Ht 5' 7\" (1.702 m)   Wt 140 lb (63.5 kg)   SpO2 97%   BMI 21.93 kg/m²      · Lungs: clear to auscultation bilaterally,no wheeze.   · Heart: regular rate and rhythm, S1, S2 normal, no murmur, click, rub or gallop  · Abdomen: soft, non-tender; bowel sounds normal; no masses,  no organomegaly  · Extremities: extremities normal, atraumatic, no cyanosis or edema  · Neurological:  stable  · Skin - no rash, no lump   · Eye- no icterus no redness  · GI-oral mucosa moist,  · Lymphatic system-no lymphadenopathy no splenomegaly  · Mouth- mucous membrane moist  · Head-normocephalic atraumatic  · Neck- supple no carotid bruit,Thyroid not palpable. Laboratory Testing:  CBC:   Recent Labs     03/31/19  0607   WBC 7.0   HGB 9.5*        BMP:    Recent Labs     03/29/19  1253 03/30/19  0843 03/31/19  0607    137 137   K 3.4* 3.3* 3.3*   CL 97* 98 97*   CO2 25 26 25   BUN 31* 29* 27*   CREATININE 2.34* 1.88* 1.51*   GLUCOSE 268* 183* 222*     S. Calcium:  Recent Labs     03/31/19  0607   CALCIUM 8.6     S. Ionized Calcium:No results for input(s): IONCA in the last 72 hours. S. Magnesium:  Recent Labs     03/31/19  0607   MG 1.4*     S. Phosphorus:  Recent Labs     03/31/19  0607   PHOS 3.3     S. Glucose:  Recent Labs     03/31/19  0652 03/31/19  1040 03/31/19  1600   POCGLU 215* 172* 169*     Glycosylated hemoglobin A1C:   No results for input(s): LABA1C in the last 72 hours. INR: No results for input(s): INR in the last 72 hours. Hepatic functions:   No results for input(s): ALKPHOS, ALT, AST, PROT, BILITOT, BILIDIR, LABALBU in the last 72 hours. Pancreatic functions:No results for input(s): LACTA, AMYLASE in the last 72 hours. S. Lactic Acid: No results for input(s): LACTA in the last 72 hours. Cardiac enzymes:No results for input(s): CKTOTAL, CKMB, CKMBINDEX, TROPONINI in the last 72 hours. BNP:No results for input(s): BNP in the last 72 hours. Lipid profile: No results for input(s): CHOL, TRIG, HDL, LDLCALC in the last 72 hours.     Invalid input(s): LDL  Blood Gases: No results found for: PH, PCO2, PO2, HCO3, O2SAT  Thyroid functions:   Lab Results   Component Value Date    TSH 0.73 07/01/2017        Imaging/Diagonstics:  Ct Abdomen Pelvis Wo Contrast Additional Contrast? None    Result Date: 3/28/2019  EXAMINATION: CT OF THE ABDOMEN AND PELVIS WITHOUT CONTRAST 3/28/2019 3:10 pm TECHNIQUE: CT of the abdomen and pelvis was performed without the administration of intravenous contrast. Multiplanar reformatted images are provided for review. Dose modulation, iterative reconstruction, and/or weight based adjustment of the mA/kV was utilized to reduce the radiation dose to as low as reasonably achievable. COMPARISON: Renal ultrasound 03/19/2018. CT chest abdomen pelvis 03/19/2018. HISTORY: ORDERING SYSTEM PROVIDED HISTORY: ABDOMINAL PAIN TECHNOLOGIST PROVIDED HISTORY: Ordering Physician Provided Reason for Exam: patient c/o right sided flank pain FINDINGS: Lower Chest: Small pleural effusions, left greater than right, are again noted and larger in the interval.  New airspace disease in the posterior lingula with air bronchograms is noted. Organs: Evaluation of the abdominal and pelvic viscera is limited in the absence of contrast.  Calcified stone in the gallbladder fundus. No wall thickening or biliary dilatation. The liver, pancreas, spleen and adrenals reveal no abnormality. Bilateral nephrolithiasis is noted. No hydronephrosis. 3 cm right renal cyst and small hemorrhagic or proteinaceous cyst in the lower pole the right kidney again noted. The left kidney is displaced by the retroperitoneal hematoma, as seen previously. GI/Bowel: No bowel dilatation or wall thickening identified. Diverticulosis. Pelvis: No acute findings. The bladder is well distended. Peritoneum/Retroperitoneum: Retroperitoneal hematoma posterolaterally on the left side is again demonstrated without significant change measuring approximately 9.7 x 7.6 x 20 cm. Advil of mint of the right psoas muscle is also noted without appreciable change. Trace amount of simple pericolic fluid and perihepatic fluid is noted with little interval change. No free air. No loculated fluid collection otherwise appreciated. The aorta is normal in caliber. Calcified atheromatous plaque. No lymphadenopathy. Bones/Soft Tissues: Anterior wedging of L3. Multilevel degenerative change in the spine.   No new soft tissue or osseous abnormality appreciated. No significant change in large left retroperitoneal hematoma. Small pleural effusions and dependent atelectasis, left greater than right, more prominent in the interval.  New atelectasis or consolidation in the posterior lingula. No new findings to explain right-sided pain. Bilateral nephrolithiasis. Cholelithiasis. Xr Chest Standard (2 Vw)    Result Date: 3/27/2019  EXAMINATION: TWO VIEWS OF THE CHEST 3/27/2019 5:54 pm COMPARISON: 03/24/2019. HISTORY: ORDERING SYSTEM PROVIDED HISTORY: f/u ? pneumonia TECHNOLOGIST PROVIDED HISTORY: f/u ? pneumonia Ordering Physician Provided Reason for Exam: f/u ? pneumonia Acuity: Unknown Type of Exam: Unknown FINDINGS: The growth there is persistent left basilar opacification and effusion, improved compared to the previous study. Mild dependent changes at the right base with small effusion are also slightly improved. No evidence of superimposed failure. The cardiomediastinal silhouette is stable. Left-sided transvenous pacer. Bibasilar opacification and effusion, left greater than right, with interval improvement. Ct Head Wo Contrast    Result Date: 3/21/2019  EXAMINATION: CT OF THE HEAD WITHOUT CONTRAST  3/21/2019 12:27 pm TECHNIQUE: CT of the head was performed without the administration of intravenous contrast. Dose modulation, iterative reconstruction, and/or weight based adjustment of the mA/kV was utilized to reduce the radiation dose to as low as reasonably achievable. COMPARISON: None. HISTORY: ORDERING SYSTEM PROVIDED HISTORY: re-evaluate hemorrhage TECHNOLOGIST PROVIDED HISTORY: Okay to leave floor without nursing; patient is stepdown FINDINGS: BRAIN/VENTRICLES: Left frontal hematoma smaller and less dense, now measuring 2.7 x 2.2 cm versus 3.2 x 2.7 cm; associated mass effect and rightward 4 mm midline shift unchanged whereas satellite foci of heme inferomedial to the above essentially resolved.   Subdural heme/fluid over the collection without appreciable hemorrhage of 4 mm is noted. Left-to-right subfalcine shift of approximately 4 mm is noted. No new areas of intraparenchymal hemorrhage are noted. Small subarachnoid hemorrhage is noted over the parietal lobe sulci, unchanged. ORBITS: The visualized portion of the orbits demonstrate no acute abnormality. SINUSES: Air-fluid level is present in the left maxillary sinus, small representing interval change. Mucoperiosteal thickening is present in the left maxillary and ethmoid sinuses. Air-fluid levels are present in the sphenoid sinuses. Mastoid air cells are aerated and unchanged. SOFT TISSUES/SKULL:  Multiple david holes are noted in the skull. Left temporal bone fracture is unchanged. Estimated biologic radiation dose for this procedure:916.73 mGy/cm2. Minimal decrease in size of left frontal intraparenchymal hematoma compared to prior study of 1 day earlier. No change in satellite hematoma, subdural fluid collections or subarachnoid blood as above. Stable left temporal fracture. No new areas of hemorrhage. Stable subfalcine shift as above. Sinus inflammation as above. Ct Head Wo Contrast    Result Date: 3/13/2019  EXAMINATION: CT OF THE HEAD WITHOUT CONTRAST  3/13/2019 4:02 am TECHNIQUE: CT of the head was performed without the administration of intravenous contrast. Dose modulation, iterative reconstruction, and/or weight based adjustment of the mA/kV was utilized to reduce the radiation dose to as low as reasonably achievable. COMPARISON: 03/11/2019 HISTORY: ORDERING SYSTEM PROVIDED HISTORY: SDH- neuro change TECHNOLOGIST PROVIDED HISTORY: FINDINGS: BRAIN/VENTRICLES: Again seen is left anterior inferior frontal acute intraparenchymal hematoma, 3.5 x 2.7 cm with another focus of acute hemorrhage medially. Surrounding edema is unchanged. There is 4 mm of left-to-right subfalcine herniation.   Status post removal of right frontal approach ventricular catheter mild contusion along the tract. No hydrocephalus. Low-density extra-axial collections, 5 and 4 mm on the right and left respectively. Small amount pneumocephalus. Small amount subarachnoid hemorrhage again noted over parietal lobe sulci. Posterior fossa is unremarkable. ORBITS: The visualized portion of the orbits demonstrate no acute abnormality. SINUSES: Left mastoid air cells are opacified. Paranasal sinuses are patent. SOFT TISSUES/SKULL:  Left temporal bone fracture detailed previously. No significant change in left frontal lobe hematoma. Small extra-axial collections are stable. Status post removal of ventricular catheter. Trace SAH, stable. Ct Head Wo Contrast    Result Date: 3/11/2019  EXAMINATION: CT OF THE HEAD WITHOUT CONTRAST  3/11/2019 1:26 am TECHNIQUE: CT of the head was performed without the administration of intravenous contrast. Dose modulation, iterative reconstruction, and/or weight based adjustment of the mA/kV was utilized to reduce the radiation dose to as low as reasonably achievable. COMPARISON: 03/09/2019 HISTORY: ORDERING SYSTEM PROVIDED HISTORY: interval TECHNOLOGIST PROVIDED HISTORY: FINDINGS: BRAIN/VENTRICLES: Again seen is left inferior frontal intraparenchymal hematoma with surrounding edema. Acute component hemorrhage measured at 3.4 x 2.1 cm, slightly reduced in size. Persistent 5 mm sub falcine left-to-right midline shift. Mixed density subdural hematomas again noted, 6 and 3 mm in maximal thickness on the right and left respectively. Scattered subarachnoid hemorrhage. Intraventricular hemorrhage. No downward herniation pattern. Right frontal approach ventriculostomy catheter has been placed in the interval, tip in midline adjacent septum pellucidum. No hydrocephalus. Pneumocephalus. ORBITS: The visualized portion of the orbits demonstrate no acute abnormality. SINUSES: Partial opacification left mastoid air cells. Trace fluid left maxillary sinus.  SOFT TISSUES/SKULL:  Left temporal bone fracture as detailed previously. Multiple david holes. Left inferior frontal intraparenchymal hematoma slightly smaller in the interval. Persistent small bilateral cerebral convexity subdural collections. Interval increase subarachnoid hemorrhage. Intraventricular hemorrhage. Status post right frontal approach ventricular catheter as described. Ct Head Wo Contrast    Result Date: 3/9/2019  EXAMINATION: CT OF THE HEAD WITHOUT CONTRAST  3/9/2019 1:51 am TECHNIQUE: CT of the head was performed without the administration of intravenous contrast. Dose modulation, iterative reconstruction, and/or weight based adjustment of the mA/kV was utilized to reduce the radiation dose to as low as reasonably achievable. COMPARISON: 03/08/2019, 7 hours prior HISTORY: ORDERING SYSTEM PROVIDED HISTORY: ICH, repeat scan TECHNOLOGIST PROVIDED HISTORY: FINDINGS: BRAIN/VENTRICLES: Again seen is left inferior frontal intraparenchymal hematoma, measured at 3.6 x 3.1 cm with other smaller foci in the inferomedial portion, overall grossly measured at 3.1 x 1.5 cm. Associated edema results in left-to-right midline sub fall seen herniation of 6 mm as measured in the coronal plane, slightly increased from 4 mm measured at a similar location. Subdural collections over the right and left cerebral convexities are measured at up to 4 and 7 mm in thickness maximally, again in the coronal plane, perhaps slightly more prominent and increased in density compared to recent prior study. Ventricular configuration is stable. Basilar cisterns are patent. Posterior fossa is unremarkable. ORBITS: The visualized portion of the orbits demonstrate no acute abnormality. SINUSES: Paranasal sinuses are patent. Left mastoid air cells are partially opacified. SOFT TISSUES/SKULL:  Bifrontal david holes again noted. Longitudinal left temporal bone fracture. Scalp hematoma.      Edema associated with left inferior frontal lobe hemorrhage results in slightly increased left-to-right midline shift of 6 mm. Bifrontal convexity hematomas slightly more prominent however without significant mass effect. Skull fractures as previously noted. Ct Head Wo Contrast    Result Date: 3/8/2019  EXAMINATION: CT OF THE HEAD WITHOUT CONTRAST  3/8/2019 6:54 pm TECHNIQUE: CT of the head was performed without the administration of intravenous contrast. Dose modulation, iterative reconstruction, and/or weight based adjustment of the mA/kV was utilized to reduce the radiation dose to as low as reasonably achievable. COMPARISON: Mon hour ago HISTORY: ORDERING SYSTEM PROVIDED HISTORY: Trauma TECHNOLOGIST PROVIDED HISTORY: FINDINGS: BRAIN/VENTRICLES: Left frontal intraparenchymal hemorrhage appears unchanged in size and configuration. Small amount of subfrontal parenchymal hemorrhage is noted on the right. There is a small anterior inter hemispheric subdural hematoma which appears less conspicuous. Punctate hemorrhages are noted near the vertex on the right. No intraventricular hemorrhage is noted. There edema of the left frontal lobe with slight 2-3 mm midline shift anteriorly. There is no tonsillar or transtentorial herniation. There is moderate atrophy and nonspecific white matter disease. There may be a small area of subarachnoid hemorrhage near the sylvian fissure on the right posteriorly. 9 x 11 mm calcified pineal mass noted. Possible ill-defined cortical hemorrhage left temporal lobe. Intracranial atherosclerosis. ORBITS: The visualized portion of the orbits demonstrate no acute abnormality. SINUSES: The visualized paranasal sinuses and mastoid air cells demonstrate no acute abnormality. SOFT TISSUES/SKULL:  Opacified mastoid air cells on the left. Nondisplaced longitudinal temporal bone fracture on the left. Left posterior parietal pneumocephalus appears less conspicuous.   Bilateral remote david holes are noted in frontal and parietal bilateral david holes. Superficial scalp hematomas along the posterior calvarium. Multifocal acute intracranial hemorrhage as described above, most significant within the left inferior frontal lobe with parenchymal hematoma measuring approximately 3.2 cm in maximal dimension. Acute nondepressed longitudinal fracture of the left temporal bone the fracture extends superiorly into the left parietal bone. Trace amount of left-sided pneumocephalus adjacent to the parietal bone fracture. Critical results were called by Dr. Som De Leon to [facility] on 3/8/2019 at 17:48. Ct Iac Posterior Fossa Wo Contrast    Result Date: 3/9/2019  EXAMINATION: CT OF THE INTERNAL AUDITORY CANAL WITHOUT CONTRAST 3/9/2019 1:51 am: TECHNIQUE: CT of the internal auditory canal was performed without contrast was performed without the administration of intravenous contrast. Multiplanar reformatted images are provided for review. Dose modulation, iterative reconstruction, and/or weight based adjustment of the mA/kV was utilized to reduce the radiation dose to as low as reasonably achievable. COMPARISON: Routine head CT HISTORY: ORDERING SYSTEM PROVIDED HISTORY: temporal bone fx TECHNOLOGIST PROVIDED HISTORY: temporal bone fx FINDINGS: RIGHT TEMPORAL BONE:  The external auditory canal is clear without evidence of bony erosion. The scutum is intact. The middle ear cavity is clear. The ossicular chain is intact. The mastoid air cells are clear. The inner ear structures appear unremarkable. Normal mineralization of the otic capsule. The internal auditory canal and vestibular aqueduct appear unremarkable. The carotid canal is normal in appearance. The jugular bulb is unremarkable. LEFT TEMPORAL BONE: External auditory canal is intact. The scutum is intact. The middle ear cavity is partially opacified. The ossicular chain is intact. The mastoid air cells are largely opacified. The inner ear structures appear unremarkable.   Normal mineralization of the otic capsule. Longitudinal oriented temporal bone fracture is nondisplaced and spares the otic capsule and appears to spare the course of the facial nerve. The internal auditory canal and vestibular aqueduct appear unremarkable. The carotid canal is normal in appearance. The jugular bulb is unremarkable. BRAIN: The visualized portion of the intracranial contents appear unremarkable. ORBITS: The visualized portion of the orbits demonstrate no acute abnormality. SINUSES: The visualized paranasal sinuses are clear apart from minimal mucosal thickening floor of right maxillary antrum. Longitudinal fracture left temporal bone spares ossicular chain and otic capsule. Opacification of left middle ear cavity and mastoid air cells. Ct Cervical Spine Wo Contrast    Result Date: 3/8/2019  EXAMINATION: CT OF THE CERVICAL SPINE WITHOUT CONTRAST 3/8/2019 6:54 pm TECHNIQUE: CT of the cervical spine was performed without the administration of intravenous contrast. Multiplanar reformatted images are provided for review. Dose modulation, iterative reconstruction, and/or weight based adjustment of the mA/kV was utilized to reduce the radiation dose to as low as reasonably achievable. COMPARISON: April 17, 2018 HISTORY: ORDERING SYSTEM PROVIDED HISTORY: Trauma FINDINGS: BONES/ALIGNMENT: There is no evidence of an acute cervical spine fracture. There is normal alignment of the cervical spine. DEGENERATIVE CHANGES: Multilevel moderately severe disc disease is present throughout the cervical spine. Multilevel mild facet arthropathy is noted. Degenerative change at the atlantoaxial interface is noted. SOFT TISSUES: There is no prevertebral soft tissue swelling. Left subclavian pacer partially visualized. The lung apices reveal no acute findings. No acute abnormality of the cervical spine.      Ct Thoracic Spine Wo Contrast    Result Date: 3/8/2019  EXAMINATION: CT OF THE CHEST, ABDOMEN, AND PELVIS WITH CONTRAST; CT OF THE THORACIC SPINE WITHOUT CONTRAST; CT OF THE LUMBAR SPINE WITHOUT CONTRAST 3/8/2019 6:54 pm TECHNIQUE: CT of the chest, abdomen and pelvis was performed with the administration of intravenous contrast. Multiplanar reformatted images are provided for review. Dose modulation, iterative reconstruction, and/or weight based adjustment of the mA/kV was utilized to reduce the radiation dose to as low as reasonably achievable.; CT of the thoracic spine was performed without the administration of intravenous contrast. Multiplanar reformatted images are provided for review. Dose modulation, iterative reconstruction, and/or weight based adjustment of the mA/kV was utilized to reduce the radiation dose to as low as reasonably achievable.; CT of the lumbar spine was performed without the administration of intravenous contrast. Multiplanar reformatted images are provided for review. Dose modulation, iterative reconstruction, and/or weight based adjustment of the mA/kV was utilized to reduce the radiation dose to as low as reasonably achievable. COMPARISON: None HISTORY: ORDERING SYSTEM PROVIDED HISTORY: SDH TECHNOLOGIST PROVIDED HISTORY: FINDINGS: Chest: Mediastinum: Thoracic aorta is normal in caliber. No mediastinal hemorrhage. Mild cardiomegaly. No pericardial effusion. No mediastinal, hilar, or axillary lymphadenopathy. Lungs/pleura: No focal consolidation, pleural effusion, or pneumothorax. Soft Tissues/Bones: No acute osseous abnormality in the thorax. Abdomen/Pelvis: Organs: Liver, spleen, pancreas, and adrenal glands are unremarkable. Multiple hypodense lesions in both kidneys measuring up to 3.2 cm on the right side and 1 cm on the left side are compatible with cysts and probable cysts. No evidence of kidney injury. No radiodense gallstones. GI/Bowel: Bowel appears unremarkable. Nonvisualized appendix. Pelvis: Urinary bladder and prostate gland appear unremarkable.  Peritoneum/Retroperitoneum: No free intraperitoneal air, fluid, or lymphadenopathy by size criteria. Bones/Soft Tissues: Osseous structures appear unremarkable. THORACIC AND LUMBAR SPINE: No evidence of acute thoracic or lumbar spine fracture dislocation. Mild superior endplate deformity of the L2 vertebral body appears remote. No significant degenerative changes. Alignment is within normal limits. No CT evidence of traumatic injury in the chest abdomen or pelvis. No evidence of acute thoracic or lumbar spine fracture dislocation. Remote mild superior endplate deformity of L2. Ct Lumbar Spine Wo Contrast    Result Date: 3/8/2019  EXAMINATION: CT OF THE CHEST, ABDOMEN, AND PELVIS WITH CONTRAST; CT OF THE THORACIC SPINE WITHOUT CONTRAST; CT OF THE LUMBAR SPINE WITHOUT CONTRAST 3/8/2019 6:54 pm TECHNIQUE: CT of the chest, abdomen and pelvis was performed with the administration of intravenous contrast. Multiplanar reformatted images are provided for review. Dose modulation, iterative reconstruction, and/or weight based adjustment of the mA/kV was utilized to reduce the radiation dose to as low as reasonably achievable.; CT of the thoracic spine was performed without the administration of intravenous contrast. Multiplanar reformatted images are provided for review. Dose modulation, iterative reconstruction, and/or weight based adjustment of the mA/kV was utilized to reduce the radiation dose to as low as reasonably achievable.; CT of the lumbar spine was performed without the administration of intravenous contrast. Multiplanar reformatted images are provided for review. Dose modulation, iterative reconstruction, and/or weight based adjustment of the mA/kV was utilized to reduce the radiation dose to as low as reasonably achievable. COMPARISON: None HISTORY: ORDERING SYSTEM PROVIDED HISTORY: [facility] TECHNOLOGIST PROVIDED HISTORY: FINDINGS: Chest: Mediastinum: Thoracic aorta is normal in caliber. No mediastinal hemorrhage.  Mild cardiomegaly. No pericardial effusion. No mediastinal, hilar, or axillary lymphadenopathy. Lungs/pleura: No focal consolidation, pleural effusion, or pneumothorax. Soft Tissues/Bones: No acute osseous abnormality in the thorax. Abdomen/Pelvis: Organs: Liver, spleen, pancreas, and adrenal glands are unremarkable. Multiple hypodense lesions in both kidneys measuring up to 3.2 cm on the right side and 1 cm on the left side are compatible with cysts and probable cysts. No evidence of kidney injury. No radiodense gallstones. GI/Bowel: Bowel appears unremarkable. Nonvisualized appendix. Pelvis: Urinary bladder and prostate gland appear unremarkable. Peritoneum/Retroperitoneum: No free intraperitoneal air, fluid, or lymphadenopathy by size criteria. Bones/Soft Tissues: Osseous structures appear unremarkable. THORACIC AND LUMBAR SPINE: No evidence of acute thoracic or lumbar spine fracture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

## 2019-03-31 NOTE — PROGRESS NOTES
Physical Medicine & Rehabilitation  Progress Note      Subjective:    72year old gentleman with traumatic intracranial hemorrhage    Patient is well, and has had no acute complaints or problems. Encouraged patient to increase activity and time out of bed. ROS:  Denies fevers, chills, sweats. No chest pain, palpitations, lightheadedness. Denies coughing, wheezing or shortness of breath. Denies abdominal pain, nausea, diarrhea or constipation. No new areas of joint pain. Denies new areas of numbness or weakness. Denies new anxiety or depression issues. No new skin problems. Rehabilitation:   Progressing in therapies. PT:  Restrictions/Precautions: Seizure, General Precautions, Surgical Protocols, Fall Risk(tele sitter)  Implants present? : Metal implants   Transfers  Sit to Stand: Contact guard assistance(Cues for hand placement)  Stand to sit: Contact guard assistance(cues for hand placement)  Bed to Chair: Contact guard assistance  Stand Pivot Transfers: Contact guard assistance(Cues to stay inside RW)  Comment: Toilet transfer - SBA x 2  (Pt used pull cord both times to call for help)  Ambulation 1  Surface: level tile  Device: Rolling Walker  Other Apparatus: Wheelchair follow  Assistance: Contact guard assistance  Quality of Gait: Steady with RW. Distance: 200 ft x 4  Comments: No assistance required to steer RW. Transfers  Sit to Stand: Contact guard assistance(Cues for hand placement)  Stand to sit: Contact guard assistance(cues for hand placement)  Bed to Chair: Contact guard assistance  Stand Pivot Transfers: Contact guard assistance(Cues to stay inside RW)  Comment: Toilet transfer - SBA x 2  (Pt used pull cord both times to call for help)  Ambulation  Ambulation?: Yes  Ambulation 1  Surface: level tile  Device: Rolling Walker  Other Apparatus: Wheelchair follow  Assistance: Contact guard assistance  Quality of Gait: Steady with RW.     Distance: 200 ft x 4  Comments: No assistance required to steer RW. Surface: level tile  Ambulation 1  Surface: level tile  Device: Rolling Walker  Other Apparatus: Wheelchair follow  Assistance: Contact guard assistance  Quality of Gait: Steady with RW.     Distance: 200 ft x 4  Comments: No assistance required to steer RW.      OT:  ADL  Additional Comments: see FIMs         Balance  Sitting Balance: Supervision(seated EOB)  Standing Balance: Contact guard assistance   Standing Balance  Time: AM: 1 min x2; PM: 1 min x3  Activity: AM: ADL/dressing tasks, SPT EOB>w/c; PM: EOB<>w/c, attempting standing task in PM but reports increased dizziness requesting to sit  Sit to stand: Contact guard assistance  Stand to sit: Contact guard assistance  Comment: no LOB noted, vc's for safety  Functional Mobility  Functional - Mobility Device: Rolling Walker  Activity: To/from bathroom  Assist Level: Contact guard assistance  Functional Mobility Comments: slow pace, RW      Bed mobility  Rolling to Left: Supervision  Rolling to Right: Supervision  Supine to Sit: Minimal assistance  Sit to Supine: Supervision  Scooting: Stand by assistance  Comment: HOB raised  Transfers  Stand Step Transfers: Contact guard assistance  Stand Pivot Transfers: Contact guard assistance  Sit to stand: Contact guard assistance  Stand to sit: Contact guard assistance   Toilet Transfers  Toilet - Technique: Ambulating  Equipment Used: Standard toilet  Toilet Transfer: Contact guard assistance             FIM ITEMS  SELFCARE GROUP  SELF-CARE  Eatin - Feeds self with setup/supervision/cues and/or requires only setup/supervision/cues to perform tube feedings  GOAL: Eatin  Groomin - Requires setup/cues to do all tasks(s/u to wash face)  GOAL: Groomin  Bathin - Able to bathe 8-9 areas(A to wash B feet, declines lacey-care, s/u c vc's req)  GOAL: Bathin  Dressing-Upper: 5 - Requires setup/supervision/cues and/or requires assist with presthesis/brace only(s/u req)  GOAL: Dressing-Upper: 5  Dressing-Lower: 3 - Requires assist with 2-3 parts of dressing(TA to doff footies, pt able to don, A threading over feet, p)  GOAL: Dressing-Lower: 5  Toiletin - Total assist  GOAL: Toiletin      Objective:  BP (!) 145/85   Pulse 74   Temp 97.9 °F (36.6 °C) (Oral)   Resp 16   Ht 5' 7\" (1.702 m)   Wt 134 lb 3.2 oz (60.9 kg)   SpO2 97%   BMI 21.02 kg/m²       GEN: well developed, well nourished, NAD  HEENT: NCAT, PERRL, EOMI, mucous membranes pink and moist  CV: RRR, no murmurs, rubs or gallops  PULM: CTAB, no rales or rhonchi. Respirations WNL and unlabored  ABD: soft, NT, ND, BS+ and equal  NEURO: A&O to place and person. Disoriented to time. Sensation intact to light touch. MSK: Functional ROM all extremities. Strength 4/5 key muscles. EXTREMITIES: No calf tenderness to palpation bilaterally. No edema BLEs  SKIN: warm dry and intact with good turgor  PSYCH: appropriately interactive. Affect WNL. Diagnostics:     CBC:   Recent Labs     19  0843 19  0607   WBC 7.1 7.0   RBC 3.05* 3.17*   HGB 9.5* 9.5*   HCT 28.0* 29.1*   MCV 91.9 91.7   RDW 15.9* 15.7*    307     BMP:   Recent Labs     19  1253 19  0843 19  0607    137 137   K 3.4* 3.3* 3.3*   CL 97* 98 97*   CO2 25 26 25   PHOS  --  2.7 3.3   BUN 31* 29* 27*   CREATININE 2.34* 1.88* 1.51*   GLUCOSE 268* 183* 222*     BNP: No results for input(s): BNP in the last 72 hours. PT/INR: No results for input(s): PROTIME, INR in the last 72 hours. APTT: No results for input(s): APTT in the last 72 hours. CARDIAC ENZYMES: No results for input(s): CKMB, CKMBINDEX, TROPONINT in the last 72 hours. Invalid input(s): CKTOTAL;3  FASTING LIPID PANEL:  Lab Results   Component Value Date    TRIG 119 2019     LIVER PROFILE: No results for input(s): AST, ALT, ALB, BILIDIR, BILITOT, ALKPHOS in the last 72 hours.      Current Medications:   Current Facility-Administered Medications: vitamin D (CHOLECALCIFEROL) tablet 2,000 Units, 2,000 Units, Oral, Daily  magnesium oxide (MAG-OX) tablet 400 mg, 400 mg, Oral, BID  dicyclomine (BENTYL) capsule 10 mg, 10 mg, Oral, TID AC  metoclopramide (REGLAN) tablet 5 mg, 5 mg, Oral, TID AC  escitalopram (LEXAPRO) tablet 5 mg, 5 mg, Oral, Daily  melatonin ER tablet 1 mg, 1 mg, Oral, Nightly PRN  sucralfate (CARAFATE) tablet 1 g, 1 g, Oral, 4 times per day  labetalol (NORMODYNE) tablet 200 mg, 200 mg, Oral, Q6H PRN  donepezil (ARICEPT) tablet 5 mg, 5 mg, Oral, Nightly  docusate sodium (COLACE) capsule 100 mg, 100 mg, Oral, BID  bisacodyl (DULCOLAX) suppository 10 mg, 10 mg, Rectal, Daily PRN  glucose (GLUTOSE) 40 % oral gel 15 g, 15 g, Oral, PRN  dextrose 50 % solution 12.5 g, 12.5 g, Intravenous, PRN  glucagon (rDNA) injection 1 mg, 1 mg, Intramuscular, PRN  dextrose 5 % solution, 100 mL/hr, Intravenous, PRN  oxyCODONE (ROXICODONE) immediate release tablet 5 mg, 5 mg, Oral, Q4H PRN **OR** [DISCONTINUED] oxyCODONE (ROXICODONE) immediate release tablet 10 mg, 10 mg, Oral, Q4H PRN  lidocaine PF 4 % injection 4 mL, 4 mL, Inhalation, As Directed RT PRN  amLODIPine (NORVASC) tablet 10 mg, 10 mg, Oral, Daily  ferrous sulfate tablet 325 mg, 325 mg, Oral, BID WC  folic acid (FOLVITE) tablet 1 mg, 1 mg, Oral, Daily  insulin lispro (HUMALOG) injection vial 0-18 Units, 0-18 Units, Subcutaneous, TID WC  insulin lispro (HUMALOG) injection vial 0-9 Units, 0-9 Units, Subcutaneous, Nightly  levETIRAcetam (KEPPRA) tablet 500 mg, 500 mg, Oral, BID  pantoprazole (PROTONIX) tablet 40 mg, 40 mg, Oral, BID AC  polyethylene glycol (GLYCOLAX) packet 17 g, 17 g, Oral, Daily  vitamin B-1 (THIAMINE) tablet 100 mg, 100 mg, Oral, Daily  acetaminophen (TYLENOL) tablet 650 mg, 650 mg, Oral, Q4H PRN      Impression/Plan:   Impaired ADLs, gait, and mobility due to:      1. TBI with frontal, temporal, parietal skull fracture and L frontal lobe hemorrhagic contusion: on aricept, holding trazodone. Considering amantadine for focus/attention. Continue PT/OT/speech for gait, mobility, strengthening, endurance, ADLs, cognition. 2. Hallucinations: psych consulted. Recommended lexapro, melatonin. 3. Seizure: stable on keppra  4. Alcoholism with dementia: on thiamine, folate, Aricept. 5. Hyperglycemic: HbA1c 6.1, IM following  6. Retroperitoneal hematoma/Anemia: s/p reversal of anticoagulation and 3 umits PRBCs. Monitoring Hb. Stable - improved to 9.5 today. On Iron. 7. Gastritis: on protonix. GI consulted. On carafate, bentyl, reglan. 8. Anorexia: dietitican following and patient on supplements. 9. Leukocytosis: improved. 10. Hypokalemia and hypomagnesiemia: K 3.3 today. Internal medicine and nephrology managing. On KCL and Mg repletion per nephro. 11. HTN: stable on norvasc. 12. REGGIE: nephrology following: managing free water and monitoring renal function. Ct improving. Ct 1.8 today. 13. PE: Anticoagulation contraindicated for retroperitoneal hematoma, anemia, gastritis, hemorrhagic TBI. Vascular consulted - no interventions at this time. Question of whether patient had IVC filter in past - no radiologic evidence of filter being present. 14. Internal medicine medically managing. Electronically signed by Lindsay Wheat MD on 3/31/2019 at 11:07 PM      This note is created with the assistance of a speech recognition program.  While intending to generate a document that actually reflects the content of the visit, the document can still have some errors including those of syntax and sound a like substitutions which may escape proof reading.   In such instances, actual meaning can be extrapolated by contextual diversion.

## 2019-03-31 NOTE — PLAN OF CARE
Problem: Risk for Impaired Skin Integrity  Goal: Tissue integrity - skin and mucous membranes  Description  Structural intactness and normal physiological function of skin and  mucous membranes. 3/31/2019 0304 by Tong Head RN  Outcome: Met This Shift     No new signs of skin breakdown. Patient able to reposition self. Problem: Falls - Risk of:  Goal: Will remain free from falls  Description  Will remain free from falls  3/31/2019 0304 by Tong Head RN  Outcome: Met This Shift     No falls experienced during shift. Patient has bed alarm placed and telesitter in room. Problem: Pain:  Goal: Pain level will decrease  Description  Pain level will decrease  3/31/2019 0304 by Tong Head RN  Outcome: Met This Shift     No complaints of pain during shift.
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
## 2019-04-01 LAB
ABSOLUTE EOS #: 0.1 K/UL (ref 0–0.4)
ABSOLUTE IMMATURE GRANULOCYTE: ABNORMAL K/UL (ref 0–0.3)
ABSOLUTE LYMPH #: 0.9 K/UL (ref 1–4.8)
ABSOLUTE MONO #: 0.5 K/UL (ref 0.1–1.3)
ANION GAP SERPL CALCULATED.3IONS-SCNC: 14 MMOL/L (ref 9–17)
BASOPHILS # BLD: 1 % (ref 0–2)
BASOPHILS ABSOLUTE: 0.1 K/UL (ref 0–0.2)
BUN BLDV-MCNC: 23 MG/DL (ref 8–23)
BUN/CREAT BLD: ABNORMAL (ref 9–20)
CALCIUM SERPL-MCNC: 8.8 MG/DL (ref 8.6–10.4)
CHLORIDE BLD-SCNC: 99 MMOL/L (ref 98–107)
CO2: 25 MMOL/L (ref 20–31)
CREAT SERPL-MCNC: 1.31 MG/DL (ref 0.7–1.2)
DIFFERENTIAL TYPE: ABNORMAL
EOSINOPHILS RELATIVE PERCENT: 2 % (ref 0–4)
GFR AFRICAN AMERICAN: >60 ML/MIN
GFR NON-AFRICAN AMERICAN: 54 ML/MIN
GFR SERPL CREATININE-BSD FRML MDRD: ABNORMAL ML/MIN/{1.73_M2}
GFR SERPL CREATININE-BSD FRML MDRD: ABNORMAL ML/MIN/{1.73_M2}
GLUCOSE BLD-MCNC: 193 MG/DL (ref 75–110)
GLUCOSE BLD-MCNC: 215 MG/DL (ref 70–99)
GLUCOSE BLD-MCNC: 221 MG/DL (ref 75–110)
GLUCOSE BLD-MCNC: 270 MG/DL (ref 75–110)
GLUCOSE BLD-MCNC: 97 MG/DL (ref 75–110)
HCT VFR BLD CALC: 30 % (ref 41–53)
HEMOGLOBIN: 9.8 G/DL (ref 13.5–17.5)
IMMATURE GRANULOCYTES: ABNORMAL %
LYMPHOCYTES # BLD: 11 % (ref 24–44)
MAGNESIUM: 1.5 MG/DL (ref 1.6–2.6)
MCH RBC QN AUTO: 30 PG (ref 26–34)
MCHC RBC AUTO-ENTMCNC: 32.8 G/DL (ref 31–37)
MCV RBC AUTO: 91.3 FL (ref 80–100)
MONOCYTES # BLD: 7 % (ref 1–7)
NRBC AUTOMATED: ABNORMAL PER 100 WBC
PDW BLD-RTO: 15.9 % (ref 11.5–14.9)
PHOSPHORUS: 3.5 MG/DL (ref 2.5–4.5)
PLATELET # BLD: 309 K/UL (ref 150–450)
PLATELET ESTIMATE: ABNORMAL
PMV BLD AUTO: 8.9 FL (ref 6–12)
POTASSIUM SERPL-SCNC: 3.6 MMOL/L (ref 3.7–5.3)
RBC # BLD: 3.28 M/UL (ref 4.5–5.9)
RBC # BLD: ABNORMAL 10*6/UL
SEG NEUTROPHILS: 79 % (ref 36–66)
SEGMENTED NEUTROPHILS ABSOLUTE COUNT: 6 K/UL (ref 1.3–9.1)
SODIUM BLD-SCNC: 138 MMOL/L (ref 135–144)
WBC # BLD: 7.6 K/UL (ref 3.5–11)
WBC # BLD: ABNORMAL 10*3/UL

## 2019-04-01 PROCEDURE — 99232 SBSQ HOSP IP/OBS MODERATE 35: CPT | Performed by: PHYSICAL MEDICINE & REHABILITATION

## 2019-04-01 PROCEDURE — 99232 SBSQ HOSP IP/OBS MODERATE 35: CPT | Performed by: INTERNAL MEDICINE

## 2019-04-01 PROCEDURE — 97530 THERAPEUTIC ACTIVITIES: CPT

## 2019-04-01 PROCEDURE — 97127 HC SP THER IVNTJ W/FOCUS COG FUNCJ: CPT

## 2019-04-01 PROCEDURE — 6370000000 HC RX 637 (ALT 250 FOR IP): Performed by: PHYSICAL MEDICINE & REHABILITATION

## 2019-04-01 PROCEDURE — 1180000000 HC REHAB R&B

## 2019-04-01 PROCEDURE — 83735 ASSAY OF MAGNESIUM: CPT

## 2019-04-01 PROCEDURE — 6370000000 HC RX 637 (ALT 250 FOR IP): Performed by: NURSE PRACTITIONER

## 2019-04-01 PROCEDURE — 6370000000 HC RX 637 (ALT 250 FOR IP): Performed by: INTERNAL MEDICINE

## 2019-04-01 PROCEDURE — 85025 COMPLETE CBC W/AUTO DIFF WBC: CPT

## 2019-04-01 PROCEDURE — 97110 THERAPEUTIC EXERCISES: CPT

## 2019-04-01 PROCEDURE — 6370000000 HC RX 637 (ALT 250 FOR IP): Performed by: STUDENT IN AN ORGANIZED HEALTH CARE EDUCATION/TRAINING PROGRAM

## 2019-04-01 PROCEDURE — 82947 ASSAY GLUCOSE BLOOD QUANT: CPT

## 2019-04-01 PROCEDURE — 97116 GAIT TRAINING THERAPY: CPT

## 2019-04-01 PROCEDURE — 80048 BASIC METABOLIC PNL TOTAL CA: CPT

## 2019-04-01 PROCEDURE — 84100 ASSAY OF PHOSPHORUS: CPT

## 2019-04-01 PROCEDURE — 97535 SELF CARE MNGMENT TRAINING: CPT

## 2019-04-01 PROCEDURE — 36415 COLL VENOUS BLD VENIPUNCTURE: CPT

## 2019-04-01 RX ORDER — POTASSIUM CHLORIDE 750 MG/1
20 CAPSULE, EXTENDED RELEASE ORAL ONCE
Status: COMPLETED | OUTPATIENT
Start: 2019-04-01 | End: 2019-04-01

## 2019-04-01 RX ADMIN — Medication 400 MG: at 19:51

## 2019-04-01 RX ADMIN — DONEPEZIL HYDROCHLORIDE 5 MG: 5 TABLET, FILM COATED ORAL at 19:52

## 2019-04-01 RX ADMIN — FERROUS SULFATE TAB 325 MG (65 MG ELEMENTAL FE) 325 MG: 325 (65 FE) TAB at 16:26

## 2019-04-01 RX ADMIN — SUCRALFATE 1 G: 1 TABLET ORAL at 12:54

## 2019-04-01 RX ADMIN — LEVETIRACETAM 500 MG: 500 TABLET, FILM COATED ORAL at 08:11

## 2019-04-01 RX ADMIN — AMLODIPINE BESYLATE 10 MG: 10 TABLET ORAL at 08:11

## 2019-04-01 RX ADMIN — Medication 400 MG: at 08:11

## 2019-04-01 RX ADMIN — DICYCLOMINE HYDROCHLORIDE 10 MG: 10 CAPSULE ORAL at 10:15

## 2019-04-01 RX ADMIN — DICYCLOMINE HYDROCHLORIDE 10 MG: 10 CAPSULE ORAL at 05:37

## 2019-04-01 RX ADMIN — SUCRALFATE 1 G: 1 TABLET ORAL at 00:15

## 2019-04-01 RX ADMIN — PANTOPRAZOLE SODIUM 40 MG: 40 TABLET, DELAYED RELEASE ORAL at 16:26

## 2019-04-01 RX ADMIN — OXYCODONE HYDROCHLORIDE 5 MG: 5 TABLET ORAL at 19:51

## 2019-04-01 RX ADMIN — POTASSIUM CHLORIDE 20 MEQ: 750 CAPSULE, EXTENDED RELEASE ORAL at 16:26

## 2019-04-01 RX ADMIN — ESCITALOPRAM OXALATE 5 MG: 10 TABLET ORAL at 08:11

## 2019-04-01 RX ADMIN — INSULIN LISPRO 3 UNITS: 100 INJECTION, SOLUTION INTRAVENOUS; SUBCUTANEOUS at 07:22

## 2019-04-01 RX ADMIN — SUCRALFATE 1 G: 1 TABLET ORAL at 16:26

## 2019-04-01 RX ADMIN — OXYCODONE HYDROCHLORIDE 5 MG: 5 TABLET ORAL at 08:23

## 2019-04-01 RX ADMIN — LEVETIRACETAM 500 MG: 500 TABLET, FILM COATED ORAL at 19:51

## 2019-04-01 RX ADMIN — SUCRALFATE 1 G: 1 TABLET ORAL at 05:37

## 2019-04-01 RX ADMIN — INSULIN LISPRO 6 UNITS: 100 INJECTION, SOLUTION INTRAVENOUS; SUBCUTANEOUS at 16:26

## 2019-04-01 RX ADMIN — Medication 1 MG: at 00:15

## 2019-04-01 RX ADMIN — METOCLOPRAMIDE 5 MG: 10 TABLET ORAL at 12:54

## 2019-04-01 RX ADMIN — DICYCLOMINE HYDROCHLORIDE 10 MG: 10 CAPSULE ORAL at 16:29

## 2019-04-01 RX ADMIN — VITAMIN D, TAB 1000IU (100/BT) 2000 UNITS: 25 TAB at 08:11

## 2019-04-01 RX ADMIN — METOCLOPRAMIDE 5 MG: 10 TABLET ORAL at 16:26

## 2019-04-01 RX ADMIN — Medication 1 MG: at 19:51

## 2019-04-01 RX ADMIN — METOCLOPRAMIDE 5 MG: 10 TABLET ORAL at 05:37

## 2019-04-01 RX ADMIN — FERROUS SULFATE TAB 325 MG (65 MG ELEMENTAL FE) 325 MG: 325 (65 FE) TAB at 08:11

## 2019-04-01 RX ADMIN — DOCUSATE SODIUM 100 MG: 100 CAPSULE, LIQUID FILLED ORAL at 10:15

## 2019-04-01 RX ADMIN — PANTOPRAZOLE SODIUM 40 MG: 40 TABLET, DELAYED RELEASE ORAL at 05:37

## 2019-04-01 RX ADMIN — THIAMINE HCL TAB 100 MG 100 MG: 100 TAB at 08:11

## 2019-04-01 RX ADMIN — ACETAMINOPHEN 650 MG: 325 TABLET, FILM COATED ORAL at 08:23

## 2019-04-01 RX ADMIN — POLYETHYLENE GLYCOL 3350 17 G: 17 POWDER, FOR SOLUTION ORAL at 08:11

## 2019-04-01 RX ADMIN — INSULIN LISPRO 9 UNITS: 100 INJECTION, SOLUTION INTRAVENOUS; SUBCUTANEOUS at 12:54

## 2019-04-01 ASSESSMENT — PAIN SCALES - GENERAL
PAINLEVEL_OUTOF10: 0
PAINLEVEL_OUTOF10: 6
PAINLEVEL_OUTOF10: 2
PAINLEVEL_OUTOF10: 8
PAINLEVEL_OUTOF10: 0
PAINLEVEL_OUTOF10: 0
PAINLEVEL_OUTOF10: 6
PAINLEVEL_OUTOF10: 8
PAINLEVEL_OUTOF10: 0

## 2019-04-01 ASSESSMENT — PAIN DESCRIPTION - LOCATION
LOCATION: ABDOMEN
LOCATION: ABDOMEN
LOCATION: ABDOMEN;FLANK
LOCATION: ABDOMEN;BACK;HEAD

## 2019-04-01 ASSESSMENT — PAIN DESCRIPTION - ORIENTATION
ORIENTATION: RIGHT
ORIENTATION: LOWER
ORIENTATION: RIGHT

## 2019-04-01 ASSESSMENT — PAIN DESCRIPTION - DESCRIPTORS
DESCRIPTORS: ACHING;DISCOMFORT
DESCRIPTORS: ACHING

## 2019-04-01 ASSESSMENT — PAIN DESCRIPTION - PAIN TYPE
TYPE: CHRONIC PAIN
TYPE: CHRONIC PAIN;ACUTE PAIN
TYPE: CHRONIC PAIN
TYPE: ACUTE PAIN;CHRONIC PAIN

## 2019-04-01 ASSESSMENT — PAIN DESCRIPTION - ONSET: ONSET: ON-GOING

## 2019-04-01 ASSESSMENT — PAIN DESCRIPTION - FREQUENCY
FREQUENCY: INTERMITTENT
FREQUENCY: INTERMITTENT

## 2019-04-01 ASSESSMENT — PAIN DESCRIPTION - PROGRESSION: CLINICAL_PROGRESSION: NOT CHANGED

## 2019-04-01 NOTE — PROGRESS NOTES
Nutrition Assessment    Type and Reason for Visit: Reassess    Nutrition Recommendations: Continue current diet and oral nutrition supplements. Encourage oral intake. Nutrition Assessment: Nutritional status appears to be declining as evidenced by further wt loss. Pt at risk of additonal decline if PO intake does not improve. Pt is drinking 3-4 Ensure Enlive daily (4875-9646 kcal with 60-80 gm protein) with minimal intake of meals. Staff to continue to encourage intake. Will monitor nutrition progression. Malnutrition Assessment:  · Malnutrition Status: Meets the criteria for severe malnutrition  · Context: Acute illness or injury  · Findings of the 6 clinical characteristics of malnutrition (Minimum of 2 out of 6 clinical characteristics is required to make the diagnosis of moderate or severe Protein Calorie Malnutrition based on AND/ASPEN Guidelines):  1. Energy Intake-Less than or equal to 50% of estimated energy requirement, Greater than or equal to 7 days    2. Weight Loss-5% loss or greater, (x 3 weeks at time of admission; additional loss noted)  3. Fat Loss-Mild subcutaneous fat loss, Orbital  4. Muscle Loss-Mild muscle mass loss, Temples (temporalis muscle)  5. Fluid Accumulation-Mild fluid accumulation, Extremities  6.  Strength-Not measured    Nutrition Risk Level: High    Nutrient Needs:  · Estimated Daily Total Kcal: 1278-6346 based on Admission wt with Drucella Pock with 1.2-1.3 factor  · Estimated Daily Protein (g): 76-95 based on 1.2-1.5 gm per kg of admission wt    Nutrition Diagnosis:   · Problem: Inadequate oral intake  · Etiology: related to Cognitive or neurological impairment, Acute injury/trauma     Signs and symptoms:  as evidenced by Intake 0-25%, Weight loss, Mild loss of subcutaneous fat, Mild muscle loss    Objective Information:  · Nutrition-Focused Physical Findings: Loss of appetite. Edema: +1 RLE, LLE.    · Wound Type: Surgical Wound(Incision to head)  · Current

## 2019-04-01 NOTE — PATIENT CARE CONFERENCE
[address]   ACUTE REHABILITATION  TEAM CONFERENCE NOTE  Date: 19  Patient Name: Kylie Fire       Room: 0855/8295-46  MRN: [id number]       : 1945  (77 y.o.)     Gender: male   Referring Practitioner: Dr. Kelli Shaw  Diagnosis: L ICH, closed head injury    NURSING  FIMS:  Bladder: 5 - Voids, but staff empties urinal/BSC and/or requires setup/sup/cues to urinate (e.g. timed voids/self cath) (no accidents, no touching) (help w/I&O cath)  Bladder Level of Assistance: 5- Requires helper to provide or empty urinal, bedside commode, or requires set-up/cues to empty bladder (ie. helper provides self-catheter supplies )  Bladder Frequency of Accidents: 4 - Two accidents (soiling linens or clothing) in past 7 days, includes bedpan or urinal spills by patient  Bowel: 6 - Uses toilet independently with device or oral medication(s)  Bowel Level of Assistance: 6- Requires device like bedpan, diaper, bedside commode, but patient obtains and empties own device including colostomy. Or requires bowel management medication including stool softeners, laxatives, inserts own suppository  Bowel Frequency of Accidents: 6 - No accidents, uses device like bedpan, diaper, bedside commode colostomy, or requires medication to manage bowels   Bladder  Incontintent at times-   Bowel   Continent  Intervention    Bladder Program     Wounds/Incisions/Ulcers: Incision healing well- right anterior head david hole   Medication Education Program: Patient currently unable to manage medications and family being educated  Pain: Patient's pain is currently controlled with -  roxicodone and tylenol    Fall Risk:  Falling star program initiated    PHYSICAL THERAPY  Bed mobility  Scooting: Stand by assistance  Bed Mobility  Supine to Sit: Stand by assistance  Sit to Supine: Stand by assistance  Scooting: Stand by assistance  Comment: Pt demos good technique in hospital bed.  Pt uses bed rails to assist.     Transfers:  Sit to Stand: Contact guard assistance  Stand to sit: Contact guard assistance  Bed to Chair: Contact guard assistance  Stand Pivot Transfers: Contact guard assistance(Cues to stay inside RW)    Ambulation 1  Surface: level tile  Device: Rolling Walker  Assistance: Contact guard assistance  Quality of Gait: Pt demos a steady gait with slight forward flexed posture. Distance: 15'x2  Comments: No assistance required to steer RW. Stairs  # Steps : 10  Stairs Height: 4\"  Rails: Bilateral  Device: No Device  Assistance: Contact guard assistance  Comment: Pt demos an alternating gait pattern. no LOB noted. FIMS:  Bed, Chair, Wheel Chair: 4 - Requires steadying assistance only <25% assist  and/or requires assist with one leg only  Walk: 1 - Total Assistance Walks < 50 feet OR requires two or more people OR patient performs < 25% of locomotion effort(refused further amb )  Distance Walked: 10'x2  Wheel Chair: 0 - Activity Not Assessed/Does Not Occur  Distance Traveled in Wheel Chair: 0  Stairs: 2- Maximal Assistance Performs 25-49% of the effort to go up and down 4 to 6 stairs and requires the assistance of one person only    PT Equipment Recommendations  Equipment Needed: (TBD)    Assessment: Pt admitted to [facility] ARU 3/26/19. Pt min A for all functional mobility. Pt functional mobility will fluctuate depending on cooperation.      Goals  Short term goals  Time Frame for Short term goals: 1 week  Short term goal 1: Pt to perform bed mobility mod I with use of hand rail  Short term goal 2: Pt to perform bed mobility CGA with RW  Short term goal 3: Pt to amb 300 ft with RW on level surface CGA  Short term goal 4: Pt to ascende/descend 5 steps with bilateral hand rail min A  Short term goal 5: WC mobility x 20' with min to mod A+1     OCCUPATIONAL THERAPY  FIMS:  Eatin - Feeds self with setup/supervision/cues and/or requires only setup/supervision/cues to perform tube feedings  Groomin - Requires setup/cues to do all tasks(s/u to wash face, vc's for sequencing)  Bathin - Able to bathe all 10 areas with setup/sup/cues(s/u req, vc's for sequencing, declines lacey-care)  Dressing-Upper: 5 - Requires setup/supervision/cues and/or requires assist with presthesis/brace only(s/u, vc's req)  Dressing-Lower: 5 - Requires setup/supervision/cues and/or staff applies TEDS/prosthesis/brace only(A TEDs, s/u req, vc's for tasks, SBA in standing for clothin)  Toiletin - Did not occur during session (other sessions Min A )  Toilet Transfer: 0 - Did not occur during session (other session Min A )   Primary Mode: Shower  Shower Transfer: 0 - Activity does not occur (pt declines showering, ADL EOB and supine this date)     Equipment Recommendations  Equipment Needed: (TBD)    Assessment: encouragement req for participation    Short term goals  Time Frame for Short term goals: in 1 week pt will   Short term goal 1: demo UB ADLs with set-up and min vcs to attent to task  Short term goal 2: demo LB ADLs with moderate assistance with minimal vcs to attent to task  Short term goal 3: demo toilet transfer with minimal assistance, using least restrictive device  Short term goal 4: demo 20+ minutes of activity participation during functional activity to increase participation in ADLs and functional mobility     SPEECH THERAPY  min A expression, comprehension, max A for memory, problem solving  Short Term Goal: supervision expression, comprehension, mod A memory and problem solving    RECREATIONAL THERAPY  Comment/Participation: Participating in unit program.  Involved in therapy dog visit, and encouraging/assisting with [address]    NUTRITION  Weight: 134 lb 3.2 oz (60.9 kg) / Body mass index is 21.02 kg/m². Diet Rx: General. Ensure Enlive 4x/day. Patient admitted with severe malnutrition and appears to be declining with further weight loss noted. Patient sometimes takes 3-4 Ensure Enlive per day and bites of food.  Will continue to

## 2019-04-01 NOTE — PROGRESS NOTES
Reason for Follow up: REGGIE. HISTORY:    Feels well. Creatinine improving. SNa 138. Review Of Systems:   Constitutional: No fever, chills, lethargy, weakness or wt loss. Cardiac:  No chest pain, dyspnea, orthopnea or PND. Pulmonary:  No cough, phlegm or wheezing. Abdomen:  No abdominal pain, nausea, vomiting or diarrhea. :   No hematuria, pyuria, dysuria or flank pain. Extremities:  No swelling or joint pains. Scheduled Meds:   potassium chloride  20 mEq Oral Once    vitamin D3  2,000 Units Oral Daily    magnesium oxide  400 mg Oral BID    dicyclomine  10 mg Oral TID AC    metoclopramide  5 mg Oral TID AC    escitalopram  5 mg Oral Daily    sucralfate  1 g Oral 4 times per day    donepezil  5 mg Oral Nightly    docusate sodium  100 mg Oral BID    amLODIPine  10 mg Oral Daily    ferrous sulfate  325 mg Oral BID WC    folic acid  1 mg Oral Daily    insulin lispro  0-18 Units Subcutaneous TID WC    insulin lispro  0-9 Units Subcutaneous Nightly    levETIRAcetam  500 mg Oral BID    pantoprazole  40 mg Oral BID AC    polyethylene glycol  17 g Oral Daily    vitamin B-1  100 mg Oral Daily   Continuous Infusions:   dextrose       PRN Meds:melatonin ER, labetalol, bisacodyl, glucose, dextrose, glucagon (rDNA), dextrose, oxyCODONE **OR** [DISCONTINUED] oxyCODONE, lidocaine PF, acetaminophen    Allergies   Allergen Reactions    Celebrex [Celecoxib] Swelling       Physical Exam:  Blood pressure (!) 145/83, pulse 86, temperature 97.3 °F (36.3 °C), temperature source Oral, resp. rate 15, height 5' 7\" (1.702 m), weight 134 lb 3.2 oz (60.9 kg), SpO2 96 %. In: 1110 [P.O.:1110]  Out: 200   In: 1110   Out: 200 [Urine:200]    General:  Awake, alert, not in distress. Appears to be stated age. HEENT: Atraumatic, normocephalic. Anicteric sclera. Pink and moist oral mucosa. No JVD. Chest: Bilateral air entry, clear to auscultation, no wheezing, rhonchi or rales.   Cardiovascular: RRR, S1S2, no murmur, rub or gallop. No lower extremity edema. Abdomen: Soft, non tender to palpation. Active bowel sounds x 4 quadrants. Musculoskeletal: Active ROM. No cyanosis or clubbing. Integumentary: Pink, warm and dry. Free from rash or lesions. Skin turgor normal.  CNS: Speech clear. Face symmetrical. No tremor. Data:  CBC:   Lab Results   Component Value Date    WBC 7.6 04/01/2019    HGB 9.8 (L) 04/01/2019    HCT 30.0 (L) 04/01/2019    MCV 91.3 04/01/2019     04/01/2019     BMP:    Lab Results   Component Value Date     04/01/2019    K 3.6 (L) 04/01/2019    CL 99 04/01/2019    CO2 25 04/01/2019    BUN 23 04/01/2019    CREATININE 1.31 (H) 04/01/2019    GLUCOSE 215 (H) 04/01/2019     CMP:   Lab Results   Component Value Date     04/01/2019    K 3.6 (L) 04/01/2019    CL 99 04/01/2019    CO2 25 04/01/2019    BUN 23 04/01/2019    CREATININE 1.31 (H) 04/01/2019    GLUCOSE 215 (H) 04/01/2019    CALCIUM 8.8 04/01/2019    PROT 5.8 (L) 03/28/2019    LABALBU 2.7 (L) 03/28/2019    BILITOT 0.61 03/28/2019    ALKPHOS 77 03/28/2019    AST 12 03/28/2019    ALT 8 03/28/2019      Hepatic:   Lab Results   Component Value Date    AST 12 03/28/2019    ALT 8 03/28/2019    BILITOT 0.61 03/28/2019    ALKPHOS 77 03/28/2019     BNP: No results found for: BNP  Lipids: No results found for: CHOL, HDL  INR:   Lab Results   Component Value Date    INR 1.2 03/26/2019     PTH: No results found for: PTH  Phosphorus:    Lab Results   Component Value Date    PHOS 3.5 04/01/2019     Ionized Calcium: No results found for: IONCA  Magnesium:   Lab Results   Component Value Date    MG 1.5 (L) 04/01/2019     Albumin:   Lab Results   Component Value Date    LABALBU 2.7 (L) 03/28/2019     Last 3 CK, CKMB, Troponin: @LABRCNT(CKTOTAL:3,CKMB:3,TROPONINI:3)       URINE:)No results found for: [place]    Radiology:   Reviewed. Assessment:  1. Acute kidney injury. Renal function is improving. 2. Hypokalemia.   3. HTN with worsening BP when in pain. 4. Intracranial hemorrhage. 5. Anemia. 6. Borderline hypernatremia, improved. 7. Hypomagnesemia and hypovitaminosis D. Plan:  Replete potassium, magnesium and vitamin D.  BP stable. Continue protein supplements. Avoid hypotension, nephrotoxic drugs, Lovenox, Fleets enema and IV contrast exposure. Follow up labs ordered for AM.     Please do not hesitate to call with questions. We will follow with you. Electronically signed by Brandon Oscar MD on 4/1/2019 at [facility].   Ph: [phone]

## 2019-04-01 NOTE — PLAN OF CARE
Problem: Risk for Impaired Skin Integrity  Goal: Tissue integrity - skin and mucous membranes  Description  Structural intactness and normal physiological function of skin and  mucous membranes. 4/1/2019 0909 by Yassine Mcmillan RN  Outcome: Ongoing   No new altered skin this shift. Will continue to monitor. Problem: Falls - Risk of:  Goal: Will remain free from falls  Description  Will remain free from falls  4/1/2019 0909 by Yassine Mcmillan RN  Outcome: Ongoing   No falls this shift. Problem: Falls - Risk of:  Goal: Absence of physical injury  Description  Absence of physical injury  4/1/2019 0909 by Yassine Mcmillan RN  Outcome: Ongoing   No injury this shift. Problem: Pain:  Goal: Pain level will decrease  Description  Pain level will decrease  4/1/2019 0909 by Yassine Mcmillan RN  Outcome: Ongoing   PRN pain medication helpful- roxicodone and tylenol for generalized pain and headache. Problem: Nutrition  Goal: Optimal nutrition therapy  4/1/2019 0909 by Yassine Mcmillan RN  Outcome: Ongoing   Pt continues to have poor intake this shift. Pt continues to have ensure encouraged each meal and bites of food.

## 2019-04-01 NOTE — PLAN OF CARE
Problem: Risk for Impaired Skin Integrity  Goal: Tissue integrity - skin and mucous membranes  Description  Structural intactness and normal physiological function of skin and  mucous membranes.   4/1/2019 0333 by Aurelia Braxton RN  Outcome: Ongoing  Note:   Pt skin assessed during shift  No signs of breakdown  Pt turns self     Problem: Falls - Risk of:  Goal: Will remain free from falls  Description  Will remain free from falls  4/1/2019 0333 by Aurelia Braxton RN  Outcome: Ongoing  Note:   Pt absent of falls during shift  Telesitter in use  Pt up with assistance  Room close to nurse's station     Problem: Falls - Risk of:  Goal: Absence of physical injury  Description  Absence of physical injury  Outcome: Ongoing     Problem: Falls - Risk of:  Goal: Absence of physical injury  Description  Absence of physical injury  Outcome: Ongoing     Problem: Pain:  Goal: Pain level will decrease  Description  Pain level will decrease  Outcome: Ongoing  Note:   Pt pain assessed  Pain treated as needed

## 2019-04-01 NOTE — PROGRESS NOTES
explain all you want but it's not going to change my mind\". Pt requires max encouragement to participate in PT. Comments: Pt is in bed upon arrival. PT is agreeable to PT upon arrival. Pt request to use the restroom prior to tx. Pt refuses PT after using the restroom d/t pain. Max encouragement required however pt continues to refuse. JEB [place] informed. Pt educated on the importance of functional mobility. Pain Assessment: 0-10  Pain Level: 8  Pain Type: Chronic pain  Pain Location: Abdomen  Pain Orientation: Lower     Oxygen Therapy  O2 Device: None (Room air)          Bed Mobility:   Bed Mobility  Supine to Sit: Stand by assistance  Sit to Supine: Stand by assistance  Scooting: Stand by assistance  Comment: Pt demos good technique in hospital bed. Pt uses bed rails to assist.       Transfers:  Sit to Stand: Contact guard assistance  Stand to sit: Contact guard assistance   Comments: Pt requires vc's for safe hand placement. Pt demos decrease safety awareness as evidence by attempting to stand without writer close by. Pt able to complete sit to stand from bed and toilet this date. Ambulation 1  Surface: level tile  Device: Rolling Walker  Assistance: Contact guard assistance  Quality of Gait: Pt demos a steady gait with slight forward flexed posture. Distance: 15'x2  Comments: Refused further amb. Stairs/Curb  Stairs?: Yes  Stairs  # Steps : 10  Stairs Height: 4\"  Rails: Bilateral  Device: No Device  Assistance: Contact guard assistance  Comment: Pt demos an alternating gait pattern. no LOB noted.                                                      FIMS:      TRANSFERS  Bed, Chair, Wheel Chair: 4 - Requires steadying assistance only <25% assist  and/or requires assist with one leg only  Toilet Transfer: 4 - Requires steadying assistance only < 25% assist   LOCOMOTION  Primary Mode: Walk  Distance Walked: 10'x2  Walk: 1 - Total Assistance Walks < 50 feet OR requires two or more people OR patient performs < 25% of locomotion effort(refused further amb )  Stairs: 2- Maximal Assistance Performs 25-49% of the effort to go up and down 4 to 6 stairs and requires the assistance of one person only       BALANCE Posture: Fair  Sitting - Static: Good  Sitting - Dynamic: Good;-  Standing - Static: Fair;+  Standing - Dynamic: Fair  Comments: standing balance assessed with RW    EXERCISES    Other exercises 4: Seated bilateral LE x 15 reps with OTB for light resistance. Other exercises 7: education on the importance of strength training and functional mobility to maintain independence. Other exercises 8: bed mobility x2           Activity Tolerance: Patient limited by cognitive status, Patient Tolerated treatment well          Patient Education  New Education Provided: POC, importance of functional mobility. Learner:patient  Method: demonstration and explanation       Outcome: needs reinforcement     Current Treatment Recommendations: Strengthening, ROM, Balance Training, Functional Mobility Training, Transfer Training, Endurance Training, Wheelchair Mobility Training, Gait Training, Neuromuscular Re-education, Cognitive Reorientation, Safety Education & Training, Patient/Caregiver Education & Training, Positioning, Home Exercise Program, Stair training, Equipment Evaluation, Education, & procurement    Conditions Requiring Skilled Therapeutic Intervention  Body structures, Functions, Activity limitations: Decreased functional mobility ; Decreased safe awareness;Decreased cognition;Decreased endurance;Decreased balance;Decreased vision/visual deficit  Assessment: Pt admitted to [address] [place] [address] 3/26/19. Pt min A for all functional mobility. Pt functional mobility will fluctuate depending on cooperation. Treatment Diagnosis: impaired function  Prognosis: Good  Patient Education: POC, importance of functional mobility.    REQUIRES PT FOLLOW UP: Yes  Discharge Recommendations: 24 hour supervision or assist;Home with Home health PT    Goals  Short term goals  Time Frame for Short term goals: 1 week  Short term goal 1: Pt to perform bed mobility mod I with use of hand rail  Short term goal 2: Pt to perform bed mobility CGA with RW  Short term goal 3: Pt to amb 300 ft with RW on level surface CGA  Short term goal 4: Pt to ascende/descend 5 steps with bilateral hand rail min A  Short term goal 5: WC mobility x 20' with min to mod A+1   Long term goals  Time Frame for Long term goals : by discharge  Long term goal 1: Pt to perform bed mobility independent  Long term goal 2: Pt to perform transfers with RW supervision  Long term goal 3: Pt to amb 300 ft with least restrictive DME on level surface supervision  Long term goal 4: Pt to ascend/descend one flight of stairs       04/01/19 0900 04/01/19 1512   PT Individual Minutes   Time In 0900 1512   Time Out 0918 1535   Minutes 18 23       Electronically signed by Dm Stern PTA on 4/1/19 at 3:55 PM
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
## 2019-04-01 NOTE — PROGRESS NOTES
[address]   ACUTE REHABILITATION OCCUPATIONAL THERAPY  DAILY NOTE    Date: 19  Patient Name: Markell Carolina      Room: 8945/5390-65    MRN: [id number]   : 1945  (77 y.o.)  Gender: male   Referring Practitioner: Dr. Brant Adan  Diagnosis: L intercerebral hemorrhage  Additional Pertinent Hx: Alcohol abuse,     Restrictions  Restrictions/Precautions: Seizure, General Precautions, Surgical Protocols, Fall Risk(telesitter)  Implants present? : Metal implants  Required Braces or Orthoses?: No    Subjective  Subjective: \"I'm just not going to do anything, you can just send me home\" - AM  PM- \"I know I can do it, thats not the question, it's should I do it\"  Comments: Patient uncooperative this AM, time spent educating patient about ARU process and importance of participation in therapies.  PM session pt required Max encouragement to participate, agreeable to go to gym after much encouragement  Patient Currently in Pain: Yes  Pain Level: 8  Pain Location: Abdomen  Pain Orientation: Right           Pain Assessment  Pain Assessment: 0-10  Pain Level: 8  Pain Type: Chronic pain  Pain Location: Abdomen  Pain Orientation: Right    Objective     Balance  Sitting Balance: Supervision(seated EOB prior to transfer)  Standing Balance: Contact guard assistance  Bed mobility  Supine to Sit: Supervision;Stand by assistance(HOB raised)  Transfers  Sit to stand: Contact guard assistance  Stand to sit: Contact guard assistance  Standing Balance  Activity: PM: functional mobility <> bed to bathroom  Sit to stand: Contact guard assistance  Stand to sit: Contact guard assistance  Comment: no LOB noted, vc's for safety  Functional Mobility  Functional - Mobility Device: Rolling Walker  Activity: To/from bathroom  Assist Level: Contact guard assistance  Functional Mobility Comments: slow pace, RW   Toilet Transfers  Toilet - Technique: Ambulating  Equipment Used: Standard toilet  Toilet Transfer: Contact guard transfer with minimal assistance, using least restrictive device  Short term goal 4: demo 20+ minutes of activity participation during functional activity to increase participation in ADLs and functional mobility   Long term goals  Time Frame for Long term goals : by d/c pt will  Long term goal 1: demo BADLs with set-up for safety   Long term goal 2: participate in functional tasks for 30+ minutes with min verbal cueing to redirect attention  Long term goal 3: demo functional transfers with set-up  Long term goal 4: demo safety awareness during all ADLs/functional mobility with minimal verbal cueing        OT Individual Minutes  Time in 0930  Time out 0943  Minutes 13    OT Individual Minutes  Time in 1427  Time out 1510  Minutes 43    Electronically signed by Bernadine Peter OT on 4/1/19 at 3:48 PM

## 2019-04-02 LAB
ABSOLUTE EOS #: 0.2 K/UL (ref 0–0.4)
ABSOLUTE IMMATURE GRANULOCYTE: ABNORMAL K/UL (ref 0–0.3)
ABSOLUTE LYMPH #: 0.9 K/UL (ref 1–4.8)
ABSOLUTE MONO #: 0.6 K/UL (ref 0.1–1.3)
ANION GAP SERPL CALCULATED.3IONS-SCNC: 14 MMOL/L (ref 9–17)
BASOPHILS # BLD: 1 % (ref 0–2)
BASOPHILS ABSOLUTE: 0.1 K/UL (ref 0–0.2)
BUN BLDV-MCNC: 23 MG/DL (ref 8–23)
BUN/CREAT BLD: ABNORMAL (ref 9–20)
CALCIUM SERPL-MCNC: 8.9 MG/DL (ref 8.6–10.4)
CHLORIDE BLD-SCNC: 99 MMOL/L (ref 98–107)
CO2: 24 MMOL/L (ref 20–31)
CREAT SERPL-MCNC: 1.13 MG/DL (ref 0.7–1.2)
DIFFERENTIAL TYPE: ABNORMAL
EOSINOPHILS RELATIVE PERCENT: 2 % (ref 0–4)
GFR AFRICAN AMERICAN: >60 ML/MIN
GFR NON-AFRICAN AMERICAN: >60 ML/MIN
GFR SERPL CREATININE-BSD FRML MDRD: ABNORMAL ML/MIN/{1.73_M2}
GFR SERPL CREATININE-BSD FRML MDRD: ABNORMAL ML/MIN/{1.73_M2}
GLUCOSE BLD-MCNC: 124 MG/DL (ref 75–110)
GLUCOSE BLD-MCNC: 162 MG/DL (ref 75–110)
GLUCOSE BLD-MCNC: 192 MG/DL (ref 75–110)
GLUCOSE BLD-MCNC: 198 MG/DL (ref 75–110)
GLUCOSE BLD-MCNC: 208 MG/DL (ref 70–99)
HCT VFR BLD CALC: 30.3 % (ref 41–53)
HEMOGLOBIN: 10 G/DL (ref 13.5–17.5)
IMMATURE GRANULOCYTES: ABNORMAL %
LYMPHOCYTES # BLD: 11 % (ref 24–44)
MAGNESIUM: 1.5 MG/DL (ref 1.6–2.6)
MCH RBC QN AUTO: 30.2 PG (ref 26–34)
MCHC RBC AUTO-ENTMCNC: 33 G/DL (ref 31–37)
MCV RBC AUTO: 91.6 FL (ref 80–100)
MONOCYTES # BLD: 7 % (ref 1–7)
NRBC AUTOMATED: ABNORMAL PER 100 WBC
PDW BLD-RTO: 16 % (ref 11.5–14.9)
PHOSPHORUS: 3.4 MG/DL (ref 2.5–4.5)
PLATELET # BLD: 302 K/UL (ref 150–450)
PLATELET ESTIMATE: ABNORMAL
PMV BLD AUTO: 8.9 FL (ref 6–12)
POTASSIUM SERPL-SCNC: 3.6 MMOL/L (ref 3.7–5.3)
RBC # BLD: 3.31 M/UL (ref 4.5–5.9)
RBC # BLD: ABNORMAL 10*6/UL
SEG NEUTROPHILS: 79 % (ref 36–66)
SEGMENTED NEUTROPHILS ABSOLUTE COUNT: 7 K/UL (ref 1.3–9.1)
SODIUM BLD-SCNC: 137 MMOL/L (ref 135–144)
WBC # BLD: 8.8 K/UL (ref 3.5–11)
WBC # BLD: ABNORMAL 10*3/UL

## 2019-04-02 PROCEDURE — 6370000000 HC RX 637 (ALT 250 FOR IP): Performed by: NURSE PRACTITIONER

## 2019-04-02 PROCEDURE — 36415 COLL VENOUS BLD VENIPUNCTURE: CPT

## 2019-04-02 PROCEDURE — 85025 COMPLETE CBC W/AUTO DIFF WBC: CPT

## 2019-04-02 PROCEDURE — 6370000000 HC RX 637 (ALT 250 FOR IP): Performed by: PHYSICAL MEDICINE & REHABILITATION

## 2019-04-02 PROCEDURE — 97530 THERAPEUTIC ACTIVITIES: CPT

## 2019-04-02 PROCEDURE — 97110 THERAPEUTIC EXERCISES: CPT

## 2019-04-02 PROCEDURE — 99232 SBSQ HOSP IP/OBS MODERATE 35: CPT | Performed by: INTERNAL MEDICINE

## 2019-04-02 PROCEDURE — 97112 NEUROMUSCULAR REEDUCATION: CPT

## 2019-04-02 PROCEDURE — 84100 ASSAY OF PHOSPHORUS: CPT

## 2019-04-02 PROCEDURE — 97116 GAIT TRAINING THERAPY: CPT

## 2019-04-02 PROCEDURE — 97127 HC SP THER IVNTJ W/FOCUS COG FUNCJ: CPT

## 2019-04-02 PROCEDURE — 80048 BASIC METABOLIC PNL TOTAL CA: CPT

## 2019-04-02 PROCEDURE — 82947 ASSAY GLUCOSE BLOOD QUANT: CPT

## 2019-04-02 PROCEDURE — 83735 ASSAY OF MAGNESIUM: CPT

## 2019-04-02 PROCEDURE — 6370000000 HC RX 637 (ALT 250 FOR IP): Performed by: STUDENT IN AN ORGANIZED HEALTH CARE EDUCATION/TRAINING PROGRAM

## 2019-04-02 PROCEDURE — 6370000000 HC RX 637 (ALT 250 FOR IP): Performed by: INTERNAL MEDICINE

## 2019-04-02 PROCEDURE — 97535 SELF CARE MNGMENT TRAINING: CPT

## 2019-04-02 PROCEDURE — 1180000000 HC REHAB R&B

## 2019-04-02 RX ORDER — POTASSIUM CHLORIDE 750 MG/1
40 CAPSULE, EXTENDED RELEASE ORAL ONCE
Status: COMPLETED | OUTPATIENT
Start: 2019-04-02 | End: 2019-04-02

## 2019-04-02 RX ORDER — MIRTAZAPINE 15 MG/1
15 TABLET, ORALLY DISINTEGRATING ORAL NIGHTLY
Status: DISCONTINUED | OUTPATIENT
Start: 2019-04-02 | End: 2019-04-09

## 2019-04-02 RX ORDER — MAGNESIUM SULFATE 1 G/100ML
1 INJECTION INTRAVENOUS ONCE
Status: DISCONTINUED | OUTPATIENT
Start: 2019-04-02 | End: 2019-04-02

## 2019-04-02 RX ADMIN — PANTOPRAZOLE SODIUM 40 MG: 40 TABLET, DELAYED RELEASE ORAL at 16:59

## 2019-04-02 RX ADMIN — SUCRALFATE 1 G: 1 TABLET ORAL at 10:50

## 2019-04-02 RX ADMIN — Medication 800 MG: at 21:18

## 2019-04-02 RX ADMIN — PANTOPRAZOLE SODIUM 40 MG: 40 TABLET, DELAYED RELEASE ORAL at 05:54

## 2019-04-02 RX ADMIN — INSULIN LISPRO 3 UNITS: 100 INJECTION, SOLUTION INTRAVENOUS; SUBCUTANEOUS at 10:51

## 2019-04-02 RX ADMIN — POLYETHYLENE GLYCOL 3350 17 G: 17 POWDER, FOR SOLUTION ORAL at 08:09

## 2019-04-02 RX ADMIN — AMLODIPINE BESYLATE 10 MG: 10 TABLET ORAL at 08:08

## 2019-04-02 RX ADMIN — INSULIN LISPRO 3 UNITS: 100 INJECTION, SOLUTION INTRAVENOUS; SUBCUTANEOUS at 16:59

## 2019-04-02 RX ADMIN — SUCRALFATE 1 G: 1 TABLET ORAL at 16:59

## 2019-04-02 RX ADMIN — DONEPEZIL HYDROCHLORIDE 5 MG: 5 TABLET, FILM COATED ORAL at 21:19

## 2019-04-02 RX ADMIN — OXYCODONE HYDROCHLORIDE 5 MG: 5 TABLET ORAL at 12:46

## 2019-04-02 RX ADMIN — SUCRALFATE 1 G: 1 TABLET ORAL at 05:54

## 2019-04-02 RX ADMIN — FOLIC ACID 1 MG: 1 TABLET ORAL at 08:16

## 2019-04-02 RX ADMIN — Medication 400 MG: at 08:08

## 2019-04-02 RX ADMIN — LEVETIRACETAM 500 MG: 500 TABLET, FILM COATED ORAL at 08:16

## 2019-04-02 RX ADMIN — FERROUS SULFATE TAB 325 MG (65 MG ELEMENTAL FE) 325 MG: 325 (65 FE) TAB at 08:08

## 2019-04-02 RX ADMIN — MIRTAZAPINE 15 MG: 15 TABLET, ORALLY DISINTEGRATING ORAL at 21:19

## 2019-04-02 RX ADMIN — DICYCLOMINE HYDROCHLORIDE 10 MG: 10 CAPSULE ORAL at 10:51

## 2019-04-02 RX ADMIN — LEVETIRACETAM 500 MG: 500 TABLET, FILM COATED ORAL at 21:18

## 2019-04-02 RX ADMIN — OXYCODONE HYDROCHLORIDE 5 MG: 5 TABLET ORAL at 08:08

## 2019-04-02 RX ADMIN — POTASSIUM CHLORIDE 40 MEQ: 750 CAPSULE, EXTENDED RELEASE ORAL at 16:59

## 2019-04-02 RX ADMIN — INSULIN LISPRO 3 UNITS: 100 INJECTION, SOLUTION INTRAVENOUS; SUBCUTANEOUS at 08:09

## 2019-04-02 RX ADMIN — ACETAMINOPHEN 650 MG: 325 TABLET, FILM COATED ORAL at 08:08

## 2019-04-02 RX ADMIN — DICYCLOMINE HYDROCHLORIDE 10 MG: 10 CAPSULE ORAL at 05:54

## 2019-04-02 RX ADMIN — METOCLOPRAMIDE 5 MG: 10 TABLET ORAL at 07:00

## 2019-04-02 RX ADMIN — FERROUS SULFATE TAB 325 MG (65 MG ELEMENTAL FE) 325 MG: 325 (65 FE) TAB at 16:59

## 2019-04-02 RX ADMIN — ACETAMINOPHEN 650 MG: 325 TABLET, FILM COATED ORAL at 12:46

## 2019-04-02 RX ADMIN — METOCLOPRAMIDE 5 MG: 10 TABLET ORAL at 16:59

## 2019-04-02 RX ADMIN — ESCITALOPRAM OXALATE 5 MG: 10 TABLET ORAL at 08:09

## 2019-04-02 RX ADMIN — OXYCODONE HYDROCHLORIDE 5 MG: 5 TABLET ORAL at 21:18

## 2019-04-02 RX ADMIN — THIAMINE HCL TAB 100 MG 100 MG: 100 TAB at 08:08

## 2019-04-02 RX ADMIN — VITAMIN D, TAB 1000IU (100/BT) 2000 UNITS: 25 TAB at 08:08

## 2019-04-02 RX ADMIN — METOCLOPRAMIDE 5 MG: 10 TABLET ORAL at 10:50

## 2019-04-02 RX ADMIN — DICYCLOMINE HYDROCHLORIDE 10 MG: 10 CAPSULE ORAL at 17:00

## 2019-04-02 RX ADMIN — Medication 1 MG: at 21:27

## 2019-04-02 ASSESSMENT — PAIN DESCRIPTION - PAIN TYPE
TYPE: CHRONIC PAIN
TYPE: ACUTE PAIN;CHRONIC PAIN
TYPE: CHRONIC PAIN

## 2019-04-02 ASSESSMENT — PAIN DESCRIPTION - LOCATION
LOCATION: GENERALIZED
LOCATION: ABDOMEN;GENERALIZED
LOCATION: GENERALIZED

## 2019-04-02 ASSESSMENT — PAIN SCALES - GENERAL
PAINLEVEL_OUTOF10: 6
PAINLEVEL_OUTOF10: 9
PAINLEVEL_OUTOF10: 0
PAINLEVEL_OUTOF10: 0
PAINLEVEL_OUTOF10: 6
PAINLEVEL_OUTOF10: 0
PAINLEVEL_OUTOF10: 3
PAINLEVEL_OUTOF10: 4
PAINLEVEL_OUTOF10: 0

## 2019-04-02 ASSESSMENT — PAIN DESCRIPTION - ONSET: ONSET: ON-GOING

## 2019-04-02 ASSESSMENT — PAIN DESCRIPTION - DESCRIPTORS
DESCRIPTORS: ACHING

## 2019-04-02 ASSESSMENT — PAIN DESCRIPTION - PROGRESSION: CLINICAL_PROGRESSION: NOT CHANGED

## 2019-04-02 ASSESSMENT — PAIN DESCRIPTION - FREQUENCY
FREQUENCY: INTERMITTENT

## 2019-04-02 NOTE — PROGRESS NOTES
[address] [facility]    ACUTE REHABILITATION OCCUPATIONAL THERAPY  DAILY NOTE    Date: 19  Patient Name: Mary Torres      Room: 7147/5354-75    MRN: [id number]   : 1945  (77 y.o.)  Gender: male   Referring Practitioner: Dr. Leon Lewis  Diagnosis: L intercerebral hemorrhage  Additional Pertinent Hx: Alcohol abuse,     Restrictions  Restrictions/Precautions: Seizure, General Precautions, Surgical Protocols, Fall Risk(telesitter)  Implants present? : Metal implants  Required Braces or Orthoses?: No    Subjective  Subjective: \"I feel so dizzy, and a headache\"  Comments: pt cooperative c ADL tasks this AM when given tasks versus options, pt sat EOB for UBB and LBD, pt frequently returns to supine d/t dizziness and fatigue, cooperative to sit EOB c cuing and increased time, pt declines lacey-care or sinkside tasks  Pain Level: 9  Restrictions/Precautions: Seizure;General Precautions;Surgical Protocols; Fall Risk(telesitter)  Overall Orientation Status: Impaired  Orientation Level: Oriented to person;Oriented to situation;Disoriented to place; Disoriented to time  Patient Observation  Observations: telesitter  Pain Assessment  Pain Level: 9    Objective  Cognition  Overall Cognitive Status: Impaired  Arousal/Alertness: Inconsistent responses to stimuli  Following Directions: Follows one step commands  Attention Span: Difficulty attending to directions  Memory: Decreased recall of biographical information;Decreased recall of precautions  Following Commands:  Follows one step commands with repetition  Safety Judgement: Decreased awareness of need for assistance  Insights: Decreased awareness of deficits  Perception  Overall Perceptual Status: Impaired  Initiation: Cues to initiate tasks  Motor Planning: (further assessment warranted)  Balance  Sitting Balance: Supervision(seated EOB)  Standing Balance: Stand by assistance  Bed mobility  Rolling to Left: Supervision  Rolling to Right: Supervision  Supine to Sit: Minimal assistance(hand held A)  Sit to Supine: Supervision  Scooting: Supervision(hips to EOB)  Comment: HOB elevated, bed rails c hand held A  Transfers  Stand Step Transfers: Contact guard assistance(EOB>w/c)  Sit to stand: Stand by assistance  Stand to sit: Stand by assistance  Standing Balance  Time: AM: 1 min; PM: 1 min  Activity: AM: pulling pants over hips/tie pants; PM: stand step transfer EOB>w/c  Sit to stand: Stand by assistance  Stand to sit: Stand by assistance  Comment: no LOB noted              Additional Activities: PM: pt participated in sequencing activities to place cards by suit, then numerically, pt able followed pattern to place beads, pt req minimal cuing for task sequencing but req Mod cuing for initiation, RB's req d/t fatigue, increased time req for tasks throughout                            OT FIM:   Eatin - Feeds self with setup/supervision/cues and/or requires only setup/supervision/cues to perform tube feedings  Groomin - Requires setup/cues to do all tasks(s/u to wash face, vc's for sequencing)  Bathin - Able to bathe all 10 areas with setup/sup/cues(s/u req, vc's for sequencing, declines lacey-care)  Dressing-Upper: 5 - Requires setup/supervision/cues and/or requires assist with presthesis/brace only(s/u, vc's req)  Dressing-Lower: 5 - Requires setup/supervision/cues and/or staff applies TEDS/prosthesis/brace only(A TEDs, s/u req, vc's for tasks, SBA in standing for clothing mgmt)  Toiletin - Did not occur  Toilet Transfer: 0 - Did not occur  Primary Mode: Shower  Tub Transfer: 0 - Activity does not occur  Shower Transfer: 0 - Activity does not occur(pt declines showering, ADL EOB and supine this date)               Assessment  Performance deficits / Impairments: Decreased functional mobility ; Decreased ADL status; Decreased strength;Decreased safe awareness;Decreased cognition;Decreased endurance;Decreased balance;Decreased high-level IADLs  Assessment: encouragement req for participation, pt demo'ing slight agitation in PM session d/t fatigue, pt req MAX encouragement for participation and exiting bed  Prognosis: Good  Discharge Recommendations: 24 hour supervision or assist  Activity Tolerance: Patient Tolerated treatment well;Patient limited by fatigue;Patient limited by pain  Safety Devices in place: Yes  Type of devices: Nurse notified; Left in bed;Patient at risk for falls;Call light within reach; Bed alarm in place(telesitter)  Equipment Recommendations  Equipment Needed: (TBD)       Patient Education:     Patient Education: OT POC, safety, ADL tasks, therapy participation  Barriers to Learning: cognition  Learner:patient  Method: demonstration and explanation       Outcome: needs reinforcement     Plan  Plan  Times per week: 900/7  Times per day: Twice a day  Short term goals  Time Frame for Short term goals: in 1 week pt will   Short term goal 1: demo UB ADLs with set-up and min vcs to attent to task  Short term goal 2: demo LB ADLs with moderate assistance with minimal vcs to attent to task  Short term goal 3: demo toilet transfer with minimal assistance, using least restrictive device  Short term goal 4: demo 20+ minutes of activity participation during functional activity to increase participation in ADLs and functional mobility   Long term goals  Time Frame for Long term goals : by d/c pt will  Long term goal 1: demo BADLs with set-up for safety   Long term goal 2: participate in functional tasks for 30+ minutes with min verbal cueing to redirect attention  Long term goal 3: demo functional transfers with set-up  Long term goal 4: demo safety awareness during all ADLs/functional mobility with minimal verbal cueing        04/02/19 1234 04/02/19 1402   OT Individual Minutes   Time In [facility]   Minutes 31 30     Electronically signed by EFE Brizuela on 4/2/19 at 3:22 PM
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
## 2019-04-02 NOTE — PROGRESS NOTES
Reason for Follow up: REGGIE. HISTORY:    Feels well. Creatinine improving. SNa 137. Review Of Systems:   Constitutional: No fever, chills, lethargy, weakness or wt loss. Cardiac:  No chest pain, dyspnea, orthopnea or PND. Pulmonary:  No cough, phlegm or wheezing. Abdomen:  No abdominal pain, nausea, vomiting or diarrhea. :   No hematuria, pyuria, dysuria or flank pain. Extremities:  No swelling or joint pains. Scheduled Meds:   vitamin D3  2,000 Units Oral Daily    magnesium oxide  400 mg Oral BID    dicyclomine  10 mg Oral TID AC    metoclopramide  5 mg Oral TID AC    escitalopram  5 mg Oral Daily    sucralfate  1 g Oral 4 times per day    donepezil  5 mg Oral Nightly    amLODIPine  10 mg Oral Daily    ferrous sulfate  325 mg Oral BID WC    folic acid  1 mg Oral Daily    insulin lispro  0-18 Units Subcutaneous TID WC    insulin lispro  0-9 Units Subcutaneous Nightly    levETIRAcetam  500 mg Oral BID    pantoprazole  40 mg Oral BID AC    polyethylene glycol  17 g Oral Daily    vitamin B-1  100 mg Oral Daily   Continuous Infusions:   dextrose       PRN Meds:melatonin ER, labetalol, bisacodyl, glucose, dextrose, glucagon (rDNA), dextrose, oxyCODONE **OR** [DISCONTINUED] oxyCODONE, lidocaine PF, acetaminophen    Allergies   Allergen Reactions    Celebrex [Celecoxib] Swelling       Physical Exam:  Blood pressure 136/78, pulse 78, temperature 97.7 °F (36.5 °C), temperature source Oral, resp. rate 16, height 5' 7\" (1.702 m), weight 134 lb 3.2 oz (60.9 kg), SpO2 97 %. In: 1250 [P.O.:1250]  Out: -   In: 1250   Out: -     General:  Awake, alert, not in distress. Appears to be stated age. HEENT: Atraumatic, normocephalic. Anicteric sclera. Pink and moist oral mucosa. No JVD. Chest: Bilateral air entry, clear to auscultation, no wheezing, rhonchi or rales. Cardiovascular: RRR, S1S2, no murmur, rub or gallop. No lower extremity edema. Abdomen: Soft, non tender to palpation.  Active bowel sounds x 4 quadrants. Musculoskeletal: Active ROM. No cyanosis or clubbing. Integumentary: Pink, warm and dry. Free from rash or lesions. Skin turgor normal.  CNS: Speech clear. Face symmetrical. No tremor. Data:  CBC:   Lab Results   Component Value Date    WBC 8.8 04/02/2019    HGB 10.0 (L) 04/02/2019    HCT 30.3 (L) 04/02/2019    MCV 91.6 04/02/2019     04/02/2019     BMP:    Lab Results   Component Value Date     04/02/2019    K 3.6 (L) 04/02/2019    CL 99 04/02/2019    CO2 24 04/02/2019    BUN 23 04/02/2019    CREATININE 1.13 04/02/2019    GLUCOSE 208 (H) 04/02/2019     CMP:   Lab Results   Component Value Date     04/02/2019    K 3.6 (L) 04/02/2019    CL 99 04/02/2019    CO2 24 04/02/2019    BUN 23 04/02/2019    CREATININE 1.13 04/02/2019    GLUCOSE 208 (H) 04/02/2019    CALCIUM 8.9 04/02/2019    PROT 5.8 (L) 03/28/2019    LABALBU 2.7 (L) 03/28/2019    BILITOT 0.61 03/28/2019    ALKPHOS 77 03/28/2019    AST 12 03/28/2019    ALT 8 03/28/2019      Hepatic:   Lab Results   Component Value Date    AST 12 03/28/2019    ALT 8 03/28/2019    BILITOT 0.61 03/28/2019    ALKPHOS 77 03/28/2019     BNP: No results found for: BNP  Lipids: No results found for: CHOL, HDL  INR:   Lab Results   Component Value Date    INR 1.2 03/26/2019     PTH: No results found for: PTH  Phosphorus:    Lab Results   Component Value Date    PHOS 3.4 04/02/2019     Ionized Calcium: No results found for: IONCA  Magnesium:   Lab Results   Component Value Date    MG 1.5 (L) 04/02/2019     Albumin:   Lab Results   Component Value Date    LABALBU 2.7 (L) 03/28/2019     Last 3 CK, CKMB, Troponin: @LABRCNT(CKTOTAL:3,CKMB:3,TROPONINI:3)       URINE:)No results found for: Liana Carmichael    Radiology:   Reviewed. Assessment:  1. Acute kidney injury, ATN. Renal function is improving. 2. Hypokalemia. 3. HTN with worsening BP when in pain. 4. Intracranial hemorrhage. 5. Anemia. 6. Borderline hypernatremia, improved.   7. Hypomagnesemia and hypovitaminosis D.  8. Bilateral nephrolithiasis. Plan:  Replete potassium, magnesium and vitamin D.  BP stable. Continue protein supplements. Avoid hypotension, nephrotoxic drugs, Lovenox, Fleets enema and IV contrast exposure. Follow up labs ordered for AM.     Please do not hesitate to call with questions. We will follow with you. Electronically signed by Shayan Dixon MD on 4/2/2019 at 2:02 PM  [facility].   Ph: [phone]

## 2019-04-02 NOTE — PLAN OF CARE
Problem: Risk for Impaired Skin Integrity  Goal: Tissue integrity - skin and mucous membranes  Description  Structural intactness and normal physiological function of skin and  mucous membranes. Outcome: Met This Shift   Skin assessment completed this shift. Nutrition and Hydration status assessed with adequate intake. Osito Score as charted. Pressure Relief Overlay remains intact and inflated to patient's bed throughout the shift. Bilateral heels remain elevated on pillows throughout the shift. Patient able to reposition self for comfort and to prevent breakdown. Patient verbalizes understanding of pressure ulcer prevention measures. Skin integrity maintained. No new skin breakdown noted. Skin to high risk pressure areas including coccyx and heels are clear. Darby / Incontinence care provided as needed throughout the shift. Zinc Moisture Barrier ointment applied to buttocks as a preventative measure. Problem: Falls - Risk of:  Goal: Will remain free from falls  Description  Will remain free from falls  Outcome: Met This Shift  No falls or injuries sustained at this time. No attempts to get out of bed without nursing assistance. Call light within reach and pt. uses sometimes appropriately for assistance. Siderails up x 2. Nonskid footwear remains on. Bed in low and locked position. Hourly nursing rounds made. Pt. Alert and oriented, but has intermittent confusion, aware of limitations, and exhibits good safety judgement. Pt. uses assistive devices appropriately ( walker with one assist). Pt. understands individual fall risk factors. Pt. reoriented to surroundings and reminded to use call light with each nurse/patient interaction. Pt. room located close to nurse's station. Bed alarm / Personal alarm remains engaged throughout the shift as a precaution. Tele-sitter also placed in pts room as an added precaution.     Problem: Pain:  Goal: Pain level will decrease  Description  Pain level
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
## 2019-04-02 NOTE — PLAN OF CARE
Problem: Risk for Impaired Skin Integrity  Goal: Tissue integrity - skin and mucous membranes  Description  Structural intactness and normal physiological function of skin and  mucous membranes. Outcome: Ongoing   No new altered skin this shift. Will continue to monitor. Problem: Falls - Risk of:  Goal: Will remain free from falls  Description  Will remain free from falls  Outcome: Ongoing   No falls this shift. Problem: Falls - Risk of:  Goal: Absence of physical injury  Description  Absence of physical injury  Outcome: Ongoing   No injury this shift. Problem: Pain:  Goal: Pain level will decrease  Description  Pain level will decrease  Outcome: Ongoing   PRN pain medication helpful- roxicodone and tylenol for generalized pain and headache. Problem: Nutrition  Goal: Optimal nutrition therapy  Outcome: Ongoing   Pt continues to have poor intake this shift. Pt continues to have ensure encouraged each meal and bites of food.
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
## 2019-04-02 NOTE — PROGRESS NOTES
Nutrition Assessment    Type and Reason for Visit: Reassess    Nutrition Recommendations: Continue General diet and Ensure Enlive 4x/day. Start 3 day Calorie Count. Nutrition Assessment: Patient continues to be declining from a nutritional standpoint as evidence by poor p.o intakes and weight loss. Ensure Enlive intake is good. Will start Calorie Count for 3 days to evaluate need for alternative nutrition therapy- enteral nutrition. Continue General diet and Ensure Enlive 4x/day. Will monitor p.o intakes. Staff to continue encouragement of oral intake. Malnutrition Assessment:  · Malnutrition Status: Meets the criteria for severe malnutrition  · Context: Acute illness or injury  · Findings of the 6 clinical characteristics of malnutrition (Minimum of 2 out of 6 clinical characteristics is required to make the diagnosis of moderate or severe Protein Calorie Malnutrition based on AND/ASPEN Guidelines):  1. Energy Intake-Less than or equal to 50% of estimated energy requirement, Greater than or equal to 7 days    2. Weight Loss-5% loss or greater, (x 3 weeks at time of admission; additional loss noted)  3. Fat Loss-Mild subcutaneous fat loss, Orbital  4. Muscle Loss-Mild muscle mass loss, Temples (temporalis muscle)  5. Fluid Accumulation-Mild fluid accumulation, Extremities  6.  Strength-Not measured    Nutrition Risk Level: High    Nutrient Needs:  · Estimated Daily Total Kcal: 7028-1226 based on Admission wt with [employer] with 1.2-1.3 factor  · Estimated Daily Protein (g): 76-95 based on 1.2-1.5 gm per kg of admission wt    Nutrition Diagnosis:   · Problem: Inadequate oral intake  · Etiology: related to Cognitive or neurological impairment, Acute injury/trauma     Signs and symptoms:  as evidenced by Intake 0-25%, Weight loss, Mild loss of subcutaneous fat, Mild muscle loss    Objective Information:  · Nutrition-Focused Physical Findings: Loss of appetite. Edema: +1 RLE, LLE.    · Wound Type: Surgical Wound(Incision to head)  · Current Nutrition Therapies:  · Oral Diet Orders: General   · Oral Diet intake: 1-25%, 0%  · Oral Nutrition Supplement (ONS) Orders: Standard High Calorie Oral Supplement  · ONS intake: %, 51-75%  · Anthropometric Measures:  · Ht: 5' 7\" (170.2 cm)   · Current Body Wt: 134 lb 4.2 oz (60.9 kg)  · Admission Body Wt: 139 lb 15.9 oz (63.5 kg)  · Usual Body Wt: 149 lb 14.6 oz (68 kg)(wt listed as 3-8-19, but unsure if date accurate)  · % Weight Change:  ,  4% in 1 week; 10% in 1 month if wts and dates accurate  · Ideal Body Wt: 148 lb (67.1 kg), % Ideal Body 91%  · BMI Classification: BMI 18.5 - 24.9 Normal Weight    Nutrition Interventions:   Continue current diet, Continue current ONS, Start Calorie Count  Continued Inpatient Monitoring    Nutrition Evaluation:   · Evaluation: Progressing toward goals(taking supplements well)   · Goals: PO intake % of meals and supplements    · Monitoring: Meal Intake, Supplement Intake, Weight, Pertinent Labs, Monitor Bowel Function, Diet Tolerance, Skin Integrity, I&O, Chewing/Swallowing      [place] Allyssa ANDRADE REffieD, L.D,  Clinical Dietitian  Cell# [phone]
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
## 2019-04-03 LAB
ABSOLUTE EOS #: 0.2 K/UL (ref 0–0.4)
ABSOLUTE IMMATURE GRANULOCYTE: ABNORMAL K/UL (ref 0–0.3)
ABSOLUTE LYMPH #: 1.1 K/UL (ref 1–4.8)
ABSOLUTE MONO #: 0.6 K/UL (ref 0.1–1.3)
ANION GAP SERPL CALCULATED.3IONS-SCNC: 13 MMOL/L (ref 9–17)
BASOPHILS # BLD: 1 % (ref 0–2)
BASOPHILS ABSOLUTE: 0.1 K/UL (ref 0–0.2)
BUN BLDV-MCNC: 22 MG/DL (ref 8–23)
BUN/CREAT BLD: ABNORMAL (ref 9–20)
CALCIUM SERPL-MCNC: 8.7 MG/DL (ref 8.6–10.4)
CHLORIDE BLD-SCNC: 102 MMOL/L (ref 98–107)
CO2: 24 MMOL/L (ref 20–31)
CREAT SERPL-MCNC: 1.2 MG/DL (ref 0.7–1.2)
DIFFERENTIAL TYPE: ABNORMAL
EOSINOPHILS RELATIVE PERCENT: 2 % (ref 0–4)
GFR AFRICAN AMERICAN: >60 ML/MIN
GFR NON-AFRICAN AMERICAN: 60 ML/MIN
GFR SERPL CREATININE-BSD FRML MDRD: ABNORMAL ML/MIN/{1.73_M2}
GFR SERPL CREATININE-BSD FRML MDRD: ABNORMAL ML/MIN/{1.73_M2}
GLUCOSE BLD-MCNC: 114 MG/DL (ref 75–110)
GLUCOSE BLD-MCNC: 158 MG/DL (ref 75–110)
GLUCOSE BLD-MCNC: 159 MG/DL (ref 75–110)
GLUCOSE BLD-MCNC: 179 MG/DL (ref 70–99)
GLUCOSE BLD-MCNC: 181 MG/DL (ref 75–110)
HCT VFR BLD CALC: 31.9 % (ref 41–53)
HEMOGLOBIN: 10.5 G/DL (ref 13.5–17.5)
IMMATURE GRANULOCYTES: ABNORMAL %
LYMPHOCYTES # BLD: 12 % (ref 24–44)
MAGNESIUM: 1.7 MG/DL (ref 1.6–2.6)
MCH RBC QN AUTO: 30.2 PG (ref 26–34)
MCHC RBC AUTO-ENTMCNC: 33 G/DL (ref 31–37)
MCV RBC AUTO: 91.5 FL (ref 80–100)
MONOCYTES # BLD: 7 % (ref 1–7)
NRBC AUTOMATED: ABNORMAL PER 100 WBC
PDW BLD-RTO: 15.6 % (ref 11.5–14.9)
PHOSPHORUS: 3.4 MG/DL (ref 2.5–4.5)
PLATELET # BLD: 322 K/UL (ref 150–450)
PLATELET ESTIMATE: ABNORMAL
PMV BLD AUTO: 8.8 FL (ref 6–12)
POTASSIUM SERPL-SCNC: 4.2 MMOL/L (ref 3.7–5.3)
RBC # BLD: 3.48 M/UL (ref 4.5–5.9)
RBC # BLD: ABNORMAL 10*6/UL
SEG NEUTROPHILS: 78 % (ref 36–66)
SEGMENTED NEUTROPHILS ABSOLUTE COUNT: 6.8 K/UL (ref 1.3–9.1)
SODIUM BLD-SCNC: 139 MMOL/L (ref 135–144)
WBC # BLD: 8.7 K/UL (ref 3.5–11)
WBC # BLD: ABNORMAL 10*3/UL

## 2019-04-03 PROCEDURE — 82947 ASSAY GLUCOSE BLOOD QUANT: CPT

## 2019-04-03 PROCEDURE — 36415 COLL VENOUS BLD VENIPUNCTURE: CPT

## 2019-04-03 PROCEDURE — 97530 THERAPEUTIC ACTIVITIES: CPT

## 2019-04-03 PROCEDURE — 80048 BASIC METABOLIC PNL TOTAL CA: CPT

## 2019-04-03 PROCEDURE — 6370000000 HC RX 637 (ALT 250 FOR IP): Performed by: STUDENT IN AN ORGANIZED HEALTH CARE EDUCATION/TRAINING PROGRAM

## 2019-04-03 PROCEDURE — 6370000000 HC RX 637 (ALT 250 FOR IP): Performed by: NURSE PRACTITIONER

## 2019-04-03 PROCEDURE — 97535 SELF CARE MNGMENT TRAINING: CPT

## 2019-04-03 PROCEDURE — 84100 ASSAY OF PHOSPHORUS: CPT

## 2019-04-03 PROCEDURE — 6370000000 HC RX 637 (ALT 250 FOR IP): Performed by: PHYSICAL MEDICINE & REHABILITATION

## 2019-04-03 PROCEDURE — 6370000000 HC RX 637 (ALT 250 FOR IP): Performed by: INTERNAL MEDICINE

## 2019-04-03 PROCEDURE — 99232 SBSQ HOSP IP/OBS MODERATE 35: CPT | Performed by: PHYSICAL MEDICINE & REHABILITATION

## 2019-04-03 PROCEDURE — 85025 COMPLETE CBC W/AUTO DIFF WBC: CPT

## 2019-04-03 PROCEDURE — 83735 ASSAY OF MAGNESIUM: CPT

## 2019-04-03 PROCEDURE — 1180000000 HC REHAB R&B

## 2019-04-03 PROCEDURE — 99231 SBSQ HOSP IP/OBS SF/LOW 25: CPT | Performed by: INTERNAL MEDICINE

## 2019-04-03 PROCEDURE — 97127 HC SP THER IVNTJ W/FOCUS COG FUNCJ: CPT

## 2019-04-03 RX ADMIN — METOCLOPRAMIDE 5 MG: 10 TABLET ORAL at 11:59

## 2019-04-03 RX ADMIN — OXYCODONE HYDROCHLORIDE 5 MG: 5 TABLET ORAL at 07:57

## 2019-04-03 RX ADMIN — PANTOPRAZOLE SODIUM 40 MG: 40 TABLET, DELAYED RELEASE ORAL at 06:04

## 2019-04-03 RX ADMIN — THIAMINE HCL TAB 100 MG 100 MG: 100 TAB at 09:12

## 2019-04-03 RX ADMIN — Medication 800 MG: at 19:52

## 2019-04-03 RX ADMIN — LEVETIRACETAM 500 MG: 500 TABLET, FILM COATED ORAL at 19:52

## 2019-04-03 RX ADMIN — AMLODIPINE BESYLATE 10 MG: 10 TABLET ORAL at 09:12

## 2019-04-03 RX ADMIN — DICYCLOMINE HYDROCHLORIDE 10 MG: 10 CAPSULE ORAL at 06:05

## 2019-04-03 RX ADMIN — FERROUS SULFATE TAB 325 MG (65 MG ELEMENTAL FE) 325 MG: 325 (65 FE) TAB at 17:01

## 2019-04-03 RX ADMIN — OXYCODONE HYDROCHLORIDE 5 MG: 5 TABLET ORAL at 19:41

## 2019-04-03 RX ADMIN — FOLIC ACID 1 MG: 1 TABLET ORAL at 09:12

## 2019-04-03 RX ADMIN — INSULIN LISPRO 3 UNITS: 100 INJECTION, SOLUTION INTRAVENOUS; SUBCUTANEOUS at 09:12

## 2019-04-03 RX ADMIN — LEVETIRACETAM 500 MG: 500 TABLET, FILM COATED ORAL at 09:12

## 2019-04-03 RX ADMIN — Medication 800 MG: at 09:12

## 2019-04-03 RX ADMIN — VITAMIN D, TAB 1000IU (100/BT) 2000 UNITS: 25 TAB at 09:12

## 2019-04-03 RX ADMIN — DICYCLOMINE HYDROCHLORIDE 10 MG: 10 CAPSULE ORAL at 17:02

## 2019-04-03 RX ADMIN — INSULIN LISPRO 3 UNITS: 100 INJECTION, SOLUTION INTRAVENOUS; SUBCUTANEOUS at 13:05

## 2019-04-03 RX ADMIN — DONEPEZIL HYDROCHLORIDE 5 MG: 5 TABLET, FILM COATED ORAL at 19:52

## 2019-04-03 RX ADMIN — POLYETHYLENE GLYCOL 3350 17 G: 17 POWDER, FOR SOLUTION ORAL at 09:12

## 2019-04-03 RX ADMIN — SUCRALFATE 1 G: 1 TABLET ORAL at 17:02

## 2019-04-03 RX ADMIN — PANTOPRAZOLE SODIUM 40 MG: 40 TABLET, DELAYED RELEASE ORAL at 17:01

## 2019-04-03 RX ADMIN — DICYCLOMINE HYDROCHLORIDE 10 MG: 10 CAPSULE ORAL at 11:59

## 2019-04-03 RX ADMIN — SUCRALFATE 1 G: 1 TABLET ORAL at 13:05

## 2019-04-03 RX ADMIN — MIRTAZAPINE 15 MG: 15 TABLET, ORALLY DISINTEGRATING ORAL at 19:52

## 2019-04-03 RX ADMIN — METOCLOPRAMIDE 5 MG: 10 TABLET ORAL at 06:04

## 2019-04-03 RX ADMIN — FERROUS SULFATE TAB 325 MG (65 MG ELEMENTAL FE) 325 MG: 325 (65 FE) TAB at 09:12

## 2019-04-03 RX ADMIN — SUCRALFATE 1 G: 1 TABLET ORAL at 06:04

## 2019-04-03 RX ADMIN — METOCLOPRAMIDE 5 MG: 10 TABLET ORAL at 17:01

## 2019-04-03 ASSESSMENT — PAIN SCALES - GENERAL
PAINLEVEL_OUTOF10: 4
PAINLEVEL_OUTOF10: 8
PAINLEVEL_OUTOF10: 3
PAINLEVEL_OUTOF10: 6
PAINLEVEL_OUTOF10: 1
PAINLEVEL_OUTOF10: 8
PAINLEVEL_OUTOF10: 8

## 2019-04-03 ASSESSMENT — PAIN DESCRIPTION - FREQUENCY: FREQUENCY: CONTINUOUS

## 2019-04-03 ASSESSMENT — PAIN DESCRIPTION - LOCATION
LOCATION: GENERALIZED
LOCATION: GENERALIZED

## 2019-04-03 ASSESSMENT — PAIN DESCRIPTION - PAIN TYPE: TYPE: ACUTE PAIN

## 2019-04-03 ASSESSMENT — PAIN DESCRIPTION - ONSET: ONSET: UNABLE TO TELL

## 2019-04-03 NOTE — CARE COORDINATION
Writer spoke to the pt son, malou, about d/c plan. Writer informed malou that it was the recommendation of the staff that someone should be with the pt 24/7. Raya Keyana agrees. However, his sister does not want the pt go to a snf. She will stay with him. Writer suggested she come and spend the day. Raya Ridley stated he will call her and inform her.

## 2019-04-03 NOTE — PROGRESS NOTES
Reason for Follow up: REGGIE. HISTORY:    Feels well. Creatinine showed no change  No new complaints    Review Of Systems:   Constitutional: No fever, chills, lethargy, weakness or wt loss. Cardiac:  No chest pain, dyspnea, orthopnea or PND. Pulmonary:  No cough, phlegm or wheezing. Abdomen:  No abdominal pain, nausea, vomiting or diarrhea. :   No hematuria, pyuria, dysuria or flank pain. Extremities:  No swelling or joint pains. Scheduled Meds:   magnesium oxide  800 mg Oral BID    mirtazapine  15 mg Oral Nightly    vitamin D3  2,000 Units Oral Daily    dicyclomine  10 mg Oral TID AC    metoclopramide  5 mg Oral TID AC    sucralfate  1 g Oral 4 times per day    donepezil  5 mg Oral Nightly    amLODIPine  10 mg Oral Daily    ferrous sulfate  325 mg Oral BID WC    folic acid  1 mg Oral Daily    insulin lispro  0-18 Units Subcutaneous TID WC    insulin lispro  0-9 Units Subcutaneous Nightly    levETIRAcetam  500 mg Oral BID    pantoprazole  40 mg Oral BID AC    polyethylene glycol  17 g Oral Daily    vitamin B-1  100 mg Oral Daily   Continuous Infusions:   dextrose       PRN Meds:melatonin ER, labetalol, bisacodyl, glucose, dextrose, glucagon (rDNA), dextrose, oxyCODONE **OR** [DISCONTINUED] oxyCODONE, lidocaine PF, acetaminophen    Allergies   Allergen Reactions    Celebrex [Celecoxib] Swelling       Physical Exam:  Blood pressure 130/80, pulse 76, temperature 97.9 °F (36.6 °C), temperature source Oral, resp. rate 16, height 5' 7\" (1.702 m), weight 134 lb 3.2 oz (60.9 kg), SpO2 98 %. In: 450 [P.O.:450]  Out: -   In: 450   Out: -     General:  Awake, alert, not in distress. Appears to be stated age. HEENT: Atraumatic, normocephalic. Anicteric sclera. Pink and moist oral mucosa. No JVD. Chest: Bilateral air entry, clear to auscultation, no wheezing, rhonchi or rales. Cardiovascular: RRR, S1S2, no murmur, rub or gallop. No lower extremity edema.     Abdomen: Soft, non tender to palpation. Musculoskeletal:  No cyanosis or clubbing. Integumentary: Pink, warm and dry. Free from rash or lesions. Skin turgor normal.  CNS: Speech clear. Face symmetrical. No tremor. Data:  CBC:   Lab Results   Component Value Date    WBC 8.7 04/03/2019    HGB 10.5 (L) 04/03/2019    HCT 31.9 (L) 04/03/2019    MCV 91.5 04/03/2019     04/03/2019     BMP:    Lab Results   Component Value Date     04/03/2019    K 4.2 04/03/2019     04/03/2019    CO2 24 04/03/2019    BUN 22 04/03/2019    CREATININE 1.20 04/03/2019    GLUCOSE 179 (H) 04/03/2019     CMP:   Lab Results   Component Value Date     04/03/2019    K 4.2 04/03/2019     04/03/2019    CO2 24 04/03/2019    BUN 22 04/03/2019    CREATININE 1.20 04/03/2019    GLUCOSE 179 (H) 04/03/2019    CALCIUM 8.7 04/03/2019    PROT 5.8 (L) 03/28/2019    LABALBU 2.7 (L) 03/28/2019    BILITOT 0.61 03/28/2019    ALKPHOS 77 03/28/2019    AST 12 03/28/2019    ALT 8 03/28/2019      Hepatic:   Lab Results   Component Value Date    AST 12 03/28/2019    ALT 8 03/28/2019    BILITOT 0.61 03/28/2019    ALKPHOS 77 03/28/2019     BNP: No results found for: BNP  Lipids: No results found for: CHOL, HDL  INR:   Lab Results   Component Value Date    INR 1.2 03/26/2019     PTH: No results found for: PTH  Phosphorus:    Lab Results   Component Value Date    PHOS 3.4 04/03/2019     Ionized Calcium: No results found for: IONCA  Magnesium:   Lab Results   Component Value Date    MG 1.7 04/03/2019     Albumin:   Lab Results   Component Value Date    LABALBU 2.7 (L) 03/28/2019     Last 3 CK, CKMB, Troponin: @LABRCNT(CKTOTAL:3,CKMB:3,TROPONINI:3)       URINE:)No results found for: Eliazar Barnett    Radiology:   Reviewed. Assessment:  1. Acute kidney injury, ATN. Renal function is improving. 2. Hypokalemia. 3. HTN with worsening BP when in pain. 4. Intracranial hemorrhage. 5. Anemia. 6. Borderline hypernatremia, improved.   7. Hypomagnesemia and hypovitaminosis D.  8. Bilateral nephrolithiasis. Plan:  Electrolytes Ok  BP stable. Monitor renal function and electrolytes closley  Continue protein supplements. Avoid hypotension, nephrotoxic drugs, Lovenox, Fleets enema and IV contrast exposure. Follow up labs ordered for AM.     Please do not hesitate to call with questions. We will follow with you. Electronically signed by Vince Rinaldi MD on 4/3/2019 at 1:20 PM  [facility].   Ph: [phone]
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
## 2019-04-03 NOTE — PLAN OF CARE
Nutrition Problem: Inadequate oral intake  Intervention: Food and/or Nutrient Delivery: Continue current diet, Modify current ONS, Start Calorie Count  Nutritional Goals: PO intake % of meals and supplements

## 2019-04-03 NOTE — PROGRESS NOTES
Physical Therapy  DATE: 4/3/2019    NAME: Jair Jeronimo  MRN: [id number]   : 1945    Patient not seen this date for Physical Therapy due to:  [] Blood transfusion in progress  [] Hemodialysis  [x]  Patient Declined  [] Spine Precautions   [] Strict Bedrest  [] Surgery/ Procedure  [] Testing      [] Other Pt refuses PT this AM despite multiple attempts/max encouragement. [] PT being discontinued at this time. Patient independent. No further needs. [] PT being discontinued at this time as the patient has been transferred to palliative care. No further needs.     James Michelle, HENRY

## 2019-04-03 NOTE — PROGRESS NOTES
head)  · Current Nutrition Therapies:  · Oral Diet Orders: General   · Oral Diet intake: 1-25%, 0%  · Oral Nutrition Supplement (ONS) Orders: Standard High Calorie Oral Supplement  · ONS intake: 0%  · Anthropometric Measures:  · Ht: 5' 7\" (170.2 cm)   · Current Body Wt: 134 lb 4.2 oz (60.9 kg)  · Admission Body Wt: 139 lb 15.9 oz (63.5 kg)  · Usual Body Wt: 149 lb 14.6 oz (68 kg)(wt listed as 3-8-19, but unsure if date accurate)  · % Weight Change:  ,  4% in 1 week; 10% in 1 month if wts and dates accurate  · Ideal Body Wt: 148 lb (67.1 kg), % Ideal Body 91%  · BMI Classification: BMI 18.5 - 24.9 Normal Weight    Nutrition Interventions:   Continue current diet, Modify current ONS, Start Calorie Count  Continued Inpatient Monitoring    Nutrition Evaluation:   · Evaluation: No progress toward goals   · Goals: PO intake % of meals and supplements    · Monitoring: Meal Intake, Supplement Intake, Weight, Pertinent Labs, Monitor Bowel Function, Diet Tolerance, Skin Integrity, I&O, Chewing/Swallowing      KINDRA Bennett R.D.   Clinical Dietitian  Pager: [phone]

## 2019-04-03 NOTE — PLAN OF CARE
Problem: Risk for Impaired Skin Integrity  Goal: Tissue integrity - skin and mucous membranes  Description  Structural intactness and normal physiological function of skin and  mucous membranes. Outcome: Ongoing   No new skin issues this shift. Skin integrity maintained. Safety maintained this shift. Will continue to monitor. Problem: Falls - Risk of:  Goal: Will remain free from falls  Description  Will remain free from falls  Outcome: Ongoing   Patient remains free from falls/injuries at this time. Call light within reach. Safety maintained this shift. Will continue to monitor. Problem: Pain:  Goal: Pain level will decrease  Description  Pain level will decrease  Outcome: Ongoing   Pain controlled with prn and ATC pain medications this shift. Safety maintained this shift. Will continue to monitor.
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
## 2019-04-03 NOTE — PROGRESS NOTES
[address] [facility]    ACUTE REHABILITATION OCCUPATIONAL THERAPY  DAILY NOTE    Date: 4/3/19  Patient Name: Kylie Fire      Room: 9240/0347-07    MRN: [id number]   : 1945  (77 y.o.)  Gender: male   Referring Practitioner: Dr. Yefri Quintero  Diagnosis: L intercerebral hemorrhage  Additional Pertinent Hx: Alcohol abuse,     Restrictions  Restrictions/Precautions: Seizure, General Precautions, Surgical Protocols, Fall Risk(telesitter)  Implants present? : Metal implants  Required Braces or Orthoses?: No    Subjective  Subjective: \"I don't feel good\" pt reports abdominal pain/discomfort throughout session, NSG notified and enters to administer pain meds  Comments: pt cooperative c ADL tasks this AM when given tasks versus options, pt agreeable to showering this AM, s/u req, vc's for initiation and task sequencing, SBA in standing c vc's for safety and UE support, no LOB noted  Pain Level: 8  Restrictions/Precautions: Seizure;General Precautions;Surgical Protocols; Fall Risk(telesitter)  Overall Orientation Status: Impaired  Orientation Level: Oriented to person;Oriented to situation;Disoriented to place; Disoriented to time  Patient Observation  Observations: Telesitter; Impulsive at times  Pain Assessment  Pain Level: 8    Objective  Cognition  Overall Cognitive Status: Impaired  Arousal/Alertness: Inconsistent responses to stimuli  Following Directions: Follows one step commands  Attention Span: Difficulty attending to directions  Memory: Decreased recall of biographical information;Decreased recall of precautions  Following Commands:  Follows one step commands with repetition  Safety Judgement: Decreased awareness of need for assistance  Insights: Decreased awareness of deficits  Perception  Overall Perceptual Status: Impaired  Initiation: Cues to initiate tasks  Balance  Sitting Balance: Supervision  Standing Balance: Stand by assistance  Bed mobility  Rolling to Left: Supervision  Rolling to Right: Supervision  Supine to Sit: Stand by assistance  Sit to Supine: Stand by assistance  Scooting: Stand by assistance  Comment: HOB elevated, vc's for tech c F return  Transfers  Stand Step Transfers: Stand by assistance(R/W)  Sit to stand: Stand by assistance  Stand to sit: Stand by assistance  Transfer Comments: vc's req for tech  Standing Balance  Time: AM: 1-2 min x5; PM: 1 min x2  Activity: AM: functional mobility/transfers in room/bathroom, ADL tasks; PM: functional transfers in room, EOB<>w/c  Sit to stand: Stand by assistance  Stand to sit: Stand by assistance  Comment: no LOB noted  Functional Mobility  Functional - Mobility Device: Rolling Walker  Activity: To/from bathroom  Assist Level: Stand by assistance(R/W)  Functional Mobility Comments: slow pace using R/W, V/T cues for safety/tech, vc's for visual scanning           Additional Activities: PM: pt participated in [employer] board to address sequencing, visual scanning and task tolerance, pt req min-mod vc's for visual scanning to locate numbered peg, min vc's for correct sequencing, pt fatigued req increased time for task participation                            OT FIM:   Eatin - Feeds self with setup/supervision/cues and/or requires only setup/supervision/cues to perform tube feedings  Groomin - Requires setup/cues to do all tasks(s/u to wash face/comb hair, vc's req)  Bathin - Able to bathe all 10 areas with setup/sup/cues(s/u, SBA in standing, vc's req for sequencing)  Dressing-Upper: 5 - Requires setup/supervision/cues and/or requires assist with presthesis/brace only(s/u, pt able to don/doff [place] shirt)  Dressing-Lower: 5 - Requires setup/supervision/cues and/or staff applies TEDS/prosthesis/brace only(A TEDs, s/u req, vc's for initiation, SBA in standing)  Toiletin - Requires setup/supervision/cues  Toilet Transfer: 5 - Requires setup/supervision/cues  Primary Mode: Shower  Tub Transfer: 0 - Activity does not occur  Shower Transfer: 4 - Minimal contact assistance, pt. expends 75% or more effort(CGA over threshold, V/T cues for R/W safety/tech)               Assessment  Performance deficits / Impairments: Decreased functional mobility ; Decreased ADL status; Decreased strength;Decreased safe awareness;Decreased cognition;Decreased endurance;Decreased balance;Decreased high-level IADLs  Assessment: encouragement req for participation, pt agreeable to ADL tasks, increased time req c V/T cues for task sequencing/safety and mobility  Prognosis: Good  Discharge Recommendations: 24 hour supervision or assist  Activity Tolerance: Patient Tolerated treatment well;Patient limited by fatigue;Patient limited by pain;Treatment limited secondary to decreased cognition  Activity Tolerance: pt agreeable c encouragement  Safety Devices in place: Yes  Type of devices: Nurse notified; Left in chair;Patient at risk for falls; Chair alarm in place;Call light within reach(telesitter)  Equipment Recommendations  Equipment Needed: (TBD)       Patient Education:     Patient Education: OT POC, safety, ADL tasks, therapy participation  Barriers to Learning: cognition  Learner:patient  Method: demonstration and explanation       Outcome: needs reinforcement     Plan  Plan  Times per week: 900/7  Times per day: Twice a day  Short term goals  Time Frame for Short term goals: in 1 week pt will   Short term goal 1: demo UB ADLs with set-up and min vcs to attent to task  Short term goal 2: demo LB ADLs with moderate assistance with minimal vcs to attent to task  Short term goal 3: demo toilet transfer with minimal assistance, using least restrictive device  Short term goal 4: demo 20+ minutes of activity participation during functional activity to increase participation in ADLs and functional mobility   Long term goals  Time Frame for Long term goals : by d/c pt will  Long term goal 1: demo BADLs with set-up for safety   Long term goal 2: participate in functional tasks for 30+ minutes with min verbal cueing to redirect attention  Long term goal 3: demo functional transfers with set-up  Long term goal 4: demo safety awareness during all ADLs/functional mobility with minimal verbal cueing        04/03/19 1238 04/03/19 1504   OT Individual Minutes   Time In 1238 0740   Time Out 1302 0830   Minutes 24 50     Electronically signed by EFE Umaña on 4/3/19 at 3:08 PM
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
## 2019-04-04 LAB
ABSOLUTE EOS #: 0.2 K/UL (ref 0–0.4)
ABSOLUTE IMMATURE GRANULOCYTE: ABNORMAL K/UL (ref 0–0.3)
ABSOLUTE LYMPH #: 1 K/UL (ref 1–4.8)
ABSOLUTE MONO #: 0.7 K/UL (ref 0.1–1.3)
ANION GAP SERPL CALCULATED.3IONS-SCNC: 13 MMOL/L (ref 9–17)
BASOPHILS # BLD: 1 % (ref 0–2)
BASOPHILS ABSOLUTE: 0.1 K/UL (ref 0–0.2)
BUN BLDV-MCNC: 19 MG/DL (ref 8–23)
BUN/CREAT BLD: ABNORMAL (ref 9–20)
CALCIUM SERPL-MCNC: 8.8 MG/DL (ref 8.6–10.4)
CHLORIDE BLD-SCNC: 100 MMOL/L (ref 98–107)
CO2: 24 MMOL/L (ref 20–31)
CREAT SERPL-MCNC: 1.14 MG/DL (ref 0.7–1.2)
DIFFERENTIAL TYPE: ABNORMAL
EOSINOPHILS RELATIVE PERCENT: 3 % (ref 0–4)
GFR AFRICAN AMERICAN: >60 ML/MIN
GFR NON-AFRICAN AMERICAN: >60 ML/MIN
GFR SERPL CREATININE-BSD FRML MDRD: ABNORMAL ML/MIN/{1.73_M2}
GFR SERPL CREATININE-BSD FRML MDRD: ABNORMAL ML/MIN/{1.73_M2}
GLUCOSE BLD-MCNC: 123 MG/DL (ref 75–110)
GLUCOSE BLD-MCNC: 161 MG/DL (ref 70–99)
GLUCOSE BLD-MCNC: 161 MG/DL (ref 75–110)
GLUCOSE BLD-MCNC: 237 MG/DL (ref 75–110)
GLUCOSE BLD-MCNC: 294 MG/DL (ref 75–110)
HCT VFR BLD CALC: 31.9 % (ref 41–53)
HEMOGLOBIN: 10.5 G/DL (ref 13.5–17.5)
IMMATURE GRANULOCYTES: ABNORMAL %
LYMPHOCYTES # BLD: 11 % (ref 24–44)
MAGNESIUM: 1.8 MG/DL (ref 1.6–2.6)
MCH RBC QN AUTO: 30.2 PG (ref 26–34)
MCHC RBC AUTO-ENTMCNC: 33 G/DL (ref 31–37)
MCV RBC AUTO: 91.6 FL (ref 80–100)
MONOCYTES # BLD: 7 % (ref 1–7)
NRBC AUTOMATED: ABNORMAL PER 100 WBC
PDW BLD-RTO: 15.7 % (ref 11.5–14.9)
PHOSPHORUS: 3.4 MG/DL (ref 2.5–4.5)
PLATELET # BLD: 328 K/UL (ref 150–450)
PLATELET ESTIMATE: ABNORMAL
PMV BLD AUTO: 9.1 FL (ref 6–12)
POTASSIUM SERPL-SCNC: 4.3 MMOL/L (ref 3.7–5.3)
RBC # BLD: 3.48 M/UL (ref 4.5–5.9)
RBC # BLD: ABNORMAL 10*6/UL
SEG NEUTROPHILS: 78 % (ref 36–66)
SEGMENTED NEUTROPHILS ABSOLUTE COUNT: 7.2 K/UL (ref 1.3–9.1)
SODIUM BLD-SCNC: 137 MMOL/L (ref 135–144)
WBC # BLD: 9.2 K/UL (ref 3.5–11)
WBC # BLD: ABNORMAL 10*3/UL

## 2019-04-04 PROCEDURE — 6370000000 HC RX 637 (ALT 250 FOR IP): Performed by: NURSE PRACTITIONER

## 2019-04-04 PROCEDURE — 6370000000 HC RX 637 (ALT 250 FOR IP): Performed by: INTERNAL MEDICINE

## 2019-04-04 PROCEDURE — 99232 SBSQ HOSP IP/OBS MODERATE 35: CPT | Performed by: INTERNAL MEDICINE

## 2019-04-04 PROCEDURE — 80048 BASIC METABOLIC PNL TOTAL CA: CPT

## 2019-04-04 PROCEDURE — 99231 SBSQ HOSP IP/OBS SF/LOW 25: CPT | Performed by: INTERNAL MEDICINE

## 2019-04-04 PROCEDURE — 99232 SBSQ HOSP IP/OBS MODERATE 35: CPT | Performed by: PHYSICAL MEDICINE & REHABILITATION

## 2019-04-04 PROCEDURE — 83735 ASSAY OF MAGNESIUM: CPT

## 2019-04-04 PROCEDURE — 85025 COMPLETE CBC W/AUTO DIFF WBC: CPT

## 2019-04-04 PROCEDURE — 36415 COLL VENOUS BLD VENIPUNCTURE: CPT

## 2019-04-04 PROCEDURE — 82947 ASSAY GLUCOSE BLOOD QUANT: CPT

## 2019-04-04 PROCEDURE — 84100 ASSAY OF PHOSPHORUS: CPT

## 2019-04-04 PROCEDURE — 97127 HC SP THER IVNTJ W/FOCUS COG FUNCJ: CPT

## 2019-04-04 PROCEDURE — 6370000000 HC RX 637 (ALT 250 FOR IP): Performed by: PHYSICAL MEDICINE & REHABILITATION

## 2019-04-04 PROCEDURE — 6370000000 HC RX 637 (ALT 250 FOR IP): Performed by: STUDENT IN AN ORGANIZED HEALTH CARE EDUCATION/TRAINING PROGRAM

## 2019-04-04 PROCEDURE — 97535 SELF CARE MNGMENT TRAINING: CPT

## 2019-04-04 PROCEDURE — 97530 THERAPEUTIC ACTIVITIES: CPT

## 2019-04-04 PROCEDURE — 1180000000 HC REHAB R&B

## 2019-04-04 RX ORDER — METOCLOPRAMIDE 10 MG/1
5 TABLET ORAL
Status: COMPLETED | OUTPATIENT
Start: 2019-04-05 | End: 2019-04-05

## 2019-04-04 RX ORDER — METOCLOPRAMIDE 10 MG/1
5 TABLET ORAL
Status: COMPLETED | OUTPATIENT
Start: 2019-04-06 | End: 2019-04-07

## 2019-04-04 RX ADMIN — VITAMIN D, TAB 1000IU (100/BT) 2000 UNITS: 25 TAB at 07:32

## 2019-04-04 RX ADMIN — MIRTAZAPINE 15 MG: 15 TABLET, ORALLY DISINTEGRATING ORAL at 20:04

## 2019-04-04 RX ADMIN — LEVETIRACETAM 500 MG: 500 TABLET, FILM COATED ORAL at 07:33

## 2019-04-04 RX ADMIN — DONEPEZIL HYDROCHLORIDE 5 MG: 5 TABLET, FILM COATED ORAL at 20:04

## 2019-04-04 RX ADMIN — PANTOPRAZOLE SODIUM 40 MG: 40 TABLET, DELAYED RELEASE ORAL at 16:54

## 2019-04-04 RX ADMIN — DICYCLOMINE HYDROCHLORIDE 10 MG: 10 CAPSULE ORAL at 11:16

## 2019-04-04 RX ADMIN — SUCRALFATE 1 G: 1 TABLET ORAL at 16:54

## 2019-04-04 RX ADMIN — INSULIN LISPRO 3 UNITS: 100 INJECTION, SOLUTION INTRAVENOUS; SUBCUTANEOUS at 07:34

## 2019-04-04 RX ADMIN — Medication 800 MG: at 20:03

## 2019-04-04 RX ADMIN — FOLIC ACID 1 MG: 1 TABLET ORAL at 07:33

## 2019-04-04 RX ADMIN — METOCLOPRAMIDE 5 MG: 10 TABLET ORAL at 16:54

## 2019-04-04 RX ADMIN — INSULIN LISPRO 3 UNITS: 100 INJECTION, SOLUTION INTRAVENOUS; SUBCUTANEOUS at 20:07

## 2019-04-04 RX ADMIN — SUCRALFATE 1 G: 1 TABLET ORAL at 06:08

## 2019-04-04 RX ADMIN — DICYCLOMINE HYDROCHLORIDE 10 MG: 10 CAPSULE ORAL at 06:08

## 2019-04-04 RX ADMIN — METOCLOPRAMIDE 5 MG: 10 TABLET ORAL at 06:08

## 2019-04-04 RX ADMIN — POLYETHYLENE GLYCOL 3350 17 G: 17 POWDER, FOR SOLUTION ORAL at 07:33

## 2019-04-04 RX ADMIN — FERROUS SULFATE TAB 325 MG (65 MG ELEMENTAL FE) 325 MG: 325 (65 FE) TAB at 16:54

## 2019-04-04 RX ADMIN — THIAMINE HCL TAB 100 MG 100 MG: 100 TAB at 07:32

## 2019-04-04 RX ADMIN — METOCLOPRAMIDE 5 MG: 10 TABLET ORAL at 11:14

## 2019-04-04 RX ADMIN — LEVETIRACETAM 500 MG: 500 TABLET, FILM COATED ORAL at 20:03

## 2019-04-04 RX ADMIN — METFORMIN HYDROCHLORIDE 500 MG: 500 TABLET, FILM COATED ORAL at 16:54

## 2019-04-04 RX ADMIN — INSULIN LISPRO 9 UNITS: 100 INJECTION, SOLUTION INTRAVENOUS; SUBCUTANEOUS at 11:16

## 2019-04-04 RX ADMIN — DICYCLOMINE HYDROCHLORIDE 10 MG: 10 CAPSULE ORAL at 16:55

## 2019-04-04 RX ADMIN — Medication 800 MG: at 07:33

## 2019-04-04 RX ADMIN — PANTOPRAZOLE SODIUM 40 MG: 40 TABLET, DELAYED RELEASE ORAL at 06:08

## 2019-04-04 RX ADMIN — SUCRALFATE 1 G: 1 TABLET ORAL at 11:15

## 2019-04-04 RX ADMIN — FERROUS SULFATE TAB 325 MG (65 MG ELEMENTAL FE) 325 MG: 325 (65 FE) TAB at 07:33

## 2019-04-04 RX ADMIN — OXYCODONE HYDROCHLORIDE 5 MG: 5 TABLET ORAL at 10:47

## 2019-04-04 RX ADMIN — Medication 1 MG: at 20:03

## 2019-04-04 RX ADMIN — SUCRALFATE 1 G: 1 TABLET ORAL at 00:21

## 2019-04-04 RX ADMIN — AMLODIPINE BESYLATE 10 MG: 10 TABLET ORAL at 07:33

## 2019-04-04 ASSESSMENT — PAIN SCALES - GENERAL
PAINLEVEL_OUTOF10: 9
PAINLEVEL_OUTOF10: 0
PAINLEVEL_OUTOF10: 0
PAINLEVEL_OUTOF10: 8
PAINLEVEL_OUTOF10: 6

## 2019-04-04 ASSESSMENT — PAIN DESCRIPTION - ONSET: ONSET: ON-GOING

## 2019-04-04 ASSESSMENT — PAIN DESCRIPTION - PROGRESSION: CLINICAL_PROGRESSION: GRADUALLY WORSENING

## 2019-04-04 ASSESSMENT — PAIN DESCRIPTION - FREQUENCY: FREQUENCY: CONTINUOUS

## 2019-04-04 ASSESSMENT — PAIN DESCRIPTION - DESCRIPTORS: DESCRIPTORS: OTHER (COMMENT)

## 2019-04-04 ASSESSMENT — PAIN DESCRIPTION - PAIN TYPE: TYPE: ACUTE PAIN

## 2019-04-04 ASSESSMENT — PAIN DESCRIPTION - ORIENTATION: ORIENTATION: LEFT

## 2019-04-04 ASSESSMENT — PAIN DESCRIPTION - LOCATION: LOCATION: ABDOMEN;EYE

## 2019-04-04 NOTE — PLAN OF CARE
Problem: Risk for Impaired Skin Integrity  Goal: Tissue integrity - skin and mucous membranes  Description  Structural intactness and normal physiological function of skin and  mucous membranes. 4/4/2019 0336 by Manuel Garcia RN  Outcome: Ongoing  Note:   Pt turning self in bed and assisted with boosts as needed this shift. Pt free from breakdown. Problem: Falls - Risk of:  Goal: Will remain free from falls  Description  Will remain free from falls  4/4/2019 0336 by Manuel Garcia RN  Outcome: Ongoing  Note:   Alarm on bed and personal alarm on pt, with telesitter in room for pt safety. Pt free from falls this shift. Reoriented that needs to use call light when needing out of bed. Problem: Pain:  Goal: Pain level will decrease  Description  Pain level will decrease  4/4/2019 0336 by Manuel Garcia RN  Outcome: Ongoing  Note:   Pain controlled this shift with PRN Laura as needed. Pt only having minor complaints of pain throughout night, medication at beginning of shift for pain, no other complaints. Problem: Nutrition  Goal: Optimal nutrition therapy  4/4/2019 0336 by Manuel Garcia RN  Outcome: Ongoing  Note:   Nutrition not optimal this shift, glucerna and ensure added to promote adequate nutrition. Pt refusing lunch and dinner, only time amount of breakfast was ate. Nutrition still not optimal this shift.

## 2019-04-04 NOTE — PROGRESS NOTES
Reason for Follow up: REGGIE. HISTORY:    Feels well. Creatinine stable 1.1-1.2.  BP stable. Feels well. Review Of Systems:   Constitutional: No fever, chills, lethargy, weakness or wt loss. Cardiac:  No chest pain, dyspnea, orthopnea or PND. Pulmonary:  No cough, phlegm or wheezing. Abdomen:  No abdominal pain, nausea, vomiting or diarrhea. :   No hematuria, pyuria, dysuria or flank pain. Extremities:  No swelling or joint pains. Scheduled Meds:   metFORMIN  500 mg Oral BID WC    magnesium oxide  800 mg Oral BID    mirtazapine  15 mg Oral Nightly    vitamin D3  2,000 Units Oral Daily    dicyclomine  10 mg Oral TID AC    metoclopramide  5 mg Oral TID AC    sucralfate  1 g Oral 4 times per day    donepezil  5 mg Oral Nightly    amLODIPine  10 mg Oral Daily    ferrous sulfate  325 mg Oral BID WC    folic acid  1 mg Oral Daily    insulin lispro  0-18 Units Subcutaneous TID WC    insulin lispro  0-9 Units Subcutaneous Nightly    levETIRAcetam  500 mg Oral BID    pantoprazole  40 mg Oral BID AC    polyethylene glycol  17 g Oral Daily    vitamin B-1  100 mg Oral Daily   Continuous Infusions:   dextrose       PRN Meds:melatonin ER, labetalol, bisacodyl, glucose, dextrose, glucagon (rDNA), dextrose, oxyCODONE **OR** [DISCONTINUED] oxyCODONE, lidocaine PF, acetaminophen    Allergies   Allergen Reactions    Celebrex [Celecoxib] Swelling       Physical Exam:  Blood pressure 139/69, pulse 71, temperature 97.3 °F (36.3 °C), temperature source Oral, resp. rate 15, height 5' 7\" (1.702 m), weight 127 lb 9.6 oz (57.9 kg), SpO2 98 %. In: 520 [P.O.:520]  Out: -   In: 520   Out: -     General:  Awake, alert, not in distress. Appears to be stated age. HEENT: Atraumatic, normocephalic. Anicteric sclera. Pink and moist oral mucosa. No JVD. Chest: Bilateral air entry, clear to auscultation, no wheezing, rhonchi or rales. Cardiovascular: RRR, S1S2, no murmur, rub or gallop. No lower extremity edema. Abdomen: Soft, non tender to palpation. Musculoskeletal:  No cyanosis or clubbing. Integumentary: Pink, warm and dry. Free from rash or lesions. Skin turgor normal.  CNS: Speech clear. Face symmetrical. No tremor. Data:  CBC:   Lab Results   Component Value Date    WBC 9.2 04/04/2019    HGB 10.5 (L) 04/04/2019    HCT 31.9 (L) 04/04/2019    MCV 91.6 04/04/2019     04/04/2019     BMP:    Lab Results   Component Value Date     04/04/2019    K 4.3 04/04/2019     04/04/2019    CO2 24 04/04/2019    BUN 19 04/04/2019    CREATININE 1.14 04/04/2019    GLUCOSE 161 (H) 04/04/2019     CMP:   Lab Results   Component Value Date     04/04/2019    K 4.3 04/04/2019     04/04/2019    CO2 24 04/04/2019    BUN 19 04/04/2019    CREATININE 1.14 04/04/2019    GLUCOSE 161 (H) 04/04/2019    CALCIUM 8.8 04/04/2019    PROT 5.8 (L) 03/28/2019    LABALBU 2.7 (L) 03/28/2019    BILITOT 0.61 03/28/2019    ALKPHOS 77 03/28/2019    AST 12 03/28/2019    ALT 8 03/28/2019      Hepatic:   Lab Results   Component Value Date    AST 12 03/28/2019    ALT 8 03/28/2019    BILITOT 0.61 03/28/2019    ALKPHOS 77 03/28/2019     BNP: No results found for: BNP  Lipids: No results found for: CHOL, HDL  INR:   Lab Results   Component Value Date    INR 1.2 03/26/2019     PTH: No results found for: PTH  Phosphorus:    Lab Results   Component Value Date    PHOS 3.4 04/04/2019     Ionized Calcium: No results found for: IONCA  Magnesium:   Lab Results   Component Value Date    MG 1.8 04/04/2019     Albumin:   Lab Results   Component Value Date    LABALBU 2.7 (L) 03/28/2019     Last 3 CK, CKMB, Troponin: @LABRCNT(CKTOTAL:3,CKMB:3,TROPONINI:3)       URINE:)No results found for: [facility]    Radiology:   Reviewed. Assessment:  1. Acute kidney injury, ATN. Renal function is stable. 2. Hypokalemia. 3. HTN with worsening BP when in pain. 4. Intracranial hemorrhage. 5. Anemia. 6. Borderline hypernatremia, improved.   7. Hypomagnesemia and hypovitaminosis D.  8. Bilateral nephrolithiasis. Plan:  Electrolytes Ok  BP stable. Monitor renal function and electrolytes closley  Continue protein supplements. Avoid hypotension, nephrotoxic drugs, Lovenox, Fleets enema and IV contrast exposure. Follow up labs ordered for AM.     Please do not hesitate to call with questions. We will follow with you. Pt seen in collaboration with Dr. Guevara Fontanez. Electronically signed by CARYL Moody CNP on 4/4/2019 at 3:01 PM  [facility]. Ph: [phone]      Physician Addendum  I have seen and examined pt at bed side.    I reviewed and agree with CNP's note. I performed all key and critical portions of this evaluation.  I agree with the plan of care as noted above.     Electronically signed by Adalberto Amos MD on 04/04/19 5:02 PM

## 2019-04-04 NOTE — PROGRESS NOTES
Type and Reason for Visit: Calorie Count    PO intake is improving. Pt stated he was told he needs to eat more in order to go home, so he is trying to eat more. Current diet and supplement order:  DIET GENERAL;  Dietary Nutrition Supplements: Clear Liquid Oral Supplement    Comparative Standards (Estimated Nutrition Needs):   · Estimated Daily Total Kcal: 0216-6789 based on Admission wt with [place] Byrd with 1.2-1.3 factor  · Estimated Daily Protein (g): 76-95 based on 1.2-1.5 gm per kg of admission wt    Date Consumed PO Intake Kcal PO Intake grams protein   Comments   4-3-19 218 7 Only Breakfast recorded          Intervention & Recommendations:   1. Continue Calorie Count  2. Continue current diet and Ensure Clear. Sulaiman Bautista R.D., L.D.   Phone: [phone]

## 2019-04-04 NOTE — PLAN OF CARE
Problem: Risk for Impaired Skin Integrity  Goal: Tissue integrity - skin and mucous membranes  Description  Structural intactness and normal physiological function of skin and  mucous membranes. Outcome: Met This Shift  Note:   No new skin breakdown noted this shift. Pt able to reposition self without difficulty. Problem: Falls - Risk of:  Goal: Will remain free from falls  Description  Will remain free from falls  Outcome: Met This Shift  Note:   No falls this shift. Up to bathroom and up to chair with 1 assist and walker. Problem: Pain:  Goal: Pain level will decrease  Description  Pain level will decrease  Outcome: Met This Shift  Note:   Pt had c/o abd and eye pain that subsided after prn oxycodone given. Pt able to reposition self for comfort.

## 2019-04-04 NOTE — CARE COORDINATION
Writer left a message on the pt dtr, Wilmer Favre, voice mail asking her to call him to discuss d/c plans.
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
## 2019-04-04 NOTE — CARE COORDINATION
Writer spoke to the pt dtr she stated she can NOT take care of the pt and would like him referred to [facility], hes been there before.  Writer made the referral

## 2019-04-04 NOTE — PROGRESS NOTES
by assistance(no DME)  Sit to stand: Stand by assistance  Stand to sit: Stand by assistance  Transfer Comments: vc's req for tech/safety  Standing Balance  Time: AM: 1 min x3; PM: 2-3 min x2, 4-5 min  Activity: AM: ADL tasks, stand step to move HOB, PM: functional mobility in room/hallway and OT gym using R/W, V/T cues for visual scanning, dynamic standing to remove clothing from dryer, static standing to fold, pt demo's low squat c SBA, no LOB but req Min A to return upright standing position, pt educated on using reacher to remove items from dryer or sitting in chair for increased safety and indep, pt receptive  Sit to stand: Stand by assistance  Stand to sit: Stand by assistance  Comment: no LOB noted  Functional Mobility  Functional - Mobility Device: Rolling Walker  Activity: Retrieve items; To/from bathroom; Other  Assist Level: Stand by assistance(R/W)  Functional Mobility Comments: slow pace using R/W, V/T cues for safety/tech, vc's for visual scanning           Additional Activities: PM: pt completed peg/pattern activity to address sequencing/attending to task and problem solving, pt completed 2 high complexity patterns c no A or cuing                            OT FIM:   Eatin - Feeds self with setup/supervision/cues and/or requires only setup/supervision/cues to perform tube feedings  Groomin - Requires setup/cues to do all tasks(s/u seated EOB to wash face)  Bathin - Able to bathe all 10 areas with setup/sup/cues(s/u seated EOB, vc's for sequencing, pt declines lacey-care)  Dressing-Upper: 5 - Requires setup/supervision/cues and/or requires assist with presthesis/brace only(pt able don/doff [place] shirt c s/u and cuing)  Dressing-Lower: 5 - Requires setup/supervision/cues and/or staff applies TEDS/prosthesis/brace only(A TEDs, pt able to don/doff pants/footies c cuing)  Toiletin - Requires setup/supervision/cues  Toilet Transfer: 5 - Requires setup/supervision/cues  Primary Mode: Shower  Tub Transfer: 0 - Activity does not occur  Shower Transfer: 0 - Activity does not occur(ADL's EOB this date)               Assessment  Performance deficits / Impairments: Decreased functional mobility ; Decreased ADL status; Decreased strength;Decreased safe awareness;Decreased cognition;Decreased endurance;Decreased balance;Decreased high-level IADLs  Assessment: increased participation noted this date in PM session  Prognosis: Good  Discharge Recommendations: 24 hour supervision or assist  Activity Tolerance: Patient Tolerated treatment well;Patient limited by fatigue;Patient limited by pain;Treatment limited secondary to decreased cognition  Activity Tolerance: pt agreeable c encouragement  Safety Devices in place: Yes  Type of devices: Left in chair;Nurse notified; Patient at risk for falls; Chair alarm in place;Call light within reach(telesitter, daughter present in room)  Equipment Recommendations  Equipment Needed: (TBD)       Patient Education:     Patient Education: OT POC, safety, ADL tasks, therapy participation  Barriers to Learning: cognition  Learner:family and patient  Method: demonstration and explanation       Outcome: needs reinforcement     Plan  Plan  Times per week: 900/7  Times per day: Twice a day  Short term goals  Time Frame for Short term goals: in 1 week pt will   Short term goal 1: demo UB ADLs with set-up and min vcs to attent to task  Short term goal 2: demo LB ADLs with moderate assistance with minimal vcs to attent to task  Short term goal 3: demo toilet transfer with minimal assistance, using least restrictive device  Short term goal 4: demo 20+ minutes of activity participation during functional activity to increase participation in ADLs and functional mobility   Long term goals  Time Frame for Long term goals : by d/c pt will  Long term goal 1: demo BADLs with set-up for safety   Long term goal 2: participate in functional tasks for 30+ minutes with min verbal cueing to redirect attention  Long term goal 3: demo functional transfers with set-up  Long term goal 4: demo safety awareness during all ADLs/functional mobility with minimal verbal cueing        04/04/19 1421 04/04/19 1537   OT Individual Minutes   Time In 1421 1042   Time Out 1450 1107   Minutes 29 25     Electronically signed by EFE Gibbs on 4/4/19 at 3:52 PM
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
## 2019-04-04 NOTE — PROGRESS NOTES
GI Progress notes    4/4/2019   12:20 PM    Name:  Payam Rae  MRN:    [id number]     Acct:     [de-identified]   Room:  [address] [place] [address] Day: 9     Admit Date: 3/26/2019  4:56 PM  PCP: Alisha Hamilton MD    Subjective:     C/C: No chief complaint on file. Interval History: Status: improved. Seen after interval   Had EGD in the past with two ulcer  Doing well  Has no overt bleeding  Has no melena  HGB is stable  On PPI and Carafate  Has mild abd pains  Eating well     ROS:  Constitutional: negative for chills, fevers and sweats    Gastrointestinal: mild abdominal pain, constipation, diarrhea, nausea and vomiting      Medications: Allergies:    Allergies   Allergen Reactions    Celebrex [Celecoxib] Swelling       Current Meds:   magnesium oxide (MAG-OX) tablet 800 mg BID   mirtazapine (REMERON SOL-TAB) disintegrating tablet 15 mg Nightly   vitamin D (CHOLECALCIFEROL) tablet 2,000 Units Daily   dicyclomine (BENTYL) capsule 10 mg TID AC   metoclopramide (REGLAN) tablet 5 mg TID AC   melatonin ER tablet 1 mg Nightly PRN   sucralfate (CARAFATE) tablet 1 g 4 times per day   labetalol (NORMODYNE) tablet 200 mg Q6H PRN   donepezil (ARICEPT) tablet 5 mg Nightly   bisacodyl (DULCOLAX) suppository 10 mg Daily PRN   glucose (GLUTOSE) 40 % oral gel 15 g PRN   dextrose 50 % solution 12.5 g PRN   glucagon (rDNA) injection 1 mg PRN   dextrose 5 % solution PRN   oxyCODONE (ROXICODONE) immediate release tablet 5 mg Q4H PRN   lidocaine PF 4 % injection 4 mL As Directed RT PRN   amLODIPine (NORVASC) tablet 10 mg Daily   ferrous sulfate tablet 065 mg BID WC   folic acid (FOLVITE) tablet 1 mg Daily   insulin lispro (HUMALOG) injection vial 0-18 Units TID WC   insulin lispro (HUMALOG) injection vial 0-9 Units Nightly   levETIRAcetam (KEPPRA) tablet 500 mg BID   pantoprazole (PROTONIX) tablet 40 mg BID AC   polyethylene glycol (GLYCOLAX) packet 17 g Daily   vitamin B-1 (THIAMINE) tablet 100 mg Daily   acetaminophen (TYLENOL) tablet 650 mg Q4H PRN       Data:     Code Status:  Full Code    Family History   Problem Relation Age of Onset    Diabetes Mother     Heart Disease Mother        Social History     Socioeconomic History    Marital status: Single     Spouse name: Not on file    Number of children: Not on file    Years of education: Not on file    Highest education level: Not on file   Occupational History    Not on file   Social Needs    Financial resource strain: Not on file    Food insecurity:     Worry: Not on file     Inability: Not on file    Transportation needs:     Medical: Not on file     Non-medical: Not on file   Tobacco Use    Smoking status: Former Smoker    Smokeless tobacco: Never Used   Substance and Sexual Activity    Alcohol use:  Yes     Alcohol/week: 3.6 oz     Types: 6 Shots of liquor per week     Comment: occa    Drug use: No     Types: Marijuana    Sexual activity: Not on file   Lifestyle    Physical activity:     Days per week: Not on file     Minutes per session: Not on file    Stress: Not on file   Relationships    Social connections:     Talks on phone: Not on file     Gets together: Not on file     Attends Congregation service: Not on file     Active member of club or organization: Not on file     Attends meetings of clubs or organizations: Not on file     Relationship status: Not on file    Intimate partner violence:     Fear of current or ex partner: Not on file     Emotionally abused: Not on file     Physically abused: Not on file     Forced sexual activity: Not on file   Other Topics Concern    Not on file   Social History Narrative    ** Merged History Encounter **            Vitals:  /69   Pulse 71   Temp 97.3 °F (36.3 °C) (Oral)   Resp 15   Ht 5' 7\" (1.702 m)   Wt 127 lb 9.6 oz (57.9 kg)   SpO2 98%   BMI 19.98 kg/m²   Temp (24hrs), Av.4 °F (36.3 °C), Min:97.3 °F (36.3 °C), Max:97.5 °F (36.4 °C)    Recent Labs     19  1611 19  1946 04/04/19  0651 04/04/19  1100   POCGLU 114* 158* 161* 294*       I/O (24Hr):     Intake/Output Summary (Last 24 hours) at 4/4/2019 1220  Last data filed at 4/4/2019 0808  Gross per 24 hour   Intake 320 ml   Output --   Net 320 ml       Labs:      CBC: Lab Results   Component Value Date    WBC 9.2 04/04/2019    RBC 3.48 04/04/2019    RBC 3.73 12/28/2011    HGB 10.5 04/04/2019    HCT 31.9 04/04/2019    MCV 91.6 04/04/2019    MCH 30.2 04/04/2019    MCHC 33.0 04/04/2019    RDW 15.7 04/04/2019     04/04/2019     12/28/2011    MPV 9.1 04/04/2019     CBC with Differential:  Lab Results   Component Value Date    WBC 9.2 04/04/2019    RBC 3.48 04/04/2019    RBC 3.73 12/28/2011    HGB 10.5 04/04/2019    HCT 31.9 04/04/2019     04/04/2019     12/28/2011    MCV 91.6 04/04/2019    MCH 30.2 04/04/2019    MCHC 33.0 04/04/2019    RDW 15.7 04/04/2019    LYMPHOPCT 11 04/04/2019    MONOPCT 7 04/04/2019    BASOPCT 1 04/04/2019    MONOSABS 0.70 04/04/2019    LYMPHSABS 1.00 04/04/2019    EOSABS 0.20 04/04/2019    BASOSABS 0.10 04/04/2019    DIFFTYPE NOT REPORTED 04/04/2019     Hemoglobin/Hematocrit:  Lab Results   Component Value Date    HGB 10.5 04/04/2019    HCT 31.9 04/04/2019     CMP:  Lab Results   Component Value Date     04/04/2019    K 4.3 04/04/2019     04/04/2019    CO2 24 04/04/2019    BUN 19 04/04/2019    CREATININE 1.14 04/04/2019    GFRAA >60 04/04/2019    LABGLOM >60 04/04/2019    GLUCOSE 161 04/04/2019    GLUCOSE 49 12/28/2011    PROT 5.8 03/28/2019    LABALBU 2.7 03/28/2019    CALCIUM 8.8 04/04/2019    BILITOT 0.61 03/28/2019    ALKPHOS 77 03/28/2019    AST 12 03/28/2019    ALT 8 03/28/2019     BMP:  Lab Results   Component Value Date     04/04/2019    K 4.3 04/04/2019     04/04/2019    CO2 24 04/04/2019    BUN 19 04/04/2019    LABALBU 2.7 03/28/2019    CREATININE 1.14 04/04/2019    CALCIUM 8.8 04/04/2019    GFRAA >60 04/04/2019    LABGLOM >60 04/04/2019    GLUCOSE 161 04/04/2019    GLUCOSE 49 12/28/2011     PT/INR:    Lab Results   Component Value Date    PROTIME 12.1 03/26/2019    PROTIME 10.4 12/27/2011    INR 1.2 03/26/2019     PTT:    Lab Results   Component Value Date    APTT 26.2 03/20/2019   [APTT}    Physical Examination:        General appearance: alert, cooperative and no distress  Mental Status: oriented to person, place and time and anxious  affect  ,  Abdomen: soft, nontender, nondistended, bowel sounds present     Assessment:        Primary Problem  Traumatic left-sided intracerebral hemorrhage with loss of consciousness [facility])     Active Hospital Problems    Diagnosis Date Noted    Anemia [D64.9]     Gastric ulcer without hemorrhage or perforation [K25.9]     Epigastric pain [R10.13]     Left lower quadrant pain [R10.32]     Severe malnutrition ([address] [E43] 03/27/2019    Brain dysfunction [G93.89] 03/26/2019    Acute kidney injury 2/2 ischemic ATN related to anemia and hypotension (retroperitoneal bleed right) [N17.9] 03/21/2019    Esophagitis [K20.9]     Gastrointestinal hemorrhage with hematemesis [K92.0]     Traumatic left-sided intracerebral hemorrhage with loss of consciousness ([address] [R12.873M]     Seizure ([address] [R56.9]     Marijuana abuse [F12.10] 07/01/2017     Past Medical History:   Diagnosis Date    Alcoholic dementia ([address]     Dr Joanna Nails Back pain     Cerebral artery occlusion with cerebral infarction ([address]     Diabetes mellitus ([address]     Head injury     Hearing loss     Hypertension     TIA (transient ischemic attack)     Transient ischemic attack     Ulcer         Plan:        1. Cont PPI  2. Cont Carafate  3. F/u HGB  4. Soft diet   5. Stable GI wise  6. Once DC RTC in 4-6 weeks  7.  Will signoff     Explained to the patient and d/W Nursing Staff    Please call or Page for any issues or change in status  Thanks    Electronically signed by Geoff Horan MD on 4/4/2019 at 12:20 PM

## 2019-04-05 LAB
ABSOLUTE EOS #: 0.3 K/UL (ref 0–0.4)
ABSOLUTE IMMATURE GRANULOCYTE: ABNORMAL K/UL (ref 0–0.3)
ABSOLUTE LYMPH #: 1 K/UL (ref 1–4.8)
ABSOLUTE MONO #: 0.7 K/UL (ref 0.1–1.3)
ANION GAP SERPL CALCULATED.3IONS-SCNC: 10 MMOL/L (ref 9–17)
BASOPHILS # BLD: 1 % (ref 0–2)
BASOPHILS ABSOLUTE: 0.1 K/UL (ref 0–0.2)
BUN BLDV-MCNC: 19 MG/DL (ref 8–23)
BUN/CREAT BLD: ABNORMAL (ref 9–20)
CALCIUM SERPL-MCNC: 9.1 MG/DL (ref 8.6–10.4)
CHLORIDE BLD-SCNC: 100 MMOL/L (ref 98–107)
CO2: 25 MMOL/L (ref 20–31)
CREAT SERPL-MCNC: 1.1 MG/DL (ref 0.7–1.2)
DIFFERENTIAL TYPE: ABNORMAL
EOSINOPHILS RELATIVE PERCENT: 3 % (ref 0–4)
GFR AFRICAN AMERICAN: >60 ML/MIN
GFR NON-AFRICAN AMERICAN: >60 ML/MIN
GFR SERPL CREATININE-BSD FRML MDRD: ABNORMAL ML/MIN/{1.73_M2}
GFR SERPL CREATININE-BSD FRML MDRD: ABNORMAL ML/MIN/{1.73_M2}
GLUCOSE BLD-MCNC: 126 MG/DL (ref 75–110)
GLUCOSE BLD-MCNC: 144 MG/DL (ref 75–110)
GLUCOSE BLD-MCNC: 170 MG/DL (ref 75–110)
GLUCOSE BLD-MCNC: 180 MG/DL (ref 75–110)
GLUCOSE BLD-MCNC: 195 MG/DL (ref 70–99)
HCT VFR BLD CALC: 31.5 % (ref 41–53)
HEMOGLOBIN: 10.4 G/DL (ref 13.5–17.5)
IMMATURE GRANULOCYTES: ABNORMAL %
LYMPHOCYTES # BLD: 12 % (ref 24–44)
MAGNESIUM: 1.7 MG/DL (ref 1.6–2.6)
MCH RBC QN AUTO: 30.2 PG (ref 26–34)
MCHC RBC AUTO-ENTMCNC: 33 G/DL (ref 31–37)
MCV RBC AUTO: 91.6 FL (ref 80–100)
MONOCYTES # BLD: 9 % (ref 1–7)
NRBC AUTOMATED: ABNORMAL PER 100 WBC
PDW BLD-RTO: 15.5 % (ref 11.5–14.9)
PHOSPHORUS: 3.4 MG/DL (ref 2.5–4.5)
PLATELET # BLD: 324 K/UL (ref 150–450)
PLATELET ESTIMATE: ABNORMAL
PMV BLD AUTO: 8.9 FL (ref 6–12)
POTASSIUM SERPL-SCNC: 4.1 MMOL/L (ref 3.7–5.3)
RBC # BLD: 3.44 M/UL (ref 4.5–5.9)
RBC # BLD: ABNORMAL 10*6/UL
SEG NEUTROPHILS: 75 % (ref 36–66)
SEGMENTED NEUTROPHILS ABSOLUTE COUNT: 6.4 K/UL (ref 1.3–9.1)
SODIUM BLD-SCNC: 135 MMOL/L (ref 135–144)
WBC # BLD: 8.5 K/UL (ref 3.5–11)
WBC # BLD: ABNORMAL 10*3/UL

## 2019-04-05 PROCEDURE — 97110 THERAPEUTIC EXERCISES: CPT

## 2019-04-05 PROCEDURE — 6370000000 HC RX 637 (ALT 250 FOR IP): Performed by: NURSE PRACTITIONER

## 2019-04-05 PROCEDURE — 6370000000 HC RX 637 (ALT 250 FOR IP): Performed by: STUDENT IN AN ORGANIZED HEALTH CARE EDUCATION/TRAINING PROGRAM

## 2019-04-05 PROCEDURE — 97116 GAIT TRAINING THERAPY: CPT

## 2019-04-05 PROCEDURE — 93970 EXTREMITY STUDY: CPT

## 2019-04-05 PROCEDURE — 97542 WHEELCHAIR MNGMENT TRAINING: CPT

## 2019-04-05 PROCEDURE — 6370000000 HC RX 637 (ALT 250 FOR IP): Performed by: INTERNAL MEDICINE

## 2019-04-05 PROCEDURE — 83735 ASSAY OF MAGNESIUM: CPT

## 2019-04-05 PROCEDURE — 80048 BASIC METABOLIC PNL TOTAL CA: CPT

## 2019-04-05 PROCEDURE — 84100 ASSAY OF PHOSPHORUS: CPT

## 2019-04-05 PROCEDURE — 1180000000 HC REHAB R&B

## 2019-04-05 PROCEDURE — 36415 COLL VENOUS BLD VENIPUNCTURE: CPT

## 2019-04-05 PROCEDURE — 97530 THERAPEUTIC ACTIVITIES: CPT

## 2019-04-05 PROCEDURE — 82947 ASSAY GLUCOSE BLOOD QUANT: CPT

## 2019-04-05 PROCEDURE — 6370000000 HC RX 637 (ALT 250 FOR IP): Performed by: PHYSICAL MEDICINE & REHABILITATION

## 2019-04-05 PROCEDURE — 99232 SBSQ HOSP IP/OBS MODERATE 35: CPT | Performed by: PHYSICAL MEDICINE & REHABILITATION

## 2019-04-05 PROCEDURE — 97127 HC SP THER IVNTJ W/FOCUS COG FUNCJ: CPT

## 2019-04-05 PROCEDURE — 99231 SBSQ HOSP IP/OBS SF/LOW 25: CPT | Performed by: INTERNAL MEDICINE

## 2019-04-05 PROCEDURE — 85025 COMPLETE CBC W/AUTO DIFF WBC: CPT

## 2019-04-05 RX ADMIN — METFORMIN HYDROCHLORIDE 500 MG: 500 TABLET, FILM COATED ORAL at 09:44

## 2019-04-05 RX ADMIN — METFORMIN HYDROCHLORIDE 500 MG: 500 TABLET, FILM COATED ORAL at 16:55

## 2019-04-05 RX ADMIN — FOLIC ACID 1 MG: 1 TABLET ORAL at 09:44

## 2019-04-05 RX ADMIN — DICYCLOMINE HYDROCHLORIDE 10 MG: 10 CAPSULE ORAL at 06:00

## 2019-04-05 RX ADMIN — OXYCODONE HYDROCHLORIDE 5 MG: 5 TABLET ORAL at 10:51

## 2019-04-05 RX ADMIN — FERROUS SULFATE TAB 325 MG (65 MG ELEMENTAL FE) 325 MG: 325 (65 FE) TAB at 16:55

## 2019-04-05 RX ADMIN — OXYCODONE HYDROCHLORIDE 5 MG: 5 TABLET ORAL at 21:19

## 2019-04-05 RX ADMIN — POLYETHYLENE GLYCOL 3350 17 G: 17 POWDER, FOR SOLUTION ORAL at 09:45

## 2019-04-05 RX ADMIN — DICYCLOMINE HYDROCHLORIDE 10 MG: 10 CAPSULE ORAL at 11:28

## 2019-04-05 RX ADMIN — FERROUS SULFATE TAB 325 MG (65 MG ELEMENTAL FE) 325 MG: 325 (65 FE) TAB at 09:44

## 2019-04-05 RX ADMIN — DICYCLOMINE HYDROCHLORIDE 10 MG: 10 CAPSULE ORAL at 17:17

## 2019-04-05 RX ADMIN — LEVETIRACETAM 500 MG: 500 TABLET, FILM COATED ORAL at 09:44

## 2019-04-05 RX ADMIN — Medication 800 MG: at 21:20

## 2019-04-05 RX ADMIN — PANTOPRAZOLE SODIUM 40 MG: 40 TABLET, DELAYED RELEASE ORAL at 16:55

## 2019-04-05 RX ADMIN — VITAMIN D, TAB 1000IU (100/BT) 2000 UNITS: 25 TAB at 09:44

## 2019-04-05 RX ADMIN — THIAMINE HCL TAB 100 MG 100 MG: 100 TAB at 09:44

## 2019-04-05 RX ADMIN — SUCRALFATE 1 G: 1 TABLET ORAL at 11:28

## 2019-04-05 RX ADMIN — METOCLOPRAMIDE 5 MG: 10 TABLET ORAL at 06:00

## 2019-04-05 RX ADMIN — DONEPEZIL HYDROCHLORIDE 5 MG: 5 TABLET, FILM COATED ORAL at 21:19

## 2019-04-05 RX ADMIN — SUCRALFATE 1 G: 1 TABLET ORAL at 06:00

## 2019-04-05 RX ADMIN — INSULIN LISPRO 3 UNITS: 100 INJECTION, SOLUTION INTRAVENOUS; SUBCUTANEOUS at 11:28

## 2019-04-05 RX ADMIN — METOCLOPRAMIDE 5 MG: 10 TABLET ORAL at 16:56

## 2019-04-05 RX ADMIN — LEVETIRACETAM 500 MG: 500 TABLET, FILM COATED ORAL at 21:19

## 2019-04-05 RX ADMIN — Medication 1 MG: at 21:19

## 2019-04-05 RX ADMIN — MIRTAZAPINE 15 MG: 15 TABLET, ORALLY DISINTEGRATING ORAL at 21:20

## 2019-04-05 RX ADMIN — Medication 800 MG: at 09:44

## 2019-04-05 RX ADMIN — AMLODIPINE BESYLATE 10 MG: 10 TABLET ORAL at 09:44

## 2019-04-05 RX ADMIN — INSULIN LISPRO 3 UNITS: 100 INJECTION, SOLUTION INTRAVENOUS; SUBCUTANEOUS at 09:52

## 2019-04-05 RX ADMIN — PANTOPRAZOLE SODIUM 40 MG: 40 TABLET, DELAYED RELEASE ORAL at 06:00

## 2019-04-05 ASSESSMENT — PAIN SCALES - GENERAL
PAINLEVEL_OUTOF10: 9
PAINLEVEL_OUTOF10: 0
PAINLEVEL_OUTOF10: 8
PAINLEVEL_OUTOF10: 4
PAINLEVEL_OUTOF10: 0

## 2019-04-05 ASSESSMENT — PAIN DESCRIPTION - PAIN TYPE
TYPE: CHRONIC PAIN
TYPE: CHRONIC PAIN

## 2019-04-05 ASSESSMENT — PAIN DESCRIPTION - DESCRIPTORS
DESCRIPTORS: ACHING
DESCRIPTORS: ACHING;SORE

## 2019-04-05 ASSESSMENT — PAIN DESCRIPTION - ONSET: ONSET: ON-GOING

## 2019-04-05 ASSESSMENT — PAIN DESCRIPTION - ORIENTATION: ORIENTATION: LOWER

## 2019-04-05 ASSESSMENT — PAIN DESCRIPTION - FREQUENCY: FREQUENCY: CONTINUOUS

## 2019-04-05 ASSESSMENT — PAIN DESCRIPTION - LOCATION
LOCATION: BACK
LOCATION: BACK

## 2019-04-05 NOTE — PROGRESS NOTES
RRR, S1S2, no murmur, rub or gallop. No lower extremity edema. Abdomen: Soft, non tender to palpation. Musculoskeletal:  No cyanosis or clubbing. Integumentary: Pink, warm and dry. Free from rash or lesions. Skin turgor normal.  CNS: Speech clear. Face symmetrical. No tremor. Data:  CBC:   Lab Results   Component Value Date    WBC 8.5 04/05/2019    HGB 10.4 (L) 04/05/2019    HCT 31.5 (L) 04/05/2019    MCV 91.6 04/05/2019     04/05/2019     BMP:    Lab Results   Component Value Date     04/05/2019    K 4.1 04/05/2019     04/05/2019    CO2 25 04/05/2019    BUN 19 04/05/2019    CREATININE 1.10 04/05/2019    GLUCOSE 195 (H) 04/05/2019     CMP:   Lab Results   Component Value Date     04/05/2019    K 4.1 04/05/2019     04/05/2019    CO2 25 04/05/2019    BUN 19 04/05/2019    CREATININE 1.10 04/05/2019    GLUCOSE 195 (H) 04/05/2019    CALCIUM 9.1 04/05/2019    PROT 5.8 (L) 03/28/2019    LABALBU 2.7 (L) 03/28/2019    BILITOT 0.61 03/28/2019    ALKPHOS 77 03/28/2019    AST 12 03/28/2019    ALT 8 03/28/2019      Hepatic:   Lab Results   Component Value Date    AST 12 03/28/2019    ALT 8 03/28/2019    BILITOT 0.61 03/28/2019    ALKPHOS 77 03/28/2019     BNP: No results found for: BNP  Lipids: No results found for: CHOL, HDL  INR:   Lab Results   Component Value Date    INR 1.2 03/26/2019     PTH: No results found for: PTH  Phosphorus:    Lab Results   Component Value Date    PHOS 3.4 04/05/2019     Ionized Calcium: No results found for: IONCA  Magnesium:   Lab Results   Component Value Date    MG 1.7 04/05/2019     Albumin:   Lab Results   Component Value Date    LABALBU 2.7 (L) 03/28/2019     Last 3 CK, CKMB, Troponin: @LABRCNT(CKTOTAL:3,CKMB:3,TROPONINI:3)       URINE:)No results found for: [facility]    Radiology:   Reviewed. Assessment:  1. Acute kidney injury, ATN. Renal function is stable. 2. Hypokalemia. 3. HTN with worsening BP when in pain.   4. Intracranial hemorrhage. 5. Anemia. 6. Borderline hypernatremia, improved. 7. Hypomagnesemia and hypovitaminosis D.  8. Bilateral nephrolithiasis. Plan:  Electrolytes Ok  Will start checking labs twice a week, on Mondays and Thursdays  BP stable. Monitor renal function and electrolytes closley  Continue protein supplements. Avoid hypotension, nephrotoxic drugs, Lovenox, Fleets enema and IV contrast exposure. Electronically signed by Mukund Verde MD on 4/5/2019 at 12:47 PM  [facility].   Ph: [phone]

## 2019-04-05 NOTE — PLAN OF CARE
Problem: Risk for Impaired Skin Integrity  Goal: Tissue integrity - skin and mucous membranes  Description  Structural intactness and normal physiological function of skin and  mucous membranes. Outcome: Met This Shift   Skin assessment completed this shift. Nutrition and Hydration status assessed with adequate intake. Osito Score as charted. Pressure Relief Overlay remains intact and inflated to patient's bed throughout the shift. Bilateral heels remain elevated on pillows throughout the shift. Patient able to reposition self for comfort and to prevent breakdown. Patient verbalizes understanding of pressure ulcer prevention measures. Skin integrity maintained. No new skin breakdown noted. Skin to high risk pressure areas including coccyx and heels are clear.     Darby / Incontinence care provided as needed throughout the shift. Zinc Moisture Barrier ointment applied to buttocks as a preventative measure.        Problem: Falls - Risk of:  Goal: Will remain free from falls  Description  Will remain free from falls  Outcome: Met This Shift  No falls or injuries sustained at this time. No attempts to get out of bed without nursing assistance. Call light within reach and pt. uses sometimes appropriately for assistance. Siderails up x 2. Nonskid footwear remains on. Bed in low and locked position. Hourly nursing rounds made. Pt. Alert and oriented, but has intermittent confusion, aware of limitations, and exhibits good safety judgement. Pt. uses assistive devices appropriately ( walker with one assist). Pt. understands individual fall risk factors.     Pt. reoriented to surroundings and reminded to use call light with each nurse/patient interaction.     Pt. room located close to nurse's station.      Bed alarm / Personal alarm remains engaged throughout the shift as a precaution.     Tele-sitter also placed in pts room as an added precaution.     Problem: Pain:  Goal: Pain level will decrease  Description  Pain level will decrease  Outcome: Ongoing  Pain assessment completed so far this shift. Pt. able to rest without the use of pain medication. Patient denies any pain so far this shift. Will continue  To monitor.
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
## 2019-04-05 NOTE — PROGRESS NOTES
Writer attempted to set patient up for dinner. Patient stated \"i'm not hungry. \"  Writer strongly encouraged patient to at least drink his meal shake, but patient refused at this time. Will continue to monitor.

## 2019-04-05 NOTE — PLAN OF CARE
Nutrition Problem: Inadequate oral intake  Intervention: Food and/or Nutrient Delivery: Continue current diet, Modify current ONS(Calorie Count stopped but may resume)  Nutritional Goals: PO intake % of meals and supplements
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
## 2019-04-05 NOTE — PLAN OF CARE
Problem: Risk for Impaired Skin Integrity  Goal: Tissue integrity - skin and mucous membranes  Description  Structural intactness and normal physiological function of skin and  mucous membranes. Outcome: Ongoing  Note:   Skin assessment completed. See Head to Toe assessment for details. Waffle mattress overlay in place. Patient repositioned every two hours and more frequently if necessary. Brief checked and changed at least every two hours, and more frequently when necessary. Will continue to monitor. Problem: Falls - Risk of:  Goal: Will remain free from falls  Description  Will remain free from falls  Outcome: Ongoing  Note:   Pt has been free from falls this shift. Bed is in lowest position, brake is locked, call light is within reach. Will continue to monitor. Problem: Pain:  Goal: Pain level will decrease  Description  Pain level will decrease  Outcome: Ongoing  Note:   Pain assessed with each interaction. Meds given, see MAR. Will continue to monitor.

## 2019-04-05 NOTE — PROGRESS NOTES
[facility]   OCCUPATIONAL THERAPY MISSED TREATMENT NOTE   ACUTE REHAB  Date: 19  Patient Name: Brii Knapp       Room: 5974/9363-17  MRN: [id number]   Account #: [de-identified]    : 1945  (72 y.o.)  Gender: male   Referring Practitioner: Dr. Noble Pham  Diagnosis: L intercerebral hemorrhage             REASON FOR MISSED TREATMENT:  Patient refusal   -    Pt declines AM/PM sessions this date d/t fatigue and low back pain, pt encouraged to participate to increase activity tolerance, strength and overall endurance, pt demo's P reception and becomes sightly agitated c encouragement/reinforcement. NSG notified.       PHAM Tadeo/KANWAL

## 2019-04-05 NOTE — PROGRESS NOTES
Information:  · Nutrition-Focused Physical Findings: Edema: +1 RLE, LLE. · Wound Type: (Refer to nursing flowsheet)  · Current Nutrition Therapies:  · Oral Diet Orders: General   · Oral Diet intake: 0%, 1-25%, 26-50%(Intake varies but overall is not adequate)  · Oral Nutrition Supplement (ONS) Orders: Clear Liquid Oral Supplement  · ONS intake: 0%, 26-50%, %   Date Consumed PO Intake Kcal PO Intake grams protein    Comments   4-4-19 950 41      · Anthropometric Measures:  · Ht: 5' 7\" (170.2 cm)   · Current Body Wt: 127 lb 10.3 oz (57.9 kg)  · Admission Body Wt: 139 lb 15.9 oz (63.5 kg)  · Usual Body Wt: 149 lb 14.6 oz (68 kg)(wt listed as 3-8-19, but unsure if date accurate)  · % Weight Change:  ,  4% in week; 10% in 1 month if wts and dates accurate (based on assessment 4-1); additional 5% wt loss within 1 wk if wts accurate  · Ideal Body Wt: 148 lb (67.1 kg), % Ideal Body 86% (based on wt 4-4)  · Adjusted Body Wt:  , body weight adjusted for    · BMI Classification: BMI 18.5 - 24.9 Normal Weight    Nutrition Interventions:   Continue current diet, Modify current ONS(Calorie Count stopped but may resume)  Continued Inpatient Monitoring    Nutrition Evaluation:   · Evaluation: Progress towards goals declining   · Goals: PO intake % of meals and supplements    · Monitoring: Meal Intake, Supplement Intake, Weight, Pertinent Labs, Monitor Bowel Function, Diet Tolerance, Skin Integrity, I&O, Chewing/Swallowing    Ros Perez R.D., L.D.   Phone: [phone]
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
## 2019-04-06 LAB
GLUCOSE BLD-MCNC: 143 MG/DL (ref 75–110)
GLUCOSE BLD-MCNC: 144 MG/DL (ref 75–110)
GLUCOSE BLD-MCNC: 161 MG/DL (ref 75–110)
GLUCOSE BLD-MCNC: 182 MG/DL (ref 75–110)

## 2019-04-06 PROCEDURE — 99233 SBSQ HOSP IP/OBS HIGH 50: CPT | Performed by: PHYSICAL MEDICINE & REHABILITATION

## 2019-04-06 PROCEDURE — 97116 GAIT TRAINING THERAPY: CPT

## 2019-04-06 PROCEDURE — 1180000000 HC REHAB R&B

## 2019-04-06 PROCEDURE — 82947 ASSAY GLUCOSE BLOOD QUANT: CPT

## 2019-04-06 PROCEDURE — 97110 THERAPEUTIC EXERCISES: CPT

## 2019-04-06 PROCEDURE — 6370000000 HC RX 637 (ALT 250 FOR IP): Performed by: INTERNAL MEDICINE

## 2019-04-06 PROCEDURE — 6370000000 HC RX 637 (ALT 250 FOR IP): Performed by: NURSE PRACTITIONER

## 2019-04-06 PROCEDURE — 6370000000 HC RX 637 (ALT 250 FOR IP): Performed by: PHYSICAL MEDICINE & REHABILITATION

## 2019-04-06 PROCEDURE — 97530 THERAPEUTIC ACTIVITIES: CPT

## 2019-04-06 PROCEDURE — 99231 SBSQ HOSP IP/OBS SF/LOW 25: CPT | Performed by: INTERNAL MEDICINE

## 2019-04-06 PROCEDURE — 6370000000 HC RX 637 (ALT 250 FOR IP): Performed by: STUDENT IN AN ORGANIZED HEALTH CARE EDUCATION/TRAINING PROGRAM

## 2019-04-06 PROCEDURE — 97542 WHEELCHAIR MNGMENT TRAINING: CPT

## 2019-04-06 RX ADMIN — DICYCLOMINE HYDROCHLORIDE 10 MG: 10 CAPSULE ORAL at 11:42

## 2019-04-06 RX ADMIN — DICYCLOMINE HYDROCHLORIDE 10 MG: 10 CAPSULE ORAL at 06:22

## 2019-04-06 RX ADMIN — METOCLOPRAMIDE 5 MG: 10 TABLET ORAL at 06:22

## 2019-04-06 RX ADMIN — Medication 800 MG: at 08:33

## 2019-04-06 RX ADMIN — INSULIN LISPRO 3 UNITS: 100 INJECTION, SOLUTION INTRAVENOUS; SUBCUTANEOUS at 11:41

## 2019-04-06 RX ADMIN — LEVETIRACETAM 500 MG: 500 TABLET, FILM COATED ORAL at 21:47

## 2019-04-06 RX ADMIN — INSULIN LISPRO 3 UNITS: 100 INJECTION, SOLUTION INTRAVENOUS; SUBCUTANEOUS at 08:33

## 2019-04-06 RX ADMIN — POLYETHYLENE GLYCOL 3350 17 G: 17 POWDER, FOR SOLUTION ORAL at 08:33

## 2019-04-06 RX ADMIN — INSULIN LISPRO 3 UNITS: 100 INJECTION, SOLUTION INTRAVENOUS; SUBCUTANEOUS at 17:55

## 2019-04-06 RX ADMIN — PANTOPRAZOLE SODIUM 40 MG: 40 TABLET, DELAYED RELEASE ORAL at 06:22

## 2019-04-06 RX ADMIN — SUCRALFATE 1 G: 1 TABLET ORAL at 06:22

## 2019-04-06 RX ADMIN — MIRTAZAPINE 15 MG: 15 TABLET, ORALLY DISINTEGRATING ORAL at 21:47

## 2019-04-06 RX ADMIN — FERROUS SULFATE TAB 325 MG (65 MG ELEMENTAL FE) 325 MG: 325 (65 FE) TAB at 17:55

## 2019-04-06 RX ADMIN — ACETAMINOPHEN 650 MG: 325 TABLET, FILM COATED ORAL at 17:55

## 2019-04-06 RX ADMIN — ACETAMINOPHEN 650 MG: 325 TABLET, FILM COATED ORAL at 08:40

## 2019-04-06 RX ADMIN — METFORMIN HYDROCHLORIDE 500 MG: 500 TABLET, FILM COATED ORAL at 17:55

## 2019-04-06 RX ADMIN — SUCRALFATE 1 G: 1 TABLET ORAL at 11:41

## 2019-04-06 RX ADMIN — OXYCODONE HYDROCHLORIDE 5 MG: 5 TABLET ORAL at 08:40

## 2019-04-06 RX ADMIN — METFORMIN HYDROCHLORIDE 500 MG: 500 TABLET, FILM COATED ORAL at 08:33

## 2019-04-06 RX ADMIN — FOLIC ACID 1 MG: 1 TABLET ORAL at 08:32

## 2019-04-06 RX ADMIN — DICYCLOMINE HYDROCHLORIDE 10 MG: 10 CAPSULE ORAL at 18:03

## 2019-04-06 RX ADMIN — Medication 800 MG: at 21:47

## 2019-04-06 RX ADMIN — LEVETIRACETAM 500 MG: 500 TABLET, FILM COATED ORAL at 08:32

## 2019-04-06 RX ADMIN — PANTOPRAZOLE SODIUM 40 MG: 40 TABLET, DELAYED RELEASE ORAL at 17:55

## 2019-04-06 RX ADMIN — FERROUS SULFATE TAB 325 MG (65 MG ELEMENTAL FE) 325 MG: 325 (65 FE) TAB at 08:33

## 2019-04-06 RX ADMIN — DONEPEZIL HYDROCHLORIDE 5 MG: 5 TABLET, FILM COATED ORAL at 21:47

## 2019-04-06 RX ADMIN — AMLODIPINE BESYLATE 10 MG: 10 TABLET ORAL at 08:33

## 2019-04-06 RX ADMIN — VITAMIN D, TAB 1000IU (100/BT) 2000 UNITS: 25 TAB at 08:32

## 2019-04-06 RX ADMIN — SUCRALFATE 1 G: 1 TABLET ORAL at 17:55

## 2019-04-06 RX ADMIN — THIAMINE HCL TAB 100 MG 100 MG: 100 TAB at 08:33

## 2019-04-06 ASSESSMENT — PAIN DESCRIPTION - DESCRIPTORS
DESCRIPTORS: ACHING
DESCRIPTORS: ACHING
DESCRIPTORS: SORE;ACHING

## 2019-04-06 ASSESSMENT — PAIN DESCRIPTION - PAIN TYPE
TYPE: CHRONIC PAIN
TYPE: ACUTE PAIN
TYPE: CHRONIC PAIN

## 2019-04-06 ASSESSMENT — PAIN DESCRIPTION - FREQUENCY
FREQUENCY: CONTINUOUS
FREQUENCY: INTERMITTENT
FREQUENCY: INTERMITTENT

## 2019-04-06 ASSESSMENT — PAIN SCALES - GENERAL
PAINLEVEL_OUTOF10: 0
PAINLEVEL_OUTOF10: 0
PAINLEVEL_OUTOF10: 3
PAINLEVEL_OUTOF10: 0
PAINLEVEL_OUTOF10: 8
PAINLEVEL_OUTOF10: 0
PAINLEVEL_OUTOF10: 6

## 2019-04-06 ASSESSMENT — PAIN DESCRIPTION - LOCATION
LOCATION: BACK
LOCATION: GENERALIZED
LOCATION: HEAD

## 2019-04-06 ASSESSMENT — PAIN DESCRIPTION - ORIENTATION
ORIENTATION: ANTERIOR
ORIENTATION: LOWER
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
## 2019-04-07 LAB
GLUCOSE BLD-MCNC: 138 MG/DL (ref 75–110)
GLUCOSE BLD-MCNC: 143 MG/DL (ref 75–110)
GLUCOSE BLD-MCNC: 172 MG/DL (ref 75–110)
GLUCOSE BLD-MCNC: 190 MG/DL (ref 75–110)

## 2019-04-07 PROCEDURE — 99231 SBSQ HOSP IP/OBS SF/LOW 25: CPT | Performed by: INTERNAL MEDICINE

## 2019-04-07 PROCEDURE — 99232 SBSQ HOSP IP/OBS MODERATE 35: CPT | Performed by: PHYSICAL MEDICINE & REHABILITATION

## 2019-04-07 PROCEDURE — 97535 SELF CARE MNGMENT TRAINING: CPT

## 2019-04-07 PROCEDURE — 97542 WHEELCHAIR MNGMENT TRAINING: CPT

## 2019-04-07 PROCEDURE — 6370000000 HC RX 637 (ALT 250 FOR IP): Performed by: NURSE PRACTITIONER

## 2019-04-07 PROCEDURE — 97530 THERAPEUTIC ACTIVITIES: CPT

## 2019-04-07 PROCEDURE — 1180000000 HC REHAB R&B

## 2019-04-07 PROCEDURE — 6370000000 HC RX 637 (ALT 250 FOR IP): Performed by: INTERNAL MEDICINE

## 2019-04-07 PROCEDURE — 6370000000 HC RX 637 (ALT 250 FOR IP): Performed by: PHYSICAL MEDICINE & REHABILITATION

## 2019-04-07 PROCEDURE — 97116 GAIT TRAINING THERAPY: CPT

## 2019-04-07 PROCEDURE — 97110 THERAPEUTIC EXERCISES: CPT

## 2019-04-07 PROCEDURE — 82947 ASSAY GLUCOSE BLOOD QUANT: CPT

## 2019-04-07 PROCEDURE — 6370000000 HC RX 637 (ALT 250 FOR IP): Performed by: STUDENT IN AN ORGANIZED HEALTH CARE EDUCATION/TRAINING PROGRAM

## 2019-04-07 RX ADMIN — METOCLOPRAMIDE 5 MG: 10 TABLET ORAL at 06:59

## 2019-04-07 RX ADMIN — PANTOPRAZOLE SODIUM 40 MG: 40 TABLET, DELAYED RELEASE ORAL at 06:58

## 2019-04-07 RX ADMIN — PANTOPRAZOLE SODIUM 40 MG: 40 TABLET, DELAYED RELEASE ORAL at 17:13

## 2019-04-07 RX ADMIN — Medication 800 MG: at 08:23

## 2019-04-07 RX ADMIN — DONEPEZIL HYDROCHLORIDE 5 MG: 5 TABLET, FILM COATED ORAL at 20:48

## 2019-04-07 RX ADMIN — SUCRALFATE 1 G: 1 TABLET ORAL at 12:40

## 2019-04-07 RX ADMIN — FOLIC ACID 1 MG: 1 TABLET ORAL at 08:24

## 2019-04-07 RX ADMIN — Medication 800 MG: at 20:46

## 2019-04-07 RX ADMIN — FERROUS SULFATE TAB 325 MG (65 MG ELEMENTAL FE) 325 MG: 325 (65 FE) TAB at 08:23

## 2019-04-07 RX ADMIN — DICYCLOMINE HYDROCHLORIDE 10 MG: 10 CAPSULE ORAL at 06:58

## 2019-04-07 RX ADMIN — SUCRALFATE 1 G: 1 TABLET ORAL at 17:13

## 2019-04-07 RX ADMIN — POLYETHYLENE GLYCOL 3350 17 G: 17 POWDER, FOR SOLUTION ORAL at 08:23

## 2019-04-07 RX ADMIN — FERROUS SULFATE TAB 325 MG (65 MG ELEMENTAL FE) 325 MG: 325 (65 FE) TAB at 17:13

## 2019-04-07 RX ADMIN — MIRTAZAPINE 15 MG: 15 TABLET, ORALLY DISINTEGRATING ORAL at 20:48

## 2019-04-07 RX ADMIN — METFORMIN HYDROCHLORIDE 500 MG: 500 TABLET, FILM COATED ORAL at 08:24

## 2019-04-07 RX ADMIN — INSULIN LISPRO 2 UNITS: 100 INJECTION, SOLUTION INTRAVENOUS; SUBCUTANEOUS at 20:53

## 2019-04-07 RX ADMIN — METFORMIN HYDROCHLORIDE 500 MG: 500 TABLET, FILM COATED ORAL at 17:13

## 2019-04-07 RX ADMIN — AMLODIPINE BESYLATE 10 MG: 10 TABLET ORAL at 08:24

## 2019-04-07 RX ADMIN — DICYCLOMINE HYDROCHLORIDE 10 MG: 10 CAPSULE ORAL at 17:14

## 2019-04-07 RX ADMIN — ACETAMINOPHEN 650 MG: 325 TABLET, FILM COATED ORAL at 08:23

## 2019-04-07 RX ADMIN — LEVETIRACETAM 500 MG: 500 TABLET, FILM COATED ORAL at 20:46

## 2019-04-07 RX ADMIN — SUCRALFATE 1 G: 1 TABLET ORAL at 06:59

## 2019-04-07 RX ADMIN — OXYCODONE HYDROCHLORIDE 5 MG: 5 TABLET ORAL at 08:23

## 2019-04-07 RX ADMIN — LEVETIRACETAM 500 MG: 500 TABLET, FILM COATED ORAL at 08:23

## 2019-04-07 RX ADMIN — THIAMINE HCL TAB 100 MG 100 MG: 100 TAB at 08:23

## 2019-04-07 RX ADMIN — DICYCLOMINE HYDROCHLORIDE 10 MG: 10 CAPSULE ORAL at 12:40

## 2019-04-07 RX ADMIN — INSULIN LISPRO 3 UNITS: 100 INJECTION, SOLUTION INTRAVENOUS; SUBCUTANEOUS at 07:00

## 2019-04-07 RX ADMIN — VITAMIN D, TAB 1000IU (100/BT) 2000 UNITS: 25 TAB at 08:23

## 2019-04-07 RX ADMIN — INSULIN LISPRO 3 UNITS: 100 INJECTION, SOLUTION INTRAVENOUS; SUBCUTANEOUS at 17:15

## 2019-04-07 ASSESSMENT — PAIN SCALES - GENERAL
PAINLEVEL_OUTOF10: 6
PAINLEVEL_OUTOF10: 0
PAINLEVEL_OUTOF10: 8
PAINLEVEL_OUTOF10: 8
PAINLEVEL_OUTOF10: 0

## 2019-04-07 ASSESSMENT — PAIN DESCRIPTION - PAIN TYPE
TYPE: ACUTE PAIN;CHRONIC PAIN
TYPE: CHRONIC PAIN

## 2019-04-07 ASSESSMENT — PAIN DESCRIPTION - FREQUENCY: FREQUENCY: INTERMITTENT

## 2019-04-07 ASSESSMENT — PAIN DESCRIPTION - DESCRIPTORS
DESCRIPTORS: ACHING
DESCRIPTORS: ACHING;PRESSURE;THROBBING
DESCRIPTORS: ACHING

## 2019-04-07 ASSESSMENT — PAIN DESCRIPTION - LOCATION
LOCATION: GENERALIZED
LOCATION: HEAD;BACK
LOCATION: HEAD

## 2019-04-07 NOTE — PROGRESS NOTES
Reason for Follow up: REGGIE. HISTORY:    Feels well. Last creatinine stable 1.1-1.2.  BP stable. Started on metformin  K normal    Review Of Systems:   Constitutional: No fever, chills, lethargy, weakness or wt loss. Cardiac:  No chest pain, dyspnea, orthopnea or PND. Pulmonary:  No cough, phlegm or wheezing. Abdomen:  No abdominal pain, nausea, vomiting or diarrhea. :   No hematuria, pyuria, dysuria or flank pain. Extremities:  No swelling or joint pains. Scheduled Meds:   metFORMIN  500 mg Oral BID WC    magnesium oxide  800 mg Oral BID    mirtazapine  15 mg Oral Nightly    vitamin D3  2,000 Units Oral Daily    dicyclomine  10 mg Oral TID AC    sucralfate  1 g Oral 4 times per day    donepezil  5 mg Oral Nightly    amLODIPine  10 mg Oral Daily    ferrous sulfate  325 mg Oral BID WC    folic acid  1 mg Oral Daily    insulin lispro  0-18 Units Subcutaneous TID WC    insulin lispro  0-9 Units Subcutaneous Nightly    levETIRAcetam  500 mg Oral BID    pantoprazole  40 mg Oral BID AC    polyethylene glycol  17 g Oral Daily    vitamin B-1  100 mg Oral Daily   Continuous Infusions:   dextrose       PRN Meds:melatonin ER, labetalol, bisacodyl, glucose, dextrose, glucagon (rDNA), dextrose, oxyCODONE **OR** [DISCONTINUED] oxyCODONE, lidocaine PF, acetaminophen    Allergies   Allergen Reactions    Celebrex [Celecoxib] Swelling       Physical Exam:  Blood pressure 125/70, pulse 87, temperature 97.9 °F (36.6 °C), resp. rate 18, height 5' 7\" (1.702 m), weight 127 lb 9.6 oz (57.9 kg), SpO2 97 %. In: 500 [P.O.:500]  Out: -   In: 500   Out: -     General:  Awake, alert, not in distress. Appears to be stated age. HEENT:Anicteric sclera. Pink and moist oral mucosa. No JVD. Chest: Bilateral air entry, clear to auscultation, no wheezing, rhonchi or rales. Cardiovascular: RRR, S1S2, no murmur, rub or gallop. No lower extremity edema. Abdomen: Soft, non tender to palpation.     Musculoskeletal:  No cyanosis or clubbing. Integumentary: Pink, warm and dry. Free from rash or lesions. Skin turgor normal.  CNS: Speech clear. Face symmetrical. No tremor. Data:  CBC:   Lab Results   Component Value Date    WBC 8.5 04/05/2019    HGB 10.4 (L) 04/05/2019    HCT 31.5 (L) 04/05/2019    MCV 91.6 04/05/2019     04/05/2019     BMP:    Lab Results   Component Value Date     04/05/2019    K 4.1 04/05/2019     04/05/2019    CO2 25 04/05/2019    BUN 19 04/05/2019    CREATININE 1.10 04/05/2019    GLUCOSE 195 (H) 04/05/2019     CMP:   Lab Results   Component Value Date     04/05/2019    K 4.1 04/05/2019     04/05/2019    CO2 25 04/05/2019    BUN 19 04/05/2019    CREATININE 1.10 04/05/2019    GLUCOSE 195 (H) 04/05/2019    CALCIUM 9.1 04/05/2019    PROT 5.8 (L) 03/28/2019    LABALBU 2.7 (L) 03/28/2019    BILITOT 0.61 03/28/2019    ALKPHOS 77 03/28/2019    AST 12 03/28/2019    ALT 8 03/28/2019      Hepatic:   Lab Results   Component Value Date    AST 12 03/28/2019    ALT 8 03/28/2019    BILITOT 0.61 03/28/2019    ALKPHOS 77 03/28/2019     BNP: No results found for: BNP  Lipids: No results found for: CHOL, HDL  INR:   Lab Results   Component Value Date    INR 1.2 03/26/2019     PTH: No results found for: PTH  Phosphorus:    Lab Results   Component Value Date    PHOS 3.4 04/05/2019     Ionized Calcium: No results found for: IONCA  Magnesium:   Lab Results   Component Value Date    MG 1.7 04/05/2019     Albumin:   Lab Results   Component Value Date    LABALBU 2.7 (L) 03/28/2019     Last 3 CK, CKMB, Troponin: @LABRCNT(CKTOTAL:3,CKMB:3,TROPONINI:3)       URINE:)No results found for: Genna Madrigal    Radiology:   Reviewed. Assessment:  1. Acute kidney injury, ATN. Renal function is improved. 2. Hypokalemia. 3. HTN with worsening BP when in pain. 4. Intracranial hemorrhage. 5. Anemia. 6. Borderline hypernatremia, improved. 7. Hypomagnesemia and hypovitaminosis D.  8. Bilateral nephrolithiasis.   9. DM    Plan:  Most recent electrolytes Ok  Check labs twice a week, on Mondays and Thursdays  BP stable. Encourage protein supplements. Monitor GFR on metformin  Avoid hypotension, nephrotoxic drugs, Lovenox, Fleets enema and IV contrast exposure. Electronically signed by Callie Dixon MD on 4/7/2019 at 12:24 PM  [facility].   Ph: [phone]

## 2019-04-07 NOTE — CARE COORDINATION
[facility] Acute Rehab Unit   Date: 2019    Patient Care Needs  Patient Name: Liat Quevedo       Room: 5891/8988-43 D/C : 19  : 1945  (72 y.o.)     Gender: male   Diagnosis: L ICH, closed head injury    Vision  Vision: Impaired  Vision Exceptions: Wears glasses at all times  Hearing  Hearing: Within functional limits    Personal Equipment and Devices: (eyeglasses, hearing,aids, dentures)  Assistive Devices: Walker    Feeding / Swallow:   Eatin - Feeds self with setup/supervision/cues and/or requires only setup/supervision/cues to perform tube feedings    Diet Ordered: General  Supplements: Clear Liquid Oral Supplement       Precautions: Precautions: Bleeding, Fall risk, Seizure,Restrictions/Precautions  Restrictions/Precautions: Seizure, General Precautions, Surgical Protocols, Fall Risk  Required Braces or Orthoses?: No  Implants present? : Metal implants,Additional Pertinent Hx: Alcohol abuse,   Restrictions/Precautions: Seizure, General Precautions, Surgical Protocols, Fall Risk Required Braces or Orthoses?: No    Mobility Equipment needs:  Device: Rolling Walker Assistance: Stand by assistance    Bed Mobility:  Bed mobility  Bridging: Stand by assistance(Pt refused to attempt \"bridge,\" but used bridge to scoot HOB)  Rolling to Left: Supervision  Rolling to Right: Supervision  Supine to Sit: Supervision  Sit to Supine: Supervision  Scooting: Stand by assistance  Comment: HOB elevated    Transfers:   Sit to Stand: Stand by assistance   Bed to Chair: Contact guard assistance Stand Pivot Transfers: Contact guard assistance      Posture: Good      Ambulation Status:  Assistance: Stand by assistance  Quality of Gait: Moderate, steady pace & stride length; RW closer today for better OSIEL; good posture, good heel strike. No LOB. Distance: 120' + 100' a.m. Courtney Flores   Device: Rolling Walker  Other Apparatus: Wheelchair follow  Comments: Pt c/o feeling dizzy at 100', requests seated rest,

## 2019-04-07 NOTE — PLAN OF CARE
Problem: Risk for Impaired Skin Integrity  Goal: Tissue integrity - skin and mucous membranes  Description  Structural intactness and normal physiological function of skin and  mucous membranes. Outcome: Ongoing   Skin assessment completed this shift. Nutrition and Hydration status assessed with adequate intake. Osito Score as charted. Pressure Relief Overlay remains intact and inflated to patient's bed throughout the shift. Bilateral heels remain elevated on pillows throughout the shift. Patient tolerates repositioning by staff at least every 2 hours. Patient able to reposition self for comfort and to prevent breakdown. Patient verbalizes understanding of pressure ulcer prevention measures. Skin integrity maintained. No new skin breakdown noted. Skin to high risk pressure areas including coccyx and heels are clear. Darby / Incontinence care provided as needed throughout the shift. Aloe Vesta Moisture Barrier ointment applied to buttocks as a preventative measure. Problem: Falls - Risk of:  Goal: Absence of physical injury  Description  Absence of physical injury  Outcome: Ongoing   No falls or injury this shift. Bed is maintained at the lowest level, brakes engaged, call light and assistive devices in reach. Room is clutter free, adequate lighting for safe transfers and ambulation. Assistance provided for transfers and toileting. Problem: Pain:  Goal: Control of acute pain  Description  Control of acute pain  Outcome: Ongoing   Patient assessed for pain, medicated per orders. Patient reports an acceptable level of discomfort with the current medications. Patient was repositioned and cold/heat therapy applied.

## 2019-04-07 NOTE — PLAN OF CARE
Problem: Risk for Impaired Skin Integrity  Goal: Tissue integrity - skin and mucous membranes  Description  Structural intactness and normal physiological function of skin and  mucous membranes. 4/7/2019 0524 by Jayme Gaitan RN  Outcome: Ongoing  4/7/2019 0511 by Jayme Gaitan, RN  Outcome: Ongoing   Skin assessment completed this shift. Nutrition and Hydration status assessed with adequate intake. Osito Score as charted. Pressure Relief Overlay remains intact and inflated to patient's bed throughout the shift. Bilateral heels remain elevated on pillows throughout the shift. Patient tolerates repositioning by staff at least every 2 hours. Patient able to reposition self for comfort and to prevent breakdown. Patient verbalizes understanding of pressure ulcer prevention measures. Skin integrity maintained. No new skin breakdown noted. Skin to high risk pressure areas including coccyx and heels are clear. Darby / Incontinence care provided as needed throughout the shift. Aloe Vesta Moisture Barrier ointment applied to buttocks as a preventative measure. Problem: Falls - Risk of:  Goal: Will remain free from falls  Description  Will remain free from falls  Outcome: Ongoing   No falls or injury this shift. Bed is maintained at the lowest level, brakes engaged, call light and assistive devices in reach. Room is clutter free, adequate lighting for safe transfers and ambulation. Assistance provided for transfers and toileting. Problem: Pain:  Goal: Pain level will decrease  Description  Pain level will decrease  Outcome: Ongoing     Patient assessed for pain, medicated per orders. Patient reports an acceptable level of discomfort with the current medications. Patient was repositioned and cold/heat therapy applied.

## 2019-04-07 NOTE — CARE COORDINATION
Acute Rehab Unit Full [facility] Progress    Admission score Current score    Goal       Self-Care     Eatin - Feeds self with setup/supervision/cues and/or requires only setup/supervision/cues to perform tube feedings 5 - Feeds self with setup/supervision/cues and/or requires only setup/supervision/cues to perform tube feedings 7   Groomin - Requires setup/cues to do all tasks(pt completed face and oral hygiene while supine in bed. pt also brushed hair. Pt require vc for initiation/completion of task) 5 - Requires setup/cues to do all tasks(s/u seated EOB to wash face) 6   Bathing: 3 - Able to bathe 5-7 areas(pt completed bathing supine in bed. Required assistance for bilateral feet and posterior lacey-care. required frequent cueing to attend to task) 5 - Able to bathe all 10 areas with setup/sup/cues(s/u seated EOB, vc's for sequencing, pt declines lacey-care) 5   Dressing-Upper: 5 - Requires setup/supervision/cues and/or requires assist with presthesis/brace only(Required demonstration and verbal cueing to initiate buttoning shirt. ) 5 - Requires setup/supervision/cues and/or requires assist with presthesis/brace only(pt able don/doff OH shirt c s/u and cuing) 5   Dressing-Lower: 2 - Requires assist with 4-5 parts of dressing(pt required assistance to thread 2nd leg and pull pants around waist while laying supine, required assitance to don socks bilaterally.) 5 - Requires setup/supervision/cues and/or staff applies TEDS/prosthesis/brace only(A TEDs, pt able to don/doff pants/footies c cuing) 5   Toiletin - Total assist(pt able to complete front lacey-hygiene.  Required total assistance to remove and change brief.) 5 - Requires setup/supervision/cues 5     Spincter Control     Bladder: 6 - Uses device independently (including EMPTYING of device, or uses medication)    6 - Uses device independently (including EMPTYING of device, or uses medication) 6             Bladder Frequency of Accidents: 6 - No accidents,uses device like urinal, bedpan, absorbant pad, or requires medication to manage bladder    6 - No accidents,uses device like urinal, bedpan, absorbant pad, or requires medication to manage bladder    Bowel: 6 - Uses toilet independently with device or oral medication(s)    6 - Uses toilet independently with device or oral medication(s) 7   Bowel Level of Assistance: 6- Requires device like bedpan, diaper, bedside commode, but patient obtains and empties own device including colostomy. Or requires bowel management medication including stool softeners, laxatives, inserts own suppository 6- Requires device like bedpan, diaper, bedside commode, but patient obtains and empties own device including colostomy. Or requires bowel management medication including stool softeners, laxatives, inserts own suppository    Bowel Frequency of Accidents: 6 - No accidents, uses device like bedpan, diaper, bedside commode colostomy, or requires medication to manage bowels    6- Requires device like bedpan, diaper, bedside commode, but patient obtains and empties own device including colostomy.   Or requires bowel management medication including stool softeners, laxatives, inserts own suppository         Transfers     Bed, Chair, Wheel Chair: 4 - Requires steadying assistance only <25% assist  and/or requires assist with one leg only    4 - Requires steadying assistance only <25% assist  and/or requires assist with one leg only 5   Toilet Transfer: 0 - Did not occur 5 - Requires setup/supervision/cues 5    Primary Mode: Shower 5   Tub Transfer: 0 - Activity does not occur 0 - Activity does not occur    Shower Transfer: 0 - Activity does not occur 0 - Activity does not occur(ADL's EOB this date)      Locomotion   Primary Mode: Walk    Primary Mode: Walk Walk 5   Distance Walked: 300 ft 120'(RW)    Distance Traveled in Wheel Chair: 0 150'(B UEs)    Walk: 1 - Total Assistance Walks < 50 feet OR requires two or more people OR patient performs < 25% of locomotion effort(wheelchair follow needed for safety) 2 - Maximal Assistance Requires up to Maximal Assistance AND requires assistance of one person to walk between  feet (Patient performs 25-49% of locomotion effort or goes between  feet)    Wheel Chair: 0 - Activity Not Assessed/Does Not Occur 4 - Contact Guard/Minimal Assistance Requires up to Contact Guard or Minimal Assistance to operate wheelchair at least 150 feet    Stairs: 0 - Activity Does not Occur ( 0 only for the admission assessment)   4- Minimal Contact Assistance Perfoms 75% or more of the effort to go up and down one flight of stairs(15 4\"/6\" steps, B HR) 5     Communication     Comprehension: 2 - Patient understands basic needs 25-49% of the time 3 - Patient understands basic needs 50-74% of the time 4   Expression: 2 - Expresses basic ideas/needs 25-49% of the time Expression: 3 - Expresses basic ideas/needs 50-74% of the time 4     Social Cognition     Social Interaction: 2 - Patient appropriate  25%-49% of the time Social Interaction: 3 - Patient appropriate  50%-74% of the time 4   Problem Solvin - Patient solves simple/routine tasks 25%-49% 2 - Patient solves simple/routine tasks 25%-49% 3   Memory: 2 - Patient remembers 25%-49% of the time 2 - Patient remembers 25%-49% of the time 3

## 2019-04-07 NOTE — PROGRESS NOTES
BID    pantoprazole  40 mg Oral BID AC    polyethylene glycol  17 g Oral Daily    vitamin B-1  100 mg Oral Daily     Continuous Infusions:    dextrose       PRN Meds: melatonin ER, labetalol, bisacodyl, glucose, dextrose, glucagon (rDNA), dextrose, oxyCODONE **OR** [DISCONTINUED] oxyCODONE, lidocaine PF, acetaminophen    Data:     Past Medical History:   has a past medical history of Alcoholic dementia ([facility] [address] Back pain, Cerebral artery occlusion with cerebral infarction ([facility] [address] Diabetes mellitus ([facility] [address] Head injury, Hearing loss, Hypertension, TIA (transient ischemic attack), Transient ischemic attack, and Ulcer. Social History:   reports that he has quit smoking. He has never used smokeless tobacco. He reports that he drinks about 3.6 oz of alcohol per week. He reports that he does not use drugs. Family History:   Family History   Problem Relation Age of Onset    Diabetes Mother     Heart Disease Mother        Vitals:  /70   Pulse 87   Temp 97.9 °F (36.6 °C)   Resp 18   Ht 5' 7\" (1.702 m)   Wt 127 lb 9.6 oz (57.9 kg)   SpO2 97%   BMI 19.98 kg/m²   Temp (24hrs), Av °F (36.7 °C), Min:97.9 °F (36.6 °C), Max:98.1 °F (36.7 °C)    Recent Labs     19  0657 19  1117 19  1550   POCGLU 143* 172* 138* 190*       I/O (24Hr):     Intake/Output Summary (Last 24 hours) at 2019 1611  Last data filed at 2019 0825  Gross per 24 hour   Intake 500 ml   Output --   Net 500 ml       Labs:    [unfilled]    Lab Results   Component Value Date/Time    SPECIAL RT HAND [facility] 03/15/2019 02:32 AM     Lab Results   Component Value Date/Time    CULTURE NO GROWTH 6 DAYS 03/15/2019 02:32 AM       [facility]    Radiology:      Physical Examination:        General appearance:  alert, cooperative and no distress, Thin Built Person   Mental Status:  oriented to person, place and time and normal affect  Lungs:  clear to auscultation bilaterally, normal effort  Heart:  regular rate and rhythm, no murmur  Abdomen:  soft, nontender, nondistended, normal bowel sounds, no masses, hepatomegaly, splenomegaly  Extremities:  no edema, redness, tenderness in the calves  Skin:  no gross lesions, rashes, induration    Assessment:        Primary Problem  Traumatic left-sided intracerebral hemorrhage with loss of consciousness [facility])    Active Hospital Problems    Diagnosis Date Noted    Anemia [D64.9]     Gastric ulcer without hemorrhage or perforation [K25.9]     Epigastric pain [R10.13]     Left lower quadrant pain [R10.32]     Severe malnutrition ([address] [E43] 03/27/2019    Brain dysfunction [G93.89] 03/26/2019    Acute kidney injury 2/2 ischemic ATN related to anemia and hypotension (retroperitoneal bleed right) [N17.9] 03/21/2019    Esophagitis [K20.9]     Gastrointestinal hemorrhage with hematemesis [K92.0]     Traumatic left-sided intracerebral hemorrhage with loss of consciousness ([address] [A57.923G]     Seizure ([address] [R56.9]     Marijuana abuse [F12.10] 07/01/2017       Plan:        1. Traumatic brain injury, right eye abduction  2. Diabetes, has readings of uncontrolled blood sugars, on insulin sliding scale, will add metformin 500 mg twice a day  3. Seizure disorder on Keppra  4. Chronic kidney disease,  Nephrologist following, Creatinine improved   5. Blood pressure controlled  6. CT abdomen, done recently suggestive of retroperitoneal bleed, not on Lovenox  7. H/o Alcoholism   8. Patient has improved appetite, on Remeron  9.  Likely will go to SNF as Patient is not participate in Therapy       4/6  Blood sugars  Controlled   Doing better with eating   Will DC to SNF soon   4/7   Doing better  Eating better working with PT    Arcelia Gooden MD  4/7/2019  4:11 PM
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
## 2019-04-07 NOTE — PROGRESS NOTES
[address] [facility]    ACUTE REHABILITATION OCCUPATIONAL THERAPY  DAILY NOTE    Date: 19  Patient Name: Brigette Malloy      Room: 1123/6407-86    MRN: [id number]   : 1945  (77 y.o.)  Gender: male   Referring Practitioner: Dr. Janneth Vaughan  Diagnosis: L intercerebral hemorrhage  Additional Pertinent Hx: Alcohol abuse,     Restrictions  Restrictions/Precautions: Seizure, General Precautions, Surgical Protocols, Fall Risk  Implants present? : Metal implants  Required Braces or Orthoses?: No    Subjective  Comments: pt cooperative with General tasks this AM; continues to require  SBA /  verbal cues for  initiation and task sequencing, SBA in standing c vc's for safety and UE support, no LOB noted  Patient Currently in Pain: Yes  Pain Level: 8  Pain Location: Head  Restrictions/Precautions: Seizure;General Precautions;Surgical Protocols; Fall Risk        Pain Assessment  Pain Assessment: 0-10  Pain Level: 8  Pain Location: Head  Pain Descriptors: Aching    Objective   Complaining of a Headache which is limiting his participation in care / therapy                                                    OT FIM:   Eatin - Feeds self with setup/supervision/cues and/or requires only setup/supervision/cues to perform tube feedings(cuing for safety and encouragement to eat)  Groomin - Requires setup/cues to do all tasks  Bathin - Able to bathe all 10 areas with setup/sup/cues(Complained of a headache at time today )  Dressing-Upper: 5 - Requires setup/supervision/cues and/or requires assist with presthesis/brace only  Dressing-Lower: 5 - Requires setup/supervision/cues and/or staff applies TEDS/prosthesis/brace only  Toilet Transfer: (did not occur during session)  Shower Transfer: (did not occur )               Assessment   Fair follow through with tasks unless constantly cued/encouraged      Type of devices: Left in bed;Bed alarm in place; Chair alarm in place(Telemonitor )          Patient Education:        Learner:patient  Method: demonstration       Outcome: needs reinforcement     Plan  Plan  Times per week: 900/7  Times per day: Twice a day  Short term goals  Time Frame for Short term goals: in 1 week pt will   Short term goal 1: demo UB ADLs with set-up and min vcs to attent to task  Short term goal 2: demo LB ADLs with moderate assistance with minimal vcs to attent to task  Short term goal 3: demo toilet transfer with minimal assistance, using least restrictive device  Short term goal 4: demo 20+ minutes of activity participation during functional activity to increase participation in ADLs and functional mobility   Long term goals  Time Frame for Long term goals : by d/c pt will  Long term goal 1: demo BADLs with set-up for safety   Long term goal 2: participate in functional tasks for 30+ minutes with min verbal cueing to redirect attention  Long term goal 3: demo functional transfers with set-up  Long term goal 4: demo safety awareness during all ADLs/functional mobility with minimal verbal cueing        04/07/19 1314 04/07/19 1400   OT Individual Minutes   Time In 0930  --    Time Out 1015  --    Minutes 45  --    Minute Variance   Variance  --  45   Reason  --  Pain  (headache - did too much in Physical therapy )     Electronically signed by Esteban Colon OT on 4/7/19 at 3:53 PM
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
## 2019-04-08 LAB
ABSOLUTE EOS #: 0.4 K/UL (ref 0–0.4)
ABSOLUTE IMMATURE GRANULOCYTE: ABNORMAL K/UL (ref 0–0.3)
ABSOLUTE LYMPH #: 1 K/UL (ref 1–4.8)
ABSOLUTE MONO #: 0.7 K/UL (ref 0.1–1.3)
ANION GAP SERPL CALCULATED.3IONS-SCNC: 14 MMOL/L (ref 9–17)
BASOPHILS # BLD: 1 % (ref 0–2)
BASOPHILS ABSOLUTE: 0.1 K/UL (ref 0–0.2)
BUN BLDV-MCNC: 14 MG/DL (ref 8–23)
BUN/CREAT BLD: ABNORMAL (ref 9–20)
CALCIUM SERPL-MCNC: 9.6 MG/DL (ref 8.6–10.4)
CHLORIDE BLD-SCNC: 101 MMOL/L (ref 98–107)
CO2: 23 MMOL/L (ref 20–31)
CREAT SERPL-MCNC: 1 MG/DL (ref 0.7–1.2)
DIFFERENTIAL TYPE: ABNORMAL
EOSINOPHILS RELATIVE PERCENT: 4 % (ref 0–4)
GFR AFRICAN AMERICAN: >60 ML/MIN
GFR NON-AFRICAN AMERICAN: >60 ML/MIN
GFR SERPL CREATININE-BSD FRML MDRD: ABNORMAL ML/MIN/{1.73_M2}
GFR SERPL CREATININE-BSD FRML MDRD: ABNORMAL ML/MIN/{1.73_M2}
GLUCOSE BLD-MCNC: 119 MG/DL (ref 75–110)
GLUCOSE BLD-MCNC: 121 MG/DL (ref 75–110)
GLUCOSE BLD-MCNC: 148 MG/DL (ref 75–110)
GLUCOSE BLD-MCNC: 167 MG/DL (ref 70–99)
GLUCOSE BLD-MCNC: 175 MG/DL (ref 75–110)
HCT VFR BLD CALC: 33.5 % (ref 41–53)
HEMOGLOBIN: 11.2 G/DL (ref 13.5–17.5)
IMMATURE GRANULOCYTES: ABNORMAL %
LYMPHOCYTES # BLD: 11 % (ref 24–44)
MCH RBC QN AUTO: 30.6 PG (ref 26–34)
MCHC RBC AUTO-ENTMCNC: 33.4 G/DL (ref 31–37)
MCV RBC AUTO: 91.4 FL (ref 80–100)
MONOCYTES # BLD: 8 % (ref 1–7)
NRBC AUTOMATED: ABNORMAL PER 100 WBC
PDW BLD-RTO: 15.4 % (ref 11.5–14.9)
PLATELET # BLD: 351 K/UL (ref 150–450)
PLATELET ESTIMATE: ABNORMAL
PMV BLD AUTO: 8.6 FL (ref 6–12)
POTASSIUM SERPL-SCNC: 4 MMOL/L (ref 3.7–5.3)
RBC # BLD: 3.67 M/UL (ref 4.5–5.9)
RBC # BLD: ABNORMAL 10*6/UL
SEG NEUTROPHILS: 76 % (ref 36–66)
SEGMENTED NEUTROPHILS ABSOLUTE COUNT: 6.8 K/UL (ref 1.3–9.1)
SODIUM BLD-SCNC: 138 MMOL/L (ref 135–144)
WBC # BLD: 8.9 K/UL (ref 3.5–11)
WBC # BLD: ABNORMAL 10*3/UL

## 2019-04-08 PROCEDURE — 97542 WHEELCHAIR MNGMENT TRAINING: CPT

## 2019-04-08 PROCEDURE — 97116 GAIT TRAINING THERAPY: CPT

## 2019-04-08 PROCEDURE — 6370000000 HC RX 637 (ALT 250 FOR IP): Performed by: STUDENT IN AN ORGANIZED HEALTH CARE EDUCATION/TRAINING PROGRAM

## 2019-04-08 PROCEDURE — 82947 ASSAY GLUCOSE BLOOD QUANT: CPT

## 2019-04-08 PROCEDURE — 85025 COMPLETE CBC W/AUTO DIFF WBC: CPT

## 2019-04-08 PROCEDURE — 99232 SBSQ HOSP IP/OBS MODERATE 35: CPT | Performed by: PHYSICAL MEDICINE & REHABILITATION

## 2019-04-08 PROCEDURE — 97127 HC SP THER IVNTJ W/FOCUS COG FUNCJ: CPT

## 2019-04-08 PROCEDURE — 1180000000 HC REHAB R&B

## 2019-04-08 PROCEDURE — 6370000000 HC RX 637 (ALT 250 FOR IP): Performed by: NURSE PRACTITIONER

## 2019-04-08 PROCEDURE — 97530 THERAPEUTIC ACTIVITIES: CPT

## 2019-04-08 PROCEDURE — 6370000000 HC RX 637 (ALT 250 FOR IP): Performed by: INTERNAL MEDICINE

## 2019-04-08 PROCEDURE — 99232 SBSQ HOSP IP/OBS MODERATE 35: CPT | Performed by: INTERNAL MEDICINE

## 2019-04-08 PROCEDURE — 97535 SELF CARE MNGMENT TRAINING: CPT

## 2019-04-08 PROCEDURE — 97110 THERAPEUTIC EXERCISES: CPT

## 2019-04-08 PROCEDURE — 36415 COLL VENOUS BLD VENIPUNCTURE: CPT

## 2019-04-08 PROCEDURE — 6370000000 HC RX 637 (ALT 250 FOR IP): Performed by: PHYSICAL MEDICINE & REHABILITATION

## 2019-04-08 PROCEDURE — 80048 BASIC METABOLIC PNL TOTAL CA: CPT

## 2019-04-08 RX ADMIN — INSULIN LISPRO 3 UNITS: 100 INJECTION, SOLUTION INTRAVENOUS; SUBCUTANEOUS at 08:14

## 2019-04-08 RX ADMIN — DICYCLOMINE HYDROCHLORIDE 10 MG: 10 CAPSULE ORAL at 10:38

## 2019-04-08 RX ADMIN — THIAMINE HCL TAB 100 MG 100 MG: 100 TAB at 08:13

## 2019-04-08 RX ADMIN — PANTOPRAZOLE SODIUM 40 MG: 40 TABLET, DELAYED RELEASE ORAL at 06:17

## 2019-04-08 RX ADMIN — Medication 1 MG: at 22:15

## 2019-04-08 RX ADMIN — POLYETHYLENE GLYCOL 3350 17 G: 17 POWDER, FOR SOLUTION ORAL at 08:14

## 2019-04-08 RX ADMIN — FERROUS SULFATE TAB 325 MG (65 MG ELEMENTAL FE) 325 MG: 325 (65 FE) TAB at 15:06

## 2019-04-08 RX ADMIN — METFORMIN HYDROCHLORIDE 500 MG: 500 TABLET, FILM COATED ORAL at 08:12

## 2019-04-08 RX ADMIN — LEVETIRACETAM 500 MG: 500 TABLET, FILM COATED ORAL at 08:12

## 2019-04-08 RX ADMIN — DICYCLOMINE HYDROCHLORIDE 10 MG: 10 CAPSULE ORAL at 06:17

## 2019-04-08 RX ADMIN — DONEPEZIL HYDROCHLORIDE 5 MG: 5 TABLET, FILM COATED ORAL at 22:14

## 2019-04-08 RX ADMIN — VITAMIN D, TAB 1000IU (100/BT) 2000 UNITS: 25 TAB at 08:13

## 2019-04-08 RX ADMIN — Medication 800 MG: at 08:12

## 2019-04-08 RX ADMIN — Medication 800 MG: at 22:15

## 2019-04-08 RX ADMIN — SUCRALFATE 1 G: 1 TABLET ORAL at 06:17

## 2019-04-08 RX ADMIN — SUCRALFATE 1 G: 1 TABLET ORAL at 22:14

## 2019-04-08 RX ADMIN — LEVETIRACETAM 500 MG: 500 TABLET, FILM COATED ORAL at 22:15

## 2019-04-08 RX ADMIN — ACETAMINOPHEN 650 MG: 325 TABLET, FILM COATED ORAL at 10:38

## 2019-04-08 RX ADMIN — DICYCLOMINE HYDROCHLORIDE 10 MG: 10 CAPSULE ORAL at 15:03

## 2019-04-08 RX ADMIN — SUCRALFATE 1 G: 1 TABLET ORAL at 15:03

## 2019-04-08 RX ADMIN — METFORMIN HYDROCHLORIDE 500 MG: 500 TABLET, FILM COATED ORAL at 15:06

## 2019-04-08 RX ADMIN — FERROUS SULFATE TAB 325 MG (65 MG ELEMENTAL FE) 325 MG: 325 (65 FE) TAB at 08:13

## 2019-04-08 RX ADMIN — AMLODIPINE BESYLATE 10 MG: 10 TABLET ORAL at 08:12

## 2019-04-08 RX ADMIN — FOLIC ACID 1 MG: 1 TABLET ORAL at 08:13

## 2019-04-08 RX ADMIN — INSULIN LISPRO 3 UNITS: 100 INJECTION, SOLUTION INTRAVENOUS; SUBCUTANEOUS at 11:01

## 2019-04-08 RX ADMIN — MIRTAZAPINE 15 MG: 15 TABLET, ORALLY DISINTEGRATING ORAL at 22:14

## 2019-04-08 RX ADMIN — PANTOPRAZOLE SODIUM 40 MG: 40 TABLET, DELAYED RELEASE ORAL at 15:03

## 2019-04-08 ASSESSMENT — PAIN DESCRIPTION - DESCRIPTORS: DESCRIPTORS: ACHING;SORE

## 2019-04-08 ASSESSMENT — PAIN DESCRIPTION - ORIENTATION: ORIENTATION: LOWER

## 2019-04-08 ASSESSMENT — PAIN SCALES - GENERAL
PAINLEVEL_OUTOF10: 8
PAINLEVEL_OUTOF10: 8
PAINLEVEL_OUTOF10: 0
PAINLEVEL_OUTOF10: 6

## 2019-04-08 ASSESSMENT — PAIN DESCRIPTION - PAIN TYPE: TYPE: CHRONIC PAIN

## 2019-04-08 ASSESSMENT — PAIN DESCRIPTION - LOCATION: LOCATION: BACK
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
## 2019-04-08 NOTE — PROGRESS NOTES
Nutrition Assessment    Type and Reason for Visit: Reassess    Nutrition Recommendations: Resume Calorie Count. Continue current diet and oral nutrition supplements with attempts to honor preferences. Nutrition Assessment: Nutritional status does not appear to be improving. Calorie count re-started. Will continue attempts to honor food and oral nutrition supplement preferences and monitor nutrition progression. Malnutrition Assessment:  · Malnutrition Status: Meets the criteria for severe malnutrition  · Context: Acute illness or injury  · Findings of the 6 clinical characteristics of malnutrition (Minimum of 2 out of 6 clinical characteristics is required to make the diagnosis of moderate or severe Protein Calorie Malnutrition based on AND/ASPEN Guidelines):  1. Energy Intake-Less than or equal to 50% of estimated energy requirement, Greater than or equal to 7 days    2. Weight Loss-5% loss or greater, (x 3 weeks at time of admission; additional loss noted)  3. Fat Loss-Mild subcutaneous fat loss, Orbital  4. Muscle Loss-Mild muscle mass loss, Temples (temporalis muscle)  5. Fluid Accumulation-Mild fluid accumulation, Extremities  6.  Strength-Not measured    Nutrition Risk Level: High    Nutrient Needs:  · Estimated Daily Total Kcal: 6721-4966 based on Admission wt with Wally Cap with 1.2-1.3 factor  · Estimated Daily Protein (g): 76-95 based on 1.2-1.5 gm per kg of admission wt    Nutrition Diagnosis:   · Problem: Inadequate oral intake  · Etiology: related to Cognitive or neurological impairment, Acute injury/trauma     Signs and symptoms:  as evidenced by Intake 0-25%, Weight loss, Mild loss of subcutaneous fat, Mild muscle loss    Objective Information:  · Wound Type: (Bruising, redness.  No wounds documented. )  · Current Nutrition Therapies:  · Oral Diet Orders: General   · Oral Diet intake: 1-25%  · Oral Nutrition Supplement (ONS) Orders: Standard High Calorie Oral Supplement, Clear
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
## 2019-04-08 NOTE — PROGRESS NOTES
elevated  Transfers  Stand Step Transfers: Contact guard assistance(hand held A)  Sit to stand: Stand by assistance  Stand to sit: Stand by assistance  Transfer Comments: vc's req for tech/safety  Standing Balance  Time: AM: 1-2 min x2; PM: 4-5 min, >1 min  Activity: AM: LBD; PM: dynamic standing to toss bags at target and retrieve using reacher to address standing tolerance/balance and overall endurance, no LOB noted but unsteady at times, vc's req for safety, stand step transfer w/c>EOB c hand held A  Sit to stand: Stand by assistance  Stand to sit: Stand by assistance  Comment: unsteady, V/T cues for functional mobility and R/W safety/tech in PM session  Functional Mobility  Functional - Mobility Device: Rolling Walker  Activity: Retrieve items; Other  Assist Level: Stand by assistance  Functional Mobility Comments: slow pace using R/W, V/T cues for safety/tech, vc's for visual scanning           Additional Activities: PM: pt placed shapes on wooden peg board to address problem solving, pt req vc's/prompting for task initiation and correct shape placement, increased time req c encouragement for participation; safety cards utilized to address safety concerns and problem solving for home situations/safety, pt req vc's for identifying safety concerns and prompting to problem solve how to fix safety concerns or what to do to avoid safety concerns                            OT FIM:   Eatin - Feeds self with setup/supervision/cues and/or requires only setup/supervision/cues to perform tube feedings  Groomin - Requires setup/cues to do all tasks(s/u req, vc's for initiation, pt washed face only)  Bathin - Able to bathe all 10 areas with setup/sup/cues(s/u req c vc's for task sequencing, pt declines lacey-care, SBA in standing)  Dressing-Upper: 5 - Requires setup/supervision/cues and/or requires assist with presthesis/brace only(s/u req)  Dressing-Lower: 5 - Requires setup/supervision/cues and/or staff applies TEDS/prosthesis/brace only(s/u req, SBA in standing, A TEDs)  Toiletin - Did not occur  Toilet Transfer: 0 - Did not occur  Primary Mode: Shower  Tub Transfer: 0 - Activity does not occur  Shower Transfer: 0 - Activity does not occur(pt declines showering, ADL seated EOB this date)               Assessment  Performance deficits / Impairments: Decreased functional mobility ; Decreased ADL status; Decreased strength;Decreased safe awareness;Decreased cognition;Decreased endurance;Decreased balance;Decreased high-level IADLs  Prognosis: Good  Discharge Recommendations: 24 hour supervision or assist  Activity Tolerance: Patient Tolerated treatment well;Patient limited by fatigue;Patient limited by pain;Treatment limited secondary to decreased cognition  Activity Tolerance: pt agreeable c encouragement  Safety Devices in place: Yes  Type of devices: Left in bed;Nurse notified; Patient at risk for falls;Call light within reach; Bed alarm in place(telesitter)  Equipment Recommendations  Equipment Needed: (TBD)       Patient Education:     Patient Education: OT POC, safety, ADL tasks, therapy participation  Barriers to Learning: cognition  Learner:patient  Method: demonstration and explanation       Outcome: needs reinforcement     Plan  Plan  Times per week: 900/7  Times per day: Twice a day  Short term goals  Time Frame for Short term goals: in 1 week pt will   Short term goal 1: demo UB ADLs with set-up and min vcs to attent to task  Short term goal 2: demo LB ADLs with moderate assistance with minimal vcs to attent to task  Short term goal 3: demo toilet transfer with minimal assistance, using least restrictive device  Short term goal 4: demo 20+ minutes of activity participation during functional activity to increase participation in ADLs and functional mobility   Long term goals  Time Frame for Long term goals : by d/c pt will  Long term goal 1: demo BADLs with set-up for safety   Long term goal 2: participate in

## 2019-04-08 NOTE — PROGRESS NOTES
Reason for Follow up: REGGIE. Interim History:    Patient seen and examined at the bedside   Feels well. Serum creatinine 1.0  BP stable. Scheduled Meds:   metFORMIN  500 mg Oral BID WC    magnesium oxide  800 mg Oral BID    mirtazapine  15 mg Oral Nightly    vitamin D3  2,000 Units Oral Daily    dicyclomine  10 mg Oral TID AC    sucralfate  1 g Oral 4 times per day    donepezil  5 mg Oral Nightly    amLODIPine  10 mg Oral Daily    ferrous sulfate  325 mg Oral BID WC    folic acid  1 mg Oral Daily    insulin lispro  0-18 Units Subcutaneous TID WC    insulin lispro  0-9 Units Subcutaneous Nightly    levETIRAcetam  500 mg Oral BID    pantoprazole  40 mg Oral BID AC    polyethylene glycol  17 g Oral Daily    vitamin B-1  100 mg Oral Daily   Continuous Infusions:   dextrose       PRN Meds:melatonin ER, labetalol, bisacodyl, glucose, dextrose, glucagon (rDNA), dextrose, oxyCODONE **OR** [DISCONTINUED] oxyCODONE, lidocaine PF, acetaminophen    Allergies   Allergen Reactions    Celebrex [Celecoxib] Swelling       Physical Exam:  Blood pressure (!) 148/81, pulse 95, temperature 97.3 °F (36.3 °C), temperature source Oral, resp. rate 16, height 5' 7\" (1.702 m), weight 127 lb 9.6 oz (57.9 kg), SpO2 98 %. No intake/output data recorded. No intake/output data recorded. General:  Awake, alert, not in distress. Appears to be stated age. HEENT:Anicteric sclera. Pink and moist oral mucosa. No JVD. Chest: Bilateral air entry, clear to auscultation, no wheezing, rhonchi or rales. Cardiovascular: RRR, S1S2, no murmur, rub or gallop. No lower extremity edema. Abdomen: Soft, non tender to palpation. Musculoskeletal:  No cyanosis or clubbing. Integumentary: Pink, warm and dry. Free from rash or lesions. Skin turgor normal.  CNS: Speech clear. Face symmetrical. No tremor.      Data:  CBC:   Lab Results   Component Value Date    WBC 8.9 04/08/2019    HGB 11.2 (L) 04/08/2019    HCT 33.5 (L) 04/08/2019 MCV 91.4 04/08/2019     04/08/2019     BMP:    Lab Results   Component Value Date     04/08/2019    K 4.0 04/08/2019     04/08/2019    CO2 23 04/08/2019    BUN 14 04/08/2019    CREATININE 1.00 04/08/2019    GLUCOSE 167 (H) 04/08/2019     CMP:   Lab Results   Component Value Date     04/08/2019    K 4.0 04/08/2019     04/08/2019    CO2 23 04/08/2019    BUN 14 04/08/2019    CREATININE 1.00 04/08/2019    GLUCOSE 167 (H) 04/08/2019    CALCIUM 9.6 04/08/2019    PROT 5.8 (L) 03/28/2019    LABALBU 2.7 (L) 03/28/2019    BILITOT 0.61 03/28/2019    ALKPHOS 77 03/28/2019    AST 12 03/28/2019    ALT 8 03/28/2019      Hepatic:   Lab Results   Component Value Date    AST 12 03/28/2019    ALT 8 03/28/2019    BILITOT 0.61 03/28/2019    ALKPHOS 77 03/28/2019     BNP: No results found for: BNP  Lipids: No results found for: CHOL, HDL  INR:   Lab Results   Component Value Date    INR 1.2 03/26/2019     PTH: No results found for: PTH  Phosphorus:    Lab Results   Component Value Date    PHOS 3.4 04/05/2019     Ionized Calcium: No results found for: IONCA  Magnesium:   Lab Results   Component Value Date    MG 1.7 04/05/2019     Albumin:   Lab Results   Component Value Date    LABALBU 2.7 (L) 03/28/2019     Last 3 CK, CKMB, Troponin: @LABRCNT(CKTOTAL:3,CKMB:3,TROPONINI:3)       URINE:)No results found for: Harland Diesel    Radiology:   Reviewed. Assessment:    1. Acute kidney injury--improved. 2. Hypokalemia--improved  3. HTN  4. Status post - Intracranial hemorrhage. 5. Anemia. 6. Bilateral nephrolithiasis. 7. DM    Plan:    Renal functions improved   Electrolytes okay   Volume status at goal   Blood pressure stable   Renal perspective no active issues   Will sign off     Electronically signed by Carolina Figueredo MD on 4/8/2019 at [address] [facility].   Ph: [phone]

## 2019-04-08 NOTE — PLAN OF CARE
Nutrition Problem: Inadequate oral intake  Intervention: Food and/or Nutrient Delivery: Continue current diet, Continue current ONS, Start Calorie Count  Nutritional Goals: PO intake % of meals and supplements
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
## 2019-04-08 NOTE — PATIENT CARE CONFERENCE
Stand: Supervision  Stand to sit: Supervision  Bed to Chair: Supervision  Stand Pivot Transfers: CGA  Squat Pivot Transfers: Supervision  Comment: outside patio; also needed to use restroom toileting Independent    Ambulation 1  Surface: level tile;carpet  Device: Rolling Walker  Assistance: Stand by assistance  Quality of Gait: Moderate, steady pace; shortened stride length;  god posture; RW slightly advanced. Slowed pace & shorter steps in 180* turn. No LOB. Distance: 200'  Comments: Pt states later he felt like his LEs wound give out during last half of amb, but also felt he could make it to a chair if it became a safety issue. Ambulation 2  Surface - 2: outdoors  Device 2: No device  Assistance 2: Contact guard assistance  Quality of Gait 2: Slightly unsteady in turns, no LOB. Distance: 20 feet  Comments: patient hesitant to walk because reported \"my back hurts\"     Stairs  # Steps : 10  Stairs Height: (4\"/6\")  Rails: Bilateral  Assistance: Contact guard assistance;Stand by assistance  Comment: Cued step-to pattern without return; pt adopted that pattern for 2 steps on descent, states he needs to sit down    FIMS:  Bed, Chair, Wheel Chair: 4 - Requires steadying assistance only <25% assist  and/or requires assist with one leg only  Walk: 5 - Supervision Requires standby supervision or cuing to walk at least 150 feet(w/RW)  Distance Walked: 200'(RW)  Wheel Chair: 2 - Maximal Assistance Requires up to [facility] requires assistance of one person to operate wheelchair between [address] [place] [address]: 120'(B UEs)  Stairs: 2- Maximal Assistance Performs 25-49% of the effort to go up and down 4 to 6 stairs and requires the assistance of one person only(10 4\"/6\" steps, B HR)    PT Equipment Recommendations  Equipment Needed: (TBD)    Assessment: Pt req's frequent rest breaks, encouragement to participate fully.     Goals  Short term goals  Time Frame for Short term goals: 1 week  Short term goal 1: Pt to perform bed mobility mod I with use of hand rail  Short term goal 2: Pt to perform bed mobility CGA with RW  Short term goal 3: Pt to amb 300 ft with RW on level surface CGA  Short term goal 4: Pt to ascend/descend 5 steps with bilateral hand rail min A  Short term goal 5: WC mobility x 20' with min to mod A+1         OCCUPATIONAL THERAPY  FIMS:  Eatin - Feeds self with setup/supervision/cues and/or requires only setup/supervision/cues to perform tube feedings  Groomin - Requires setup/cues to do all tasks(s/u req, vc's for initiation, pt washed face only)  Bathin - Able to bathe all 10 areas with setup/sup/cues(s/u req c vc's for task sequencing, pt declines lacey-care, S)  Dressing-Upper: 5 - Requires setup/supervision/cues and/or requires assist with presthesis/brace only(s/u req)  Dressing-Lower: 5 - Requires setup/supervision/cues and/or staff applies TEDS/prosthesis/brace only(s/u req, SBA in standing, A TEDs)  Toiletin - Did not occur  Toilet Transfer: 0 - Did not occur  Primary Mode: Shower  Tub Transfer: 0 - Activity does not occur  Shower Transfer: 0 - Activity does not occur(pt declines showering, ADL seated EOB this date)    Equipment Recommendations  Equipment Needed: (TBD)    Assessment: increased participation noted this date in PM session    Short term goals  Time Frame for Short term goals: in 1 week pt will   Short term goal 1: demo UB ADLs with set-up and min vcs to attent to task  Short term goal 2: demo LB ADLs with moderate assistance with minimal vcs to attent to task  Short term goal 3: demo toilet transfer with minimal assistance, using least restrictive device  Short term goal 4: demo 20+ minutes of activity participation during functional activity to increase participation in ADLs and functional mobility         SPEECH THERAPY  Mod A comprehension, min A expression, max A memory and problem solving  Short Term Goal: min A comprehension, supervision level expression, mod A memory and problem solving    RECREATIONAL THERAPY  Comment/Participation: Participating in unit program    NUTRITION  Weight: 127 lb 9.6 oz (57.9 kg) / Body mass index is 19.98 kg/m². Diet Rx: General. Ensure Clear twice daily; Ensure Enlive daily. PO intake remains inadequate. Calorie Count restarted. Please see nutrition note for details.     SOCIAL WORK ASSESSMENT  Assessment: snf   Pre-Admission Status:  Lives With: Alone  Type of Home: House  Home Layout: Two level  Home Access: Stairs to enter with rails  Entrance Stairs - Number of Steps: 3  Entrance Stairs - Rails: Both  Bathroom Shower/Tub: Tub only  Bathroom Toilet: Standard  Bathroom Equipment: Grab bars in shower  Receives Help From: Family(son lives close)  ADL Assistance: Independent  Homemaking Assistance: Independent  Homemaking Responsibilities: Yes  Ambulation Assistance: Independent  Transfer Assistance: Independent  Active : No  Patient's  Info: sons provide transportation or rides bike-owns a Corvette however (obtained from writer's last visit)  Additional Comments: Pt is a poor historian family not present during evaluation     Family Education: No family available for education    Risk for Readmission: High 14>   Readmission Risk              Risk of Unplanned Readmission:        38         Critical Items: None       Problem / Barrier Intervention / Plan  Results   Inadequate oral intake Supplements, Remeron, Calorie Count    Impaired language and cognition  Language/cognitive retraining exercises    Decreased IND with self-care tasks ADL retraining, use of modified techniques, cognitive retraining                          Functional FIM Gain  Admission Score:  45  Progress:  59  Goal:  89   `  Discharge Plan   Estimated Discharge Date: SNF  Overnight or Day Pass: No  Factors facilitating achievement of predicted outcomes: Cooperative and Pleasant  Barriers to the achievement of predicted outcomes: Confusion, Cognitive deficit, Decreased motivation, Limited participation, Limited insight into deficits and Medical complications    Functional Goals at discharge:  Dilip Brown Close supervision  Discharge therapy goals:  PT: Long term goals  Time Frame for Long term goals : by discharge  Long term goal 1: Pt to perform bed mobility independent  Long term goal 2: Pt to perform transfers with RW supervision  Long term goal 3: Pt to amb 300 ft with least restrictive DME on level surface supervision  Long term goal 4: Pt to ascend/descend one flight of stairs  OT:Long term goals  Time Frame for Long term goals : by d/c pt will  Long term goal 1: demo BADLs with set-up for safety   Long term goal 2: participate in functional tasks for 30+ minutes with min verbal cueing to redirect attention  Long term goal 3: demo functional transfers with set-up  Long term goal 4: demo safety awareness during all ADLs/functional mobility with minimal verbal cueing  ST: supervision level comprehension and expression, min A problem solving and memory    Team Members Present at Conference:  :  [facility], [address] [place] [address]  Occupational Therapist: Page Archuleta OT   [address] PT  Speech Therapist: Maggy SANDERSCCC/SLP  Nurse: Mckenzie Randolph RN   Recreation Therapy: Grayson Joy  Dietary/Nutrition: Jayme BARBA  Pastoral Care: Neo Solis  CMG:   Alphonse Bashir RN     I approve the established interdisciplinary plan of care as documented within the medical record of Renee Terrazas.     Josh Castro MD

## 2019-04-08 NOTE — PROGRESS NOTES
Physical Therapy  Facility/Department: [facility] ACUTE REHAB  Daily Treatment Note  NAME: Vee Connor  : 1945  MRN: [id number]    Date of Service: 2019    Discharge Recommendations:  24 hour supervision or assist, Home with Home health PT        Patient Diagnosis(es): There were no encounter diagnoses. has a past medical history of Alcoholic dementia ([facility] Back pain, Cerebral artery occlusion with cerebral infarction ([facility] Diabetes mellitus ([facility] Head injury, Hearing loss, Hypertension, TIA (transient ischemic attack), Transient ischemic attack, and Ulcer. has a past surgical history that includes back surgery; Pacemaker insertion; eye surgery ([id number]); brain surgery; Appendectomy; craniotomy (Right, 3/9/2019); and Upper gastrointestinal endoscopy (N/A, 3/14/2019). Restrictions  Restrictions/Precautions  Restrictions/Precautions: Seizure, General Precautions, Surgical Protocols, Fall Risk  Required Braces or Orthoses?: No  Implants present? : Metal implants  Subjective              Orientation     Cognition      Objective   Bed mobility  Bridging: Supervision  Rolling to Left: Supervision  Rolling to Right: Supervision  Supine to Sit: Supervision  Sit to Supine: Supervision  Scooting: Supervision  Transfers  Sit to Stand: Supervision  Stand to sit: Supervision  Bed to Chair: Supervision  Stand Pivot Transfers: Supervision  Squat Pivot Transfers: Supervision  Ambulation 1  Surface: level tile;carpet  Device: Rolling Walker  Assistance: Stand by assistance  Quality of Gait: Moderate, steady pace; shortened stride length;  god posture; RW slightly advanced. Slowed pace & shorter steps in 180* turn. No LOB. Distance: 200'  Comments: Pt states later he felt like his LEs wound give out during last half of amb, but also felt he could make it to a chair if it became a safety issue.    Ambulation 2  Surface - 2: outdoors  Device 2: No device  Quality of Gait 2: Slightly unsteady in turns, no LOB.  Distance: 20 feet  Comments: patient hesitant to walk because reported \"my back hurts\"         Other exercises  Other exercises 4: Seated B LE ther ex, 15-20x each, AROM         Other Activities: Community Outing  Comment: outside patio; also needed to use restroom toileting Independent              Assessment            G-Code     OutComes Score                                                  AM-PAC Score             Goals  Short term goals  Time Frame for Short term goals: 1 week  Short term goal 1: Pt to perform bed mobility mod I with use of hand rail  Short term goal 2: Pt to perform bed mobility CGA with RW  Short term goal 3: Pt to amb 300 ft with RW on level surface CGA  Short term goal 4: Pt to ascend/descend 5 steps with bilateral hand rail min A  Short term goal 5: WC mobility x 20' with min to mod A+1   Long term goals  Time Frame for Long term goals : by discharge  Long term goal 1: Pt to perform bed mobility independent  Long term goal 2: Pt to perform transfers with RW supervision  Long term goal 3: Pt to amb 300 ft with least restrictive DME on level surface supervision  Long term goal 4: Pt to ascend/descend one flight of stairs  Patient Goals   Patient goals : none stated    Plan    Plan  Times per week: 900 min/wk   Times per day: Daily  Specific instructions for Next Treatment: progress ther ex as tolerated, progress to stair training  Current Treatment Recommendations: Strengthening, ROM, Balance Training, Functional Mobility Training, Transfer Training, Endurance Training, Wheelchair Mobility Training, Gait Training, Neuromuscular Re-education, Cognitive Reorientation, Safety Education & Training, Patient/Caregiver Education & Training, Positioning, Home Exercise Program, Stair training, Equipment Evaluation, Education, & procurement  Safety Devices  Type of devices: Gait belt, Telesitter in use, Left in bed, Bed alarm in place, All fall risk precautions in place, Patient at risk for falls(Telesitter in room)     Therapy Time   Individual Concurrent Group Co-treatment   Time In 1300  1245       Time Out 1330  1300       Minutes 30                 Dave Batista, PTA

## 2019-04-09 LAB
GLUCOSE BLD-MCNC: 105 MG/DL (ref 75–110)
GLUCOSE BLD-MCNC: 144 MG/DL (ref 75–110)
GLUCOSE BLD-MCNC: 154 MG/DL (ref 75–110)
GLUCOSE BLD-MCNC: 222 MG/DL (ref 75–110)
GLUCOSE BLD-MCNC: 63 MG/DL (ref 75–110)

## 2019-04-09 PROCEDURE — 97530 THERAPEUTIC ACTIVITIES: CPT

## 2019-04-09 PROCEDURE — 6370000000 HC RX 637 (ALT 250 FOR IP): Performed by: NURSE PRACTITIONER

## 2019-04-09 PROCEDURE — 6370000000 HC RX 637 (ALT 250 FOR IP): Performed by: PHYSICAL MEDICINE & REHABILITATION

## 2019-04-09 PROCEDURE — 82947 ASSAY GLUCOSE BLOOD QUANT: CPT

## 2019-04-09 PROCEDURE — 97110 THERAPEUTIC EXERCISES: CPT

## 2019-04-09 PROCEDURE — 6370000000 HC RX 637 (ALT 250 FOR IP): Performed by: STUDENT IN AN ORGANIZED HEALTH CARE EDUCATION/TRAINING PROGRAM

## 2019-04-09 PROCEDURE — 6370000000 HC RX 637 (ALT 250 FOR IP): Performed by: INTERNAL MEDICINE

## 2019-04-09 PROCEDURE — 99232 SBSQ HOSP IP/OBS MODERATE 35: CPT | Performed by: PHYSICAL MEDICINE & REHABILITATION

## 2019-04-09 PROCEDURE — 99231 SBSQ HOSP IP/OBS SF/LOW 25: CPT | Performed by: INTERNAL MEDICINE

## 2019-04-09 PROCEDURE — 97116 GAIT TRAINING THERAPY: CPT

## 2019-04-09 PROCEDURE — 1180000000 HC REHAB R&B

## 2019-04-09 PROCEDURE — 97127 HC SP THER IVNTJ W/FOCUS COG FUNCJ: CPT

## 2019-04-09 PROCEDURE — 97112 NEUROMUSCULAR REEDUCATION: CPT

## 2019-04-09 RX ORDER — MIRTAZAPINE 30 MG/1
30 TABLET, FILM COATED ORAL NIGHTLY
Status: DISCONTINUED | OUTPATIENT
Start: 2019-04-09 | End: 2019-04-16 | Stop reason: HOSPADM

## 2019-04-09 RX ADMIN — DICYCLOMINE HYDROCHLORIDE 10 MG: 10 CAPSULE ORAL at 15:56

## 2019-04-09 RX ADMIN — VITAMIN D, TAB 1000IU (100/BT) 2000 UNITS: 25 TAB at 08:25

## 2019-04-09 RX ADMIN — POLYETHYLENE GLYCOL 3350 17 G: 17 POWDER, FOR SOLUTION ORAL at 08:26

## 2019-04-09 RX ADMIN — LEVETIRACETAM 500 MG: 500 TABLET, FILM COATED ORAL at 20:27

## 2019-04-09 RX ADMIN — DICYCLOMINE HYDROCHLORIDE 10 MG: 10 CAPSULE ORAL at 08:28

## 2019-04-09 RX ADMIN — INSULIN LISPRO 1 UNITS: 100 INJECTION, SOLUTION INTRAVENOUS; SUBCUTANEOUS at 11:58

## 2019-04-09 RX ADMIN — SUCRALFATE 1 G: 1 TABLET ORAL at 12:03

## 2019-04-09 RX ADMIN — PANTOPRAZOLE SODIUM 40 MG: 40 TABLET, DELAYED RELEASE ORAL at 08:26

## 2019-04-09 RX ADMIN — DICYCLOMINE HYDROCHLORIDE 10 MG: 10 CAPSULE ORAL at 12:03

## 2019-04-09 RX ADMIN — SUCRALFATE 1 G: 1 TABLET ORAL at 08:27

## 2019-04-09 RX ADMIN — SUCRALFATE 1 G: 1 TABLET ORAL at 15:57

## 2019-04-09 RX ADMIN — METFORMIN HYDROCHLORIDE 500 MG: 500 TABLET, FILM COATED ORAL at 08:25

## 2019-04-09 RX ADMIN — FERROUS SULFATE TAB 325 MG (65 MG ELEMENTAL FE) 325 MG: 325 (65 FE) TAB at 15:57

## 2019-04-09 RX ADMIN — AMLODIPINE BESYLATE 10 MG: 10 TABLET ORAL at 08:25

## 2019-04-09 RX ADMIN — LEVETIRACETAM 500 MG: 500 TABLET, FILM COATED ORAL at 08:25

## 2019-04-09 RX ADMIN — ACETAMINOPHEN 650 MG: 325 TABLET, FILM COATED ORAL at 19:16

## 2019-04-09 RX ADMIN — METFORMIN HYDROCHLORIDE 500 MG: 500 TABLET, FILM COATED ORAL at 15:57

## 2019-04-09 RX ADMIN — Medication 800 MG: at 20:27

## 2019-04-09 RX ADMIN — INSULIN LISPRO 6 UNITS: 100 INJECTION, SOLUTION INTRAVENOUS; SUBCUTANEOUS at 16:33

## 2019-04-09 RX ADMIN — Medication 800 MG: at 08:26

## 2019-04-09 RX ADMIN — THIAMINE HCL TAB 100 MG 100 MG: 100 TAB at 08:26

## 2019-04-09 RX ADMIN — DONEPEZIL HYDROCHLORIDE 5 MG: 5 TABLET, FILM COATED ORAL at 20:27

## 2019-04-09 RX ADMIN — ACETAMINOPHEN 650 MG: 325 TABLET, FILM COATED ORAL at 09:33

## 2019-04-09 RX ADMIN — MIRTAZAPINE 30 MG: 30 TABLET, ORALLY DISINTEGRATING ORAL at 20:28

## 2019-04-09 RX ADMIN — FOLIC ACID 1 MG: 1 TABLET ORAL at 08:26

## 2019-04-09 RX ADMIN — FERROUS SULFATE TAB 325 MG (65 MG ELEMENTAL FE) 325 MG: 325 (65 FE) TAB at 08:26

## 2019-04-09 RX ADMIN — PANTOPRAZOLE SODIUM 40 MG: 40 TABLET, DELAYED RELEASE ORAL at 15:57

## 2019-04-09 ASSESSMENT — PAIN SCALES - GENERAL
PAINLEVEL_OUTOF10: 9
PAINLEVEL_OUTOF10: 1
PAINLEVEL_OUTOF10: 6
PAINLEVEL_OUTOF10: 5
PAINLEVEL_OUTOF10: 8
PAINLEVEL_OUTOF10: 0

## 2019-04-09 NOTE — PROGRESS NOTES
Type and Reason for Visit: Calorie Count     Pt did not eat well at breakfast today but was doing better at lunch. Nurse informed pt that tube feeding may be a consideration if PO intake does not improve. Note Remeron has been increased. Current diet and supplement order:  DIET GENERAL;  Dietary Nutrition Supplements: Clear Liquid Oral Supplement  Dietary Nutrition Supplements: Standard High Calorie Oral Supplement    Comparative Standards (Estimated Nutrition Needs):   · Estimated Daily Total Kcal: 3369-9124 based on Admission wt with Nicole Begun with 1.2-1.3 factor  · Estimated Daily Protein (g): 76-95 based on 1.2-1.5 gm per kg of admission wt    Date Consumed PO Intake Kcal %   Kcal met PO Intake grams protein %  Protein met   Comments   4-8 184  3  Only breakfast recorded          Intervention & Recommendations:   1. Continue Calorie Count    Ros Perez R.D., L.D.   Phone: [phone]
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
## 2019-04-09 NOTE — PROGRESS NOTES
tech/safety  Standing Balance  Time: >1 min  Activity: stand step EOB>w/c  Sit to stand: Stand by assistance  Stand to sit: Stand by assistance  Comment: unsteady, no LOB noted              Additional Activities: AM: pt educated on therapy schedule, benefits to participation and activity promotion to increase overall endurance/task tolerance and UE strengthening, pt seated EOB and demo's no initiation for task participation, pt re ed on OT POC, pt demo's P reception and carryover; PM: pt participated in pill mgmt activity to increase problem solving and following commands, pt able to complete task c s/u, increased time and prompting to dispence mock medication according to label directions; sequencing cards completed to address task sequencing and problem solving, pt able to complete 2 card situations c increased time and minimal vc's for task directions; vc's for task initiation c PM activities                            OT FIM:   Eatin - Feeds self with setup/supervision/cues and/or requires only setup/supervision/cues to perform tube feedings(per staff)  Groomin - Did not occur(pt declines)  Bathin - Did not occur(pt declines)  Dressing-Upper: 0 - Did not occur(pt declines)  Dressing-Lower: 0 - Did not occur(pt declines)  Toiletin - Did not occur  Toilet Transfer: 0 - Did not occur  Primary Mode: Shower  Tub Transfer: 0 - Activity does not occur  Shower Transfer: 0 - Activity does not occur(pt declines showering/ADL tasks this date)               Assessment  Performance deficits / Impairments: Decreased functional mobility ; Decreased ADL status; Decreased strength;Decreased safe awareness;Decreased cognition;Decreased endurance;Decreased balance;Decreased high-level IADLs  Assessment: increased participation noted this date in PM session  Prognosis: Good  Discharge Recommendations: 24 hour supervision or assist  Activity Tolerance: Patient limited by fatigue;Patient limited by pain;Treatment limited secondary to decreased cognition  Activity Tolerance: pt agreeable c encouragement in PM  Safety Devices in place: Yes  Type of devices: Left in bed;Nurse notified; Patient at risk for falls; Bed alarm in place;Call light within reach(telesitter)  Equipment Recommendations  Equipment Needed: (TBD)       Patient Education:     Patient Education: OT POC, safety, ADL tasks, therapy participation  Barriers to Learning: cognition  Learner:patient  Method: demonstration and explanation       Outcome: needs reinforcement     Plan  Plan  Times per week: 900/7  Times per day: Twice a day  Short term goals  Time Frame for Short term goals: in 1 week pt will   Short term goal 1: demo UB ADLs with set-up and min vcs to attent to task  Short term goal 2: demo LB ADLs with moderate assistance with minimal vcs to attent to task  Short term goal 3: demo toilet transfer with minimal assistance, using least restrictive device  Short term goal 4: demo 20+ minutes of activity participation during functional activity to increase participation in ADLs and functional mobility   Long term goals  Time Frame for Long term goals : by d/c pt will  Long term goal 1: demo BADLs with set-up for safety   Long term goal 2: participate in functional tasks for 30+ minutes with min verbal cueing to redirect attention  Long term goal 3: demo functional transfers with set-up  Long term goal 4: demo safety awareness during all ADLs/functional mobility with minimal verbal cueing        04/09/19 1309 04/09/19 1541   OT Individual Minutes   Time In [address]58   Minutes 40 21     Electronically signed by EFE Eisenberg on 4/9/19 at 3:55 PM
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
## 2019-04-09 NOTE — PLAN OF CARE
shift.  Pt. denies need for oral analgesic. Verbalizes understanding of nonpharmacologic strategies that provide comfort. Pt. repositioned for comfort. Nonverbal cues indicate absence of pain.

## 2019-04-10 LAB
GLUCOSE BLD-MCNC: 133 MG/DL (ref 75–110)
GLUCOSE BLD-MCNC: 142 MG/DL (ref 75–110)
GLUCOSE BLD-MCNC: 150 MG/DL (ref 75–110)
GLUCOSE BLD-MCNC: 151 MG/DL (ref 75–110)

## 2019-04-10 PROCEDURE — 97116 GAIT TRAINING THERAPY: CPT

## 2019-04-10 PROCEDURE — 6370000000 HC RX 637 (ALT 250 FOR IP): Performed by: INTERNAL MEDICINE

## 2019-04-10 PROCEDURE — 6370000000 HC RX 637 (ALT 250 FOR IP): Performed by: STUDENT IN AN ORGANIZED HEALTH CARE EDUCATION/TRAINING PROGRAM

## 2019-04-10 PROCEDURE — 97110 THERAPEUTIC EXERCISES: CPT

## 2019-04-10 PROCEDURE — 97530 THERAPEUTIC ACTIVITIES: CPT

## 2019-04-10 PROCEDURE — 99232 SBSQ HOSP IP/OBS MODERATE 35: CPT | Performed by: PHYSICAL MEDICINE & REHABILITATION

## 2019-04-10 PROCEDURE — 6370000000 HC RX 637 (ALT 250 FOR IP): Performed by: NURSE PRACTITIONER

## 2019-04-10 PROCEDURE — 99232 SBSQ HOSP IP/OBS MODERATE 35: CPT | Performed by: INTERNAL MEDICINE

## 2019-04-10 PROCEDURE — 6370000000 HC RX 637 (ALT 250 FOR IP): Performed by: PHYSICAL MEDICINE & REHABILITATION

## 2019-04-10 PROCEDURE — 97127 HC SP THER IVNTJ W/FOCUS COG FUNCJ: CPT

## 2019-04-10 PROCEDURE — 1180000000 HC REHAB R&B

## 2019-04-10 PROCEDURE — 82947 ASSAY GLUCOSE BLOOD QUANT: CPT

## 2019-04-10 RX ADMIN — METFORMIN HYDROCHLORIDE 500 MG: 500 TABLET, FILM COATED ORAL at 16:44

## 2019-04-10 RX ADMIN — FERROUS SULFATE TAB 325 MG (65 MG ELEMENTAL FE) 325 MG: 325 (65 FE) TAB at 16:44

## 2019-04-10 RX ADMIN — Medication 800 MG: at 20:07

## 2019-04-10 RX ADMIN — Medication 1 MG: at 00:26

## 2019-04-10 RX ADMIN — OXYCODONE HYDROCHLORIDE 5 MG: 5 TABLET ORAL at 14:03

## 2019-04-10 RX ADMIN — LEVETIRACETAM 500 MG: 500 TABLET, FILM COATED ORAL at 09:16

## 2019-04-10 RX ADMIN — SUCRALFATE 1 G: 1 TABLET ORAL at 11:23

## 2019-04-10 RX ADMIN — VITAMIN D, TAB 1000IU (100/BT) 2000 UNITS: 25 TAB at 09:15

## 2019-04-10 RX ADMIN — PANTOPRAZOLE SODIUM 40 MG: 40 TABLET, DELAYED RELEASE ORAL at 16:44

## 2019-04-10 RX ADMIN — PANTOPRAZOLE SODIUM 40 MG: 40 TABLET, DELAYED RELEASE ORAL at 06:00

## 2019-04-10 RX ADMIN — Medication 800 MG: at 09:15

## 2019-04-10 RX ADMIN — SUCRALFATE 1 G: 1 TABLET ORAL at 16:44

## 2019-04-10 RX ADMIN — THIAMINE HCL TAB 100 MG 100 MG: 100 TAB at 09:16

## 2019-04-10 RX ADMIN — AMLODIPINE BESYLATE 10 MG: 10 TABLET ORAL at 09:16

## 2019-04-10 RX ADMIN — DICYCLOMINE HYDROCHLORIDE 10 MG: 10 CAPSULE ORAL at 05:59

## 2019-04-10 RX ADMIN — LEVETIRACETAM 500 MG: 500 TABLET, FILM COATED ORAL at 20:07

## 2019-04-10 RX ADMIN — FERROUS SULFATE TAB 325 MG (65 MG ELEMENTAL FE) 325 MG: 325 (65 FE) TAB at 09:15

## 2019-04-10 RX ADMIN — SUCRALFATE 1 G: 1 TABLET ORAL at 06:00

## 2019-04-10 RX ADMIN — POLYETHYLENE GLYCOL 3350 17 G: 17 POWDER, FOR SOLUTION ORAL at 09:17

## 2019-04-10 RX ADMIN — Medication 1 MG: at 20:07

## 2019-04-10 RX ADMIN — INSULIN LISPRO 3 UNITS: 100 INJECTION, SOLUTION INTRAVENOUS; SUBCUTANEOUS at 11:05

## 2019-04-10 RX ADMIN — DICYCLOMINE HYDROCHLORIDE 10 MG: 10 CAPSULE ORAL at 09:16

## 2019-04-10 RX ADMIN — MIRTAZAPINE 30 MG: 30 TABLET, ORALLY DISINTEGRATING ORAL at 20:08

## 2019-04-10 RX ADMIN — OXYCODONE HYDROCHLORIDE 5 MG: 5 TABLET ORAL at 20:11

## 2019-04-10 RX ADMIN — FOLIC ACID 1 MG: 1 TABLET ORAL at 09:16

## 2019-04-10 RX ADMIN — DICYCLOMINE HYDROCHLORIDE 10 MG: 10 CAPSULE ORAL at 16:45

## 2019-04-10 RX ADMIN — DONEPEZIL HYDROCHLORIDE 5 MG: 5 TABLET, FILM COATED ORAL at 20:08

## 2019-04-10 RX ADMIN — SUCRALFATE 1 G: 1 TABLET ORAL at 00:26

## 2019-04-10 RX ADMIN — INSULIN LISPRO 3 UNITS: 100 INJECTION, SOLUTION INTRAVENOUS; SUBCUTANEOUS at 09:17

## 2019-04-10 RX ADMIN — METFORMIN HYDROCHLORIDE 500 MG: 500 TABLET, FILM COATED ORAL at 09:16

## 2019-04-10 ASSESSMENT — PAIN DESCRIPTION - FREQUENCY: FREQUENCY: INTERMITTENT

## 2019-04-10 ASSESSMENT — PAIN DESCRIPTION - DESCRIPTORS: DESCRIPTORS: ACHING;DISCOMFORT

## 2019-04-10 ASSESSMENT — PAIN DESCRIPTION - PAIN TYPE: TYPE: ACUTE PAIN

## 2019-04-10 ASSESSMENT — PAIN SCALES - GENERAL
PAINLEVEL_OUTOF10: 9
PAINLEVEL_OUTOF10: 8
PAINLEVEL_OUTOF10: 0
PAINLEVEL_OUTOF10: 6
PAINLEVEL_OUTOF10: 9

## 2019-04-10 ASSESSMENT — PAIN DESCRIPTION - ONSET: ONSET: ON-GOING

## 2019-04-10 ASSESSMENT — PAIN DESCRIPTION - PROGRESSION: CLINICAL_PROGRESSION: NOT CHANGED

## 2019-04-10 ASSESSMENT — PAIN DESCRIPTION - ORIENTATION: ORIENTATION: LOWER

## 2019-04-10 ASSESSMENT — PAIN DESCRIPTION - LOCATION: LOCATION: BACK

## 2019-04-10 NOTE — PLAN OF CARE
Problem: Risk for Impaired Skin Integrity  Goal: Tissue integrity - skin and mucous membranes  Description  Structural intactness and normal physiological function of skin and  mucous membranes. 4/10/2019 0857 by Genna Araiza RN  Outcome: Ongoing  Note:   There are no new skin issues so far this shift. Will continue to assist patient with repositioning at least every two hours. Problem: Falls - Risk of:  Goal: Will remain free from falls  Description  Will remain free from falls  4/10/2019 0857 by Genna Araiza RN  Outcome: Ongoing  Note:   Patient remains free from falls so far this shift. Will continue to round at least hourly, place bed in lowest position with call light within reach, and alarm activated. Problem: Pain:  Goal: Control of acute pain  Description  Control of acute pain  4/10/2019 0857 by Genna Araiza RN  Outcome: Ongoing  Note:   Patient denies pain at this time. Will continue to assess.

## 2019-04-10 NOTE — PROGRESS NOTES
Completed peer to peer with Dr. Lore Morton from [facility] who states patient is too high functioning for SNF- therefore, does not meet criteria. May need long term placement if no home support.     Dr. Agapito Scott  PM&R
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
## 2019-04-10 NOTE — PROGRESS NOTES
to Sit: Supervision  Sit to Supine: Supervision  Scooting: Supervision(hips EOB)  Comment: HOB elevated, bed rails used  Transfers  Stand Step Transfers: Stand by assistance(unsteady but no LOB noted, w/c<>EOB)  Sit to stand: Stand by assistance  Stand to sit: Stand by assistance  Transfer Comments: vc's req for tech/safety  Standing Balance  Time: PM: 1 min x2, 2-3 min  Activity: PM: stand step EOB<>w/c, dynamic standing to participate in balloon tap, task to address safety/standing tolerance and balance, no LOB noted c SUP in standing, 1-2 UE support on R/W, RB's req d/t low back pain  Sit to stand: Stand by assistance  Stand to sit: Stand by assistance  Comment: unsteady, no LOB noted              Additional Activities: PM: seated task for ball bounce using 1# weighted bar to increase overall endurance and UE strength for functional tasks, pt only tolerates 2-3 min d/t pain in back, declines further attempts; pt attempts lacing ADL board to address coordination and problem solving, pt unable to complete c VCs and visual cues, pt declines additional attempts after 2 lacing holes d/t frustration and continued c/o back pain                            OT FIM:   Eatin - Feeds self with setup/supervision/cues and/or requires only setup/supervision/cues to perform tube feedings(per staff)  Groomin - Did not occur(pt declines)  Bathin - Did not occur(pt declines)  Dressing-Upper: 0 - Did not occur(pt declines)  Dressing-Lower: 0 - Did not occur(pt declines)  Toiletin - Did not occur  Toilet Transfer: 0 - Did not occur  Primary Mode: Shower  Tub Transfer: 0 - Activity does not occur  Shower Transfer: 0 - Activity does not occur(pt declines)               Assessment  Performance deficits / Impairments: Decreased functional mobility ; Decreased ADL status; Decreased strength;Decreased safe awareness;Decreased cognition;Decreased endurance;Decreased balance;Decreased high-level IADLs  Prognosis: Good  Discharge Recommendations: 24 hour supervision or assist  Activity Tolerance: Patient limited by fatigue;Patient limited by pain;Treatment limited secondary to decreased cognition  Activity Tolerance: pt agreeable c encouragement in PM  Safety Devices in place: Yes  Type of devices: Left in bed;Nurse notified; Patient at risk for falls; Bed alarm in place;Call light within reach(telesitter)  Equipment Recommendations  Equipment Needed: (TBD)       Patient Education:     Patient Education: OT POC, safety, therapy participation, ADL tasks, strengthening  Barriers to Learning: cognition  Learner:patient  Method: demonstration and explanation       Outcome: needs reinforcement     Plan  Plan  Times per week: 900/7  Times per day: Twice a day  Short term goals  Time Frame for Short term goals: in 1 week pt will   Short term goal 1: demo UB ADLs with set-up and min vcs to attent to task  Short term goal 2: demo LB ADLs with moderate assistance with minimal vcs to attent to task  Short term goal 3: demo toilet transfer with minimal assistance, using least restrictive device  Short term goal 4: demo 20+ minutes of activity participation during functional activity to increase participation in ADLs and functional mobility   Long term goals  Time Frame for Long term goals : by d/c pt will  Long term goal 1: demo BADLs with set-up for safety   Long term goal 2: participate in functional tasks for 30+ minutes with min verbal cueing to redirect attention  Long term goal 3: demo functional transfers with set-up  Long term goal 4: demo safety awareness during all ADLs/functional mobility with minimal verbal cueing        04/10/19 1542   OT Individual Minutes   Time In 1344   Time Out 1417   Minutes 33  AM: pt declines AM session     Electronically signed by EFE Serrato on 4/10/19 at 3:57 PM
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
## 2019-04-10 NOTE — PROGRESS NOTES
Type and Reason for Visit: Calorie Count     Pt ate fairly well yesterday. However, pt did not like entree today; did not want supplement on tray. Declined offer of replacement items. Nurse to encourage intake further. Current diet and supplement order:  DIET GENERAL;  Dietary Nutrition Supplements: Clear Liquid Oral Supplement  Dietary Nutrition Supplements: Standard High Calorie Oral Supplement    Comparative Standards (Estimated Nutrition Needs):   · Estimated Daily Total Kcal: 7132-0222 based on Admission wt with Burnsultana Goodmichael with 1.2-1.3 factor  · Estimated Daily Protein (g): 76-95 based on 1.2-1.5 gm per kg of admission wt    Date Consumed PO Intake Kcal %   Kcal met PO Intake grams protein %  Protein met   Comments   4-8 184  3  Only breakfast recorded   4-9 922 57% 36 47%           Intervention & Recommendations:   1. Continue Calorie Count  2. Nurse to encourage intake. Sharmin Vee R.D., L.D.   Phone: [phone]
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
## 2019-04-11 LAB
ABSOLUTE EOS #: 0.3 K/UL (ref 0–0.4)
ABSOLUTE IMMATURE GRANULOCYTE: ABNORMAL K/UL (ref 0–0.3)
ABSOLUTE LYMPH #: 1 K/UL (ref 1–4.8)
ABSOLUTE MONO #: 0.6 K/UL (ref 0.1–1.3)
ANION GAP SERPL CALCULATED.3IONS-SCNC: 12 MMOL/L (ref 9–17)
BASOPHILS # BLD: 1 % (ref 0–2)
BASOPHILS ABSOLUTE: 0.1 K/UL (ref 0–0.2)
BUN BLDV-MCNC: 17 MG/DL (ref 8–23)
BUN/CREAT BLD: ABNORMAL (ref 9–20)
CALCIUM SERPL-MCNC: 9.9 MG/DL (ref 8.6–10.4)
CHLORIDE BLD-SCNC: 100 MMOL/L (ref 98–107)
CO2: 25 MMOL/L (ref 20–31)
CREAT SERPL-MCNC: 1.06 MG/DL (ref 0.7–1.2)
DIFFERENTIAL TYPE: ABNORMAL
EOSINOPHILS RELATIVE PERCENT: 4 % (ref 0–4)
GFR AFRICAN AMERICAN: >60 ML/MIN
GFR NON-AFRICAN AMERICAN: >60 ML/MIN
GFR SERPL CREATININE-BSD FRML MDRD: ABNORMAL ML/MIN/{1.73_M2}
GFR SERPL CREATININE-BSD FRML MDRD: ABNORMAL ML/MIN/{1.73_M2}
GLUCOSE BLD-MCNC: 124 MG/DL (ref 75–110)
GLUCOSE BLD-MCNC: 133 MG/DL (ref 75–110)
GLUCOSE BLD-MCNC: 151 MG/DL (ref 75–110)
GLUCOSE BLD-MCNC: 170 MG/DL (ref 75–110)
GLUCOSE BLD-MCNC: 177 MG/DL (ref 70–99)
HCT VFR BLD CALC: 34.6 % (ref 41–53)
HEMOGLOBIN: 11.6 G/DL (ref 13.5–17.5)
IMMATURE GRANULOCYTES: ABNORMAL %
LYMPHOCYTES # BLD: 13 % (ref 24–44)
MCH RBC QN AUTO: 30.8 PG (ref 26–34)
MCHC RBC AUTO-ENTMCNC: 33.4 G/DL (ref 31–37)
MCV RBC AUTO: 92.2 FL (ref 80–100)
MONOCYTES # BLD: 7 % (ref 1–7)
NRBC AUTOMATED: ABNORMAL PER 100 WBC
PDW BLD-RTO: 15.3 % (ref 11.5–14.9)
PLATELET # BLD: 329 K/UL (ref 150–450)
PLATELET ESTIMATE: ABNORMAL
PMV BLD AUTO: 8.8 FL (ref 6–12)
POTASSIUM SERPL-SCNC: 4.4 MMOL/L (ref 3.7–5.3)
RBC # BLD: 3.76 M/UL (ref 4.5–5.9)
RBC # BLD: ABNORMAL 10*6/UL
SEG NEUTROPHILS: 75 % (ref 36–66)
SEGMENTED NEUTROPHILS ABSOLUTE COUNT: 6 K/UL (ref 1.3–9.1)
SODIUM BLD-SCNC: 137 MMOL/L (ref 135–144)
WBC # BLD: 8 K/UL (ref 3.5–11)
WBC # BLD: ABNORMAL 10*3/UL

## 2019-04-11 PROCEDURE — 82947 ASSAY GLUCOSE BLOOD QUANT: CPT

## 2019-04-11 PROCEDURE — 6370000000 HC RX 637 (ALT 250 FOR IP): Performed by: INTERNAL MEDICINE

## 2019-04-11 PROCEDURE — 99232 SBSQ HOSP IP/OBS MODERATE 35: CPT | Performed by: INTERNAL MEDICINE

## 2019-04-11 PROCEDURE — 6370000000 HC RX 637 (ALT 250 FOR IP): Performed by: NURSE PRACTITIONER

## 2019-04-11 PROCEDURE — 97127 HC SP THER IVNTJ W/FOCUS COG FUNCJ: CPT

## 2019-04-11 PROCEDURE — 6370000000 HC RX 637 (ALT 250 FOR IP): Performed by: PHYSICAL MEDICINE & REHABILITATION

## 2019-04-11 PROCEDURE — 36415 COLL VENOUS BLD VENIPUNCTURE: CPT

## 2019-04-11 PROCEDURE — 97116 GAIT TRAINING THERAPY: CPT

## 2019-04-11 PROCEDURE — 97110 THERAPEUTIC EXERCISES: CPT

## 2019-04-11 PROCEDURE — 80048 BASIC METABOLIC PNL TOTAL CA: CPT

## 2019-04-11 PROCEDURE — 97535 SELF CARE MNGMENT TRAINING: CPT

## 2019-04-11 PROCEDURE — 85025 COMPLETE CBC W/AUTO DIFF WBC: CPT

## 2019-04-11 PROCEDURE — 51798 US URINE CAPACITY MEASURE: CPT

## 2019-04-11 PROCEDURE — 99232 SBSQ HOSP IP/OBS MODERATE 35: CPT | Performed by: PHYSICAL MEDICINE & REHABILITATION

## 2019-04-11 PROCEDURE — 1180000000 HC REHAB R&B

## 2019-04-11 PROCEDURE — 6370000000 HC RX 637 (ALT 250 FOR IP): Performed by: STUDENT IN AN ORGANIZED HEALTH CARE EDUCATION/TRAINING PROGRAM

## 2019-04-11 RX ADMIN — FERROUS SULFATE TAB 325 MG (65 MG ELEMENTAL FE) 325 MG: 325 (65 FE) TAB at 09:27

## 2019-04-11 RX ADMIN — DICYCLOMINE HYDROCHLORIDE 10 MG: 10 CAPSULE ORAL at 05:52

## 2019-04-11 RX ADMIN — DONEPEZIL HYDROCHLORIDE 5 MG: 5 TABLET, FILM COATED ORAL at 20:11

## 2019-04-11 RX ADMIN — AMLODIPINE BESYLATE 10 MG: 10 TABLET ORAL at 09:26

## 2019-04-11 RX ADMIN — INSULIN LISPRO 3 UNITS: 100 INJECTION, SOLUTION INTRAVENOUS; SUBCUTANEOUS at 11:57

## 2019-04-11 RX ADMIN — SUCRALFATE 1 G: 1 TABLET ORAL at 05:51

## 2019-04-11 RX ADMIN — LEVETIRACETAM 500 MG: 500 TABLET, FILM COATED ORAL at 20:10

## 2019-04-11 RX ADMIN — METFORMIN HYDROCHLORIDE 500 MG: 500 TABLET, FILM COATED ORAL at 09:26

## 2019-04-11 RX ADMIN — Medication 800 MG: at 20:10

## 2019-04-11 RX ADMIN — DICYCLOMINE HYDROCHLORIDE 10 MG: 10 CAPSULE ORAL at 11:57

## 2019-04-11 RX ADMIN — INSULIN LISPRO 3 UNITS: 100 INJECTION, SOLUTION INTRAVENOUS; SUBCUTANEOUS at 09:33

## 2019-04-11 RX ADMIN — Medication 1 MG: at 20:10

## 2019-04-11 RX ADMIN — SUCRALFATE 1 G: 1 TABLET ORAL at 11:59

## 2019-04-11 RX ADMIN — SUCRALFATE 1 G: 1 TABLET ORAL at 17:10

## 2019-04-11 RX ADMIN — THIAMINE HCL TAB 100 MG 100 MG: 100 TAB at 09:27

## 2019-04-11 RX ADMIN — PANTOPRAZOLE SODIUM 40 MG: 40 TABLET, DELAYED RELEASE ORAL at 05:51

## 2019-04-11 RX ADMIN — OXYCODONE HYDROCHLORIDE 5 MG: 5 TABLET ORAL at 09:26

## 2019-04-11 RX ADMIN — FERROUS SULFATE TAB 325 MG (65 MG ELEMENTAL FE) 325 MG: 325 (65 FE) TAB at 17:10

## 2019-04-11 RX ADMIN — LEVETIRACETAM 500 MG: 500 TABLET, FILM COATED ORAL at 09:26

## 2019-04-11 RX ADMIN — Medication 800 MG: at 09:26

## 2019-04-11 RX ADMIN — PANTOPRAZOLE SODIUM 40 MG: 40 TABLET, DELAYED RELEASE ORAL at 17:10

## 2019-04-11 RX ADMIN — METFORMIN HYDROCHLORIDE 500 MG: 500 TABLET, FILM COATED ORAL at 17:10

## 2019-04-11 RX ADMIN — MIRTAZAPINE 30 MG: 30 TABLET, ORALLY DISINTEGRATING ORAL at 20:11

## 2019-04-11 RX ADMIN — FOLIC ACID 1 MG: 1 TABLET ORAL at 09:27

## 2019-04-11 RX ADMIN — DICYCLOMINE HYDROCHLORIDE 10 MG: 10 CAPSULE ORAL at 17:11

## 2019-04-11 RX ADMIN — ACETAMINOPHEN 650 MG: 325 TABLET, FILM COATED ORAL at 13:17

## 2019-04-11 RX ADMIN — VITAMIN D, TAB 1000IU (100/BT) 2000 UNITS: 25 TAB at 09:26

## 2019-04-11 ASSESSMENT — PAIN DESCRIPTION - LOCATION: LOCATION: BACK

## 2019-04-11 ASSESSMENT — PAIN SCALES - GENERAL
PAINLEVEL_OUTOF10: 5
PAINLEVEL_OUTOF10: 9
PAINLEVEL_OUTOF10: 6
PAINLEVEL_OUTOF10: 10
PAINLEVEL_OUTOF10: 8
PAINLEVEL_OUTOF10: 0
PAINLEVEL_OUTOF10: 8

## 2019-04-11 NOTE — PLAN OF CARE
Problem: Risk for Impaired Skin Integrity  Goal: Tissue integrity - skin and mucous membranes  Description  Structural intactness and normal physiological function of skin and  mucous membranes. Outcome: Met This Shift   Skin assessment completed this shift. Nutrition and Hydration status assessed with adequate intake. Osito Score as charted. Pressure Relief Overlay remains intact and inflated to patient's bed throughout the shift. Bilateral heels remain elevated on pillows throughout the shift. Patient able to reposition self for comfort and to prevent breakdown. Patient verbalizes understanding of pressure ulcer prevention measures. Skin integrity maintained. No new skin breakdown noted. Skin to high risk pressure areas including coccyx and heels are clear.     Darby / Incontinence care provided as needed throughout the shift. Zinc Moisture Barrier ointment applied to buttocks as a preventative measure.        Problem: Falls - Risk of:  Goal: Will remain free from falls  Description  Will remain free from falls  Outcome: Met This Shift  No falls or injuries sustained at this time. No attempts to get out of bed without nursing assistance. Call light within reach and pt. uses sometimes appropriately for assistance. Siderails up x 2. Nonskid footwear remains on. Bed in low and locked position. Hourly nursing rounds made. Pt. Alert and oriented, but has intermittent confusion, aware of limitations, and exhibits good safety judgement. Pt. uses assistive devices appropriately ( walker with one assist). Pt. understands individual fall risk factors.     Pt. reoriented to surroundings and reminded to use call light with each nurse/patient interaction.     Pt. room located close to nurse's station.      Bed alarm / Personal alarm remains engaged throughout the shift as a precaution.     Tele-sitter also placed in pts room as an added precaution.     Problem: Pain:  Goal: Pain level will decrease  Description  Pain level will decrease  Outcome: Ongoing  Pain assessment and reassessment completed so far this shift. Pt. able to rest after the use of pain medication. Patient medicated with 5mg of Roxicodone for c/o lower back pain               Pt. Repositions per self for comfort. Nonverbal cues indicate pain relief. Pt. Rests quietly with eyes closed after pain medication administration. Respirations easy and unlabored. Appears free from distress.

## 2019-04-11 NOTE — PLAN OF CARE
Problem: Risk for Impaired Skin Integrity  Goal: Tissue integrity - skin and mucous membranes  Description  Structural intactness and normal physiological function of skin and  mucous membranes. Outcome: Ongoing  Note:   There are no new skin issues so far this shift. Will continue to assist patient with repositioning at least every two hours. Problem: Falls - Risk of:  Goal: Will remain free from falls  Description  Will remain free from falls  Outcome: Ongoing  Note:   Patient remains free from falls so far this shift. Will continue to round at least hourly, place bed in lowest position with call light within reach, and alarm activated. Problem: Pain:  Goal: Control of acute pain  Description  Control of acute pain  Outcome: Ongoing  Note:   Pain is controlled with the use of prn pain meds. Will continue to assess throughout shift.
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
## 2019-04-11 NOTE — PROGRESS NOTES
[facility]   OCCUPATIONAL THERAPY MISSED TREATMENT NOTE   ACUTE REHAB  Date: 19  Patient Name: Edvin Olivo       Room: 85/3476-27  MRN: [id number]   Account #: [de-identified]    : 1945  (72 y.o.)  Gender: male   Referring Practitioner: Dr. Phani Kim  Diagnosis: L intercerebral hemorrhage             REASON FOR MISSED TREATMENT:  Patient refusal   -    Pt declines OT this PM d/t fatigue, pt encouraged to participate in PM therapies, pt verbalized fatigue and needing to rest.     PHAM Serrato/KANWAL

## 2019-04-11 NOTE — PROGRESS NOTES
[address] [facility]    ACUTE REHABILITATION OCCUPATIONAL THERAPY  DAILY NOTE    Date: 19  Patient Name: Blossom Head      Room: 1100/5468-04    MRN: [id number]   : 1945  (77 y.o.)  Gender: male   Referring Practitioner: Dr. Stephanie Paulino  Diagnosis: L intercerebral hemorrhage  Additional Pertinent Hx: Alcohol abuse,     Restrictions  Restrictions/Precautions: Seizure, General Precautions, Surgical Protocols, Fall Risk(telesitter)  Implants present? : Metal implants  Required Braces or Orthoses?: No    Subjective  Subjective: \"things don't make sense\" pt states in regards to ARU stay  Comments: education provided to pt why they are here and benefits to participation, encouragement req for participation c F reception but agreeable to ADL tasks this AM  Pain Level: 8  Restrictions/Precautions: Seizure;General Precautions;Surgical Protocols; Fall Risk(telesitter)     Patient Observation  Observations: telesitter  Pain Assessment  Pain Level: 8    Objective  Cognition  Overall Cognitive Status: Impaired  Arousal/Alertness: Appropriate responses to stimuli  Following Directions: Follows two step commands  Attention Span: Attends with cues to redirect  Memory: Decreased recall of biographical information;Decreased recall of precautions;Decreased recall of recent events  Following Commands:  Follows one step commands with repetition  Safety Judgement: Decreased awareness of need for assistance  Insights: Decreased awareness of deficits  Perception  Overall Perceptual Status: Impaired  Initiation: Cues to initiate tasks  Balance  Sitting Balance: Supervision  Standing Balance: Stand by assistance  Bed mobility  Rolling to Left: Supervision  Rolling to Right: Supervision  Supine to Sit: Supervision  Scooting: Supervision  Comment: HOB elevated, vc's for task initiation  Transfers  Stand Step Transfers: Stand by assistance(no DME)  Sit to stand: Stand by assistance  Stand to sit: Stand by assistance  Transfer Comments: vc's req for tech/safety  Standing Balance  Time: AM: 1-2 min x4  Activity: AM: functional mobility in room/bathroom, ADL tasks, grooming sinkside  Sit to stand: Stand by assistance  Stand to sit: Stand by assistance  Comment: unsteady, no LOB noted  Functional Mobility  Functional - Mobility Device: Rolling Walker  Activity: To/from bathroom; Other  Assist Level: Stand by assistance  Functional Mobility Comments: slow pace using R/W, V/T cues for safety/tech, vc's for visual scanning                                        OT FIM:   Eatin - Feeds self with setup/supervision/cues and/or requires only setup/supervision/cues to perform tube feedings(s/u req)  Groomin - Requires setup/cues to do all tasks(SBA in standing sinkside to brush hair/wash face)  Bathin - Able to bathe all 10 areas with setup/sup/cues(s/u req, vc's for ADL tasks, sitting/standing in shower, SBA)  Dressing-Upper: 5 - Requires setup/supervision/cues and/or requires assist with presthesis/brace only  Dressing-Lower: 5 - Requires setup/supervision/cues and/or staff applies TEDS/prosthesis/brace only(A TEDs, s/u req, SBA in standing, vc's for tasks)  Toiletin - Requires setup/supervision/cues(cuing req, SBA)  Toilet Transfer: 5 - Requires setup/supervision/cues(SBA )  Primary Mode: Shower  Tub Transfer: 0 - Activity does not occur  Shower Transfer: 5 - Supervision, set-up, cues(close SBA over threshold, no DME or LOB, vc's for safety)               Assessment  Performance deficits / Impairments: Decreased functional mobility ; Decreased ADL status; Decreased strength;Decreased safe awareness;Decreased cognition;Decreased endurance;Decreased balance;Decreased high-level IADLs  Prognosis: Good  Discharge Recommendations: 24 hour supervision or assist  Activity Tolerance: Patient limited by fatigue;Patient limited by pain;Treatment limited secondary to decreased cognition  Safety Devices in place: Yes  Type of devices: Left in chair;Nurse notified; Patient at risk for falls; Chair alarm in place;Call light within reach  Equipment Recommendations  Equipment Needed: No       Patient Education:     Patient Education: OT POC, safety, therapy participation, ADL tasks, strengthening  Barriers to Learning: cognition  Learner:patient  Method: demonstration and explanation       Outcome: verbalized concerns, needs reinforcement and asked questions     Plan  Plan  Times per week: 900/7  Times per day: Twice a day  Short term goals  Time Frame for Short term goals: in 1 week pt will   Short term goal 1: demo UB ADLs with set-up and min vcs to attent to task  Short term goal 2: demo LB ADLs with moderate assistance with minimal vcs to attent to task  Short term goal 3: demo toilet transfer with minimal assistance, using least restrictive device  Short term goal 4: demo 20+ minutes of activity participation during functional activity to increase participation in ADLs and functional mobility   Long term goals  Time Frame for Long term goals : by d/c pt will  Long term goal 1: demo BADLs with set-up for safety   Long term goal 2: participate in functional tasks for 30+ minutes with min verbal cueing to redirect attention  Long term goal 3: demo functional transfers with set-up  Long term goal 4: demo safety awareness during all ADLs/functional mobility with minimal verbal cueing        04/11/19 1256   [address]   Time In 1106   Time Out 1138   Minutes 32     Electronically signed by EFE Durbin on 4/11/19 at 3:15 PM

## 2019-04-11 NOTE — PROGRESS NOTES
Nutrition Assessment    Type and Reason for Visit: Reassess, Calorie Count    Nutrition Recommendations: Continue current diet and oral nutrition supplements. Continue Calorie Count. Nutrition Assessment: Pt is not improving from a nutritional standpoint with risk of further decline. PO intake is variable and inconsistent. Will continue to encourage intake and monitor Calorie Count. Malnutrition Assessment:  · Malnutrition Status: Meets the criteria for severe malnutrition  · Context: Acute illness or injury  · Findings of the 6 clinical characteristics of malnutrition (Minimum of 2 out of 6 clinical characteristics is required to make the diagnosis of moderate or severe Protein Calorie Malnutrition based on AND/ASPEN Guidelines):  1. Energy Intake-Less than or equal to 50% of estimated energy requirement, Greater than or equal to 7 days    2. Weight Loss-5% loss or greater, (x 3 weeks at time of admission; additional loss noted)  3. Fat Loss-Mild subcutaneous fat loss, Orbital  4. Muscle Loss-Mild muscle mass loss, Temples (temporalis muscle)  5. Fluid Accumulation-Mild fluid accumulation, Extremities  6.  Strength-Not measured    Nutrition Risk Level: High    Nutrient Needs:  · Estimated Daily Total Kcal: 5322-8081 based on Admission wt with Nicole Begun with 1.2-1.3 factor  · Estimated Daily Protein (g): 76-95 based on 1.2-1.5 gm per kg of admission wt    Nutrition Diagnosis:   · Problem: Inadequate oral intake  · Etiology: related to Cognitive or neurological impairment, Acute injury/trauma     Signs and symptoms:  as evidenced by Intake 0-25%, Weight loss, Mild loss of subcutaneous fat, Mild muscle loss    Objective Information:  · Nutrition-Focused Physical Findings: Edema: +1 RLE, LLE. Often poor appetite. · Wound Type: (Bruising, redness.  No wounds documented. )  · Current Nutrition Therapies:  · Oral Diet Orders: General   · Oral Diet intake: 0%, 1-25%, 26-50%, 51-75%, %  · Oral
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
## 2019-04-11 NOTE — PLAN OF CARE
Nutrition Problem: Inadequate oral intake  Intervention: Food and/or Nutrient Delivery: Continue current diet, Continue current ONS, Continue Calorie Count  Nutritional Goals: PO intake % of meals and supplements

## 2019-04-11 NOTE — PROGRESS NOTES
Writer into pts room to give pt morning medications. Pt has not been up to the bathrrom at all this shift. Writer asks pt if he needs to use the bathroom while writer is in the room. Pt states no. Pt also states that his pants and bed are not wet. Writer bladder scans pt as a precaution. Bladder scan 486 this morning. Pt agrees to walk to the bathroom, pt urinates in the toilet. PVR is 0 at this time. Will continue to monitor.
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
## 2019-04-12 LAB
GLUCOSE BLD-MCNC: 121 MG/DL (ref 75–110)
GLUCOSE BLD-MCNC: 145 MG/DL (ref 75–110)
GLUCOSE BLD-MCNC: 153 MG/DL (ref 75–110)
GLUCOSE BLD-MCNC: 226 MG/DL (ref 75–110)

## 2019-04-12 PROCEDURE — 6370000000 HC RX 637 (ALT 250 FOR IP): Performed by: INTERNAL MEDICINE

## 2019-04-12 PROCEDURE — 6370000000 HC RX 637 (ALT 250 FOR IP): Performed by: NURSE PRACTITIONER

## 2019-04-12 PROCEDURE — 6370000000 HC RX 637 (ALT 250 FOR IP): Performed by: PHYSICAL MEDICINE & REHABILITATION

## 2019-04-12 PROCEDURE — 6370000000 HC RX 637 (ALT 250 FOR IP): Performed by: STUDENT IN AN ORGANIZED HEALTH CARE EDUCATION/TRAINING PROGRAM

## 2019-04-12 PROCEDURE — 97127 HC SP THER IVNTJ W/FOCUS COG FUNCJ: CPT

## 2019-04-12 PROCEDURE — 1180000000 HC REHAB R&B

## 2019-04-12 PROCEDURE — 97110 THERAPEUTIC EXERCISES: CPT

## 2019-04-12 PROCEDURE — 97530 THERAPEUTIC ACTIVITIES: CPT

## 2019-04-12 PROCEDURE — 82947 ASSAY GLUCOSE BLOOD QUANT: CPT

## 2019-04-12 PROCEDURE — 99231 SBSQ HOSP IP/OBS SF/LOW 25: CPT | Performed by: INTERNAL MEDICINE

## 2019-04-12 PROCEDURE — 99232 SBSQ HOSP IP/OBS MODERATE 35: CPT | Performed by: PHYSICAL MEDICINE & REHABILITATION

## 2019-04-12 RX ADMIN — METFORMIN HYDROCHLORIDE 500 MG: 500 TABLET, FILM COATED ORAL at 15:44

## 2019-04-12 RX ADMIN — MIRTAZAPINE 30 MG: 30 TABLET, ORALLY DISINTEGRATING ORAL at 20:16

## 2019-04-12 RX ADMIN — FERROUS SULFATE TAB 325 MG (65 MG ELEMENTAL FE) 325 MG: 325 (65 FE) TAB at 08:38

## 2019-04-12 RX ADMIN — SUCRALFATE 1 G: 1 TABLET ORAL at 11:45

## 2019-04-12 RX ADMIN — PANTOPRAZOLE SODIUM 40 MG: 40 TABLET, DELAYED RELEASE ORAL at 15:44

## 2019-04-12 RX ADMIN — THIAMINE HCL TAB 100 MG 100 MG: 100 TAB at 08:38

## 2019-04-12 RX ADMIN — LEVETIRACETAM 500 MG: 500 TABLET, FILM COATED ORAL at 20:16

## 2019-04-12 RX ADMIN — DICYCLOMINE HYDROCHLORIDE 10 MG: 10 CAPSULE ORAL at 15:45

## 2019-04-12 RX ADMIN — SUCRALFATE 1 G: 1 TABLET ORAL at 20:16

## 2019-04-12 RX ADMIN — VITAMIN D, TAB 1000IU (100/BT) 2000 UNITS: 25 TAB at 08:38

## 2019-04-12 RX ADMIN — Medication 800 MG: at 20:16

## 2019-04-12 RX ADMIN — LEVETIRACETAM 500 MG: 500 TABLET, FILM COATED ORAL at 08:38

## 2019-04-12 RX ADMIN — DONEPEZIL HYDROCHLORIDE 5 MG: 5 TABLET, FILM COATED ORAL at 20:16

## 2019-04-12 RX ADMIN — SUCRALFATE 1 G: 1 TABLET ORAL at 06:06

## 2019-04-12 RX ADMIN — DICYCLOMINE HYDROCHLORIDE 10 MG: 10 CAPSULE ORAL at 06:06

## 2019-04-12 RX ADMIN — METFORMIN HYDROCHLORIDE 500 MG: 500 TABLET, FILM COATED ORAL at 08:38

## 2019-04-12 RX ADMIN — SUCRALFATE 1 G: 1 TABLET ORAL at 15:45

## 2019-04-12 RX ADMIN — Medication 1 MG: at 20:15

## 2019-04-12 RX ADMIN — FOLIC ACID 1 MG: 1 TABLET ORAL at 08:38

## 2019-04-12 RX ADMIN — DICYCLOMINE HYDROCHLORIDE 10 MG: 10 CAPSULE ORAL at 11:45

## 2019-04-12 RX ADMIN — INSULIN LISPRO 3 UNITS: 100 INJECTION, SOLUTION INTRAVENOUS; SUBCUTANEOUS at 11:05

## 2019-04-12 RX ADMIN — Medication 800 MG: at 08:38

## 2019-04-12 RX ADMIN — INSULIN LISPRO 3 UNITS: 100 INJECTION, SOLUTION INTRAVENOUS; SUBCUTANEOUS at 06:55

## 2019-04-12 RX ADMIN — ACETAMINOPHEN 650 MG: 325 TABLET, FILM COATED ORAL at 13:29

## 2019-04-12 RX ADMIN — PANTOPRAZOLE SODIUM 40 MG: 40 TABLET, DELAYED RELEASE ORAL at 06:06

## 2019-04-12 RX ADMIN — FERROUS SULFATE TAB 325 MG (65 MG ELEMENTAL FE) 325 MG: 325 (65 FE) TAB at 15:44

## 2019-04-12 RX ADMIN — AMLODIPINE BESYLATE 10 MG: 10 TABLET ORAL at 08:38

## 2019-04-12 ASSESSMENT — PAIN DESCRIPTION - LOCATION: LOCATION: LEG

## 2019-04-12 ASSESSMENT — PAIN SCALES - GENERAL
PAINLEVEL_OUTOF10: 5
PAINLEVEL_OUTOF10: 5
PAINLEVEL_OUTOF10: 7

## 2019-04-12 ASSESSMENT — PAIN SCALES - WONG BAKER: WONGBAKER_NUMERICALRESPONSE: 4

## 2019-04-12 ASSESSMENT — PAIN DESCRIPTION - DESCRIPTORS: DESCRIPTORS: PATIENT UNABLE TO DESCRIBE

## 2019-04-12 ASSESSMENT — PAIN DESCRIPTION - PROGRESSION: CLINICAL_PROGRESSION: NOT CHANGED

## 2019-04-12 ASSESSMENT — PAIN DESCRIPTION - ORIENTATION: ORIENTATION: RIGHT;LEFT
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
## 2019-04-12 NOTE — PLAN OF CARE
level will decrease  Outcome: Ongoing  Pain assessment completed so far this shift. Pt. able to rest without the use of pain medication. Patient states that he had pain, but refused pain medication. Will continue to monitor.
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
## 2019-04-12 NOTE — PROGRESS NOTES
Physical Medicine & Rehabilitation  Progress Note      Subjective:    72year old gentleman with traumatic intracranial hemorrhage    Patient is well, and has had no acute complaints or problems    ROS:  Denies fevers, chills, sweats. No chest pain, palpitations, lightheadedness. Denies coughing, wheezing or shortness of breath. Denies abdominal pain, nausea, diarrhea or constipation. No new areas of joint pain. Denies new areas of numbness or weakness. Denies new anxiety or depression issues. No new skin problems. Rehabilitation:   Progressing in therapies. PT:  Restrictions/Precautions: Seizure, General Precautions, Surgical Protocols, Fall Risk(telesitter)  Implants present? : Metal implants   Transfers  Sit to Stand: Stand by assistance  Stand to sit: Stand by assistance  Bed to Chair: Stand by assistance  Stand Pivot Transfers: Stand by assistance  Squat Pivot Transfers: Supervision  Comment: with, without RW  Ambulation 1  Surface: level tile  Device: Rolling Walker  Other Apparatus: Wheelchair follow  Assistance: Stand by assistance  Quality of Gait: Steering close to wall on right  Distance: 250 ft  Comments: Cues to steer away from wall. Transfers  Sit to Stand: Stand by assistance  Stand to sit: Stand by assistance  Bed to Chair: Stand by assistance  Stand Pivot Transfers: Stand by assistance  Squat Pivot Transfers: Supervision  Comment: with, without RW  Ambulation  Ambulation?: Yes  Ambulation 1  Surface: level tile  Device: Rolling Walker  Other Apparatus: Wheelchair follow  Assistance: Stand by assistance  Quality of Gait: Steering close to wall on right  Distance: 250 ft  Comments: Cues to steer away from wall.     Surface: level tile  Ambulation 1  Surface: level tile  Device: Rolling Walker  Other Apparatus: Wheelchair follow  Assistance: Stand by assistance  Quality of Gait: Steering close to wall on right  Distance: 250 ft  Comments: Cues to steer away from wall.    OT:  ADL  Additional Comments: see FIMs         Balance  Sitting Balance: Supervision  Standing Balance: Stand by assistance   Standing Balance  Time: AM: 1-2 min x4  Activity: AM: functional mobility in room/bathroom, ADL tasks, grooming sinkside  Sit to stand: Stand by assistance  Stand to sit: Stand by assistance  Comment: unsteady, no LOB noted  Functional Mobility  Functional - Mobility Device: Rolling Walker  Activity: To/from bathroom, Other  Assist Level: Stand by assistance  Functional Mobility Comments: slow pace using R/W, V/T cues for safety/tech, vc's for visual scanning     Bed mobility  Bridging: Supervision  Rolling to Left: Supervision  Rolling to Right: Supervision  Supine to Sit: Supervision  Sit to Supine: Supervision  Scooting: Supervision  Comment: HOB elevated, vc's for task initiation  Transfers  Stand Step Transfers: Stand by assistance(no DME)  Stand Pivot Transfers: Contact guard assistance  Sit to stand: Stand by assistance  Stand to sit: Stand by assistance  Transfer Comments: vc's req for tech/safety   Toilet Transfers  Toilet - Technique: Ambulating  Equipment Used: Standard toilet  Toilet Transfer: Contact guard assistance             FIM ITEMS  SELFCARE GROUP  SELF-CARE  Eatin - Feeds self with setup/supervision/cues and/or requires only setup/supervision/cues to perform tube feedings  GOAL: Eatin  Groomin - Requires setup/cues to do all tasks(SBA in standing sinkside to brush hair/wash face)  GOAL: Groomin  Bathin - Able to bathe all 10 areas with setup/sup/cues(s/u req, vc's for ADL tasks, sitting/standing in shower, SBA)  GOAL: Bathin  Dressing-Upper: 5 - Requires setup/supervision/cues and/or requires assist with presthesis/brace only  GOAL: Dressing-Upper: 5  Dressing-Lower: 5 - Requires setup/supervision/cues and/or staff applies TEDS/prosthesis/brace only(A TEDs, s/u req, SBA in standing, vc's for tasks)  GOAL: Dressing-Lower: 5  Toiletin - TRIG 119 03/16/2019     LIVER PROFILE: No results for input(s): AST, ALT, ALB, BILIDIR, BILITOT, ALKPHOS in the last 72 hours.      Current Medications:   Current Facility-Administered Medications: mirtazapine (REMERON SOL-TAB) disintegrating tablet 30 mg, 30 mg, Oral, Nightly  metFORMIN (GLUCOPHAGE) tablet 500 mg, 500 mg, Oral, BID WC  magnesium oxide (MAG-OX) tablet 800 mg, 800 mg, Oral, BID  vitamin D (CHOLECALCIFEROL) tablet 2,000 Units, 2,000 Units, Oral, Daily  dicyclomine (BENTYL) capsule 10 mg, 10 mg, Oral, TID AC  melatonin ER tablet 1 mg, 1 mg, Oral, Nightly PRN  sucralfate (CARAFATE) tablet 1 g, 1 g, Oral, 4 times per day  labetalol (NORMODYNE) tablet 200 mg, 200 mg, Oral, Q6H PRN  donepezil (ARICEPT) tablet 5 mg, 5 mg, Oral, Nightly  bisacodyl (DULCOLAX) suppository 10 mg, 10 mg, Rectal, Daily PRN  glucose (GLUTOSE) 40 % oral gel 15 g, 15 g, Oral, PRN  dextrose 50 % solution 12.5 g, 12.5 g, Intravenous, PRN  glucagon (rDNA) injection 1 mg, 1 mg, Intramuscular, PRN  dextrose 5 % solution, 100 mL/hr, Intravenous, PRN  oxyCODONE (ROXICODONE) immediate release tablet 5 mg, 5 mg, Oral, Q4H PRN **OR** [DISCONTINUED] oxyCODONE (ROXICODONE) immediate release tablet 10 mg, 10 mg, Oral, Q4H PRN  lidocaine PF 4 % injection 4 mL, 4 mL, Inhalation, As Directed RT PRN  amLODIPine (NORVASC) tablet 10 mg, 10 mg, Oral, Daily  ferrous sulfate tablet 325 mg, 325 mg, Oral, BID WC  folic acid (FOLVITE) tablet 1 mg, 1 mg, Oral, Daily  insulin lispro (HUMALOG) injection vial 0-18 Units, 0-18 Units, Subcutaneous, TID WC  insulin lispro (HUMALOG) injection vial 0-9 Units, 0-9 Units, Subcutaneous, Nightly  levETIRAcetam (KEPPRA) tablet 500 mg, 500 mg, Oral, BID  pantoprazole (PROTONIX) tablet 40 mg, 40 mg, Oral, BID AC  polyethylene glycol (GLYCOLAX) packet 17 g, 17 g, Oral, Daily  vitamin B-1 (THIAMINE) tablet 100 mg, 100 mg, Oral, Daily  acetaminophen (TYLENOL) tablet 650 mg, 650 mg, Oral, Q4H PRN      Impression/Plan: Impaired ADLs, gait, and mobility due to:      1. TBI with frontal, temporal, parietal skull fracture and L frontal lobe hemorrhagic contusion: on aricept. Continue PT/OT/speech for gait, mobility, strengthening, endurance, ADLs, cognition. Patient has achieved improved mobility. Awaiting expedited appeal for nursing home placement. 2. Hallucinations/alcoholic dementia: psych consulted. On lexapro. Off Seroquel. 3. Seizure: stable on keppra  4. Alcoholism with dementia: on thiamine, folate, Aricept. 5. Hyperglycemic: HbA1c 6.1, IM following  6. Retroperitoneal hematoma/Anemia: Monitoring Hb. Stable - Hb 11.6 on 4/11/19. On Iron. 7. Esophagitis/Gastritis: on protonix. On carafate, Miralax, Protonix, bentyl. 8. Anorexia: dietitican following, recently increased remeron for appetite. 9. HTN: stable on norvasc. 10. REGGIE: nephrology signed off - stable/resolved. 11. PE: Anticoagulation contraindicated for retroperitoneal hematoma, anemia, gastritis, hemorrhagic TBI. Vascular consulted - no interventions at this time. Question of whether patient had IVC filter in past - no radiologic evidence of filter being present. Venous dopplers 4/5/19 negative for DVT. Continue to monitor. Vascular consulted - will recommend anticoagulation when timing appropriate. 12. Internal medicine medically managing.   13. Recommend follow up with PCP, PM&R, vascular surgery - Dr. Jackie Huitron, neuro/neurointerventionalist, neurosurgery after discharge. Electronically signed by Yoav Selby MD on 4/12/2019 at 12:57 PM      This note is created with the assistance of a speech recognition program.  While intending to generate a document that actually reflects the content of the visit, the document can still have some errors including those of syntax and sound a like substitutions which may escape proof reading.   In such instances, actual meaning can be extrapolated by contextual diversion.

## 2019-04-12 NOTE — PROGRESS NOTES
[facility]: [facility]   OCCUPATIONAL THERAPY MISSED TREATMENT NOTE   ACUTE REHAB  Date: 19  Patient Name: Markell Carolina       Room: [facility]/  MRN: [id number]   Account #: [de-identified]    : 1945  (72 y.o.)  Gender: male   Referring Practitioner: Dr. Brant Adan  Diagnosis: L intercerebral hemorrhage             REASON FOR MISSED TREATMENT:  Patient refusal   -   Pt declines OT this AM requesting to eat lunch and rest, will attempt later this date.           PHAM Bryant/KANWAL

## 2019-04-12 NOTE — PROGRESS NOTES
[address]    ACUTE REHABILITATION OCCUPATIONAL THERAPY  DAILY NOTE    Date: 19  Patient Name: Donald Mcpherson      Room: 6437/1679-43    MRN: [id number]   : 1945  (72 y.o.)  Gender: male   Referring Practitioner: Dr. Jenifer Richmond  Diagnosis: L intercerebral hemorrhage  Additional Pertinent Hx: Alcohol abuse,     Restrictions  Restrictions/Precautions: Seizure, General Precautions, Surgical Protocols, Fall Risk(telesitter)  Implants present? : Metal implants  Required Braces or Orthoses?: No    Subjective  Subjective: \"I feel really good\"  Comments: pt cooperative for PM session  Pain Level: 7(pt c/o back pain)  Restrictions/Precautions: Seizure;General Precautions;Surgical Protocols; Fall Risk(telesitter)  Overall Orientation Status: Impaired  Orientation Level: Oriented to person;Oriented to situation;Disoriented to place; Disoriented to time  Patient Observation  Observations: telesitter  Pain Assessment  Pain Level: 7(pt c/o back pain)    Objective  Cognition  Overall Cognitive Status: Impaired  Arousal/Alertness: Appropriate responses to stimuli  Following Directions: Follows two step commands  Attention Span: Attends with cues to redirect  Memory: Decreased recall of biographical information;Decreased recall of precautions;Decreased recall of recent events  Following Commands:  Follows one step commands with repetition  Safety Judgement: Decreased awareness of need for assistance  Insights: Decreased awareness of deficits  Perception  Overall Perceptual Status: Impaired  Initiation: Cues to initiate tasks  Balance  Sitting Balance: Supervision  Standing Balance: Stand by assistance  Bed mobility  Rolling to Left: Supervision  Rolling to Right: Supervision  Supine to Sit: Supervision  Scooting: Supervision  Comment: HOB elevated  Transfers  Stand Step Transfers: Stand by assistance(R/W)  Sit to stand: Stand by assistance  Stand to sit: Stand by assistance  Transfer Comments: 's hernandez for tech/safety  Standing Balance  Time: PM: 10 min, 5 min, 2 min  Activity: PM: dynamic standing to participate in lacing ROM ladder, functional mobility in OT gym/hallway  Sit to stand: Stand by assistance  Stand to sit: Stand by assistance  Comment: unsteady, no LOB noted  Functional Mobility  Functional - Mobility Device: Rolling Walker  Activity: To/from bathroom; Other  Assist Level: Stand by assistance  Functional Mobility Comments: slow pace using R/W, V/T cues for safety/tech, vc's for visual scanning     Type of ROM/Therapeutic Exercise  Type of ROM/Therapeutic Exercise: AROM  Comment: pt completed lacing ROM ladder to increase BUE strength/ROM and overall task tolerance, pt req MAX V/T cues for completion of task, unsteady but no LOB noted, SBA req     Additional Activities: PM: pt assembled/unassembled nut/bolt/washer s/u to address sequencing and problem solving, pt demo no difficulty c task once provided c verbal instruction, increased time req                            OT FIM:   Eatin - Feeds self with setup/supervision/cues and/or requires only setup/supervision/cues to perform tube feedings(s/u req)  Groomin - Did not occur(pt declines)  Bathin - Did not occur(pt declines)  Dressing-Upper: 0 - Did not occur(pt declines)  Dressing-Lower: 0 - Did not occur(pt declines)  Toiletin - Did not occur(pt declines)  Toilet Transfer: 0 - Did not occur  Primary Mode: Shower  Tub Transfer: 0 - Activity does not occur  Shower Transfer: 0 - Activity does not occur(pt declines)               Assessment  Performance deficits / Impairments: Decreased functional mobility ; Decreased ADL status; Decreased strength;Decreased safe awareness;Decreased cognition;Decreased endurance;Decreased balance;Decreased high-level IADLs  Assessment: increased participation noted this date in PM session  Prognosis: Good  Discharge Recommendations: 24 hour supervision or assist  Activity Tolerance: Patient Tolerated treatment well;Patient limited by pain;Treatment limited secondary to decreased cognition  Activity Tolerance: pt agreeable c encouragement in PM  Safety Devices in place: Yes  Type of devices: Left in chair(taken to PT)  Equipment Recommendations  Equipment Needed: No       Patient Education:     Patient Education: OT POC, safety, therapy participation, ADL tasks, strengthening  Barriers to Learning: cognition  Learner:patient  Method: demonstration and explanation       Outcome: needs reinforcement     Plan  Plan  Times per week: 900/7  Times per day: Twice a day  Short term goals  Time Frame for Short term goals: in 1 week pt will   Short term goal 1: demo UB ADLs with set-up and min vcs to attent to task  Short term goal 2: demo LB ADLs with moderate assistance with minimal vcs to attent to task  Short term goal 3: demo toilet transfer with minimal assistance, using least restrictive device  Short term goal 4: demo 20+ minutes of activity participation during functional activity to increase participation in ADLs and functional mobility   Long term goals  Time Frame for Long term goals : by d/c pt will  Long term goal 1: demo BADLs with set-up for safety   Long term goal 2: participate in functional tasks for 30+ minutes with min verbal cueing to redirect attention  Long term goal 3: demo functional transfers with set-up  Long term goal 4: demo safety awareness during all ADLs/functional mobility with minimal verbal cueing        04/12/19 Dilip Washburn   Time In 1310   Time Out 1340   Minutes 30     Electronically signed by EFE Razo on 4/12/19 at 4:28 PM

## 2019-04-13 LAB
GLUCOSE BLD-MCNC: 128 MG/DL (ref 75–110)
GLUCOSE BLD-MCNC: 131 MG/DL (ref 75–110)
GLUCOSE BLD-MCNC: 162 MG/DL (ref 75–110)
GLUCOSE BLD-MCNC: 84 MG/DL (ref 75–110)

## 2019-04-13 PROCEDURE — 6370000000 HC RX 637 (ALT 250 FOR IP): Performed by: NURSE PRACTITIONER

## 2019-04-13 PROCEDURE — 6370000000 HC RX 637 (ALT 250 FOR IP): Performed by: PHYSICAL MEDICINE & REHABILITATION

## 2019-04-13 PROCEDURE — 6370000000 HC RX 637 (ALT 250 FOR IP): Performed by: INTERNAL MEDICINE

## 2019-04-13 PROCEDURE — 6370000000 HC RX 637 (ALT 250 FOR IP): Performed by: STUDENT IN AN ORGANIZED HEALTH CARE EDUCATION/TRAINING PROGRAM

## 2019-04-13 PROCEDURE — 1180000000 HC REHAB R&B

## 2019-04-13 PROCEDURE — 97116 GAIT TRAINING THERAPY: CPT

## 2019-04-13 PROCEDURE — 99231 SBSQ HOSP IP/OBS SF/LOW 25: CPT | Performed by: INTERNAL MEDICINE

## 2019-04-13 PROCEDURE — 82947 ASSAY GLUCOSE BLOOD QUANT: CPT

## 2019-04-13 RX ADMIN — SUCRALFATE 1 G: 1 TABLET ORAL at 05:08

## 2019-04-13 RX ADMIN — FERROUS SULFATE TAB 325 MG (65 MG ELEMENTAL FE) 325 MG: 325 (65 FE) TAB at 08:29

## 2019-04-13 RX ADMIN — OXYCODONE HYDROCHLORIDE 5 MG: 5 TABLET ORAL at 08:29

## 2019-04-13 RX ADMIN — FOLIC ACID 1 MG: 1 TABLET ORAL at 08:29

## 2019-04-13 RX ADMIN — SUCRALFATE 1 G: 1 TABLET ORAL at 20:42

## 2019-04-13 RX ADMIN — LEVETIRACETAM 500 MG: 500 TABLET, FILM COATED ORAL at 08:47

## 2019-04-13 RX ADMIN — DICYCLOMINE HYDROCHLORIDE 10 MG: 10 CAPSULE ORAL at 12:06

## 2019-04-13 RX ADMIN — Medication 800 MG: at 08:29

## 2019-04-13 RX ADMIN — FERROUS SULFATE TAB 325 MG (65 MG ELEMENTAL FE) 325 MG: 325 (65 FE) TAB at 17:09

## 2019-04-13 RX ADMIN — DICYCLOMINE HYDROCHLORIDE 10 MG: 10 CAPSULE ORAL at 05:08

## 2019-04-13 RX ADMIN — METFORMIN HYDROCHLORIDE 500 MG: 500 TABLET, FILM COATED ORAL at 17:09

## 2019-04-13 RX ADMIN — METFORMIN HYDROCHLORIDE 500 MG: 500 TABLET, FILM COATED ORAL at 08:29

## 2019-04-13 RX ADMIN — PANTOPRAZOLE SODIUM 40 MG: 40 TABLET, DELAYED RELEASE ORAL at 05:08

## 2019-04-13 RX ADMIN — Medication 800 MG: at 20:37

## 2019-04-13 RX ADMIN — DONEPEZIL HYDROCHLORIDE 5 MG: 5 TABLET, FILM COATED ORAL at 20:37

## 2019-04-13 RX ADMIN — MIRTAZAPINE 30 MG: 30 TABLET, ORALLY DISINTEGRATING ORAL at 20:38

## 2019-04-13 RX ADMIN — VITAMIN D, TAB 1000IU (100/BT) 2000 UNITS: 25 TAB at 08:28

## 2019-04-13 RX ADMIN — LEVETIRACETAM 500 MG: 500 TABLET, FILM COATED ORAL at 20:42

## 2019-04-13 RX ADMIN — SUCRALFATE 1 G: 1 TABLET ORAL at 12:05

## 2019-04-13 RX ADMIN — SUCRALFATE 1 G: 1 TABLET ORAL at 17:10

## 2019-04-13 RX ADMIN — DICYCLOMINE HYDROCHLORIDE 10 MG: 10 CAPSULE ORAL at 17:11

## 2019-04-13 RX ADMIN — INSULIN LISPRO 3 UNITS: 100 INJECTION, SOLUTION INTRAVENOUS; SUBCUTANEOUS at 08:47

## 2019-04-13 RX ADMIN — AMLODIPINE BESYLATE 10 MG: 10 TABLET ORAL at 08:29

## 2019-04-13 RX ADMIN — Medication 1 MG: at 20:37

## 2019-04-13 RX ADMIN — PANTOPRAZOLE SODIUM 40 MG: 40 TABLET, DELAYED RELEASE ORAL at 17:10

## 2019-04-13 RX ADMIN — THIAMINE HCL TAB 100 MG 100 MG: 100 TAB at 08:29

## 2019-04-13 ASSESSMENT — PAIN SCALES - GENERAL
PAINLEVEL_OUTOF10: 0
PAINLEVEL_OUTOF10: 8
PAINLEVEL_OUTOF10: 6
PAINLEVEL_OUTOF10: 8

## 2019-04-13 ASSESSMENT — PAIN DESCRIPTION - ORIENTATION: ORIENTATION: RIGHT

## 2019-04-13 ASSESSMENT — PAIN DESCRIPTION - PAIN TYPE: TYPE: CHRONIC PAIN

## 2019-04-13 ASSESSMENT — PAIN DESCRIPTION - LOCATION: LOCATION: BACK

## 2019-04-13 NOTE — PLAN OF CARE
Problem: Risk for Impaired Skin Integrity  Goal: Tissue integrity - skin and mucous membranes  Description  Structural intactness and normal physiological function of skin and  mucous membranes. Outcome: Ongoing  Skin integrity improved/maintained this shift. See head to toe assessment. Problem: Falls - Risk of:  Goal: Will remain free from falls  Description  Will remain free from falls  Outcome: Ongoing  Pt. Free of falls and injuries this shift. Problem: Pain:  Goal: Pain level will decrease  Description  Pain level will decrease  Outcome: Ongoing   Adequate pain control achieved this shift. See MAR.
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
## 2019-04-13 NOTE — PLAN OF CARE
Problem: Risk for Impaired Skin Integrity  Goal: Tissue integrity - skin and mucous membranes  Description  Structural intactness and normal physiological function of skin and  mucous membranes. 4/13/2019 1040 by Sameer Wheat RN  Outcome: Ongoing  Note:   There are no new skin issues so far this shift. Will continue to assist patient with repositioning at least every two hours. Problem: Falls - Risk of:  Goal: Will remain free from falls  Description  Will remain free from falls  4/13/2019 1040 by Sameer Wheat RN  Outcome: Ongoing  Note:   Patient remains free from falls so far this shift. Will continue to round at least hourly, place bed in lowest position with call light within reach, and alarm activated. Problem: Pain:  Goal: Control of acute pain  Description  Control of acute pain  Outcome: Ongoing  Note:   Pain is controlled at this time with the use of prn pain meds. Will continue to assess.

## 2019-04-13 NOTE — PROGRESS NOTES
Physical Medicine & Rehabilitation  Progress Note      Subjective:    72year old gentleman with traumatic intracranial hemorrhage    Patient is well, and has had no acute complaints or problems, B/B ok    ROS:  Denies fevers, chills, sweats. No chest pain, palpitations, lightheadedness. Denies coughing, wheezing or shortness of breath. Denies abdominal pain, nausea, diarrhea or constipation. No new areas of joint pain. Denies new areas of numbness or weakness. Denies new anxiety or depression issues. No new skin problems. Rehabilitation:   Progressing in therapies. PT:  Restrictions/Precautions: Seizure, General Precautions, Surgical Protocols, Fall Risk(telesitter)  Implants present? : Metal implants   Transfers  Sit to Stand: Stand by assistance  Stand to sit: Stand by assistance  Bed to Chair: Stand by assistance  Stand Pivot Transfers: Stand by assistance  Squat Pivot Transfers: Supervision  Comment: with, without RW  Ambulation 1  Surface: level tile  Device: Rolling Walker  Other Apparatus: Wheelchair follow  Assistance: Stand by assistance  Quality of Gait: Steering close to wall on right, cues for safety  Distance: 250 ftx2  Comments: Cues to steer away from wall. Transfers  Sit to Stand: Stand by assistance  Stand to sit: Stand by assistance  Bed to Chair: Stand by assistance  Stand Pivot Transfers: Stand by assistance  Squat Pivot Transfers: Supervision  Comment: with, without RW  Ambulation  Ambulation?: Yes  Ambulation 1  Surface: level tile  Device: Rolling Walker  Other Apparatus: Wheelchair follow  Assistance: Stand by assistance  Quality of Gait: Steering close to wall on right, cues for safety  Distance: 250 ftx2  Comments: Cues to steer away from wall.     Surface: level tile  Ambulation 1  Surface: level tile  Device: Rolling Walker  Other Apparatus: Wheelchair follow  Assistance: Stand by assistance  Quality of Gait: Steering close to wall on right, cues for safety  Distance: 250 ftx2  Comments: Cues to steer away from wall.     OT:  ADL  Additional Comments: see FIMs         Balance  Sitting Balance: Supervision  Standing Balance: Stand by assistance   Standing Balance  Time: PM: 10 min, 5 min, 2 min  Activity: PM: dynamic standing to participate in lacing ROM ladder, functional mobility in OT gym/hallway  Sit to stand: Stand by assistance  Stand to sit: Stand by assistance  Comment: unsteady, no LOB noted  Functional Mobility  Functional - Mobility Device: Rolling Walker  Activity: To/from bathroom, Other  Assist Level: Stand by assistance  Functional Mobility Comments: slow pace using R/W, V/T cues for safety/tech, vc's for visual scanning     Bed mobility  Bridging: Supervision  Rolling to Left: Supervision  Rolling to Right: Supervision  Supine to Sit: Supervision  Sit to Supine: Supervision  Scooting: Supervision  Comment: HOB elevated  Transfers  Stand Step Transfers: Stand by assistance(R/W)  Stand Pivot Transfers: Contact guard assistance  Sit to stand: Stand by assistance  Stand to sit: Stand by assistance  Transfer Comments: vc's req for tech/safety   Toilet Transfers  Toilet - Technique: Ambulating  Equipment Used: Standard toilet  Toilet Transfer: Contact guard assistance             FIM ITEMS  SEL[employer]  SELF-CARE  Eatin - Feeds self with setup/supervision/cues and/or requires only setup/supervision/cues to perform tube feedings  GOAL: Eatin  Groomin - Did not occur(pt declines)  GOAL: Groomin  Bathin - Did not occur(pt declines)  GOAL: Bathin  Dressing-Upper: 0 - Did not occur(pt declines)  GOAL: Dressing-Upper: 5  Dressing-Lower: 0 - Did not occur(pt declines)  GOAL: Dressing-Lower: 5  Toiletin - Requires setup/supervision/cues  GOAL: Toiletin    SPEECH:  Subjective: [x] Alert         [x] Cooperative     [] Confused     [] Agitated    [] Lethargic     Objective/Assessment:  Attention:  functional     Orientation: n/a     Recall: disintegrating tablet 30 mg, 30 mg, Oral, Nightly  metFORMIN (GLUCOPHAGE) tablet 500 mg, 500 mg, Oral, BID WC  magnesium oxide (MAG-OX) tablet 800 mg, 800 mg, Oral, BID  vitamin D (CHOLECALCIFEROL) tablet 2,000 Units, 2,000 Units, Oral, Daily  dicyclomine (BENTYL) capsule 10 mg, 10 mg, Oral, TID AC  melatonin ER tablet 1 mg, 1 mg, Oral, Nightly PRN  sucralfate (CARAFATE) tablet 1 g, 1 g, Oral, 4 times per day  labetalol (NORMODYNE) tablet 200 mg, 200 mg, Oral, Q6H PRN  donepezil (ARICEPT) tablet 5 mg, 5 mg, Oral, Nightly  bisacodyl (DULCOLAX) suppository 10 mg, 10 mg, Rectal, Daily PRN  glucose (GLUTOSE) 40 % oral gel 15 g, 15 g, Oral, PRN  dextrose 50 % solution 12.5 g, 12.5 g, Intravenous, PRN  glucagon (rDNA) injection 1 mg, 1 mg, Intramuscular, PRN  dextrose 5 % solution, 100 mL/hr, Intravenous, PRN  oxyCODONE (ROXICODONE) immediate release tablet 5 mg, 5 mg, Oral, Q4H PRN **OR** [DISCONTINUED] oxyCODONE (ROXICODONE) immediate release tablet 10 mg, 10 mg, Oral, Q4H PRN  lidocaine PF 4 % injection 4 mL, 4 mL, Inhalation, As Directed RT PRN  amLODIPine (NORVASC) tablet 10 mg, 10 mg, Oral, Daily  ferrous sulfate tablet 325 mg, 325 mg, Oral, BID WC  folic acid (FOLVITE) tablet 1 mg, 1 mg, Oral, Daily  insulin lispro (HUMALOG) injection vial 0-18 Units, 0-18 Units, Subcutaneous, TID WC  insulin lispro (HUMALOG) injection vial 0-9 Units, 0-9 Units, Subcutaneous, Nightly  levETIRAcetam (KEPPRA) tablet 500 mg, 500 mg, Oral, BID  pantoprazole (PROTONIX) tablet 40 mg, 40 mg, Oral, BID AC  polyethylene glycol (GLYCOLAX) packet 17 g, 17 g, Oral, Daily  vitamin B-1 (THIAMINE) tablet 100 mg, 100 mg, Oral, Daily  acetaminophen (TYLENOL) tablet 650 mg, 650 mg, Oral, Q4H PRN      Impression/Plan:   Impaired ADLs, gait, and mobility due to:      1. TBI with frontal, temporal, parietal skull fracture and L frontal lobe hemorrhagic contusion: on aricept.  Continue PT/OT/speech for gait, mobility, strengthening, endurance, ADLs, cognition. Patient has achieved improved mobility. Awaiting expedited appeal for nursing home placement. 2. Hallucinations/alcoholic dementia: psych consulted. On lexapro. Off Seroquel. 3. Seizure: stable on keppra  4. Alcoholism with dementia: on thiamine, folate, Aricept. 5. Hyperglycemic: HbA1c 6.1, IM following  6. Retroperitoneal hematoma/Anemia: Monitoring Hb. Stable - Hb 11.6 on 4/11/19. On Iron. 7. Esophagitis/Gastritis: on protonix. On carafate, Miralax, Protonix, bentyl. 8. Anorexia: dietitican following, recently increased remeron for appetite. 9. HTN: stable on norvasc. 10. REGGIE: nephrology signed off - stable/resolved. 11. PE: Anticoagulation contraindicated for retroperitoneal hematoma, anemia, gastritis, hemorrhagic TBI. Vascular consulted - no interventions at this time. Question of whether patient had IVC filter in past - no radiologic evidence of filter being present. Venous dopplers 4/5/19 negative for DVT. Continue to monitor. Vascular consulted - will recommend anticoagulation when timing appropriate. 12. Internal medicine medically managing.   13. Recommend follow up with PCP, PM&R, vascular surgery - Dr. Itzel Leyva, neuro/neurointerventionalist, neurosurgery after discharge.            Electronically signed by Jose Maria Shah MD on 4/13/2019 at 11:02 AM

## 2019-04-14 LAB
GLUCOSE BLD-MCNC: 114 MG/DL (ref 75–110)
GLUCOSE BLD-MCNC: 128 MG/DL (ref 75–110)
GLUCOSE BLD-MCNC: 160 MG/DL (ref 75–110)
GLUCOSE BLD-MCNC: 87 MG/DL (ref 75–110)

## 2019-04-14 PROCEDURE — 6370000000 HC RX 637 (ALT 250 FOR IP): Performed by: NURSE PRACTITIONER

## 2019-04-14 PROCEDURE — 97530 THERAPEUTIC ACTIVITIES: CPT

## 2019-04-14 PROCEDURE — 97110 THERAPEUTIC EXERCISES: CPT

## 2019-04-14 PROCEDURE — 6370000000 HC RX 637 (ALT 250 FOR IP): Performed by: INTERNAL MEDICINE

## 2019-04-14 PROCEDURE — 1180000000 HC REHAB R&B

## 2019-04-14 PROCEDURE — 6370000000 HC RX 637 (ALT 250 FOR IP): Performed by: STUDENT IN AN ORGANIZED HEALTH CARE EDUCATION/TRAINING PROGRAM

## 2019-04-14 PROCEDURE — 97535 SELF CARE MNGMENT TRAINING: CPT

## 2019-04-14 PROCEDURE — 6370000000 HC RX 637 (ALT 250 FOR IP): Performed by: PHYSICAL MEDICINE & REHABILITATION

## 2019-04-14 PROCEDURE — 82947 ASSAY GLUCOSE BLOOD QUANT: CPT

## 2019-04-14 PROCEDURE — 97116 GAIT TRAINING THERAPY: CPT

## 2019-04-14 PROCEDURE — 99231 SBSQ HOSP IP/OBS SF/LOW 25: CPT | Performed by: INTERNAL MEDICINE

## 2019-04-14 RX ADMIN — SUCRALFATE 1 G: 1 TABLET ORAL at 21:10

## 2019-04-14 RX ADMIN — PANTOPRAZOLE SODIUM 40 MG: 40 TABLET, DELAYED RELEASE ORAL at 16:46

## 2019-04-14 RX ADMIN — INSULIN LISPRO 3 UNITS: 100 INJECTION, SOLUTION INTRAVENOUS; SUBCUTANEOUS at 08:20

## 2019-04-14 RX ADMIN — MIRTAZAPINE 30 MG: 30 TABLET, ORALLY DISINTEGRATING ORAL at 21:10

## 2019-04-14 RX ADMIN — SUCRALFATE 1 G: 1 TABLET ORAL at 11:29

## 2019-04-14 RX ADMIN — DICYCLOMINE HYDROCHLORIDE 10 MG: 10 CAPSULE ORAL at 06:24

## 2019-04-14 RX ADMIN — THIAMINE HCL TAB 100 MG 100 MG: 100 TAB at 08:15

## 2019-04-14 RX ADMIN — POLYETHYLENE GLYCOL 3350 17 G: 17 POWDER, FOR SOLUTION ORAL at 08:14

## 2019-04-14 RX ADMIN — AMLODIPINE BESYLATE 10 MG: 10 TABLET ORAL at 08:15

## 2019-04-14 RX ADMIN — DONEPEZIL HYDROCHLORIDE 5 MG: 5 TABLET, FILM COATED ORAL at 21:13

## 2019-04-14 RX ADMIN — PANTOPRAZOLE SODIUM 40 MG: 40 TABLET, DELAYED RELEASE ORAL at 06:24

## 2019-04-14 RX ADMIN — FERROUS SULFATE TAB 325 MG (65 MG ELEMENTAL FE) 325 MG: 325 (65 FE) TAB at 16:46

## 2019-04-14 RX ADMIN — VITAMIN D, TAB 1000IU (100/BT) 2000 UNITS: 25 TAB at 08:14

## 2019-04-14 RX ADMIN — METFORMIN HYDROCHLORIDE 500 MG: 500 TABLET, FILM COATED ORAL at 16:46

## 2019-04-14 RX ADMIN — FERROUS SULFATE TAB 325 MG (65 MG ELEMENTAL FE) 325 MG: 325 (65 FE) TAB at 08:15

## 2019-04-14 RX ADMIN — LEVETIRACETAM 500 MG: 500 TABLET, FILM COATED ORAL at 08:15

## 2019-04-14 RX ADMIN — SUCRALFATE 1 G: 1 TABLET ORAL at 16:46

## 2019-04-14 RX ADMIN — Medication 1 MG: at 21:10

## 2019-04-14 RX ADMIN — SUCRALFATE 1 G: 1 TABLET ORAL at 06:24

## 2019-04-14 RX ADMIN — Medication 800 MG: at 21:10

## 2019-04-14 RX ADMIN — DICYCLOMINE HYDROCHLORIDE 10 MG: 10 CAPSULE ORAL at 11:29

## 2019-04-14 RX ADMIN — DICYCLOMINE HYDROCHLORIDE 10 MG: 10 CAPSULE ORAL at 16:43

## 2019-04-14 RX ADMIN — OXYCODONE HYDROCHLORIDE 5 MG: 5 TABLET ORAL at 08:15

## 2019-04-14 RX ADMIN — Medication 800 MG: at 08:15

## 2019-04-14 RX ADMIN — FOLIC ACID 1 MG: 1 TABLET ORAL at 08:15

## 2019-04-14 RX ADMIN — LEVETIRACETAM 500 MG: 500 TABLET, FILM COATED ORAL at 21:10

## 2019-04-14 RX ADMIN — OXYCODONE HYDROCHLORIDE 5 MG: 5 TABLET ORAL at 21:10

## 2019-04-14 RX ADMIN — METFORMIN HYDROCHLORIDE 500 MG: 500 TABLET, FILM COATED ORAL at 08:15

## 2019-04-14 ASSESSMENT — PAIN DESCRIPTION - FREQUENCY: FREQUENCY: INTERMITTENT

## 2019-04-14 ASSESSMENT — PAIN DESCRIPTION - ORIENTATION
ORIENTATION: RIGHT;LEFT
ORIENTATION: LOWER

## 2019-04-14 ASSESSMENT — PAIN DESCRIPTION - ONSET: ONSET: ON-GOING

## 2019-04-14 ASSESSMENT — PAIN DESCRIPTION - LOCATION
LOCATION: BACK
LOCATION: HIP

## 2019-04-14 ASSESSMENT — PAIN SCALES - GENERAL
PAINLEVEL_OUTOF10: 6
PAINLEVEL_OUTOF10: 4
PAINLEVEL_OUTOF10: 4
PAINLEVEL_OUTOF10: 8
PAINLEVEL_OUTOF10: 6
PAINLEVEL_OUTOF10: 9

## 2019-04-14 ASSESSMENT — PAIN DESCRIPTION - PROGRESSION
CLINICAL_PROGRESSION: NOT CHANGED
CLINICAL_PROGRESSION: NOT CHANGED

## 2019-04-14 ASSESSMENT — PAIN DESCRIPTION - PAIN TYPE
TYPE: ACUTE PAIN
TYPE: CHRONIC PAIN
TYPE: ACUTE PAIN
TYPE: ACUTE PAIN

## 2019-04-14 ASSESSMENT — PAIN SCALES - WONG BAKER: WONGBAKER_NUMERICALRESPONSE: 8

## 2019-04-14 NOTE — PROGRESS NOTES
assist  Activity Tolerance: Patient Tolerated treatment well;Patient limited by pain;Treatment limited secondary to decreased cognition  Safety Devices in place: Yes  Type of devices: Left in chair; All fall risk precautions in place;Call light within reach  Equipment Recommendations  Equipment Needed: No  Comments: Pt. was agreeable throughout therapy and asked for insights as to what the interventions would provide for him, EMERITA explained and gave in depth overview of how interventions would increase his independence and address his goals    Patient Education:     Patient Education: OT POC, safety, therapy participation, ADL tasks, strengthening  Learner:patient  Method: explanation       Outcome: demonstrated understanding     Plan  Plan  Times per week: 900/7  Times per day: Twice a day  Current Treatment Recommendations: Balance Training, Functional Mobility Training, Home Management Training, Self-Care / ADL, Patient/Caregiver Education & Training, Equipment Evaluation, Education, & procurement, Neuromuscular Re-education, Pain Management, Cognitive Reorientation  Short term goals  Time Frame for Short term goals: in 1 week pt will   Short term goal 1: demo UB ADLs with set-up and min vcs to attent to task  Short term goal 2: demo LB ADLs with moderate assistance with minimal vcs to attent to task  Short term goal 3: demo toilet transfer with minimal assistance, using least restrictive device  Short term goal 4: demo 20+ minutes of activity participation during functional activity to increase participation in ADLs and functional mobility   Short term goal 5: demo standing during func activity for 10 min with LRD and CGA  Long term goals  Time Frame for Long term goals : by d/c pt will  Long term goal 1: demo BADLs with set-up for safety   Long term goal 2: participate in functional tasks for 30+ minutes with min verbal cueing to redirect attention  Long term goal 3: demo functional transfers with set-up  Long

## 2019-04-14 NOTE — PLAN OF CARE
Problem: Risk for Impaired Skin Integrity  Goal: Tissue integrity - skin and mucous membranes  Description  Structural intactness and normal physiological function of skin and  mucous membranes. Outcome: Ongoing  Note:   Skin assessment completed this shift. Nutrition and Hydration status assessed with adequate intake. Osito Score as charted. Pressure Relief Overlay remains intact and inflated to patient's bed throughout the shift. Patient able to reposition self for comfort and to prevent breakdown. Skin integrity maintained. No new skin breakdown noted. Skin to high risk pressure areas including heels are clears. Zinc Paste applied to slightly reddened buttocks as a preventative measure. Problem: Falls - Risk of:  Goal: Will remain free from falls  Description  Will remain free from falls  Outcome: Ongoing  Note:   No falls or injuries sustained at this time. Pt. is forgetful and impulsive at times with poor safety awareness. Telesitter remains in place as a precaution. Call light within reach. Siderails up x 2. Nonskid footwear remains on. Bed in low and locked position. Hourly nursing rounds made. Pt. uses assistive devices appropriately. Pt. reoriented to surroundings and reminded to use call light with each nurse/patient interaction. Pt. room located close to nurse's station. Bed alarm remains engaged throughout the shift as a precaution. Problem: Pain:  Goal: Control of acute pain  Description  Control of acute pain  Outcome: Ongoing  Note:   Pain assessment completed. Pt. able to rest.  Patient denies pain this shift. Denies need for oral analgesic. Pt. Repositions per self for comfort. Respirations easy and unlabored. Appears free from distress.

## 2019-04-14 NOTE — PLAN OF CARE
Problem: Risk for Impaired Skin Integrity  Goal: Tissue integrity - skin and mucous membranes  Description  Structural intactness and normal physiological function of skin and  mucous membranes. Outcome: Ongoing  Note:   There are no new skin issues so far this shift. Will continue to assist patient with repositioning at least every two hours. Problem: Falls - Risk of:  Goal: Will remain free from falls  Description  Will remain free from falls  Outcome: Ongoing  Note:   Patient remains free from falls so far this shift. Will continue to round at least hourly, place bed in lowest position with call light within reach, and alarm activated. Problem: Pain:  Goal: Control of acute pain  Description  Control of acute pain  Outcome: Ongoing  Note:   Pain is controlled at this time with the use of prn pain meds. Will continue to assess.

## 2019-04-14 NOTE — PROGRESS NOTES
Physical Medicine & Rehabilitation  Progress Note      Subjective:    72year old gentleman with traumatic intracranial hemorrhage    Patient is well, and has had no acute complaints or problems, B/B ok    ROS:  Denies fevers, chills, sweats. No chest pain, palpitations, lightheadedness. Denies coughing, wheezing or shortness of breath. Denies abdominal pain, nausea, diarrhea or constipation. No new areas of joint pain. Denies new areas of numbness or weakness. Denies new anxiety or depression issues. No new skin problems. Rehabilitation:   Progressing in therapies. PT:   Bed Mobility:   Bed Mobility  Comment: not assessed     Transfers:  Sit to Stand: Stand by assistance  Stand to sit: Stand by assistance  Bed to Chair: Stand by assistance  Stand Pivot Transfers: Stand by assistance         Ambulation 1  Surface: level tile  Device: Rolling Walker  Assistance: Stand by assistance  Quality of Gait: Sometimes steering close to wall, cues for safety  Distance: 250 ftx2  Comments: Cues to steer away from wall.         Stairs/Curb  Stairs?: Yes  Stairs  # Steps : 15  Stairs Height: 6\"  Rails: Right ascending  Device: No Device  Assistance: Contact guard assistance  Comment: Cues for safety        OT:  ADL  Additional Comments: see FIMs         Balance  Sitting Balance: Supervision  Standing Balance: Stand by assistance   Standing Balance  Time: PM: 10 min, 5 min, 2 min  Activity: PM: dynamic standing to participate in lacing ROM ladder, functional mobility in OT gym/hallway  Sit to stand: Stand by assistance  Stand to sit: Stand by assistance  Comment: unsteady, no LOB noted  Functional Mobility  Functional - Mobility Device: Rolling Walker  Activity: To/from bathroom, Other  Assist Level: Stand by assistance  Functional Mobility Comments: slow pace using R/W, V/T cues for safety/tech, vc's for visual scanning     Bed mobility  Bridging: Supervision  Rolling to Left: Supervision  Rolling to Right: Supervision  Supine to Sit: Supervision  Sit to Supine: Supervision  Scooting: Supervision  Comment: HOB elevated  Transfers  Stand Step Transfers: Stand by assistance(R/W)  Stand Pivot Transfers: Contact guard assistance  Sit to stand: Stand by assistance  Stand to sit: Stand by assistance  Transfer Comments: vc's req for tech/safety   Toilet Transfers  Toilet - Technique: Ambulating  Equipment Used: Standard toilet  Toilet Transfer: Contact guard assistance             FIM ITEMS  SELFCARE GROUP  SELF-CARE  Eatin - Feeds self with setup/supervision/cues and/or requires only setup/supervision/cues to perform tube feedings  GOAL: Eatin  Groomin - Did not occur(pt declines)  GOAL: Groomin  Bathin - Did not occur(pt declines)  GOAL: Bathin  Dressing-Upper: 0 - Did not occur(pt declines)  GOAL: Dressing-Upper: 5  Dressing-Lower: 0 - Did not occur(pt declines)  GOAL: Dressing-Lower: 5  Toiletin - Requires setup/supervision/cues  GOAL: Toiletin    SPEECH:  Subjective: [x] Alert         [x] Cooperative     [] Confused     [] Agitated    [] Lethargic     Objective/Assessment:  Attention:  functional     Orientation: n/a     Recall: n/a     Organization: n/a     Problem Solving/Reasoning:   Answer ? Re: grocery ad he is looking at- 100%, appt. Card- 60%, 90% c cues, Reciepe- 40%. Objective:  BP (!) 141/86   Pulse 96   Temp 97.5 °F (36.4 °C) (Oral)   Resp 15   Ht 5' 7\" (1.702 m)   Wt 127 lb 9.6 oz (57.9 kg)   SpO2 99%   BMI 19.98 kg/m²       GEN: well developed, well nourished, NAD  HEENT: NCAT, PERRL, EOMI, mucous membranes pink and moist. Wearing glasses. CV: RRR, no murmurs, rubs or gallops  PULM: CTAB, no rales or rhonchi. Respirations WNL and unlabored  ABD: soft, NT, ND, BS+ and equal  NEURO: A&O to place and person. Disoriented to time.  Kevin Burton, current president, unable to name previous president, immediate recall 3/3, delayed recall 1/3, unable to perform serial 7s beyond 80, unable to spell world backwards. Sensation intact to light touch. MSK: Functional ROM all extremities. Strength 4+/5 key muscles. EXTREMITIES: No calf tenderness to palpation bilaterally. No edema BLEs  SKIN: warm dry and intact with good turgor  PSYCH: appropriately interactive. Affect WNL. Diagnostics:     CBC:   No results for input(s): WBC, RBC, HGB, HCT, MCV, RDW, PLT in the last 72 hours. BMP:   No results for input(s): NA, K, CL, CO2, PHOS, BUN, CREATININE, GLUCOSE in the last 72 hours. Invalid input(s): CA  BNP: No results for input(s): BNP in the last 72 hours. PT/INR: No results for input(s): PROTIME, INR in the last 72 hours. APTT: No results for input(s): APTT in the last 72 hours. CARDIAC ENZYMES: No results for input(s): CKMB, CKMBINDEX, TROPONINT in the last 72 hours. Invalid input(s): CKTOTAL;3  FASTING LIPID PANEL:  Lab Results   Component Value Date    TRIG 119 03/16/2019     LIVER PROFILE: No results for input(s): AST, ALT, ALB, BILIDIR, BILITOT, ALKPHOS in the last 72 hours.      Current Medications:   Current Facility-Administered Medications: mirtazapine (REMERON SOL-TAB) disintegrating tablet 30 mg, 30 mg, Oral, Nightly  metFORMIN (GLUCOPHAGE) tablet 500 mg, 500 mg, Oral, BID WC  magnesium oxide (MAG-OX) tablet 800 mg, 800 mg, Oral, BID  vitamin D (CHOLECALCIFEROL) tablet 2,000 Units, 2,000 Units, Oral, Daily  dicyclomine (BENTYL) capsule 10 mg, 10 mg, Oral, TID AC  melatonin ER tablet 1 mg, 1 mg, Oral, Nightly PRN  sucralfate (CARAFATE) tablet 1 g, 1 g, Oral, 4 times per day  labetalol (NORMODYNE) tablet 200 mg, 200 mg, Oral, Q6H PRN  donepezil (ARICEPT) tablet 5 mg, 5 mg, Oral, Nightly  bisacodyl (DULCOLAX) suppository 10 mg, 10 mg, Rectal, Daily PRN  glucose (GLUTOSE) 40 % oral gel 15 g, 15 g, Oral, PRN  dextrose 50 % solution 12.5 g, 12.5 g, Intravenous, PRN  glucagon (rDNA) injection 1 mg, 1 mg, Intramuscular, PRN  dextrose 5 % solution, 100 mL/hr, Intravenous, PRN  oxyCODONE (ROXICODONE) immediate release tablet 5 mg, 5 mg, Oral, Q4H PRN **OR** [DISCONTINUED] oxyCODONE (ROXICODONE) immediate release tablet 10 mg, 10 mg, Oral, Q4H PRN  lidocaine PF 4 % injection 4 mL, 4 mL, Inhalation, As Directed RT PRN  amLODIPine (NORVASC) tablet 10 mg, 10 mg, Oral, Daily  ferrous sulfate tablet 325 mg, 325 mg, Oral, BID WC  folic acid (FOLVITE) tablet 1 mg, 1 mg, Oral, Daily  insulin lispro (HUMALOG) injection vial 0-18 Units, 0-18 Units, Subcutaneous, TID WC  insulin lispro (HUMALOG) injection vial 0-9 Units, 0-9 Units, Subcutaneous, Nightly  levETIRAcetam (KEPPRA) tablet 500 mg, 500 mg, Oral, BID  pantoprazole (PROTONIX) tablet 40 mg, 40 mg, Oral, BID AC  polyethylene glycol (GLYCOLAX) packet 17 g, 17 g, Oral, Daily  vitamin B-1 (THIAMINE) tablet 100 mg, 100 mg, Oral, Daily  acetaminophen (TYLENOL) tablet 650 mg, 650 mg, Oral, Q4H PRN      Impression/Plan:   Impaired ADLs, gait, and mobility due to:      1. TBI with frontal, temporal, parietal skull fracture and L frontal lobe hemorrhagic contusion: on aricept. Continue PT/OT/speech for gait, mobility, strengthening, endurance, ADLs, cognition. Patient has achieved improved mobility. Awaiting expedited appeal for nursing home placement. 2. Hallucinations/alcoholic dementia: psych consulted. On lexapro. Off Seroquel. 3. Seizure: stable on keppra  4. Alcoholism with dementia: on thiamine, folate, Aricept. 5. Hyperglycemic: HbA1c 6.1, IM following  6. Retroperitoneal hematoma/Anemia: Monitoring Hb. Stable - Hb 11.6 on 4/11/19. On Iron. 7. Esophagitis/Gastritis: on protonix. On carafate, Miralax, Protonix, bentyl. 8. Anorexia: dietitican following, recently increased remeron for appetite. 9. HTN: stable on norvasc. 10. REGGIE: nephrology signed off - stable/resolved. 11. PE: Anticoagulation contraindicated for retroperitoneal hematoma, anemia, gastritis, hemorrhagic TBI.  Vascular consulted - no interventions at this time. Question of whether patient had IVC filter in past - no radiologic evidence of filter being present. Venous dopplers 4/5/19 negative for DVT. Continue to monitor. Vascular consulted - will recommend anticoagulation when timing appropriate. 12. Internal medicine medically managing.   13. Recommend follow up with PCP, PM&R, vascular surgery - Dr. Shawn Henderson, neuro/neurointerventionalist, neurosurgery after discharge.            Electronically signed by Frandy Ni MD on 4/14/2019 at 12:29 PM
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
## 2019-04-15 LAB
ABSOLUTE EOS #: 0.4 K/UL (ref 0–0.4)
ABSOLUTE IMMATURE GRANULOCYTE: ABNORMAL K/UL (ref 0–0.3)
ABSOLUTE LYMPH #: 1.2 K/UL (ref 1–4.8)
ABSOLUTE MONO #: 0.7 K/UL (ref 0.1–1.3)
ANION GAP SERPL CALCULATED.3IONS-SCNC: 10 MMOL/L (ref 9–17)
BASOPHILS # BLD: 1 % (ref 0–2)
BASOPHILS ABSOLUTE: 0.1 K/UL (ref 0–0.2)
BUN BLDV-MCNC: 20 MG/DL (ref 8–23)
BUN/CREAT BLD: ABNORMAL (ref 9–20)
CALCIUM SERPL-MCNC: 10.1 MG/DL (ref 8.6–10.4)
CHLORIDE BLD-SCNC: 103 MMOL/L (ref 98–107)
CO2: 26 MMOL/L (ref 20–31)
CREAT SERPL-MCNC: 1 MG/DL (ref 0.7–1.2)
DIFFERENTIAL TYPE: ABNORMAL
EOSINOPHILS RELATIVE PERCENT: 4 % (ref 0–4)
GFR AFRICAN AMERICAN: >60 ML/MIN
GFR NON-AFRICAN AMERICAN: >60 ML/MIN
GFR SERPL CREATININE-BSD FRML MDRD: ABNORMAL ML/MIN/{1.73_M2}
GFR SERPL CREATININE-BSD FRML MDRD: ABNORMAL ML/MIN/{1.73_M2}
GLUCOSE BLD-MCNC: 103 MG/DL (ref 75–110)
GLUCOSE BLD-MCNC: 130 MG/DL (ref 75–110)
GLUCOSE BLD-MCNC: 152 MG/DL (ref 75–110)
GLUCOSE BLD-MCNC: 153 MG/DL (ref 70–99)
GLUCOSE BLD-MCNC: 94 MG/DL (ref 75–110)
HCT VFR BLD CALC: 34.9 % (ref 41–53)
HEMOGLOBIN: 11.7 G/DL (ref 13.5–17.5)
IMMATURE GRANULOCYTES: ABNORMAL %
LYMPHOCYTES # BLD: 14 % (ref 24–44)
MCH RBC QN AUTO: 30.6 PG (ref 26–34)
MCHC RBC AUTO-ENTMCNC: 33.6 G/DL (ref 31–37)
MCV RBC AUTO: 91.2 FL (ref 80–100)
MONOCYTES # BLD: 9 % (ref 1–7)
NRBC AUTOMATED: ABNORMAL PER 100 WBC
PDW BLD-RTO: 15.4 % (ref 11.5–14.9)
PLATELET # BLD: 312 K/UL (ref 150–450)
PLATELET ESTIMATE: ABNORMAL
PMV BLD AUTO: 8.3 FL (ref 6–12)
POTASSIUM SERPL-SCNC: 4.9 MMOL/L (ref 3.7–5.3)
RBC # BLD: 3.82 M/UL (ref 4.5–5.9)
RBC # BLD: ABNORMAL 10*6/UL
SEG NEUTROPHILS: 72 % (ref 36–66)
SEGMENTED NEUTROPHILS ABSOLUTE COUNT: 6.2 K/UL (ref 1.3–9.1)
SODIUM BLD-SCNC: 139 MMOL/L (ref 135–144)
WBC # BLD: 8.6 K/UL (ref 3.5–11)
WBC # BLD: ABNORMAL 10*3/UL

## 2019-04-15 PROCEDURE — 82947 ASSAY GLUCOSE BLOOD QUANT: CPT

## 2019-04-15 PROCEDURE — 85025 COMPLETE CBC W/AUTO DIFF WBC: CPT

## 2019-04-15 PROCEDURE — 6370000000 HC RX 637 (ALT 250 FOR IP): Performed by: NURSE PRACTITIONER

## 2019-04-15 PROCEDURE — 6370000000 HC RX 637 (ALT 250 FOR IP): Performed by: STUDENT IN AN ORGANIZED HEALTH CARE EDUCATION/TRAINING PROGRAM

## 2019-04-15 PROCEDURE — 80048 BASIC METABOLIC PNL TOTAL CA: CPT

## 2019-04-15 PROCEDURE — 97535 SELF CARE MNGMENT TRAINING: CPT

## 2019-04-15 PROCEDURE — 1180000000 HC REHAB R&B

## 2019-04-15 PROCEDURE — 97116 GAIT TRAINING THERAPY: CPT

## 2019-04-15 PROCEDURE — 6370000000 HC RX 637 (ALT 250 FOR IP): Performed by: PHYSICAL MEDICINE & REHABILITATION

## 2019-04-15 PROCEDURE — 97530 THERAPEUTIC ACTIVITIES: CPT

## 2019-04-15 PROCEDURE — 6370000000 HC RX 637 (ALT 250 FOR IP): Performed by: INTERNAL MEDICINE

## 2019-04-15 PROCEDURE — 36415 COLL VENOUS BLD VENIPUNCTURE: CPT

## 2019-04-15 PROCEDURE — 99232 SBSQ HOSP IP/OBS MODERATE 35: CPT | Performed by: INTERNAL MEDICINE

## 2019-04-15 PROCEDURE — 99232 SBSQ HOSP IP/OBS MODERATE 35: CPT | Performed by: PHYSICAL MEDICINE & REHABILITATION

## 2019-04-15 PROCEDURE — 97127 HC SP THER IVNTJ W/FOCUS COG FUNCJ: CPT

## 2019-04-15 PROCEDURE — 97110 THERAPEUTIC EXERCISES: CPT

## 2019-04-15 RX ADMIN — OXYCODONE HYDROCHLORIDE 5 MG: 5 TABLET ORAL at 13:16

## 2019-04-15 RX ADMIN — PANTOPRAZOLE SODIUM 40 MG: 40 TABLET, DELAYED RELEASE ORAL at 06:08

## 2019-04-15 RX ADMIN — MIRTAZAPINE 30 MG: 30 TABLET, ORALLY DISINTEGRATING ORAL at 20:45

## 2019-04-15 RX ADMIN — FERROUS SULFATE TAB 325 MG (65 MG ELEMENTAL FE) 325 MG: 325 (65 FE) TAB at 16:56

## 2019-04-15 RX ADMIN — DONEPEZIL HYDROCHLORIDE 5 MG: 5 TABLET, FILM COATED ORAL at 20:45

## 2019-04-15 RX ADMIN — LEVETIRACETAM 500 MG: 500 TABLET, FILM COATED ORAL at 07:20

## 2019-04-15 RX ADMIN — SUCRALFATE 1 G: 1 TABLET ORAL at 16:56

## 2019-04-15 RX ADMIN — VITAMIN D, TAB 1000IU (100/BT) 2000 UNITS: 25 TAB at 07:19

## 2019-04-15 RX ADMIN — DICYCLOMINE HYDROCHLORIDE 10 MG: 10 CAPSULE ORAL at 06:09

## 2019-04-15 RX ADMIN — Medication 800 MG: at 10:52

## 2019-04-15 RX ADMIN — LEVETIRACETAM 500 MG: 500 TABLET, FILM COATED ORAL at 20:45

## 2019-04-15 RX ADMIN — SUCRALFATE 1 G: 1 TABLET ORAL at 06:09

## 2019-04-15 RX ADMIN — SUCRALFATE 1 G: 1 TABLET ORAL at 20:45

## 2019-04-15 RX ADMIN — METFORMIN HYDROCHLORIDE 500 MG: 500 TABLET, FILM COATED ORAL at 16:56

## 2019-04-15 RX ADMIN — POLYETHYLENE GLYCOL 3350 17 G: 17 POWDER, FOR SOLUTION ORAL at 07:19

## 2019-04-15 RX ADMIN — DICYCLOMINE HYDROCHLORIDE 10 MG: 10 CAPSULE ORAL at 10:52

## 2019-04-15 RX ADMIN — SUCRALFATE 1 G: 1 TABLET ORAL at 10:52

## 2019-04-15 RX ADMIN — Medication 800 MG: at 20:44

## 2019-04-15 RX ADMIN — DICYCLOMINE HYDROCHLORIDE 10 MG: 10 CAPSULE ORAL at 16:57

## 2019-04-15 RX ADMIN — THIAMINE HCL TAB 100 MG 100 MG: 100 TAB at 07:19

## 2019-04-15 RX ADMIN — Medication 1 MG: at 20:44

## 2019-04-15 RX ADMIN — METFORMIN HYDROCHLORIDE 500 MG: 500 TABLET, FILM COATED ORAL at 07:19

## 2019-04-15 RX ADMIN — FERROUS SULFATE TAB 325 MG (65 MG ELEMENTAL FE) 325 MG: 325 (65 FE) TAB at 07:19

## 2019-04-15 RX ADMIN — FOLIC ACID 1 MG: 1 TABLET ORAL at 07:20

## 2019-04-15 RX ADMIN — PANTOPRAZOLE SODIUM 40 MG: 40 TABLET, DELAYED RELEASE ORAL at 16:56

## 2019-04-15 RX ADMIN — INSULIN LISPRO 3 UNITS: 100 INJECTION, SOLUTION INTRAVENOUS; SUBCUTANEOUS at 07:20

## 2019-04-15 RX ADMIN — AMLODIPINE BESYLATE 10 MG: 10 TABLET ORAL at 07:19

## 2019-04-15 ASSESSMENT — PAIN SCALES - GENERAL
PAINLEVEL_OUTOF10: 7
PAINLEVEL_OUTOF10: 8
PAINLEVEL_OUTOF10: 8

## 2019-04-15 ASSESSMENT — PAIN DESCRIPTION - PAIN TYPE: TYPE: CHRONIC PAIN

## 2019-04-15 ASSESSMENT — PAIN DESCRIPTION - PROGRESSION: CLINICAL_PROGRESSION: NOT CHANGED

## 2019-04-15 ASSESSMENT — PAIN DESCRIPTION - ORIENTATION: ORIENTATION: LOWER

## 2019-04-15 ASSESSMENT — PAIN DESCRIPTION - LOCATION: LOCATION: BACK
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
## 2019-04-15 NOTE — PROGRESS NOTES
Physical Medicine & Rehabilitation  Progress Note      Subjective:      72year old gentleman with traumatic intracranial hemorrhage    Patient is well, and has had no acute complaints or problems    ROS:  Denies fevers, chills, sweats. No chest pain, palpitations, lightheadedness. Denies coughing, wheezing or shortness of breath. Denies abdominal pain, nausea, diarrhea or constipation. No new areas of joint pain. Denies new areas of numbness or weakness. Denies new anxiety or depression issues. No new skin problems. Rehabilitation:   Progressing in therapies.     PT:  Restrictions/Precautions: Seizure, General Precautions, Surgical Protocols, Fall Risk  Implants present? : Metal implants   Transfers  Sit to Stand: Stand by assistance  Stand to sit: Stand by assistance  Bed to Chair: Stand by assistance  Stand Pivot Transfers: Stand by assistance  Squat Pivot Transfers: Supervision  Comment: with, without RW  Ambulation 1  Surface: level tile  Device: No Device  Other Apparatus: Wheelchair follow  Assistance: Contact guard assistance  Quality of Gait: slow pace, slight wobble, but no LOB  Distance: 140'  Comments: increased pain with ambulation    Transfers  Sit to Stand: Stand by assistance  Stand to sit: Stand by assistance  Bed to Chair: Stand by assistance  Stand Pivot Transfers: Stand by assistance  Squat Pivot Transfers: Supervision  Comment: with, without RW  Ambulation  Ambulation?: Yes  Ambulation 1  Surface: level tile  Device: No Device  Other Apparatus: Wheelchair follow  Assistance: Contact guard assistance  Quality of Gait: slow pace, slight wobble, but no LOB  Distance: 140'  Comments: increased pain with ambulation    Surface: level tile  Ambulation 1  Surface: level tile  Device: No Device  Other Apparatus: Wheelchair follow  Assistance: Contact guard assistance  Quality of Gait: slow pace, slight wobble, but no LOB  Distance: 140'  Comments: increased pain with ambulation    OT:  ADL  Additional Comments: see FIMs         Balance  Sitting Balance: Supervision  Standing Balance: Stand by assistance   Standing Balance  Time: PM: 10 min, 2 min  Activity: dynamic functional activites to increase independence with ADL's and IADL's  Sit to stand: Stand by assistance  Stand to sit: Stand by assistance  Comment: unsteady  Functional Mobility  Functional - Mobility Device: Rolling Walker  Activity: Transport items  Assist Level: Stand by assistance  Functional Mobility Comments: slow pace using R/W, V/T cues for safety/tech, vc's for visual scanning     Bed mobility  Bridging: Supervision  Rolling to Left: Supervision  Rolling to Right: Supervision  Supine to Sit: Supervision  Sit to Supine: Supervision  Scooting: Supervision  Comment: HOB elevated  Transfers  Stand Step Transfers: Stand by assistance  Stand Pivot Transfers: Contact guard assistance  Sit to stand: Stand by assistance  Stand to sit: Stand by assistance  Transfer Comments: vc's req for tech/safety   Toilet Transfers  Toilet - Technique: Ambulating  Equipment Used: Standard toilet  Toilet Transfer: Contact guard assistance             FIM ITEMS  SELFCARE [employer]  SELF-CARE  Eatin - Feeds self with setup/supervision/cues and/or requires only setup/supervision/cues to perform tube feedings(s/u req)  GOAL: Eatin  Groomin - Requires setup/cues to do all tasks(SBA-SUP seated sinkside, vc's req for task sequencing/initia)  GOAL: Groomin  Bathin - Did not occur(pt declines)  GOAL: Bathin  Dressing-Upper: 0 - Did not occur(pt declines)  GOAL: Dressing-Upper: 5  Dressing-Lower: 0 - Did not occur(pt declines)  GOAL: Dressing-Lower: 5  Toiletin - Requires setup/supervision/cues  GOAL: Toiletin    SPEECH:  Subjective: [x] Alert         [x] Cooperative     [] Confused     [] Agitated    [] Lethargic     Objective/Assessment:  Attention:  functional     Orientation: n/a     Recall: n/a     Organization: n/a     Problem Solving/Reasoning:   Answer ? Re: script label- 70%, 90% c cues. Missing equipment- 40%. Objective:  /80   Pulse 95   Temp 98 °F (36.7 °C) (Oral)   Resp 16   Ht 5' 7\" (1.702 m)   Wt 127 lb 9.6 oz (57.9 kg)   SpO2 100%   BMI 19.98 kg/m²       GEN: well developed, well nourished, NAD  HEENT: NCAT, PERRL, EOMI, mucous membranes pink and moist. Wearing glasses. CV: RRR, no murmurs, rubs or gallops  PULM: CTAB, no rales or rhonchi. Respirations WNL and unlabored  ABD: soft, NT, ND, BS+ and equal  NEURO: A&O to place and person. Disoriented to time. Sensation intact to light touch. MSK: Functional ROM all extremities. Strength 4+/5 key muscles. EXTREMITIES: No calf tenderness to palpation bilaterally. No edema BLEs  SKIN: warm dry and intact with good turgor  PSYCH: appropriately interactive. Affect WNL.       Diagnostics:     CBC:   Recent Labs     04/15/19  0636   WBC 8.6   RBC 3.82*   HGB 11.7*   HCT 34.9*   MCV 91.2   RDW 15.4*        BMP:   Recent Labs     04/15/19  0636      K 4.9      CO2 26   BUN 20   CREATININE 1.00   GLUCOSE 153*     BNP: No results for input(s): BNP in the last 72 hours. PT/INR: No results for input(s): PROTIME, INR in the last 72 hours. APTT: No results for input(s): APTT in the last 72 hours. CARDIAC ENZYMES: No results for input(s): CKMB, CKMBINDEX, TROPONINT in the last 72 hours. Invalid input(s): CKTOTAL;3  FASTING LIPID PANEL:  Lab Results   Component Value Date    TRIG 119 03/16/2019     LIVER PROFILE: No results for input(s): AST, ALT, ALB, BILIDIR, BILITOT, ALKPHOS in the last 72 hours.      Current Medications:   Current Facility-Administered Medications: mirtazapine (REMERON SOL-TAB) disintegrating tablet 30 mg, 30 mg, Oral, Nightly  metFORMIN (GLUCOPHAGE) tablet 500 mg, 500 mg, Oral, BID WC  magnesium oxide (MAG-OX) tablet 800 mg, 800 mg, Oral, BID  vitamin D (CHOLECALCIFEROL) tablet 2,000 Units, 2. Hallucinations/alcoholic dementia: psych consulted. On lexapro. Off Seroquel. 3. Seizure: stable on keppra  4. Alcoholism with dementia: on thiamine, folate, Aricept. 5. Hyperglycemic: HbA1c 6.1, IM following  6. Retroperitoneal hematoma/Anemia: Monitoring Hb. Stable - Hb 11.6 on 4/11/19. On Iron. 7. Esophagitis/Gastritis: on protonix. On carafate, Miralax, Protonix, bentyl. 8. Anorexia: dietitican following, recently increased remeron for appetite. 9. HTN: stable on norvasc. 10. REGGIE: nephrology signed off - stable/resolved. 11. PE: Anticoagulation contraindicated for retroperitoneal hematoma, anemia, gastritis, hemorrhagic TBI. Vascular consulted - no interventions at this time. Question of whether patient had IVC filter in past - no radiologic evidence of filter being present. Venous dopplers 4/5/19 negative for DVT. Continue to monitor. Vascular consulted - will recommend anticoagulation when timing appropriate. 12. Internal medicine medically managing.   13. Recommend follow up with PCP, PM&R, vascular surgery - Dr. Emiliano Durand, neuro/neurointerventionalist, neurosurgery after discharge. Electronically signed by Edilma Sharp MD on 4/15/2019 at 12:52 PM      This note is created with the assistance of a speech recognition program.  While intending to generate a document that actually reflects the content of the visit, the document can still have some errors including those of syntax and sound a like substitutions which may escape proof reading.   In such instances, actual meaning can be extrapolated by contextual diversion.

## 2019-04-15 NOTE — PROGRESS NOTES
Nutrition Assessment    Type and Reason for Visit: Reassess, Calorie Count    Nutrition Recommendations: Continue General diet. Provide Ensure Clear, Ensure Enlive, and [employer] each daily. Discontinue Calorie Count. Continue Remeron. Nutrition Assessment: Pt appears to be improving from a nutritional viewpoint although PO intake does not fully meet estimated needs. Pt sometimes will take oral nutrition supplements but does not like them. Will continue current diet and alter oral nutrition supplements. Continue to monitor nutrition progression. Malnutrition Assessment:  · Malnutrition Status: Meets the criteria for severe malnutrition  · Context: Acute illness or injury  · Findings of the 6 clinical characteristics of malnutrition (Minimum of 2 out of 6 clinical characteristics is required to make the diagnosis of moderate or severe Protein Calorie Malnutrition based on AND/ASPEN Guidelines):  1. Energy Intake-Less than or equal to 75% of estimated energy requirement, Greater than or equal to 7 days    2. Weight Loss-5% loss or greater, (x 3 weeks at time of admission; additional loss noted)  3. Fat Loss-Mild subcutaneous fat loss, Orbital  4. Muscle Loss-Mild muscle mass loss, Temples (temporalis muscle)  5. Fluid Accumulation-Mild fluid accumulation, Extremities  6.  Strength-Not measured    Nutrition Risk Level: High    Nutrient Needs:  · Estimated Daily Total Kcal: 0729-2498 based on Admission wt with Brittani Danielsro with 1.2-1.3 factor  · Estimated Daily Protein (g): 76-95 based on 1.2-1.5 gm per kg of admission wt    Nutrition Diagnosis:   · Problem: Inadequate oral intake  · Etiology: related to Cognitive or neurological impairment, Acute injury/trauma     Signs and symptoms:  as evidenced by Diet history of poor intake, Weight loss, Mild loss of subcutaneous fat, Mild muscle loss    Objective Information:  · Nutrition-Focused Physical Findings: No edema.    · Wound Type: (Bruising, redness. No wounds documented. )  · Current Nutrition Therapies:  · Oral Diet Orders: General   · Oral Diet intake: 1-25%, 26-50%, 51-75%, %  · Oral Nutrition Supplement (ONS) Orders: Standard High Calorie Oral Supplement, Clear Liquid Oral Supplement  · ONS intake: 0%, 26-50%   Date Consumed PO Intake Kcal %   Kcal met PO Intake grams protein %  Protein met    Comments   4-8 184  3  Only breakfast recorded    4-9 922  57%  36 47%      4-10 238  15%  7  9%       4/11    1205   74%   60 gm   80%   Only drank half an Ensure Clear. Not consuming much of the oral nutrition supplements    4-12 1027   63% 65  86%      4-13 788 49%  67 88%      · Anthropometric Measures:  · Ht: 5' 7\" (170.2 cm)   · Current Body Wt: 127 lb 10.3 oz (57.9 kg)  · Admission Body Wt: 139 lb 15.9 oz (63.5 kg)  · Usual Body Wt: 149 lb 14.6 oz (68 kg)(wt listed as 3-8-19, but unsure if date accurate)  · % Weight Change:  ,  4% in week; 10% in 1 month if wts and dates accurate (based on assessment 4-1); additional 5% wt loss within 1 wk if wts accurate  · Ideal Body Wt: 148 lb (67.1 kg), % Ideal Body 86% (based on wt 4-4)  · BMI Classification: BMI 18.5 - 24.9 Normal Weight    Nutrition Interventions:   Continue current diet, Modify current ONS(Discontinue Calorie Count)  Continued Inpatient Monitoring    Nutrition Evaluation:   · Evaluation: Progressing toward goals   · Goals: PO intake % of meals and supplements    · Monitoring: Meal Intake, Supplement Intake, Weight, Pertinent Labs, Monitor Bowel Function, Diet Tolerance, Skin Integrity, I&O, Chewing/Swallowing    Ros Perez R.D., L.D.   Phone: [phone]

## 2019-04-15 NOTE — PLAN OF CARE
Problem: Falls - Risk of:  Goal: Will remain free from falls  Description  Will remain free from falls  4/15/2019 1513 by Chris Marsh RN  Outcome: Met This Shift  Note:   Pt assessed as a fall risk this shift. Remains free from falls and accidental injury at this time. Fall precautions in place, including falling star sign and fall risk band on pt. Floor free from obstacles, and bed is locked and in lowest position. Adequate lighting provided. Pt encouraged to call before getting OOB for any need. Bed alarm activated. Will continue to monitor needs during hourly rounding, and reinforce education on use of call light. 4/15/2019 0516 by Ruthann Ferguson RN  Outcome: Ongoing     Problem: Risk for Impaired Skin Integrity  Goal: Tissue integrity - skin and mucous membranes  Description  Structural intactness and normal physiological function of skin and  mucous membranes. 4/15/2019 1513 by Chris Marsh RN  Outcome: Ongoing  Note:   Skin assessment complete. Waffle mattress in place. Coccyx reddened. Sensicare applied PRN. Turned and repositioned every two hours. Area kept free from moisture. Proper nourishment and fluids encouraged, as appropriate. Will continue to monitor for additional needs and changes in skin breakdown. 4/15/2019 0516 by Ruthann Ferguson RN  Outcome: Ongoing     Problem: Pain:  Goal: Pain level will decrease  Description  Pain level will decrease  4/15/2019 1513 by Chris Marsh RN  Outcome: Ongoing  Note:   Pt medicated with pain medication prn. Assessed all pain characteristics including level, type, location, frequency, and onset. Non-pharmacologic interventions offered to pt as well. Pt states pain is tolerable at this time. Will continue to monitor.     4/15/2019 0516 by Ruthann Ferguson RN  Outcome: Ongoing     Problem: Pain:  Goal: Control of acute pain  Description  Control of acute pain  4/15/2019 0516 by Ruhtann Ferguson RN  Outcome: Ongoing     Problem:
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
## 2019-04-15 NOTE — PLAN OF CARE
Safety maintained. Telesitter at bedside.  No attempts to get out of bed medicated once with oxycodone for c/o pain

## 2019-04-15 NOTE — FLOWSHEET NOTE
provided listening presence, words of comfort,pt was in a very good mood and thankful for my visit. Chaplains remain available for spiritual or emotional support as needed.       04/15/19 1519   Encounter Summary   Services provided to: Patient   Referral/Consult From: [facility] Family members   Continue Visiting   (4/15/2019)   Complexity of Encounter Low   Length of Encounter 15 minutes   Routine   Type Follow up   Assessment Approachable   Intervention Active listening;[place]   Outcome Expressed gratitude
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
## 2019-04-15 NOTE — PROGRESS NOTES
[address] [facility]    ACUTE REHABILITATION OCCUPATIONAL THERAPY  DAILY NOTE    Date: 4/15/19  Patient Name: Fifi Pacheco      Room: 4898/2535-36    MRN: [id number]   : 1945  (77 y.o.)  Gender: male   Referring Practitioner: Dr. Lorne Mi  Diagnosis: L intercerebral hemorrhage  Additional Pertinent Hx: Alcohol abuse,     Restrictions  Restrictions/Precautions: Seizure, General Precautions, Surgical Protocols, Fall Risk  Implants present? : Metal implants  Required Braces or Orthoses?: No    Subjective  Subjective: pt agitated c PM tasks this date, pt req reinforcement/encouragement for therapy participation, cooperative c additional time and encouragement  Comments: pt cooperative for ADL tasks this AM  Pain Level: 8  Restrictions/Precautions: Seizure;General Precautions;Surgical Protocols; Fall Risk  Overall Orientation Status: Impaired  Orientation Level: Oriented to person;Oriented to situation;Disoriented to place; Disoriented to time  Patient Observation  Observations: telesitter  Pain Assessment  Pain Level: 8    Objective  Cognition  Overall Cognitive Status: Impaired  Arousal/Alertness: Appropriate responses to stimuli  Following Directions: Follows two step commands  Attention Span: Attends with cues to redirect  Memory: Decreased recall of biographical information;Decreased recall of precautions;Decreased recall of recent events  Following Commands:  Follows one step commands with repetition  Safety Judgement: Decreased awareness of need for assistance  Insights: Decreased awareness of deficits  Perception  Overall Perceptual Status: Impaired  Initiation: Cues to initiate tasks  Balance  Sitting Balance: Supervision  Standing Balance: Supervision  Bed mobility  Rolling to Left: Supervision  Rolling to Right: Supervision  Supine to Sit: Supervision  Sit to Supine: Supervision  Scooting: Supervision  Comment: HOB elevated  Transfers  Stand Step Transfers: Stand by assistance(R/W)  Sit to stand: Stand by assistance(SBA-SUP)  Stand to sit: Stand by assistance(SBA-SUP)  Transfer Comments: vc's req for tech/safety  Standing Balance  Time: AM: 1-2 min x4; PM: >1 min x2  Activity: AM: functional mobility/transfers in room/bathroom, ADL tasks; PM: functional mobility hallway using R/W and in room  Sit to stand: Stand by assistance(SBA-SUP)  Stand to sit: Stand by assistance(SBA-SUP)  Comment: unsteady but no LOB noted, vc's for safety/hand placements c F return  Functional Mobility  Functional - Mobility Device: Rolling Walker  Activity: To/from bathroom; Other  Assist Level: Stand by assistance  Functional Mobility Comments: slow pace using R/W, V/T cues for safety/tech, vc's for visual scanning           Additional Activities: PM: pt participated in money mgmt activity, pt given task to make change by different monetary amounts, pt able to retrieve correct bill amount but req vc's for correct coins, pt req increased time, vc's for task initiation                            OT FIM:   Eatin - Feeds self with setup/supervision/cues and/or requires only setup/supervision/cues to perform tube feedings(s/u req)  Groomin - Requires setup/cues to do all tasks(SUP seated sinkside, vc's req for task sequencing/initiation)  Bathin - Able to bathe all 10 areas with setup/sup/cues(SUP req in standing/sitting, s/u req, vc's for sequencing/initiation)  Dressing-Upper: 5 - Requires setup/supervision/cues and/or requires assist with presthesis/brace only(s/u)  Dressing-Lower: 5 - Requires setup/supervision/cues and/or staff applies TEDS/prosthesis/brace only(A TEDs only, s/u req, SUP in standing)  Toiletin - Requires setup/supervision/cues(SUP)  Toilet Transfer: 5 - Requires setup/supervision/cues(SUP)  Primary Mode: Shower  Tub Transfer: 0 - Activity does not occur  Shower Transfer: 0 - Activity does not occur(pt declines showering, ADL sinkside)               Assessment  Performance deficits / Impairments: Decreased functional mobility ; Decreased ADL status; Decreased strength;Decreased safe awareness;Decreased cognition;Decreased endurance;Decreased balance;Decreased high-level IADLs  Prognosis: Good  Discharge Recommendations: 24 hour supervision or assist  Activity Tolerance: Patient Tolerated treatment well;Patient limited by pain;Treatment limited secondary to decreased cognition  Safety Devices in place: Yes  Type of devices: Left in bed;Nurse notified; Patient at risk for falls;Call light within reach; Bed alarm in place(telesitter)  Equipment Recommendations  Equipment Needed: No       Patient Education:     Patient Education: OT POC, safety, therapy participation, money mgmt, ADL tasks  Barriers to Learning: cognition  Learner:patient  Method: demonstration and explanation       Outcome: needs reinforcement     Plan  Plan  Times per week: 900/7  Times per day: Twice a day  Current Treatment Recommendations: Balance Training, Functional Mobility Training, Home Management Training, Self-Care / ADL, Patient/Caregiver Education & Training, Equipment Evaluation, Education, & procurement, Neuromuscular Re-education, Pain Management, Cognitive Reorientation  Short term goals  Time Frame for Short term goals: in 1 week pt will   Short term goal 1: demo UB ADLs with set-up and min vcs to attent to task  Short term goal 2: demo LB ADLs with moderate assistance with minimal vcs to attent to task  Short term goal 3: demo toilet transfer with minimal assistance, using least restrictive device  Short term goal 4: demo 20+ minutes of activity participation during functional activity to increase participation in ADLs and functional mobility   Short term goal 5: demo standing during func activity for 10 min with LRD and CGA  Long term goals  Time Frame for Long term goals : by d/c pt will  Long term goal 1: demo BADLs with set-up for safety   Long term goal 2: participate in functional tasks for 30+ minutes with min verbal cueing to redirect attention  Long term goal 3: demo functional transfers with set-up  Long term goal 4: demo safety awareness during all ADLs/functional mobility with minimal verbal cueing        04/15/19 1310 04/15/19 1533   OT Individual Minutes   Time In 1310 1108   Time Out 1339 1143   Minutes 29 35     Electronically signed by EFE White on 4/15/19 at 3:44 PM

## 2019-04-15 NOTE — CARE COORDINATION
Writer spoke to the pt son he will  the pt tomorrow after 600.  He asked the pt be referred to [facility],hes had them in the past. Writer made the referral.

## 2019-04-15 NOTE — PROGRESS NOTES
Alert [x] Cooperative     [] Confused     [] Agitated    [] Lethargic    Objective/Assessment:  Attention:  functional    Orientation: n/a    Recall: n/a    Organization: n/a    Problem Solving/Reasoning:   Answer ? Re: script label- 70%, 90% c cues. Missing equipment- 40%. Other:      Plan:  [x] Continue ST services    [] Discharge from ST:      Discharge recommendations: [] Inpatient Rehab   [] [place]   [] Outpatient Therapy  [] Follow up at trauma clinic   [] Other:       Treatment completed by: Suzanna Genao A.CCC/SLP

## 2019-04-16 VITALS
SYSTOLIC BLOOD PRESSURE: 120 MMHG | TEMPERATURE: 98 F | RESPIRATION RATE: 16 BRPM | HEART RATE: 87 BPM | WEIGHT: 127.6 LBS | DIASTOLIC BLOOD PRESSURE: 82 MMHG | OXYGEN SATURATION: 99 % | BODY MASS INDEX: 20.03 KG/M2 | HEIGHT: 67 IN

## 2019-04-16 LAB
GLUCOSE BLD-MCNC: 103 MG/DL (ref 75–110)
GLUCOSE BLD-MCNC: 136 MG/DL (ref 75–110)
GLUCOSE BLD-MCNC: 140 MG/DL (ref 75–110)

## 2019-04-16 PROCEDURE — 97127 HC SP THER IVNTJ W/FOCUS COG FUNCJ: CPT

## 2019-04-16 PROCEDURE — 82947 ASSAY GLUCOSE BLOOD QUANT: CPT

## 2019-04-16 PROCEDURE — 6370000000 HC RX 637 (ALT 250 FOR IP): Performed by: INTERNAL MEDICINE

## 2019-04-16 PROCEDURE — 6370000000 HC RX 637 (ALT 250 FOR IP): Performed by: STUDENT IN AN ORGANIZED HEALTH CARE EDUCATION/TRAINING PROGRAM

## 2019-04-16 PROCEDURE — 99231 SBSQ HOSP IP/OBS SF/LOW 25: CPT | Performed by: INTERNAL MEDICINE

## 2019-04-16 PROCEDURE — 97116 GAIT TRAINING THERAPY: CPT

## 2019-04-16 PROCEDURE — 6370000000 HC RX 637 (ALT 250 FOR IP): Performed by: NURSE PRACTITIONER

## 2019-04-16 PROCEDURE — 99238 HOSP IP/OBS DSCHRG MGMT 30/<: CPT | Performed by: PHYSICAL MEDICINE & REHABILITATION

## 2019-04-16 PROCEDURE — 97530 THERAPEUTIC ACTIVITIES: CPT

## 2019-04-16 RX ORDER — PANTOPRAZOLE SODIUM 40 MG/1
40 TABLET, DELAYED RELEASE ORAL
Qty: 30 TABLET | Refills: 3 | Status: SHIPPED | OUTPATIENT
Start: 2019-04-16 | End: 2020-06-02

## 2019-04-16 RX ORDER — DICYCLOMINE HYDROCHLORIDE 10 MG/1
10 CAPSULE ORAL
Qty: 120 CAPSULE | Refills: 3 | Status: SHIPPED | OUTPATIENT
Start: 2019-04-16 | End: 2020-06-02

## 2019-04-16 RX ORDER — FOLIC ACID 1 MG/1
1 TABLET ORAL DAILY
Qty: 30 TABLET | Refills: 3 | Status: SHIPPED | OUTPATIENT
Start: 2019-04-16 | End: 2020-06-02

## 2019-04-16 RX ORDER — MIRTAZAPINE 30 MG/1
30 TABLET, FILM COATED ORAL NIGHTLY
Qty: 30 TABLET | Refills: 3 | Status: SHIPPED | OUTPATIENT
Start: 2019-04-16 | End: 2020-06-02

## 2019-04-16 RX ORDER — FERROUS SULFATE 325(65) MG
325 TABLET ORAL 2 TIMES DAILY WITH MEALS
Qty: 30 TABLET | Refills: 3 | Status: SHIPPED | OUTPATIENT
Start: 2019-04-16 | End: 2020-06-02

## 2019-04-16 RX ORDER — DONEPEZIL HYDROCHLORIDE 5 MG/1
5 TABLET, FILM COATED ORAL NIGHTLY
Qty: 30 TABLET | Refills: 3 | Status: SHIPPED | OUTPATIENT
Start: 2019-04-16 | End: 2020-06-02

## 2019-04-16 RX ORDER — AMLODIPINE BESYLATE 10 MG/1
10 TABLET ORAL DAILY
Qty: 30 TABLET | Refills: 3 | Status: SHIPPED | OUTPATIENT
Start: 2019-04-16 | End: 2020-06-02

## 2019-04-16 RX ORDER — SUCRALFATE 1 G/1
1 TABLET ORAL 4 TIMES DAILY
Qty: 120 TABLET | Refills: 0 | Status: SHIPPED | OUTPATIENT
Start: 2019-04-16 | End: 2020-06-02

## 2019-04-16 RX ORDER — LEVETIRACETAM 500 MG/1
500 TABLET ORAL 2 TIMES DAILY
Qty: 60 TABLET | Refills: 3 | Status: SHIPPED | OUTPATIENT
Start: 2019-04-16 | End: 2020-06-02

## 2019-04-16 RX ORDER — OXYCODONE HYDROCHLORIDE 5 MG/1
5 TABLET ORAL EVERY 6 HOURS PRN
Qty: 28 TABLET | Refills: 0 | Status: SHIPPED | OUTPATIENT
Start: 2019-04-16 | End: 2019-04-23

## 2019-04-16 RX ORDER — LANOLIN ALCOHOL/MO/W.PET/CERES
100 CREAM (GRAM) TOPICAL DAILY
Qty: 30 TABLET | Refills: 3 | Status: SHIPPED | OUTPATIENT
Start: 2019-04-16 | End: 2020-06-02

## 2019-04-16 RX ADMIN — INSULIN LISPRO 3 UNITS: 100 INJECTION, SOLUTION INTRAVENOUS; SUBCUTANEOUS at 07:38

## 2019-04-16 RX ADMIN — FERROUS SULFATE TAB 325 MG (65 MG ELEMENTAL FE) 325 MG: 325 (65 FE) TAB at 17:44

## 2019-04-16 RX ADMIN — VITAMIN D, TAB 1000IU (100/BT) 2000 UNITS: 25 TAB at 07:32

## 2019-04-16 RX ADMIN — DICYCLOMINE HYDROCHLORIDE 10 MG: 10 CAPSULE ORAL at 05:52

## 2019-04-16 RX ADMIN — DICYCLOMINE HYDROCHLORIDE 10 MG: 10 CAPSULE ORAL at 12:10

## 2019-04-16 RX ADMIN — SUCRALFATE 1 G: 1 TABLET ORAL at 17:44

## 2019-04-16 RX ADMIN — THIAMINE HCL TAB 100 MG 100 MG: 100 TAB at 07:32

## 2019-04-16 RX ADMIN — PANTOPRAZOLE SODIUM 40 MG: 40 TABLET, DELAYED RELEASE ORAL at 05:52

## 2019-04-16 RX ADMIN — FOLIC ACID 1 MG: 1 TABLET ORAL at 07:32

## 2019-04-16 RX ADMIN — PANTOPRAZOLE SODIUM 40 MG: 40 TABLET, DELAYED RELEASE ORAL at 17:44

## 2019-04-16 RX ADMIN — DICYCLOMINE HYDROCHLORIDE 10 MG: 10 CAPSULE ORAL at 17:45

## 2019-04-16 RX ADMIN — AMLODIPINE BESYLATE 10 MG: 10 TABLET ORAL at 07:32

## 2019-04-16 RX ADMIN — LEVETIRACETAM 500 MG: 500 TABLET, FILM COATED ORAL at 07:32

## 2019-04-16 RX ADMIN — SUCRALFATE 1 G: 1 TABLET ORAL at 03:32

## 2019-04-16 RX ADMIN — SUCRALFATE 1 G: 1 TABLET ORAL at 07:32

## 2019-04-16 RX ADMIN — METFORMIN HYDROCHLORIDE 500 MG: 500 TABLET, FILM COATED ORAL at 07:32

## 2019-04-16 RX ADMIN — FERROUS SULFATE TAB 325 MG (65 MG ELEMENTAL FE) 325 MG: 325 (65 FE) TAB at 07:32

## 2019-04-16 RX ADMIN — Medication 800 MG: at 07:32

## 2019-04-16 ASSESSMENT — PAIN DESCRIPTION - ORIENTATION
ORIENTATION: LOWER
ORIENTATION: LEFT;LOWER

## 2019-04-16 ASSESSMENT — PAIN DESCRIPTION - PAIN TYPE
TYPE: CHRONIC PAIN
TYPE: CHRONIC PAIN

## 2019-04-16 ASSESSMENT — PAIN SCALES - GENERAL
PAINLEVEL_OUTOF10: 0
PAINLEVEL_OUTOF10: 7
PAINLEVEL_OUTOF10: 7

## 2019-04-16 ASSESSMENT — PAIN DESCRIPTION - DESCRIPTORS: DESCRIPTORS: ACHING

## 2019-04-16 ASSESSMENT — PAIN DESCRIPTION - LOCATION
LOCATION: ABDOMEN
LOCATION: BACK

## 2019-04-16 NOTE — PLAN OF CARE
Problem: Risk for Impaired Skin Integrity  Goal: Tissue integrity - skin and mucous membranes  Description  Structural intactness and normal physiological function of skin and  mucous membranes. 4/16/2019 0425 by Pat Castano RN  Outcome: Ongoing   Skin integrity improved/maintained this shift. See head to toe assessment. Problem: Falls - Risk of:  Goal: Will remain free from falls  Description  Will remain free from falls  4/16/2019 0425 by Pat Castano RN  Outcome: Ongoing   Pt. Free of falls and injuries this shift. Bed low and locked. Siderails up x2. Personal items and call light in reach. Bed alarm on. Telesitter remains in place.
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
## 2019-04-16 NOTE — CARE COORDINATION
Acute Rehab Unit Full FIM Progress    Admission score Current score    Goal       Self-Care     Eatin - Feeds self with setup/supervision/cues and/or requires only setup/supervision/cues to perform tube feedings 5 - Feeds self with setup/supervision/cues and/or requires only setup/supervision/cues to perform tube feedings(s/u req) 7   Groomin - Requires setup/cues to do all tasks(pt completed face and oral hygiene while supine in bed. pt also brushed hair. Pt require vc for initiation/completion of task) 5 - Requires setup/cues to do all tasks(SUP seated sinkside, vc's req for task sequencing/initia) 6   Bathing: 3 - Able to bathe 5-7 areas(pt completed bathing supine in bed. Required assistance for bilateral feet and posterior lacey-care. required frequent cueing to attend to task) 5 - Able to bathe all 10 areas with setup/sup/cues(SUP req in standing/sitting, s/u req, vc's for sequencing/in) 5   Dressing-Upper: 5 - Requires setup/supervision/cues and/or requires assist with presthesis/brace only(Required demonstration and verbal cueing to initiate buttoning shirt. ) 5 - Requires setup/supervision/cues and/or requires assist with presthesis/brace only(s/u) 5   Dressing-Lower: 2 - Requires assist with 4-5 parts of dressing(pt required assistance to thread 2nd leg and pull pants around waist while laying supine, required assitance to don socks bilaterally.) 5 - Requires setup/supervision/cues and/or staff applies TEDS/prosthesis/brace only(A TEDs only, s/u req, SUP in standing) 5   Toiletin - Total assist(pt able to complete front lacey-hygiene.  Required total assistance to remove and change brief.) 5 - Requires setup/supervision/cues(SUP) 5     Spincter Control     Bladder: 6 - Uses device independently (including EMPTYING of device, or uses medication)    7 - Patient urinates in toilet independently 5             Bladder Frequency of Accidents: 6 - No accidents,uses device like urinal, bedpan, absorbant supervision or cuing to walk at least 150 feet    Wheel Chair: 0 - Activity Not Assessed/Does Not Occur 2 - Maximal Assistance Requires up to [address] requires assistance of one person to operate wheelchair between  feet    Stairs: 0 - Activity Does not Occur ( 0 only for the admission assessment)   5- Supervision Requires supervision(e.g., standing by, cuing, or coaxing) to go up and down one flight of stairs(15) 5     Communication     Comprehension: 2 - Patient understands basic needs 25-49% of the time 4 - Patient understands basic needs 75-90%+ of the time 4   Expression: 2 - Expresses basic ideas/needs 25-49% of the time Expression: 4 - Expresses basic ideas/needs 75-90%+ of the time 4     Social Cognition     Social Interaction: 2 - Patient appropriate  25%-49% of the time Social Interaction: 4 - Patient appropriate 75-90%+ of the time 4   Problem Solvin - Patient solves simple/routine tasks 25%-49% 2 - Patient solves simple/routine tasks 25%-49% 3   Memory: 2 - Patient remembers 25%-49% of the time 2 - Patient remembers 25%-49% of the time 3

## 2019-04-16 NOTE — DISCHARGE INSTR - ACTIVITY
Activity as tolerated. Up with walker with supervision.  No driving    Meds to be given with supervsion only

## 2019-04-16 NOTE — DISCHARGE INSTR - DIET
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
## 2019-04-16 NOTE — PLAN OF CARE
Problem: Risk for Impaired Skin Integrity  Goal: Tissue integrity - skin and mucous membranes  Description  Structural intactness and normal physiological function of skin and  mucous membranes. Outcome: Completed     Problem: Falls - Risk of:  Goal: Will remain free from falls  Description  Will remain free from falls  Outcome: Completed  Note:   No falls. Telesitter at bedside  Goal: Absence of physical injury  Description  Absence of physical injury  Outcome: Completed     Problem: Pain:  Description  Pain management should include both nonpharmacologic and pharmacologic interventions. Goal: Pain level will decrease  Description  Pain level will decrease  Outcome: Completed  Note:   States abdominal pain, but declines anything for pain today  Goal: Control of acute pain  Description  Control of acute pain  Outcome: Completed  Goal: Control of chronic pain  Description  Control of chronic pain  Outcome: Completed     Problem: Pain:  Description  Pain management should include both nonpharmacologic and pharmacologic interventions.   Goal: Pain level will decrease  Description  Pain level will decrease  Outcome: Completed  Note:   States abdominal pain, but declines anything for pain today  Goal: Control of acute pain  Description  Control of acute pain  Outcome: Completed  Goal: Control of chronic pain  Description  Control of chronic pain  Outcome: Completed
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
## 2019-04-16 NOTE — PROGRESS NOTES
[facility]    OCCUPATIONAL THERAPY MISSED TREATMENT NOTE   ACUTE REHAB  Date: 19  Patient Name: Mary Torres       Room: 7492/3691-16  MRN: [id number]   Account #: [de-identified]    : 1945  (72 y.o.)  Gender: male   Referring Practitioner: Dr. Leon Lewis  Diagnosis: L intercerebral hemorrhage             REASON FOR MISSED TREATMENT:  Patient refusal   -    Pt declined AM/PM sessions this date d/t fatigue and waiting on son to pick him up for D/C.          PHAM White/KANWAL

## 2019-04-16 NOTE — CARE COORDINATION
[facility] Acute Rehab Unit   Date: 2019    Patient Care Needs  Patient Name: Martin Real       Room: 9727/2547-60 D/C : 19  : 1945  (72 y.o.)     Gender: male   Diagnosis: L ICH, closed head injury    Vision  Vision: Impaired  Vision Exceptions: Wears glasses at all times  Hearing  Hearing: Within functional limits    Personal Equipment and Devices: (eyeglasses, hearing,aids, dentures)  Assistive Devices: Walker    Feeding / Swallow:   Eatin - Feeds self with setup/supervision/cues and/or requires only setup/supervision/cues to perform tube feedings    Diet Ordered: General  Supplements: Standard High Calorie Oral Supplement, Clear Liquid Oral Supplement       Precautions: Precautions: Bleeding, Fall risk, Seizure,Restrictions/Precautions  Restrictions/Precautions: Seizure, General Precautions, Surgical Protocols, Fall Risk  Required Braces or Orthoses?: No  Implants present? : Metal implants,Additional Pertinent Hx: Alcohol abuse,   Restrictions/Precautions: Seizure, General Precautions, Surgical Protocols, Fall Risk Required Braces or Orthoses?: No    Mobility Equipment needs:  Device: No Device Assistance: Stand by assistance, Contact guard assistance    Bed Mobility:  Bed mobility  Bridging: Supervision  Rolling to Left: Supervision  Rolling to Right: Supervision  Supine to Sit: Supervision  Sit to Supine: Supervision  Scooting: Supervision  Comment: HOB elevated    Transfers:   Sit to Stand: Stand by assistance   Bed to Chair: Stand by assistance Stand Pivot Transfers: Stand by assistance Squat Pivot Transfers: Supervision    Posture: Fair      Ambulation Status:  Assistance: Stand by assistance, Contact guard assistance  Quality of Gait: Slow pace, pt reports increased back pain after walking short diatances without RW. Pt mostly SBA on level surfaces, CGA when distracted.   Distance: 100 ft x 2  Device: No Device  Other Apparatus: Wheelchair follow  Comments: increased pain with ambulation  Stairs  # Steps : 15  Stairs Height: 8\"(4\" and 6\")  Rails: Right ascending  Device: No Device  Assistance: Stand by assistance  Comment: Reciprocal pattern going up, and coming down, increased pain during stair training    Self-care Equipment Needs:  Equipment Needed: No    Self-care Assist Bathin - Able to bathe all 10 areas with setup/sup/cues(SUP req in standing/sitting, s/u req, vc's for sequencing/in)  Dressing-Upper: 5 - Requires setup/supervision/cues and/or requires assist with presthesis/brace only(s/u)  Dressing-Lower: 5 - Requires setup/supervision/cues and/or staff applies TEDS/prosthesis/brace only(A TEDs only, s/u req, SUP in standing)  Toiletin - Requires setup/supervision/cues(SUP)  Toilet Transfer: 5 - Requires setup/supervision/cues(SUP)    Nursing   Toilet Every 2 Hours-In Advance of Need: No (Comment)(pt able to make needs known)Activity: In bed,Activity and Safety  Activity: In bed  Repositioned: Turns self  Assistive Device: Front wheel walker  Anti-Embolism Devices: Bilateral, Pneumatic compression devices, below knee  Anti-Embolism Intervention: Refused(DVT education given)  Heels/Feet: Heel(s) elevated while up in chair,Activity: In bed,Pressure Ulcer or Non-Healing Wound: No  Alarm On: Bed, Personal   Bladder: 7 - Patient urinates in toilet independently  Bowel: 6 - Uses toilet independently with device or oral medication(s)    Wound Care Documentation and Therapy:

## 2019-04-25 ENCOUNTER — OFFICE VISIT (OUTPATIENT)
Dept: NEUROLOGY | Age: 74
End: 2019-04-25
Payer: MEDICARE

## 2019-04-25 VITALS
DIASTOLIC BLOOD PRESSURE: 92 MMHG | BODY MASS INDEX: 19.78 KG/M2 | HEART RATE: 98 BPM | WEIGHT: 126 LBS | HEIGHT: 67 IN | SYSTOLIC BLOOD PRESSURE: 133 MMHG

## 2019-04-25 DIAGNOSIS — I61.9 CEREBRAL HEMORRHAGE (HCC): ICD-10-CM

## 2019-04-25 DIAGNOSIS — S09.90XD TRAUMATIC INJURY OF HEAD, SUBSEQUENT ENCOUNTER: ICD-10-CM

## 2019-04-25 DIAGNOSIS — F02.80 DEMENTIA ASSOCIATED WITH OTHER UNDERLYING DISEASE WITHOUT BEHAVIORAL DISTURBANCE (HCC): Primary | ICD-10-CM

## 2019-04-25 PROCEDURE — 99214 OFFICE O/P EST MOD 30 MIN: CPT | Performed by: PSYCHIATRY & NEUROLOGY

## 2019-04-25 ASSESSMENT — ENCOUNTER SYMPTOMS
GASTROINTESTINAL NEGATIVE: 1
EYES NEGATIVE: 1
RESPIRATORY NEGATIVE: 1
BACK PAIN: 1
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
## 2019-05-23 ENCOUNTER — HOSPITAL ENCOUNTER (OUTPATIENT)
Dept: CT IMAGING | Age: 74
Discharge: HOME | End: 2019-05-25
Payer: MEDICARE

## 2019-05-23 DIAGNOSIS — S09.90XD TRAUMATIC INJURY OF HEAD, SUBSEQUENT ENCOUNTER: ICD-10-CM

## 2019-05-23 DIAGNOSIS — I61.9 CEREBRAL HEMORRHAGE (HCC): ICD-10-CM

## 2019-05-23 PROCEDURE — 70450 CT HEAD/BRAIN W/O DYE: CPT

## 2019-09-24 NOTE — CONSULTS
Outpatient Medications:     levETIRAcetam (KEPPRA) 500 MG tablet, Take 1 tablet by mouth 2 times daily, Disp: 60 tablet, Rfl: 3    mirtazapine (REMERON SOL-TAB) 30 MG disintegrating tablet, Take 1 tablet by mouth nightly, Disp: 30 tablet, Rfl: 3    metFORMIN (GLUCOPHAGE) 500 MG tablet, Take 1 tablet by mouth 2 times daily (with meals), Disp: 60 tablet, Rfl: 3    amLODIPine (NORVASC) 10 MG tablet, Take 1 tablet by mouth daily, Disp: 30 tablet, Rfl: 3    ferrous sulfate 325 (65 Fe) MG tablet, Take 1 tablet by mouth 2 times daily (with meals), Disp: 30 tablet, Rfl: 3    folic acid (FOLVITE) 1 MG tablet, Take 1 tablet by mouth daily, Disp: 30 tablet, Rfl: 3    magnesium oxide (MAG-OX) 400 (241.3 Mg) MG TABS tablet, Take 2 tablets by mouth 2 times daily, Disp: 30 tablet, Rfl: 0    donepezil (ARICEPT) 5 MG tablet, Take 1 tablet by mouth nightly, Disp: 30 tablet, Rfl: 3    dicyclomine (BENTYL) 10 MG capsule, Take 1 capsule by mouth 3 times daily (before meals), Disp: 120 capsule, Rfl: 3    pantoprazole (PROTONIX) 40 MG tablet, Take 1 tablet by mouth 2 times daily (before meals), Disp: 30 tablet, Rfl: 3    sucralfate (CARAFATE) 1 GM tablet, Take 1 tablet by mouth 4 times daily, Disp: 120 tablet, Rfl: 0    thiamine 100 MG tablet, Take 1 tablet by mouth daily, Disp: 30 tablet, Rfl: 3    vitamin D (CHOLECALCIFEROL) 1000 UNIT TABS tablet, Take 2 tablets by mouth daily, Disp: 60 tablet, Rfl:     vitamin B-12 1000 MCG tablet, Take 1 tablet by mouth daily, Disp: 30 tablet, Rfl: 3    Multiple Vitamin (MULTIVITAMIN) tablet, Take 1 tablet by mouth daily, Disp: 30 tablet, Rfl: 3       ALLERGIES:      Allergies   Allergen Reactions    Celebrex [Celecoxib] Swelling          FAMILY HISTORY: The patient's family history was reviewed.         SOCIAL HISTORY:   Social History     Socioeconomic History    Marital status: Single     Spouse name: Not on file    Number of children: Not on file    Years of education: Not on file    Highest education level: Not on file   Occupational History    Not on file   Social Needs    Financial resource strain: Not on file    Food insecurity:     Worry: Not on file     Inability: Not on file    Transportation needs:     Medical: Not on file     Non-medical: Not on file   Tobacco Use    Smoking status: Former Smoker    Smokeless tobacco: Never Used   Substance and Sexual Activity    Alcohol use: Yes     Alcohol/week: 6.0 standard drinks     Types: 6 Shots of liquor per week     Comment: occa    Drug use: Yes     Types: Marijuana    Sexual activity: Not on file   Lifestyle    Physical activity:     Days per week: Not on file     Minutes per session: Not on file    Stress: Not on file   Relationships    Social connections:     Talks on phone: Not on file     Gets together: Not on file     Attends Jew service: Not on file     Active member of club or organization: Not on file     Attends meetings of clubs or organizations: Not on file     Relationship status: Not on file    Intimate partner violence:     Fear of current or ex partner: Not on file     Emotionally abused: Not on file     Physically abused: Not on file     Forced sexual activity: Not on file   Other Topics Concern    Not on file   Social History Narrative    ** Merged History Encounter **              REVIEW OF SYSTEMS: A 12-point review of systems was obtained and pertinent positives andnegatives were enumerated above in the history of present illness. All other reviewed systems / symptoms were negative. Review of Systems      PHYSICAL EXAMINATION: Vital signs reviewed per the nursing documentation. /82   Pulse 87   Temp 98 °F (36.7 °C) (Oral)   Resp 16   Ht 5' 7\" (1.702 m)   Wt 127 lb 9.6 oz (57.9 kg)   SpO2 99%   BMI 19.98 kg/m²    [unfilled]   Body mass index is 19.98 kg/m². General:  A O x 3 in NAD   Psych: . Normal affect. Mentation normal   HEENT: PERRLA.  Clear conjunctivae and sclerae. Moist oral mucosae, no lesions orulcers. The neck is supple, without lymphadenopathy or jugular venous distension. No masses. Normal thyroid. Cardiovascular: S1 S2 RRR no rubs or murmurs. Pulmonary: clear BL. No accessory muscle usage. Abdominal Exam: Soft, NT ND, no hepato or spleno megaly, +BS, no ascites. No groin masses or lymphadenopathy. Extremities: No edema. Skin: Warm skin. No skin rash. No spider nevi palmar erythema naildystrophy. Joint: No joint swelling or deformity. Neurological: intact sensory. DTR+. No asterixis     LABORATORY DATA: Reviewed   Lab Results   Component Value Date    WBC 8.6 04/15/2019    HGB 11.7 (L) 04/15/2019    HCT 34.9 (L) 04/15/2019    MCV 91.2 04/15/2019     04/15/2019     04/15/2019    K 4.9 04/15/2019     04/15/2019    CO2 26 04/15/2019    BUN 20 04/15/2019    CREATININE 1.00 04/15/2019    LABALBU 2.7 (L) 03/28/2019    BILITOT 0.61 03/28/2019    ALKPHOS 77 03/28/2019    AST 12 03/28/2019    ALT 8 03/28/2019    INR 1.2 03/26/2019      Lab Results   Component Value Date    RBC 3.82 (L) 04/15/2019    HGB 11.7 (L) 04/15/2019    MCV 91.2 04/15/2019    MCH 30.6 04/15/2019    MCHC 33.6 04/15/2019    RDW 15.4 (H) 04/15/2019    MPV 8.3 04/15/2019    BASOPCT 1 04/15/2019    LYMPHSABS 1.20 04/15/2019    MONOSABS 0.70 04/15/2019    NEUTROABS 6.20 04/15/2019    EOSABS 0.40 04/15/2019    BASOSABS 0.10 04/15/2019          DIAGNOSTIC TESTING:   No results found. IMPRESSION: Mr. Adriana Garcia is a 72 y.o. male with temporal Fx. Dysphagia. Possible hematemesis. Monitor H&H. PPI drip. May need EGD. Thank you for allowing me to participate in the care of Mr. Adriana Garcia. For any further questions please do not hesitate to contact me.        Naresh Schwab MD

## 2020-01-19 NOTE — PLAN OF CARE
Problem: Risk for Impaired Skin Integrity  Goal: Tissue integrity - skin and mucous membranes  Description  Structural intactness and normal physiological function of skin and  mucous membranes. 3/30/2019 1624 by Noah Sosa RN  Outcome: Ongoing  3/30/2019 0555 by Margareth Lunsford RN  Outcome: Ongoing  Note:   Skin assessment completed this shift. Nutrition and Hydration status assessed with adequate intake. Osito Score as charted. Pressure Relief Overlay remains intact and inflated to patient's bed throughout the shift. Patient able to reposition self for comfort and to prevent breakdown. Skin integrity maintained. No new skin breakdown noted. Skin to high risk pressure areas including coccyx and heels are clear. Darby / Incontinence care provided as needed throughout the shift. Aloe Vesta Moisture Barrier ointment applied to buttocks as a preventative measure. Problem: Falls - Risk of:  Goal: Will remain free from falls  Description  Will remain free from falls  3/30/2019 1624 by Noah Sosa RN  Outcome: Ongoing  3/30/2019 0555 by Margareth Lunsford RN  Outcome: Ongoing  Note:   No falls or injuries sustained at this time. Pt. Is forgetful, confused. Telesitter remains in place as a precaution. Call light within reach. Siderails up x 3 per pt. request. Nonskid footwear remains on. Bed in low and locked position. Hourly nursing rounds made. Pt. uses assistive devices appropriately. Pt. reoriented to surroundings and reminded to use call light with each nurse/patient interaction. Pt. room located close to nurse's station. Bed alarm remains engaged throughout the shift as a precaution.      Goal: Absence of physical injury  Description  Absence of physical injury  Outcome: Ongoing     Problem: Pain:  Goal: Pain level will decrease  Description  Pain level will decrease  Outcome: Ongoing  Goal: Control of acute pain  Description  Control of acute pain  3/30/2019 1624 by Criss Argueta Tressa Jimenez RN  Outcome: Ongoing  3/30/2019 0555 by Alexis Jama RN  Outcome: Ongoing  Note:   Pain assessment completed. Pt. able to rest.  Patient medicated with Oxy IR 5mg for headache this shift as ordered. Patient relates a tolerable level of discomfort with the current medication. Pt. Repositions per self for comfort. Nonverbal cues indicate pain relief. Pt. Rests quietly with eyes closed after pain medication administration. Respirations easy and unlabored. Appears free from distress.      Goal: Control of chronic pain  Description  Control of chronic pain  Outcome: Ongoing     Problem: Nutrition  Goal: Optimal nutrition therapy  Outcome: Ongoing DISPLAY PLAN FREE TEXT

## 2020-03-25 PROBLEM — N17.9 AKI (ACUTE KIDNEY INJURY) (HCC): Status: RESOLVED | Noted: 2017-06-30 | Resolved: 2020-03-24

## 2020-06-02 ENCOUNTER — APPOINTMENT (OUTPATIENT)
Dept: CT IMAGING | Age: 75
DRG: 896 | End: 2020-06-02
Payer: MEDICARE

## 2020-06-02 ENCOUNTER — HOSPITAL ENCOUNTER (INPATIENT)
Age: 75
LOS: 7 days | Discharge: NURSING, REHAB OR LONG-TERM CARE | DRG: 896 | End: 2020-06-10
Attending: EMERGENCY MEDICINE | Admitting: INTERNAL MEDICINE
Payer: MEDICARE

## 2020-06-02 PROBLEM — E87.1 HYPONATREMIA: Status: ACTIVE | Noted: 2020-06-02

## 2020-06-02 LAB
-: NORMAL
ABSOLUTE EOS #: 0 K/UL (ref 0–0.4)
ABSOLUTE IMMATURE GRANULOCYTE: ABNORMAL K/UL (ref 0–0.3)
ABSOLUTE LYMPH #: 0.65 K/UL (ref 1–4.8)
ABSOLUTE MONO #: 1.81 K/UL (ref 0.1–1.3)
ALBUMIN SERPL-MCNC: 4.1 G/DL (ref 3.5–5.2)
ALBUMIN/GLOBULIN RATIO: ABNORMAL (ref 1–2.5)
ALP BLD-CCNC: 65 U/L (ref 40–129)
ALT SERPL-CCNC: 11 U/L (ref 5–41)
AMORPHOUS: NORMAL
ANION GAP SERPL CALCULATED.3IONS-SCNC: 17 MMOL/L (ref 9–17)
AST SERPL-CCNC: 14 U/L
BACTERIA: NORMAL
BASOPHILS # BLD: 0 % (ref 0–2)
BASOPHILS ABSOLUTE: 0 K/UL (ref 0–0.2)
BILIRUB SERPL-MCNC: 1.01 MG/DL (ref 0.3–1.2)
BILIRUBIN URINE: NEGATIVE
BUN BLDV-MCNC: 15 MG/DL (ref 8–23)
BUN/CREAT BLD: ABNORMAL (ref 9–20)
CALCIUM SERPL-MCNC: 9.6 MG/DL (ref 8.6–10.4)
CASTS UA: NORMAL /LPF
CHLORIDE BLD-SCNC: 89 MMOL/L (ref 98–107)
CO2: 18 MMOL/L (ref 20–31)
COLOR: YELLOW
COMMENT UA: ABNORMAL
CREAT SERPL-MCNC: 1.11 MG/DL (ref 0.7–1.2)
CRYSTALS, UA: NORMAL /HPF
DIFFERENTIAL TYPE: ABNORMAL
EOSINOPHILS RELATIVE PERCENT: 0 % (ref 0–4)
EPITHELIAL CELLS UA: NORMAL /HPF
ETHANOL PERCENT: <0.01 %
ETHANOL: <10 MG/DL
GFR AFRICAN AMERICAN: >60 ML/MIN
GFR NON-AFRICAN AMERICAN: >60 ML/MIN
GFR SERPL CREATININE-BSD FRML MDRD: ABNORMAL ML/MIN/{1.73_M2}
GFR SERPL CREATININE-BSD FRML MDRD: ABNORMAL ML/MIN/{1.73_M2}
GLUCOSE BLD-MCNC: 136 MG/DL (ref 75–110)
GLUCOSE BLD-MCNC: 169 MG/DL (ref 70–99)
GLUCOSE BLD-MCNC: 175 MG/DL (ref 75–110)
GLUCOSE URINE: NEGATIVE
HCT VFR BLD CALC: 44.1 % (ref 41–53)
HEMOGLOBIN: 14.8 G/DL (ref 13.5–17.5)
IMMATURE GRANULOCYTES: ABNORMAL %
KETONES, URINE: ABNORMAL
LEUKOCYTE ESTERASE, URINE: NEGATIVE
LYMPHOCYTES # BLD: 5 % (ref 24–44)
MAGNESIUM: 1.3 MG/DL (ref 1.6–2.6)
MCH RBC QN AUTO: 29.8 PG (ref 26–34)
MCHC RBC AUTO-ENTMCNC: 33.6 G/DL (ref 31–37)
MCV RBC AUTO: 88.4 FL (ref 80–100)
MONOCYTES # BLD: 14 % (ref 1–7)
MORPHOLOGY: NORMAL
MUCUS: NORMAL
NITRITE, URINE: NEGATIVE
NRBC AUTOMATED: ABNORMAL PER 100 WBC
OTHER OBSERVATIONS UA: NORMAL
PDW BLD-RTO: 13.5 % (ref 11.5–14.9)
PH UA: 5.5 (ref 5–8)
PLATELET # BLD: 275 K/UL (ref 150–450)
PLATELET ESTIMATE: ABNORMAL
PMV BLD AUTO: 8.2 FL (ref 6–12)
POTASSIUM SERPL-SCNC: 3.7 MMOL/L (ref 3.7–5.3)
PROTEIN UA: ABNORMAL
RBC # BLD: 4.99 M/UL (ref 4.5–5.9)
RBC # BLD: ABNORMAL 10*6/UL
RBC UA: NORMAL /HPF
RENAL EPITHELIAL, UA: NORMAL /HPF
SEG NEUTROPHILS: 81 % (ref 36–66)
SEGMENTED NEUTROPHILS ABSOLUTE COUNT: 10.44 K/UL (ref 1.3–9.1)
SODIUM BLD-SCNC: 124 MMOL/L (ref 135–144)
SODIUM,UR: 41 MMOL/L
SPECIFIC GRAVITY UA: 1 (ref 1–1.03)
TOTAL PROTEIN: 7.1 G/DL (ref 6.4–8.3)
TRICHOMONAS: NORMAL
TROPONIN INTERP: NORMAL
TROPONIN T: NORMAL NG/ML
TROPONIN, HIGH SENSITIVITY: 19 NG/L (ref 0–22)
TSH SERPL DL<=0.05 MIU/L-ACNC: 3.44 MIU/L (ref 0.3–5)
TURBIDITY: CLEAR
URINE HGB: ABNORMAL
UROBILINOGEN, URINE: NORMAL
WBC # BLD: 12.9 K/UL (ref 3.5–11)
WBC # BLD: ABNORMAL 10*3/UL
WBC UA: NORMAL /HPF
YEAST: NORMAL

## 2020-06-02 PROCEDURE — 84443 ASSAY THYROID STIM HORMONE: CPT

## 2020-06-02 PROCEDURE — 96366 THER/PROPH/DIAG IV INF ADDON: CPT

## 2020-06-02 PROCEDURE — G0378 HOSPITAL OBSERVATION PER HR: HCPCS

## 2020-06-02 PROCEDURE — 83735 ASSAY OF MAGNESIUM: CPT

## 2020-06-02 PROCEDURE — 84300 ASSAY OF URINE SODIUM: CPT

## 2020-06-02 PROCEDURE — 36415 COLL VENOUS BLD VENIPUNCTURE: CPT

## 2020-06-02 PROCEDURE — 85025 COMPLETE CBC W/AUTO DIFF WBC: CPT

## 2020-06-02 PROCEDURE — 2500000003 HC RX 250 WO HCPCS: Performed by: EMERGENCY MEDICINE

## 2020-06-02 PROCEDURE — 84484 ASSAY OF TROPONIN QUANT: CPT

## 2020-06-02 PROCEDURE — 81001 URINALYSIS AUTO W/SCOPE: CPT

## 2020-06-02 PROCEDURE — 2580000003 HC RX 258: Performed by: EMERGENCY MEDICINE

## 2020-06-02 PROCEDURE — 82947 ASSAY GLUCOSE BLOOD QUANT: CPT

## 2020-06-02 PROCEDURE — 93005 ELECTROCARDIOGRAM TRACING: CPT | Performed by: EMERGENCY MEDICINE

## 2020-06-02 PROCEDURE — 83930 ASSAY OF BLOOD OSMOLALITY: CPT

## 2020-06-02 PROCEDURE — G0480 DRUG TEST DEF 1-7 CLASSES: HCPCS

## 2020-06-02 PROCEDURE — 6360000002 HC RX W HCPCS: Performed by: NURSE PRACTITIONER

## 2020-06-02 PROCEDURE — 96365 THER/PROPH/DIAG IV INF INIT: CPT

## 2020-06-02 PROCEDURE — 70450 CT HEAD/BRAIN W/O DYE: CPT

## 2020-06-02 PROCEDURE — 83935 ASSAY OF URINE OSMOLALITY: CPT

## 2020-06-02 PROCEDURE — 99223 1ST HOSP IP/OBS HIGH 75: CPT | Performed by: INTERNAL MEDICINE

## 2020-06-02 PROCEDURE — 6360000002 HC RX W HCPCS: Performed by: EMERGENCY MEDICINE

## 2020-06-02 PROCEDURE — 80053 COMPREHEN METABOLIC PANEL: CPT

## 2020-06-02 PROCEDURE — 99285 EMERGENCY DEPT VISIT HI MDM: CPT

## 2020-06-02 RX ORDER — ACETAMINOPHEN 650 MG/1
650 SUPPOSITORY RECTAL EVERY 6 HOURS PRN
Status: DISCONTINUED | OUTPATIENT
Start: 2020-06-02 | End: 2020-06-10 | Stop reason: HOSPADM

## 2020-06-02 RX ORDER — SODIUM CHLORIDE 0.9 % (FLUSH) 0.9 %
10 SYRINGE (ML) INJECTION EVERY 12 HOURS SCHEDULED
Status: DISCONTINUED | OUTPATIENT
Start: 2020-06-02 | End: 2020-06-05

## 2020-06-02 RX ORDER — MAGNESIUM SULFATE 1 G/100ML
1 INJECTION INTRAVENOUS PRN
Status: DISCONTINUED | OUTPATIENT
Start: 2020-06-02 | End: 2020-06-10 | Stop reason: HOSPADM

## 2020-06-02 RX ORDER — POLYETHYLENE GLYCOL 3350 17 G/17G
17 POWDER, FOR SOLUTION ORAL DAILY PRN
Status: DISCONTINUED | OUTPATIENT
Start: 2020-06-02 | End: 2020-06-10 | Stop reason: HOSPADM

## 2020-06-02 RX ORDER — PROMETHAZINE HYDROCHLORIDE 25 MG/1
12.5 TABLET ORAL EVERY 6 HOURS PRN
Status: DISCONTINUED | OUTPATIENT
Start: 2020-06-02 | End: 2020-06-10 | Stop reason: HOSPADM

## 2020-06-02 RX ORDER — POTASSIUM CHLORIDE 7.45 MG/ML
10 INJECTION INTRAVENOUS PRN
Status: DISCONTINUED | OUTPATIENT
Start: 2020-06-02 | End: 2020-06-10 | Stop reason: HOSPADM

## 2020-06-02 RX ORDER — ACETAMINOPHEN 325 MG/1
650 TABLET ORAL EVERY 6 HOURS PRN
Status: DISCONTINUED | OUTPATIENT
Start: 2020-06-02 | End: 2020-06-05

## 2020-06-02 RX ORDER — SODIUM CHLORIDE 0.9 % (FLUSH) 0.9 %
10 SYRINGE (ML) INJECTION PRN
Status: DISCONTINUED | OUTPATIENT
Start: 2020-06-02 | End: 2020-06-05

## 2020-06-02 RX ORDER — MAGNESIUM SULFATE 1 G/100ML
1 INJECTION INTRAVENOUS
Status: DISPENSED | OUTPATIENT
Start: 2020-06-02 | End: 2020-06-02

## 2020-06-02 RX ORDER — SODIUM CHLORIDE 0.9 % (FLUSH) 0.9 %
10 SYRINGE (ML) INJECTION PRN
Status: DISCONTINUED | OUTPATIENT
Start: 2020-06-02 | End: 2020-06-06 | Stop reason: SDUPTHER

## 2020-06-02 RX ORDER — SODIUM CHLORIDE 0.9 % (FLUSH) 0.9 %
10 SYRINGE (ML) INJECTION EVERY 12 HOURS SCHEDULED
Status: DISCONTINUED | OUTPATIENT
Start: 2020-06-02 | End: 2020-06-06 | Stop reason: SDUPTHER

## 2020-06-02 RX ORDER — POTASSIUM CHLORIDE 20 MEQ/1
40 TABLET, EXTENDED RELEASE ORAL PRN
Status: DISCONTINUED | OUTPATIENT
Start: 2020-06-02 | End: 2020-06-10 | Stop reason: HOSPADM

## 2020-06-02 RX ORDER — ONDANSETRON 2 MG/ML
4 INJECTION INTRAMUSCULAR; INTRAVENOUS EVERY 6 HOURS PRN
Status: DISCONTINUED | OUTPATIENT
Start: 2020-06-02 | End: 2020-06-10 | Stop reason: HOSPADM

## 2020-06-02 RX ADMIN — MAGNESIUM SULFATE HEPTAHYDRATE 1 G: 1 INJECTION, SOLUTION INTRAVENOUS at 22:09

## 2020-06-02 RX ADMIN — MAGNESIUM SULFATE HEPTAHYDRATE 1 G: 1 INJECTION, SOLUTION INTRAVENOUS at 20:55

## 2020-06-02 RX ADMIN — FOLIC ACID: 5 INJECTION, SOLUTION INTRAMUSCULAR; INTRAVENOUS; SUBCUTANEOUS at 20:56

## 2020-06-02 ASSESSMENT — ENCOUNTER SYMPTOMS
ABDOMINAL PAIN: 0
BACK PAIN: 0
COUGH: 0

## 2020-06-02 NOTE — ED PROVIDER NOTES
Hyponatremia    3.  Hypomagnesemia          DISPOSITION/PLAN   DISPOSITION Admitted 06/02/2020 08:45:38 PM      PATIENT REFERRED TO:  Binta Coker MD  [address] [place] [address]  [phone]            DISCHARGE MEDICATIONS:  Current Discharge Medication List            Lisy Baltazar MD  Attending Emergency Physician                      Lisy Baltazar MD  06/02/20 2231       Lisy Baltazar MD  06/02/20 2232

## 2020-06-02 NOTE — ED NOTES
RN spoke with Son Gibson Villatoro) on phone and updates family- notified we are allowing one visitor back (okayed per Dr. nAa Mejia) and family agreeable.      Georgia Gibbons RN  06/02/20 2013

## 2020-06-02 NOTE — ED NOTES
Bed: 11  Expected date:   Expected time:   Means of arrival:   Comments:     Naren Birmingham RN  06/02/20 7824

## 2020-06-02 NOTE — ED NOTES
Report given to [facility], RN from ED. Report method in person   The following was reviewed with receiving RN:   Current vital signs:  BP (!) 144/101   Pulse 115   Temp 99 °F (37.2 °C) (Oral)   Resp 15   Ht 5' 7\" (1.702 m)   Wt 108 lb (49 kg)   SpO2 94%   BMI 16.92 kg/m²                MEWS Score: 3     Any medication or safety alerts were reviewed. Any pending diagnostics and notifications were also reviewed, as well as any safety concerns or issues, abnormal labs, abnormal imaging, and abnormal assessment findings. Questions were answered.           Karl Morris RN  06/02/20 9907

## 2020-06-03 PROBLEM — G93.41 METABOLIC ENCEPHALOPATHY: Status: ACTIVE | Noted: 2020-06-03

## 2020-06-03 LAB
AMMONIA: 24 UMOL/L (ref 16–60)
ANION GAP SERPL CALCULATED.3IONS-SCNC: 11 MMOL/L (ref 9–17)
BUN BLDV-MCNC: 14 MG/DL (ref 8–23)
BUN/CREAT BLD: ABNORMAL (ref 9–20)
CALCIUM SERPL-MCNC: 9.2 MG/DL (ref 8.6–10.4)
CHLORIDE BLD-SCNC: 99 MMOL/L (ref 98–107)
CO2: 24 MMOL/L (ref 20–31)
CREAT SERPL-MCNC: 0.89 MG/DL (ref 0.7–1.2)
EKG ATRIAL RATE: 104 BPM
EKG P AXIS: 20 DEGREES
EKG P-R INTERVAL: 174 MS
EKG Q-T INTERVAL: 336 MS
EKG QRS DURATION: 96 MS
EKG QTC CALCULATION (BAZETT): 441 MS
EKG R AXIS: -41 DEGREES
EKG T AXIS: 20 DEGREES
EKG VENTRICULAR RATE: 104 BPM
ESTIMATED AVERAGE GLUCOSE: 151 MG/DL
GFR AFRICAN AMERICAN: >60 ML/MIN
GFR NON-AFRICAN AMERICAN: >60 ML/MIN
GFR SERPL CREATININE-BSD FRML MDRD: ABNORMAL ML/MIN/{1.73_M2}
GFR SERPL CREATININE-BSD FRML MDRD: ABNORMAL ML/MIN/{1.73_M2}
GLUCOSE BLD-MCNC: 170 MG/DL (ref 70–99)
HBA1C MFR BLD: 6.9 % (ref 4–6)
HCT VFR BLD CALC: 45 % (ref 41–53)
HEMOGLOBIN: 15.4 G/DL (ref 13.5–17.5)
INR BLD: 1
MAGNESIUM: 2.3 MG/DL (ref 1.6–2.6)
MCH RBC QN AUTO: 30.2 PG (ref 26–34)
MCHC RBC AUTO-ENTMCNC: 34.2 G/DL (ref 31–37)
MCV RBC AUTO: 88.4 FL (ref 80–100)
NRBC AUTOMATED: NORMAL PER 100 WBC
OSMOLALITY URINE: 159 MOSM/KG (ref 80–1300)
PDW BLD-RTO: 13.8 % (ref 11.5–14.9)
PHOSPHORUS: 2.5 MG/DL (ref 2.5–4.5)
PLATELET # BLD: 256 K/UL (ref 150–450)
PMV BLD AUTO: 8.3 FL (ref 6–12)
POTASSIUM SERPL-SCNC: 3.8 MMOL/L (ref 3.7–5.3)
PROTHROMBIN TIME: 12.7 SEC (ref 11.8–14.6)
RBC # BLD: 5.09 M/UL (ref 4.5–5.9)
SERUM OSMOLALITY: 267 MOSM/KG (ref 275–295)
SERUM OSMOLALITY: 295 MOSM/KG (ref 275–295)
SODIUM BLD-SCNC: 130 MMOL/L (ref 135–144)
SODIUM BLD-SCNC: 134 MMOL/L (ref 135–144)
SODIUM BLD-SCNC: 139 MMOL/L (ref 135–144)
WBC # BLD: 8.2 K/UL (ref 3.5–11)

## 2020-06-03 PROCEDURE — 83036 HEMOGLOBIN GLYCOSYLATED A1C: CPT

## 2020-06-03 PROCEDURE — 84295 ASSAY OF SERUM SODIUM: CPT

## 2020-06-03 PROCEDURE — 83735 ASSAY OF MAGNESIUM: CPT

## 2020-06-03 PROCEDURE — 80048 BASIC METABOLIC PNL TOTAL CA: CPT

## 2020-06-03 PROCEDURE — 6370000000 HC RX 637 (ALT 250 FOR IP): Performed by: NURSE PRACTITIONER

## 2020-06-03 PROCEDURE — 6370000000 HC RX 637 (ALT 250 FOR IP): Performed by: INTERNAL MEDICINE

## 2020-06-03 PROCEDURE — 2580000003 HC RX 258: Performed by: NURSE PRACTITIONER

## 2020-06-03 PROCEDURE — 96366 THER/PROPH/DIAG IV INF ADDON: CPT

## 2020-06-03 PROCEDURE — 93005 ELECTROCARDIOGRAM TRACING: CPT | Performed by: INTERNAL MEDICINE

## 2020-06-03 PROCEDURE — 82140 ASSAY OF AMMONIA: CPT

## 2020-06-03 PROCEDURE — 6360000002 HC RX W HCPCS: Performed by: INTERNAL MEDICINE

## 2020-06-03 PROCEDURE — 2500000003 HC RX 250 WO HCPCS: Performed by: NURSE PRACTITIONER

## 2020-06-03 PROCEDURE — 6360000002 HC RX W HCPCS: Performed by: NURSE PRACTITIONER

## 2020-06-03 PROCEDURE — 36415 COLL VENOUS BLD VENIPUNCTURE: CPT

## 2020-06-03 PROCEDURE — 2060000000 HC ICU INTERMEDIATE R&B

## 2020-06-03 PROCEDURE — 96372 THER/PROPH/DIAG INJ SC/IM: CPT

## 2020-06-03 PROCEDURE — 83930 ASSAY OF BLOOD OSMOLALITY: CPT

## 2020-06-03 PROCEDURE — 96376 TX/PRO/DX INJ SAME DRUG ADON: CPT

## 2020-06-03 PROCEDURE — 96375 TX/PRO/DX INJ NEW DRUG ADDON: CPT

## 2020-06-03 PROCEDURE — 84100 ASSAY OF PHOSPHORUS: CPT

## 2020-06-03 PROCEDURE — 85027 COMPLETE CBC AUTOMATED: CPT

## 2020-06-03 PROCEDURE — 85610 PROTHROMBIN TIME: CPT

## 2020-06-03 PROCEDURE — 2580000003 HC RX 258: Performed by: INTERNAL MEDICINE

## 2020-06-03 PROCEDURE — 97162 PT EVAL MOD COMPLEX 30 MIN: CPT

## 2020-06-03 PROCEDURE — 93010 ELECTROCARDIOGRAM REPORT: CPT | Performed by: INTERNAL MEDICINE

## 2020-06-03 PROCEDURE — 84425 ASSAY OF VITAMIN B-1: CPT

## 2020-06-03 PROCEDURE — 97166 OT EVAL MOD COMPLEX 45 MIN: CPT

## 2020-06-03 RX ORDER — LORAZEPAM 1 MG/1
3 TABLET ORAL
Status: DISCONTINUED | OUTPATIENT
Start: 2020-06-03 | End: 2020-06-05

## 2020-06-03 RX ORDER — THIAMINE MONONITRATE (VIT B1) 100 MG
100 TABLET ORAL DAILY
Status: DISCONTINUED | OUTPATIENT
Start: 2020-06-03 | End: 2020-06-10 | Stop reason: HOSPADM

## 2020-06-03 RX ORDER — LORAZEPAM 2 MG/ML
4 INJECTION INTRAMUSCULAR
Status: DISCONTINUED | OUTPATIENT
Start: 2020-06-03 | End: 2020-06-05

## 2020-06-03 RX ORDER — FOLIC ACID 1 MG/1
1 TABLET ORAL DAILY
Status: DISCONTINUED | OUTPATIENT
Start: 2020-06-03 | End: 2020-06-10 | Stop reason: HOSPADM

## 2020-06-03 RX ORDER — LORAZEPAM 2 MG/ML
2 INJECTION INTRAMUSCULAR EVERY 4 HOURS PRN
Status: DISCONTINUED | OUTPATIENT
Start: 2020-06-03 | End: 2020-06-05

## 2020-06-03 RX ORDER — MAGNESIUM SULFATE 1 G/100ML
1 INJECTION INTRAVENOUS ONCE
Status: COMPLETED | OUTPATIENT
Start: 2020-06-03 | End: 2020-06-03

## 2020-06-03 RX ORDER — LORAZEPAM 1 MG/1
2 TABLET ORAL
Status: DISCONTINUED | OUTPATIENT
Start: 2020-06-03 | End: 2020-06-05

## 2020-06-03 RX ORDER — SODIUM CHLORIDE 9 MG/ML
INJECTION, SOLUTION INTRAVENOUS CONTINUOUS
Status: DISCONTINUED | OUTPATIENT
Start: 2020-06-03 | End: 2020-06-03

## 2020-06-03 RX ORDER — CIPROFLOXACIN 2 MG/ML
400 INJECTION, SOLUTION INTRAVENOUS EVERY 12 HOURS
Status: DISCONTINUED | OUTPATIENT
Start: 2020-06-03 | End: 2020-06-05

## 2020-06-03 RX ORDER — LORAZEPAM 1 MG/1
1 TABLET ORAL
Status: DISCONTINUED | OUTPATIENT
Start: 2020-06-03 | End: 2020-06-05

## 2020-06-03 RX ORDER — SODIUM CHLORIDE 450 MG/100ML
INJECTION, SOLUTION INTRAVENOUS CONTINUOUS
Status: DISCONTINUED | OUTPATIENT
Start: 2020-06-03 | End: 2020-06-03

## 2020-06-03 RX ORDER — DEXTROSE MONOHYDRATE 50 MG/ML
INJECTION, SOLUTION INTRAVENOUS CONTINUOUS
Status: DISCONTINUED | OUTPATIENT
Start: 2020-06-03 | End: 2020-06-04

## 2020-06-03 RX ORDER — AMLODIPINE BESYLATE 5 MG/1
5 TABLET ORAL DAILY
Status: DISCONTINUED | OUTPATIENT
Start: 2020-06-03 | End: 2020-06-03

## 2020-06-03 RX ORDER — LORAZEPAM 1 MG/1
4 TABLET ORAL
Status: DISCONTINUED | OUTPATIENT
Start: 2020-06-03 | End: 2020-06-05

## 2020-06-03 RX ORDER — LORAZEPAM 2 MG/ML
1 INJECTION INTRAMUSCULAR ONCE
Status: COMPLETED | OUTPATIENT
Start: 2020-06-03 | End: 2020-06-03

## 2020-06-03 RX ORDER — LORAZEPAM 2 MG/ML
1 INJECTION INTRAMUSCULAR
Status: DISCONTINUED | OUTPATIENT
Start: 2020-06-03 | End: 2020-06-05

## 2020-06-03 RX ORDER — LORAZEPAM 2 MG/ML
3 INJECTION INTRAMUSCULAR
Status: DISCONTINUED | OUTPATIENT
Start: 2020-06-03 | End: 2020-06-05

## 2020-06-03 RX ORDER — LORAZEPAM 2 MG/ML
2 INJECTION INTRAMUSCULAR
Status: DISCONTINUED | OUTPATIENT
Start: 2020-06-03 | End: 2020-06-05

## 2020-06-03 RX ORDER — AMLODIPINE BESYLATE 5 MG/1
5 TABLET ORAL DAILY
Status: DISCONTINUED | OUTPATIENT
Start: 2020-06-03 | End: 2020-06-04

## 2020-06-03 RX ADMIN — FOLIC ACID 1 MG: 1 TABLET ORAL at 16:15

## 2020-06-03 RX ADMIN — SODIUM CHLORIDE: 9 INJECTION, SOLUTION INTRAVENOUS at 05:46

## 2020-06-03 RX ADMIN — THIAMINE HCL TAB 100 MG 100 MG: 100 TAB at 16:14

## 2020-06-03 RX ADMIN — LORAZEPAM 1 MG: 2 INJECTION INTRAMUSCULAR; INTRAVENOUS at 01:34

## 2020-06-03 RX ADMIN — LORAZEPAM 1 MG: 1 TABLET ORAL at 22:34

## 2020-06-03 RX ADMIN — MAGNESIUM SULFATE HEPTAHYDRATE 1 G: 1 INJECTION, SOLUTION INTRAVENOUS at 03:36

## 2020-06-03 RX ADMIN — ENOXAPARIN SODIUM 40 MG: 40 INJECTION SUBCUTANEOUS at 09:43

## 2020-06-03 RX ADMIN — LORAZEPAM 3 MG: 2 INJECTION INTRAMUSCULAR; INTRAVENOUS at 23:29

## 2020-06-03 RX ADMIN — AMLODIPINE BESYLATE 5 MG: 5 TABLET ORAL at 09:43

## 2020-06-03 RX ADMIN — CIPROFLOXACIN 400 MG: 2 INJECTION, SOLUTION INTRAVENOUS at 16:46

## 2020-06-03 RX ADMIN — SODIUM CHLORIDE: 4.5 INJECTION, SOLUTION INTRAVENOUS at 16:46

## 2020-06-03 RX ADMIN — LORAZEPAM 2 MG: 2 INJECTION INTRAMUSCULAR; INTRAVENOUS at 03:24

## 2020-06-03 RX ADMIN — DEXTROSE MONOHYDRATE: 50 INJECTION, SOLUTION INTRAVENOUS at 22:02

## 2020-06-03 RX ADMIN — FOLIC ACID: 5 INJECTION, SOLUTION INTRAMUSCULAR; INTRAVENOUS; SUBCUTANEOUS at 22:02

## 2020-06-03 RX ADMIN — LORAZEPAM 1 MG: 1 TABLET ORAL at 16:14

## 2020-06-03 NOTE — H&P
normal. No respiratory distress. Breath sounds: Normal breath sounds. No wheezing or rales. Chest:      Chest wall: No tenderness. Abdominal:      General: Bowel sounds are normal. There is no distension. Palpations: Abdomen is soft. Tenderness: There is no abdominal tenderness. There is no guarding. Musculoskeletal: Normal range of motion. General: No tenderness. Lymphadenopathy:      Cervical: No cervical adenopathy. Skin:     General: Skin is warm and dry. Coloration: Skin is not pale. Findings: No erythema or rash. Neurological:      Mental Status: He is alert. He is disoriented. Motor: No seizure activity. Coordination: Coordination normal.   Psychiatric:         Behavior: Behavior normal.         Thought Content: Thought content normal.       DIAGNOSTICS      EKG:   EKG 12 Lead [[id number]]    Collected: 06/02/20 1817    Updated: 06/02/20 1823     Ventricular Rate 104 BPM    Atrial Rate 104 BPM    P-R Interval 174 ms    QRS Duration 96 ms    Q-T Interval 336 ms    QTc Calculation (Bazett) 441 ms    P Axis 20 degrees    R Axis -41 degrees    T Axis 20 degrees   Narrative:     Sinus tachycardia with Premature supraventricular complexes  Left axis deviation  Voltage criteria for left ventricular hypertrophy  Cannot rule out Septal infarct (cited on or before 08-MAR-2019)  Abnormal ECG  When compared with ECG of 08-MAR-2019 17:38,  Sinus rhythm has replaced Electronic atrial pacemaker  Questionable change in initial forces of Anteroseptal leads     Labs:  CBC:   Recent Labs     06/02/20  1810   WBC 12.9*   HGB 14.8        BMP:    Recent Labs     06/02/20  1810 06/03/20  0159   * 130*   K 3.7  --    CL 89*  --    CO2 18*  --    BUN 15  --    CREATININE 1.11  --    GLUCOSE 169*  --      S. Calcium:  Recent Labs     06/02/20  1810   CALCIUM 9.6     S. Ionized Calcium:No results for input(s): IONCA in the last 72 hours.   S. Magnesium:  Recent Labs 06/02/20  1810   MG 1.3*     S. Phosphorus:No results for input(s): PHOS in the last 72 hours. S. Glucose:  Recent Labs     06/02/20  1755 06/02/20  2300   POCGLU 175* 136*     Glycosylated hemoglobin A1C:   Lab Results   Component Value Date    LABA1C 6.1 03/28/2019     Hepatic:   Recent Labs     06/02/20  1810   AST 14   ALT 11   ALKPHOS 65     CARDIAC ENZY:   Recent Labs     06/02/20  1810   TROPHS 19     INR: No results for input(s): INR in the last 72 hours. BNP: No results for input(s): PROBNP in the last 72 hours. ABGs: No results for input(s): PH, PCO2, PO2, HCO3, O2SAT in the last 72 hours. Lipids: No results for input(s): CHOL, TRIG, HDL, LDLCALC in the last 72 hours. Invalid input(s): LDL  Pancreatic functions:No results for input(s): LIPASE, AMYLASE in the last 72 hours. Cancino Lucinda: No results for input(s): LACTA in the last 72 hours. Thyroid functions:   Lab Results   Component Value Date    TSH 3.44 06/02/2020      U/A:  Recent Labs     06/02/20  2326   COLORU YELLOW   WBCUA 5 TO 10   RBCUA 0 TO 2   MUCUS NOT REPORTED   BACTERIA NOT REPORTED   SPECGRAV 1.002   LEUKOCYTESUR NEGATIVE   GLUCOSEU NEGATIVE   AMORPHOUS NOT REPORTED       Imaging/Diagonstics:     Ct Head Wo Contrast    Result Date: 6/2/2020  EXAMINATION: CT OF THE HEAD WITHOUT CONTRAST  6/2/2020 7:08 pm TECHNIQUE: CT of the head was performed without the administration of intravenous contrast. Dose modulation, iterative reconstruction, and/or weight based adjustment of the mA/kV was utilized to reduce the radiation dose to as low as reasonably achievable. COMPARISON: None. HISTORY: ORDERING SYSTEM PROVIDED HISTORY: altered mental status TECHNOLOGIST PROVIDED HISTORY: altered mental status Reason for Exam: altered mental status Additional signs and symptoms: Per ED notes. Theodore Bettencourt Per ems report, by 05/23/2019 standered called for person wandering around on the street FINDINGS: BRAIN/VENTRICLES: There is prominence of the ventricles and cortical sulci consistent with cortical volume loss. No midline shift. Basal cisterns are normally outlined. There is periventricular and subcortical white matter discrete and confluent low attenuation, nonspecific, likely representing age-related chronic small vessel ischemic disease. There is no evidence for acute infarct. No acute intra or extra-axial hemorrhage. ORBITS: The visualized portion of the orbits demonstrate no acute abnormality. SINUSES: The visualized paranasal sinuses and mastoid air cells demonstrate no acute abnormality. SOFT TISSUES/SKULL:  Biparietal in bifrontal bur holes are noted as seen previously. Soft tissues. No acute intracranial abnormality. Cerebral atrophy. ASSESSMENT  and  PLAN     Principal Problem:    Hyponatremia  Active Problems:    Altered mental status    Alcoholism ([facility]    Type 2 diabetes mellitus with hyperglycemia, without long-term current use of insulin (HCC)    Hypomagnesemia    Marijuana abuse    Seizure ([facility]  Resolved Problems:    * No resolved hospital problems.  *    Plan:  Hyponatremia  - in ED  -IV NS with thiamine, folic acid, and multivitamin initiated in ED  -check urine osmolality, urine sodium, and serum osmolality  -monitor BMP    Altered Mental Status  -CT head - no intracranial abnormality; cerebral atrophy  -UA negative for UTI  -Check Thiamine and Ammonia level in am    Alcoholism  -BAL - negative on arrival  -Family unsure if patient has been drinking  -IV NS @ 100 ml/hr - (when banana bag not infusing)  -Multivitamin IV daily  -Ativan per CIWA scale  -Telesitter  -Daily labs; check Mg, Phos, HgbA1c  --TSH - 3.44 in ED  -Social Service consult for discharge planning    Hypomagnesemia  -Mag 1.3 in ED  -1 gm IV replacement ordered  --additional replacement ordered  -monitor magnesium level   -replacement protocol ordered     Patient with known seizure disorder  -Believed to be d/t alcohol withdrawal  -Patient noncompliant with seizure

## 2020-06-03 NOTE — PLAN OF CARE
Problem: Falls - Risk of:  Goal: Will remain free from falls  Description: Will remain free from falls  6/3/2020 1734 by Brittny Fair RN  Outcome: Ongoing  Note: Pt assessed as a fall risk this shift. Remains free from falls and accidental injury at this time. Fall precautions in place, including falling star sign and fall risk band on pt. Floor free from obstacles, and bed is locked and in lowest position. Adequate lighting provided. Pt being monitored with telesitter. Bed alarm activated. Will continue to monitor needs during hourly rounding, and reinforce education on use of call light. 6/3/2020 0429 by Mario Sanford RN  Outcome: Ongoing  Goal: Absence of physical injury  Description: Absence of physical injury  Outcome: Ongoing     Problem: Infection:  Goal: Will remain free from infection  Description: Will remain free from infection  6/3/2020 1734 by Brittny Fair RN  Outcome: Ongoing  Note: No s/s of infection noted this shift. Continue to monitor. 6/3/2020 0429 by Mario Sanford RN  Outcome: Ongoing     Problem: Safety:  Goal: Free from accidental physical injury  Description: Free from accidental physical injury  6/3/2020 1734 by Brittny Fair RN  Outcome: Ongoing  Note: Pt remains free from harm this shift. Continue to monitor.   6/3/2020 0429 by Mario Sanford RN  Outcome: Ongoing  Goal: Free from intentional harm  Description: Free from intentional harm  6/3/2020 1734 by Brittny Fair RN  Outcome: Ongoing  6/3/2020 0429 by Mario Sanford RN  Outcome: Ongoing  Goal: Ability to remain free from injury will improve  Description: Ability to remain free from injury will improve  Outcome: Ongoing     Problem: Daily Care:  Goal: Daily care needs are met  Description: Daily care needs are met  6/3/2020 1734 by Brittny Fair RN  Outcome: Ongoing  6/3/2020 0429 by Mario Sanford RN  Outcome: Ongoing     Problem: Pain:  Goal: Patient's pain/discomfort is manageable  Description:

## 2020-06-03 NOTE — PROGRESS NOTES
Physical Therapy    Facility/Department: [facility] PROGRESSIVE CARE  Initial Assessment    NAME: Diandra Lomeli  : 1945  MRN: [id number]    Date of Service: 6/3/2020    Discharge Recommendations:  Patient would benefit from continued therapy after discharge   PT Equipment Recommendations  Equipment Needed: Yes(TBD ? w walker)    Assessment   Body structures, Functions, Activity limitations: Decreased functional mobility ; Decreased strength;Decreased endurance;Decreased safe awareness;Decreased balance  Assessment: continue per POC to maxmize potential; pt will need placement as he is unsafe to return home and is currently 2 assist for safety. Pt remains a HIGH FALL RISK. Treatment Diagnosis: impaired strength and balance  Specific instructions for Next Treatment: start w/ bedside exercises and Gregary Quiver for standing, advance to pivot to chair and gait when able to participate- pt confused- hx alcoholic dementia, TIA, craniotomy, SDH and SAH  Prognosis: Fair  Decision Making: Medium Complexity  History: pt found wandering in the streets confused  Exam: ROM, MMT, balance and mobility assessments  Clinical Presentation:  telesitter, max x 1 supine > sit, min x 2 sit > supine, max x 1 sit <> stand, attempted 1 step towards the [facility]-  pt moved the right foot only taking the side step but would not unweight left foot to take a step, HIGH FALL RISK- can be impulsive-posterior lean  PT Education: Goals;PT Role;Plan of Care  Barriers to Learning: confusion, hx alcoholic dementia  REQUIRES PT FOLLOW UP: Yes  Activity Tolerance  Activity Tolerance: Patient limited by cognitive status       Patient Diagnosis(es): The primary encounter diagnosis was Altered mental status, unspecified altered mental status type. Diagnoses of Hyponatremia and Hypomagnesemia were also pertinent to this visit.      has a past medical history of Alcoholic dementia ([facility] [address] Back pain, Cerebral artery occlusion with cerebral infarction [facility]), Diabetes appears to be at least 3/5 as he can move bilateral LEs anti-gravity. Strength RUE  Comment: see OT for UE assessment  Strength LUE  Comment: see OT for UE assessment     Sensation  Overall Sensation Status: (NT)  Bed mobility  Bridging: Supervision  Rolling to Right: Minimal assistance  Supine to Sit: Maximum assistance  Sit to Supine: 2 Person assistance;Minimal assistance  Scooting: Supervision(supervision to scoot across the bed but max x 2 to boost up in bed at the end of treatment)  Comment: assisted changing pt's soiled gown while supine prior to dangling at the EOB w/ initially mod x 1 but improving to CGA x 1- can be impulsive- HIGH FALL RISK- usually demonstrated LOB posteriorly  Transfers  Sit to Stand: Maximum Assistance  Stand to sit: Maximum Assistance  Comment: posterior lean w/ wide OSIEL- HIGH FALL RISK- pt stood approximately 2 minutes while OT changed soiled bedsheets. Deferred transfer to chair due to safety concerns. Ambulation  Ambulation?: Yes  Ambulation 1  Surface: level tile  Device: No Device  Assistance: 2 Person assistance; Moderate assistance  Gait Deviations: Increased OSIEL  Distance:  attempted one side step towards the [facility]  Comments: pt moved the right foot only taking the side step but would not unweight left foot to take a step, HIGH FALL RISK- can be impulsive-posterior lean  Stairs/Curb  Stairs?: No     Balance  Sitting - Static: Fair;-  Sitting - Dynamic: Poor;+  Standing - Static: Poor(2 assist)  Standing - Dynamic: Poor(2 assist)        Plan   Plan  Times per week: 5-7 treatments/ week  Times per day: (5-7 treatments/ week)  Specific instructions for Next Treatment: start w/ bedside exercises and Mor Messier for standing, advance to pivot to chair and gait when able to participate- pt confused- hx alcoholic dementia, TIA, craniotomy, SDH and SAH  Current Treatment Recommendations: Strengthening, Transfer Training, Endurance Training, Patient/Caregiver Education & Training,

## 2020-06-03 NOTE — PROGRESS NOTES
benefit from continued therapy after discharge  Activity Tolerance: Treatment limited secondary to decreased cognition           Goals  Patient Goals   Patient goals : None voiced  Short term goals  Time Frame for Short term goals: By Discharge  Short term goal 1: Pt will complete bed mobility with Min A and tolerate sitting EOB for 5-10 minutes with SUP while completing a functional task. Short term goal 2: Pt will actively participate in self-care routine and complete tasks with Mod A with cues as needed for initiation, sequencing, and safety. Short term goal 3: Pt will complete toilet transfer with Mod A and Good safety using appropriate DME. Short term goal 4: Pt will stand for 2-3 minutes with 1-2 UE support and Min A while completing a functional task. Short term goal 5: Pt will actively participate in 15-20 minutes of therapeutic exercise/activity to promote increased independence and safety with self-care and mobility.     Plan  Safety Devices  Safety Devices in place: Yes  Type of devices: Patient at risk for falls, Gait belt, Left in bed, Call light within reach, Bed alarm in place     Plan  Times per week: 5-7  Times per day: Daily  Current Treatment Recommendations: Balance Training, Functional Mobility Training, Endurance Training, Cognitive Reorientation, Safety Education & Training, Patient/Caregiver Education & Training, Equipment Evaluation, Education, & procurement, Self-Care / ADL, Cognitive/Perceptual Training       Equipment Recommendations  Equipment Needed: (TBD)  OT Individual Minutes  Time In: 7062  Time Out: 4266  Minutes: 20    Electronically signed by Dale Tolliver on 6/3/20 at 3:15 PM EDT

## 2020-06-03 NOTE — DISCHARGE INSTR - COC
Assisted  Feeding  Independent  Med Admin  Independent  Med Delivery   none    Wound Care Documentation and Therapy:        Elimination:  Continence:   · Bowel: Yes  · Bladder: Yes  Urinary Catheter: None   Colostomy/Ileostomy/Ileal Conduit: No       Date of Last BM: 6/09/2020    Intake/Output Summary (Last 24 hours) at 6/3/2020 1226  Last data filed at 6/3/2020 0107  Gross per 24 hour   Intake --   Output 750 ml   Net -750 ml     I/O last 3 completed shifts:  In: -   Out: 750 [Urine:750]    Safety Concerns: At Risk for Falls and History of Seizures    Impairments/Disabilities:      Vision and Hearing    Nutrition Therapy:  Current Nutrition Therapy:   - Oral Diet:  General    Routes of Feeding: Oral  Liquids: No Restrictions  Daily Fluid Restriction: no  Last Modified Barium Swallow with Video (Video Swallowing Test): not done    Treatments at the Time of Hospital Discharge:   Respiratory Treatments: ***  Oxygen Therapy:  is not on home oxygen therapy.   Ventilator:    - No ventilator support    Rehab Therapies: Physical Therapy and Occupational Therapy  Weight Bearing Status/Restrictions: No weight bearing restirctions  Other Medical Equipment (for information only, NOT a DME order):  ***  Other Treatments: skilled nursing assessment per protocol medication education       Patient's personal belongings (please select all that are sent with patient):  Neeraj    RN SIGNATURE:  Electronically signed by Lizeth Elliott RN on 6/10/20 at 3:59 PM EDT    CASE MANAGEMENT/SOCIAL WORK SECTION    Inpatient Status Date: 6/3/20    Readmission Risk Assessment Score:  Readmission Risk              Risk of Unplanned Readmission:        0           Discharging to Facility/ [address]  [address] [facility] [address] [facility] [address]  Phone [phone]  Fax  [phone]    / signature: Electronically signed by Luann Uribe RN on

## 2020-06-03 NOTE — PROGRESS NOTES
Patient brought to PCU room 2110, placed on monitor, patients vitals taken and patient assessed. Oriented to room and instructed on Telesitter, call light and bed alarm. RN answered all questions at this time.
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
## 2020-06-04 LAB
ANION GAP SERPL CALCULATED.3IONS-SCNC: 13 MMOL/L (ref 9–17)
BUN BLDV-MCNC: 6 MG/DL (ref 8–23)
BUN/CREAT BLD: ABNORMAL (ref 9–20)
CALCIUM SERPL-MCNC: 8.8 MG/DL (ref 8.6–10.4)
CHLORIDE BLD-SCNC: 99 MMOL/L (ref 98–107)
CO2: 22 MMOL/L (ref 20–31)
CREAT SERPL-MCNC: 0.54 MG/DL (ref 0.7–1.2)
EKG ATRIAL RATE: 100 BPM
EKG ATRIAL RATE: 98 BPM
EKG P AXIS: 20 DEGREES
EKG P AXIS: 23 DEGREES
EKG P-R INTERVAL: 166 MS
EKG P-R INTERVAL: 168 MS
EKG Q-T INTERVAL: 350 MS
EKG Q-T INTERVAL: 368 MS
EKG QRS DURATION: 88 MS
EKG QRS DURATION: 90 MS
EKG QTC CALCULATION (BAZETT): 446 MS
EKG QTC CALCULATION (BAZETT): 474 MS
EKG R AXIS: -42 DEGREES
EKG R AXIS: -42 DEGREES
EKG T AXIS: -5 DEGREES
EKG T AXIS: 7 DEGREES
EKG VENTRICULAR RATE: 100 BPM
EKG VENTRICULAR RATE: 98 BPM
GFR AFRICAN AMERICAN: >60 ML/MIN
GFR NON-AFRICAN AMERICAN: >60 ML/MIN
GFR SERPL CREATININE-BSD FRML MDRD: ABNORMAL ML/MIN/{1.73_M2}
GFR SERPL CREATININE-BSD FRML MDRD: ABNORMAL ML/MIN/{1.73_M2}
GLUCOSE BLD-MCNC: 149 MG/DL (ref 70–99)
HCT VFR BLD CALC: 46.3 % (ref 41–53)
HEMOGLOBIN: 15.6 G/DL (ref 13.5–17.5)
MAGNESIUM: 1.4 MG/DL (ref 1.6–2.6)
MCH RBC QN AUTO: 30.5 PG (ref 26–34)
MCHC RBC AUTO-ENTMCNC: 33.7 G/DL (ref 31–37)
MCV RBC AUTO: 90.5 FL (ref 80–100)
NRBC AUTOMATED: NORMAL PER 100 WBC
PDW BLD-RTO: 13.9 % (ref 11.5–14.9)
PLATELET # BLD: 224 K/UL (ref 150–450)
PMV BLD AUTO: 8 FL (ref 6–12)
POTASSIUM SERPL-SCNC: 3.5 MMOL/L (ref 3.7–5.3)
RBC # BLD: 5.12 M/UL (ref 4.5–5.9)
SODIUM BLD-SCNC: 129 MMOL/L (ref 135–144)
SODIUM BLD-SCNC: 131 MMOL/L (ref 135–144)
SODIUM BLD-SCNC: 134 MMOL/L (ref 135–144)
SODIUM BLD-SCNC: 135 MMOL/L (ref 135–144)
URIC ACID: 5.5 MG/DL (ref 3.4–7)
WBC # BLD: 9.6 K/UL (ref 3.5–11)

## 2020-06-04 PROCEDURE — 6360000002 HC RX W HCPCS: Performed by: INTERNAL MEDICINE

## 2020-06-04 PROCEDURE — 36415 COLL VENOUS BLD VENIPUNCTURE: CPT

## 2020-06-04 PROCEDURE — 93005 ELECTROCARDIOGRAM TRACING: CPT | Performed by: INTERNAL MEDICINE

## 2020-06-04 PROCEDURE — 2580000003 HC RX 258: Performed by: NURSE PRACTITIONER

## 2020-06-04 PROCEDURE — 2580000003 HC RX 258: Performed by: INTERNAL MEDICINE

## 2020-06-04 PROCEDURE — 93010 ELECTROCARDIOGRAM REPORT: CPT | Performed by: INTERNAL MEDICINE

## 2020-06-04 PROCEDURE — 2060000000 HC ICU INTERMEDIATE R&B

## 2020-06-04 PROCEDURE — 84550 ASSAY OF BLOOD/URIC ACID: CPT

## 2020-06-04 PROCEDURE — 83735 ASSAY OF MAGNESIUM: CPT

## 2020-06-04 PROCEDURE — 80048 BASIC METABOLIC PNL TOTAL CA: CPT

## 2020-06-04 PROCEDURE — 84295 ASSAY OF SERUM SODIUM: CPT

## 2020-06-04 PROCEDURE — 6370000000 HC RX 637 (ALT 250 FOR IP): Performed by: INTERNAL MEDICINE

## 2020-06-04 PROCEDURE — 6360000002 HC RX W HCPCS: Performed by: NURSE PRACTITIONER

## 2020-06-04 PROCEDURE — 2500000003 HC RX 250 WO HCPCS: Performed by: NURSE PRACTITIONER

## 2020-06-04 PROCEDURE — 2500000003 HC RX 250 WO HCPCS: Performed by: INTERNAL MEDICINE

## 2020-06-04 PROCEDURE — 85027 COMPLETE CBC AUTOMATED: CPT

## 2020-06-04 PROCEDURE — 99233 SBSQ HOSP IP/OBS HIGH 50: CPT | Performed by: INTERNAL MEDICINE

## 2020-06-04 RX ORDER — SODIUM CHLORIDE 450 MG/100ML
INJECTION, SOLUTION INTRAVENOUS CONTINUOUS
Status: DISCONTINUED | OUTPATIENT
Start: 2020-06-04 | End: 2020-06-05

## 2020-06-04 RX ORDER — METOPROLOL TARTRATE 5 MG/5ML
5 INJECTION INTRAVENOUS EVERY 6 HOURS
Status: DISCONTINUED | OUTPATIENT
Start: 2020-06-04 | End: 2020-06-04

## 2020-06-04 RX ORDER — METOPROLOL TARTRATE 50 MG/1
50 TABLET, FILM COATED ORAL 2 TIMES DAILY
Status: DISCONTINUED | OUTPATIENT
Start: 2020-06-04 | End: 2020-06-04

## 2020-06-04 RX ADMIN — METOPROLOL TARTRATE 5 MG: 1 INJECTION, SOLUTION INTRAVENOUS at 11:04

## 2020-06-04 RX ADMIN — FOLIC ACID: 5 INJECTION, SOLUTION INTRAMUSCULAR; INTRAVENOUS; SUBCUTANEOUS at 11:04

## 2020-06-04 RX ADMIN — MAGNESIUM SULFATE HEPTAHYDRATE 1 G: 1 INJECTION, SOLUTION INTRAVENOUS at 13:44

## 2020-06-04 RX ADMIN — CIPROFLOXACIN 400 MG: 2 INJECTION, SOLUTION INTRAVENOUS at 01:02

## 2020-06-04 RX ADMIN — CIPROFLOXACIN 400 MG: 2 INJECTION, SOLUTION INTRAVENOUS at 16:14

## 2020-06-04 RX ADMIN — SODIUM CHLORIDE: 4.5 INJECTION, SOLUTION INTRAVENOUS at 18:40

## 2020-06-04 RX ADMIN — LORAZEPAM 3 MG: 2 INJECTION INTRAMUSCULAR; INTRAVENOUS at 02:31

## 2020-06-04 RX ADMIN — ENOXAPARIN SODIUM 40 MG: 40 INJECTION SUBCUTANEOUS at 10:37

## 2020-06-04 RX ADMIN — METOPROLOL TARTRATE 25 MG: 25 TABLET, FILM COATED ORAL at 21:30

## 2020-06-04 RX ADMIN — MAGNESIUM SULFATE HEPTAHYDRATE 1 G: 1 INJECTION, SOLUTION INTRAVENOUS at 11:04

## 2020-06-04 RX ADMIN — LORAZEPAM 3 MG: 2 INJECTION INTRAMUSCULAR; INTRAVENOUS at 01:02

## 2020-06-04 RX ADMIN — POTASSIUM CHLORIDE: 149 INJECTION, SOLUTION, CONCENTRATE INTRAVENOUS at 18:37

## 2020-06-04 NOTE — PROGRESS NOTES
Physical Therapy  DATE: 2020    NAME: Schuyler Meigs  MRN: [id number]   : 1945    Patient not seen this date for Physical Therapy due to:  [] Blood transfusion in progress  [] Cancel by RN  [] Hemodialysis  []  Refusal by Patient   [] Spine Precautions   [] Strict Bedrest  [] Surgery  [] Testing      [x] Other: Patient asleep and difficult to rouse. RN reports he received ativan last night and unable to participate with therapies at this time. Will check back later as time allows. [] PT being discontinued at this time. Patient independent. No further needs. [] PT being discontinued at this time as the patient has been transferred to hospice care. No further needs.     Yessenia Davila, PTA

## 2020-06-04 NOTE — CONSULTS
Intake/Output Summary (Last 24 hours) at 6/4/2020 1453  Last data filed at 6/4/2020 0525  Gross per 24 hour   Intake 3557 ml   Output --   Net 3557 ml       Constitutional: distracted    Skin: Skin color, texture, turgor normal. No rashes or lesions    Head: Normocephalic, without obvious abnormality, atraumatic     Cardiovascular/Edema: regular rate and rhythm, S1, S2 normal, no murmur, click, rub or gallop    Respiratory: Lungs: clear to auscultation bilaterally    Abdomen: soft, non-tender; bowel sounds normal; no masses,  no organomegaly    Back: symmetric, no curvature. ROM normal. No CVA tenderness. Extremities: extremities normal, atraumatic, no cyanosis or edema    Neuro:  Grossly normal      CBC:   Recent Labs     06/02/20  1810 06/03/20  0507 06/04/20  0737   WBC 12.9* 8.2 9.6   HGB 14.8 15.4 15.6    256 224     BMP:    Recent Labs     06/02/20  1810  06/03/20  0507 06/03/20  1432 06/03/20  2134 06/04/20  0737   *   < > 134* 139 135 134*   K 3.7  --  3.8  --   --  3.5*   CL 89*  --  99  --   --  99   CO2 18*  --  24  --   --  22   BUN 15  --  14  --   --  6*   CREATININE 1.11  --  0.89  --   --  0.54*   GLUCOSE 169*  --  170*  --   --  149*    < > = values in this interval not displayed.        Lab Results   Component Value Date    NITRU NEGATIVE 06/02/2020    COLORU YELLOW 06/02/2020    PHUR 5.5 06/02/2020    WBCUA 5 TO 10 06/02/2020    RBCUA 0 TO 2 06/02/2020    MUCUS NOT REPORTED 06/02/2020    TRICHOMONAS NOT REPORTED 06/02/2020    YEAST NOT REPORTED 06/02/2020    BACTERIA NOT REPORTED 06/02/2020    SPECGRAV 1.002 06/02/2020    LEUKOCYTESUR NEGATIVE 06/02/2020    UROBILINOGEN Normal 06/02/2020    BILIRUBINUR NEGATIVE 06/02/2020    GLUCOSEU NEGATIVE 06/02/2020    KETUA TRACE 06/02/2020    AMORPHOUS NOT REPORTED 06/02/2020     Urine Sodium:     Lab Results   Component Value Date    GERBER 41 06/02/2020     Urine Osmolarity:   Lab Results   Component Value Date    OSMOU 159 06/02/2020

## 2020-06-04 NOTE — PROGRESS NOTES
During rounds Dr. Ann Stone gave orders for PO lopressor and d/c Norvasc. Pt would not open his mouth take PO meds. RN notified Dr. Ann Stone who ordered Lopressor IV.  See orders for details

## 2020-06-04 NOTE — PROGRESS NOTES
BUN 6 (L) 8 - 23 mg/dL    CREATININE 0.54 (L) 0.70 - 1.20 mg/dL    Bun/Cre Ratio NOT REPORTED 9 - 20    Calcium 8.8 8.6 - 10.4 mg/dL    Sodium 134 (L) 135 - 144 mmol/L    Potassium 3.5 (L) 3.7 - 5.3 mmol/L    Chloride 99 98 - 107 mmol/L    CO2 22 20 - 31 mmol/L    Anion Gap 13 9 - 17 mmol/L    GFR Non-African American >60 >60 mL/min    GFR African American >60 >60 mL/min    GFR Comment          GFR Staging NOT REPORTED    CBC    Collection Time: 06/04/20  7:37 AM   Result Value Ref Range    WBC 9.6 3.5 - 11.0 k/uL    RBC 5.12 4.5 - 5.9 m/uL    Hemoglobin 15.6 13.5 - 17.5 g/dL    Hematocrit 46.3 41 - 53 %    MCV 90.5 80 - 100 fL    MCH 30.5 26 - 34 pg    MCHC 33.7 31 - 37 g/dL    RDW 13.9 11.5 - 14.9 %    Platelets 304 082 - 706 k/uL    MPV 8.0 6.0 - 12.0 fL    NRBC Automated NOT REPORTED per 100 WBC   Magnesium    Collection Time: 06/04/20  7:37 AM   Result Value Ref Range    Magnesium 1.4 (L) 1.6 - 2.6 mg/dL     Recent Labs     06/02/20  1755 06/02/20  2300   POCGLU 175* 136*        Ct Head Wo Contrast    Result Date: 6/2/2020  EXAMINATION: CT OF THE HEAD WITHOUT CONTRAST  6/2/2020 7:08 pm TECHNIQUE: CT of the head was performed without the administration of intravenous contrast. Dose modulation, iterative reconstruction, and/or weight based adjustment of the mA/kV was utilized to reduce the radiation dose to as low as reasonably achievable. COMPARISON: None. HISTORY: ORDERING SYSTEM PROVIDED HISTORY: altered mental status TECHNOLOGIST PROVIDED HISTORY: altered mental status Reason for Exam: altered mental status Additional signs and symptoms: Per ED notes. Ron Olivo Per ems report, by 05/23/2019 standered called for person wandering around on the street FINDINGS: BRAIN/VENTRICLES: There is prominence of the ventricles and cortical sulci consistent with cortical volume loss. No midline shift. Basal cisterns are normally outlined.  There is periventricular and subcortical white matter discrete and confluent low attenuation, Labs:    Hematology:  Recent Labs     06/02/20 1810 06/03/20 0507 06/04/20  0737   WBC 12.9* 8.2 9.6   RBC 4.99 5.09 5.12   HGB 14.8 15.4 15.6   HCT 44.1 45.0 46.3   MCV 88.4 88.4 90.5   MCH 29.8 30.2 30.5   MCHC 33.6 34.2 33.7   RDW 13.5 13.8 13.9    256 224   MPV 8.2 8.3 8.0   SEGS 81*  --   --    LYMPHOPCT 5*  --   --    MONOPCT 14*  --   --    EOSRELPCT 0  --   --    BASOPCT 0  --   --    PROTIME  --  12.7  --    INR  --  1.0  --      Chemistry:  Recent Labs     06/02/20 1810 06/03/20 0507 06/03/20  1432 06/03/20  2134 06/04/20  0737   *   < > 134* 139 135 134*   K 3.7  --  3.8  --   --  3.5*   CL 89*  --  99  --   --  99   CO2 18*  --  24  --   --  22   GLUCOSE 169*  --  170*  --   --  149*   BUN 15  --  14  --   --  6*   CREATININE 1.11  --  0.89  --   --  0.54*   MG 1.3*  --  2.3  --   --  1.4*   ANIONGAP 17  --  11  --   --  13   LABGLOM >60  --  >60  --   --  >60   GFRAA >60  --  >60  --   --  >60   CALCIUM 9.6  --  9.2  --   --  8.8   PHOS  --   --  2.5  --   --   --    TROPHS 19  --   --   --   --   --     < > = values in this interval not displayed.      Recent Labs     06/02/20 1810 06/03/20  0507   PROT 7.1  --    LABALBU 4.1  --    EAG  --  151   TSH 3.44  --    AST 14  --    ALT 11  --    ALKPHOS 65  --    BILITOT 1.01  --    AMMONIA  --  24     Glucose:  Recent Labs     06/02/20  1755 06/02/20  2300 06/03/20  0507   LABA1C  --   --  6.9*   POCGLU 175* 136*  --          Lab Results   Component Value Date/Time    SPECIAL RT HAND 20MLS 03/15/2019 02:32 AM     Lab Results   Component Value Date/Time    CULTURE NO GROWTH 6 DAYS 03/15/2019 02:32 AM       Lab Results   Component Value Date    POCPH 7.534 03/22/2019    POCPCO2 28.6 03/22/2019    POCPO2 52.1 03/22/2019    POCHCO3 24.1 03/22/2019    NBEA NOT REPORTED 03/22/2019    PBEA 2 03/22/2019    PRF8HOO 25 03/22/2019    GGIL6FOV 91 03/22/2019    FIO2 21.0 03/22/2019       Radiology:        Physical Examination:      Delirium tremens  General appearance: Not alert oriented to time place and person  Mental Status: Sleeping  Lungs:  clear to auscultation bilaterally, normal effort  Heart:  regular rate and rhythm, no murmur  Abdomen:  soft, nontender, nondistended, normal bowel sounds, no masses, hepatomegaly, splenomegaly  Extremities:  no edema, redness, tenderness in the calves  Skin:  no gross lesions, rashes, induration    Assessment:        Primary Problem  Hyponatremia    Active Hospital Problems    Diagnosis Date Noted    Metabolic encephalopathy [C91.36] 06/03/2020    Hyponatremia [E87.1] 06/02/2020    Seizure ([facility] [address] [R56.9]     Hypomagnesemia [E83.42] 07/01/2017    Marijuana abuse [F12.10] 07/01/2017    Type 2 diabetes mellitus with hyperglycemia, without long-term current use of insulin ([facility] [address] [E11.65] 06/30/2017    Alcoholism ([facility] [address] [F10.20] 03/18/2015    Altered mental status [R41.82]        Plan:        Delirium tremens with alcohol withdrawal  [facility] protocol  Dementia with behavioral disturbance  Hyponatremia slowly improving  Hypokalemia hypomagnesemia due to alcohol abuse will replace  Very poor physical condition fall risk will need a placement for rehab    Sonu Odonnell MD  6/4/2020  1:10 PM

## 2020-06-04 NOTE — PROGRESS NOTES
[address]   OCCUPATIONAL THERAPY MISSED TREATMENT NOTE   INPATIENT   Date: 20  Patient Name: Saud Osborne       Room:   MRN: [id number]   Account #: [de-identified]    : 1945  (69 y.o.)  Gender: male   Referring Practitioner: Dr. Rin Graff  Diagnosis: Hyponatremia, confusion             REASON FOR MISSED TREATMENT:  Patient unable to participate   -   pt sleeping upon arrival. pt only opens eyes for a second when spoken to then closes them. Bita RUSS states that he was given a dose of Ativan overnight and is sleeping it off. will continue to follow.          Lakshmi Morning, EMERITA

## 2020-06-05 LAB
ANION GAP SERPL CALCULATED.3IONS-SCNC: 12 MMOL/L (ref 9–17)
BUN BLDV-MCNC: 6 MG/DL (ref 8–23)
BUN/CREAT BLD: ABNORMAL (ref 9–20)
CALCIUM SERPL-MCNC: 8.5 MG/DL (ref 8.6–10.4)
CHLORIDE BLD-SCNC: 99 MMOL/L (ref 98–107)
CO2: 22 MMOL/L (ref 20–31)
CREAT SERPL-MCNC: 0.69 MG/DL (ref 0.7–1.2)
GFR AFRICAN AMERICAN: >60 ML/MIN
GFR NON-AFRICAN AMERICAN: >60 ML/MIN
GFR SERPL CREATININE-BSD FRML MDRD: ABNORMAL ML/MIN/{1.73_M2}
GFR SERPL CREATININE-BSD FRML MDRD: ABNORMAL ML/MIN/{1.73_M2}
GLUCOSE BLD-MCNC: 166 MG/DL (ref 70–99)
HCT VFR BLD CALC: 44.8 % (ref 41–53)
HEMOGLOBIN: 15.4 G/DL (ref 13.5–17.5)
MCH RBC QN AUTO: 30.5 PG (ref 26–34)
MCHC RBC AUTO-ENTMCNC: 34.4 G/DL (ref 31–37)
MCV RBC AUTO: 88.8 FL (ref 80–100)
NRBC AUTOMATED: NORMAL PER 100 WBC
PDW BLD-RTO: 13.8 % (ref 11.5–14.9)
PLATELET # BLD: 222 K/UL (ref 150–450)
PMV BLD AUTO: 8.1 FL (ref 6–12)
POTASSIUM SERPL-SCNC: 3.8 MMOL/L (ref 3.7–5.3)
RBC # BLD: 5.05 M/UL (ref 4.5–5.9)
SODIUM BLD-SCNC: 131 MMOL/L (ref 135–144)
SODIUM BLD-SCNC: 131 MMOL/L (ref 135–144)
SODIUM BLD-SCNC: 133 MMOL/L (ref 135–144)
WBC # BLD: 7.3 K/UL (ref 3.5–11)

## 2020-06-05 PROCEDURE — 6360000002 HC RX W HCPCS: Performed by: INTERNAL MEDICINE

## 2020-06-05 PROCEDURE — 6370000000 HC RX 637 (ALT 250 FOR IP): Performed by: INTERNAL MEDICINE

## 2020-06-05 PROCEDURE — 97535 SELF CARE MNGMENT TRAINING: CPT

## 2020-06-05 PROCEDURE — 6360000002 HC RX W HCPCS: Performed by: NURSE PRACTITIONER

## 2020-06-05 PROCEDURE — 2060000000 HC ICU INTERMEDIATE R&B

## 2020-06-05 PROCEDURE — 2500000003 HC RX 250 WO HCPCS: Performed by: NURSE PRACTITIONER

## 2020-06-05 PROCEDURE — 85027 COMPLETE CBC AUTOMATED: CPT

## 2020-06-05 PROCEDURE — 36415 COLL VENOUS BLD VENIPUNCTURE: CPT

## 2020-06-05 PROCEDURE — 80048 BASIC METABOLIC PNL TOTAL CA: CPT

## 2020-06-05 PROCEDURE — 99232 SBSQ HOSP IP/OBS MODERATE 35: CPT | Performed by: INTERNAL MEDICINE

## 2020-06-05 PROCEDURE — 97530 THERAPEUTIC ACTIVITIES: CPT

## 2020-06-05 PROCEDURE — 84295 ASSAY OF SERUM SODIUM: CPT

## 2020-06-05 PROCEDURE — 2580000003 HC RX 258: Performed by: NURSE PRACTITIONER

## 2020-06-05 RX ORDER — SODIUM CHLORIDE 0.9 % (FLUSH) 0.9 %
10 SYRINGE (ML) INJECTION PRN
Status: DISCONTINUED | OUTPATIENT
Start: 2020-06-05 | End: 2020-06-10 | Stop reason: HOSPADM

## 2020-06-05 RX ORDER — LORAZEPAM 2 MG/ML
3 INJECTION INTRAMUSCULAR
Status: DISCONTINUED | OUTPATIENT
Start: 2020-06-05 | End: 2020-06-09

## 2020-06-05 RX ORDER — LORAZEPAM 1 MG/1
1 TABLET ORAL
Status: DISCONTINUED | OUTPATIENT
Start: 2020-06-05 | End: 2020-06-09

## 2020-06-05 RX ORDER — LORAZEPAM 1 MG/1
4 TABLET ORAL
Status: DISCONTINUED | OUTPATIENT
Start: 2020-06-05 | End: 2020-06-09

## 2020-06-05 RX ORDER — CHLORDIAZEPOXIDE HYDROCHLORIDE 25 MG/1
25 CAPSULE, GELATIN COATED ORAL 2 TIMES DAILY
Status: DISCONTINUED | OUTPATIENT
Start: 2020-06-05 | End: 2020-06-08

## 2020-06-05 RX ORDER — LORAZEPAM 1 MG/1
2 TABLET ORAL
Status: DISCONTINUED | OUTPATIENT
Start: 2020-06-05 | End: 2020-06-09

## 2020-06-05 RX ORDER — LORAZEPAM 2 MG/ML
4 INJECTION INTRAMUSCULAR
Status: DISCONTINUED | OUTPATIENT
Start: 2020-06-05 | End: 2020-06-09

## 2020-06-05 RX ORDER — LORAZEPAM 2 MG/ML
1 INJECTION INTRAMUSCULAR
Status: DISCONTINUED | OUTPATIENT
Start: 2020-06-05 | End: 2020-06-09

## 2020-06-05 RX ORDER — LORAZEPAM 2 MG/ML
2 INJECTION INTRAMUSCULAR
Status: DISCONTINUED | OUTPATIENT
Start: 2020-06-05 | End: 2020-06-09

## 2020-06-05 RX ORDER — LORAZEPAM 1 MG/1
3 TABLET ORAL
Status: DISCONTINUED | OUTPATIENT
Start: 2020-06-05 | End: 2020-06-09

## 2020-06-05 RX ORDER — SODIUM CHLORIDE 0.9 % (FLUSH) 0.9 %
10 SYRINGE (ML) INJECTION EVERY 12 HOURS SCHEDULED
Status: DISCONTINUED | OUTPATIENT
Start: 2020-06-05 | End: 2020-06-10 | Stop reason: HOSPADM

## 2020-06-05 RX ADMIN — METOPROLOL TARTRATE 25 MG: 25 TABLET, FILM COATED ORAL at 20:08

## 2020-06-05 RX ADMIN — CIPROFLOXACIN 400 MG: 2 INJECTION, SOLUTION INTRAVENOUS at 02:14

## 2020-06-05 RX ADMIN — LORAZEPAM 2 MG: 1 TABLET ORAL at 23:02

## 2020-06-05 RX ADMIN — METOPROLOL TARTRATE 25 MG: 25 TABLET, FILM COATED ORAL at 10:21

## 2020-06-05 RX ADMIN — LORAZEPAM 1 MG: 2 INJECTION INTRAMUSCULAR; INTRAVENOUS at 03:00

## 2020-06-05 RX ADMIN — ENOXAPARIN SODIUM 40 MG: 40 INJECTION SUBCUTANEOUS at 10:22

## 2020-06-05 RX ADMIN — CHLORDIAZEPOXIDE HYDROCHLORIDE 25 MG: 25 CAPSULE ORAL at 20:08

## 2020-06-05 RX ADMIN — FOLIC ACID 1 MG: 1 TABLET ORAL at 10:21

## 2020-06-05 RX ADMIN — THIAMINE HCL TAB 100 MG 100 MG: 100 TAB at 10:21

## 2020-06-05 RX ADMIN — CHLORDIAZEPOXIDE HYDROCHLORIDE 25 MG: 25 CAPSULE ORAL at 14:43

## 2020-06-05 RX ADMIN — FOLIC ACID: 5 INJECTION, SOLUTION INTRAMUSCULAR; INTRAVENOUS; SUBCUTANEOUS at 14:45

## 2020-06-05 NOTE — PROGRESS NOTES
mmol/L    CO2 22 20 - 31 mmol/L    Anion Gap 12 9 - 17 mmol/L    GFR Non-African American >60 >60 mL/min    GFR African American >60 >60 mL/min    GFR Comment          GFR Staging NOT REPORTED    CBC    Collection Time: 06/05/20  5:02 AM   Result Value Ref Range    WBC 7.3 3.5 - 11.0 k/uL    RBC 5.05 4.5 - 5.9 m/uL    Hemoglobin 15.4 13.5 - 17.5 g/dL    Hematocrit 44.8 41 - 53 %    MCV 88.8 80 - 100 fL    MCH 30.5 26 - 34 pg    MCHC 34.4 31 - 37 g/dL    RDW 13.8 11.5 - 14.9 %    Platelets 260 681 - 562 k/uL    MPV 8.1 6.0 - 12.0 fL    NRBC Automated NOT REPORTED per 100 WBC   SODIUM    Collection Time: 06/05/20  8:51 AM   Result Value Ref Range    Sodium 131 (L) 135 - 144 mmol/L     Recent Labs     06/02/20  1755 06/02/20  2300   POCGLU 175* 136*        Ct Head Wo Contrast    Result Date: 6/2/2020  EXAMINATION: CT OF THE HEAD WITHOUT CONTRAST  6/2/2020 7:08 pm TECHNIQUE: CT of the head was performed without the administration of intravenous contrast. Dose modulation, iterative reconstruction, and/or weight based adjustment of the mA/kV was utilized to reduce the radiation dose to as low as reasonably achievable. COMPARISON: None. HISTORY: ORDERING SYSTEM PROVIDED HISTORY: altered mental status TECHNOLOGIST PROVIDED HISTORY: altered mental status Reason for Exam: altered mental status Additional signs and symptoms: Per ED notes. Shira Weslaco Shira Weslaco Per ems report, by 05/23/2019 standered called for person wandering around on the street FINDINGS: BRAIN/VENTRICLES: There is prominence of the ventricles and cortical sulci consistent with cortical volume loss. No midline shift. Basal cisterns are normally outlined. There is periventricular and subcortical white matter discrete and confluent low attenuation, nonspecific, likely representing age-related chronic small vessel ischemic disease. There is no evidence for acute infarct. No acute intra or extra-axial hemorrhage.  ORBITS: The visualized portion of the orbits demonstrate no acute

## 2020-06-05 NOTE — CARE COORDINATION
SIMBA received a call back from Carlos, the patient's son and he reported that he had talked to his siblings and they were in agreement for a facility for rehabilitation. Carlos wanted to know if there were any facilities not too far away that do not have Covid in their building. SIMBA did tell him about [facility]. He wanted to do a little research and then call this SW back again. SIMBA received a call back again in a short time, Carlos did ask that I go in and see his sister who is up visiting her father. SIMBA stopped in to see her and she did ask some questions. SIMBA answered the questions as well as possible and encouraged her to call the facility to answer the ones that this SIMBA could not answer. David Buchanan, the patient's daughter then said that she was in agreement with [facility]. SIMBA faxed the referral to [facility] and followed up with Rhonda Jarvis in admissions via phone. SIMBA will continue to follow.

## 2020-06-05 NOTE — PROGRESS NOTES
[address]   INPATIENT OCCUPATIONAL THERAPY  PROGRESS NOTE  Date: 2020  Patient Name: Farzana Javed      Room:   MRN: [id number]    : 1945  (75 y.o.) Gender: male     Discharge Recommendations:  Further Occupational  Therapy is recommended upon facility discharge. Equipment Needed: (TBD)    Referring Practitioner: Dr. Gillian Spivey  Diagnosis: Hyponatremia, confusion  General  Chart Reviewed: Yes  Patient assessed for rehabilitation services?: Yes  Additional Pertinent Hx: Long history of ETOH abuse  Family / Caregiver Present: No  Referring Practitioner: Dr. Gillian Spivey  Diagnosis: Hyponatremia, confusion    Restrictions  Restrictions/Precautions: Fall Risk, Seizure(peripheral IV right antecubital, telesitter, pt had removed urinary catheter, alcoholic dementia)  Implants present? : Pacemaker, Metal implants(back surgery ? metal, hx craniotomy)  Other position/activity restrictions: up w/ assist  Required Braces or Orthoses?: No      Subjective  Subjective: \"I normally dip it in a cup of water. \"  Comments: Pt alert upon entry. Finishing breakfast tray ~11A; reviewing orientation pt stated it was 'ten til noon' correctly. Pt gown covered in food spillage; 2 coughs with phlegm  expelled noted. Pt presenting much more alert and engaged in functional tasks this date. Patient Currently in Pain: Denies  Overall Orientation Status: Impaired  Orientation Level: Oriented to person;Disoriented to place; Disoriented to time;Disoriented to situation(Oriented to self only.)          Objective  Cognition  Overall Cognitive Status: Impaired  Arousal/Alertness: Inconsistent responses to stimuli  Attention Span: Difficulty dividing attention  Memory: Decreased recall of biographical information;Decreased recall of recent events  Following Commands:  Follows one step commands with repetition  Safety Judgement: Decreased awareness of need for assistance  Awareness of Errors: Assistance required to

## 2020-06-05 NOTE — PROGRESS NOTES
Department of Internal Medicine  Nephrology Louie Horn MD   Progress Note    Interval history: Patient was seen and examined today and is slightly more awake and coherent. He denies nausea, vomiting or diarrhea. He does not have any headache or dizziness. History of presenting illness: This is a 69 y.o. male with a significant past medical history of Chronic alcohol abuse with alcoholic dementia, status post cerebral artery occlusion, type 2 diabetes mellitus and Systemic hypertension, who presented to the hospital with confusion after being found wandering the road by the public. At presentation, blood pressure was 173/92 mmHg on 6/2/2020. Laboratory studies were remarkable for serum sodium 124 mmol/L and hence nephrology consultation. Serum sodium however improved from 124 to 134 mmol/L over 12 hours. He was subsequently switched to 5% dextrose in water to temporize rate of correction of serum sodium. Today, he remains confused but does not appear to have any acute focal neurologic deficits. CT scan of the brain done on presentation showed no acute process but there was marked cerebral cortical atrophy noted. Physical Exam:    VITALS:  BP (!) 145/78   Pulse 80   Temp 98 °F (36.7 °C) (Axillary)   Resp 18   Ht 5' 7\" (1.702 m)   Wt 126 lb 12.2 oz (57.5 kg)   SpO2 97%   BMI 19.85 kg/m²   24HR INTAKE/OUTPUT:      Intake/Output Summary (Last 24 hours) at 6/5/2020 1239  Last data filed at 6/5/2020 0263  Gross per 24 hour   Intake 5609 ml   Output --   Net 5609 ml       Constitutional: distracted    Skin: Skin color, texture, turgor normal. No rashes or lesions    Head: Normocephalic, without obvious abnormality, atraumatic     Cardiovascular/Edema: regular rate and rhythm, S1, S2 normal, no murmur, click, rub or gallop    Respiratory: Lungs: clear to auscultation bilaterally    Abdomen: soft, non-tender; bowel sounds normal; no masses,  no organomegaly    Back: symmetric, no curvature.  ROM normal. No CVA tenderness. Extremities: extremities normal, atraumatic, no cyanosis or edema    Neuro:  Grossly normal      CBC:   Recent Labs     06/03/20  0507 06/04/20  0737 06/05/20  0502   WBC 8.2 9.6 7.3   HGB 15.4 15.6 15.4    224 222     BMP:    Recent Labs     06/03/20  0507  06/04/20  0737  06/05/20  0018 06/05/20  0502 06/05/20  0851   *   < > 134*   < > 131* 133* 131*   K 3.8  --  3.5*  --   --  3.8  --    CL 99  --  99  --   --  99  --    CO2 24  --  22  --   --  22  --    BUN 14  --  6*  --   --  6*  --    CREATININE 0.89  --  0.54*  --   --  0.69*  --    GLUCOSE 170*  --  149*  --   --  166*  --     < > = values in this interval not displayed. Lab Results   Component Value Date    NITRU NEGATIVE 06/02/2020    COLORU YELLOW 06/02/2020    PHUR 5.5 06/02/2020    WBCUA 5 TO 10 06/02/2020    RBCUA 0 TO 2 06/02/2020    MUCUS NOT REPORTED 06/02/2020    TRICHOMONAS NOT REPORTED 06/02/2020    YEAST NOT REPORTED 06/02/2020    BACTERIA NOT REPORTED 06/02/2020    SPECGRAV 1.002 06/02/2020    LEUKOCYTESUR NEGATIVE 06/02/2020    UROBILINOGEN Normal 06/02/2020    BILIRUBINUR NEGATIVE 06/02/2020    GLUCOSEU NEGATIVE 06/02/2020    KETUA TRACE 06/02/2020    AMORPHOUS NOT REPORTED 06/02/2020     Urine Sodium:     Lab Results   Component Value Date    GERBER 41 06/02/2020     Urine Osmolarity:   Lab Results   Component Value Date    OSMOU 159 06/02/2020     Urine Creatinine:     Lab Results   Component Value Date    LABCREA 41.8 03/21/2019     IMPRESSION/RECOMMENDATIONS:      1. Hyponatremia- Acute as his serum sodium was 139 mmol/L at the last check on 4/15/2019. This may be due to beer potomania. Serum sodium is stable today at 131 to 133 mmol/L. Plan: Discontinue IV fluid. Fluid restriction 1500 mL/day. Check serum sodium level every 8 hours. 2.  Systemic hypertension - Controlled on Metoprolol 25 mg p.o. twice daily.     Audrey Lewis MD FACP  Attending Nephrologist  6/5/2020 12:39 PM

## 2020-06-06 LAB
ANION GAP SERPL CALCULATED.3IONS-SCNC: 10 MMOL/L (ref 9–17)
BUN BLDV-MCNC: 8 MG/DL (ref 8–23)
BUN/CREAT BLD: ABNORMAL (ref 9–20)
CALCIUM SERPL-MCNC: 8.5 MG/DL (ref 8.6–10.4)
CHLORIDE BLD-SCNC: 102 MMOL/L (ref 98–107)
CO2: 23 MMOL/L (ref 20–31)
CREAT SERPL-MCNC: 0.73 MG/DL (ref 0.7–1.2)
GFR AFRICAN AMERICAN: >60 ML/MIN
GFR NON-AFRICAN AMERICAN: >60 ML/MIN
GFR SERPL CREATININE-BSD FRML MDRD: ABNORMAL ML/MIN/{1.73_M2}
GFR SERPL CREATININE-BSD FRML MDRD: ABNORMAL ML/MIN/{1.73_M2}
GLUCOSE BLD-MCNC: 156 MG/DL (ref 70–99)
GLUCOSE BLD-MCNC: 194 MG/DL (ref 75–110)
HCT VFR BLD CALC: 42.9 % (ref 41–53)
HEMOGLOBIN: 14.4 G/DL (ref 13.5–17.5)
MCH RBC QN AUTO: 30 PG (ref 26–34)
MCHC RBC AUTO-ENTMCNC: 33.6 G/DL (ref 31–37)
MCV RBC AUTO: 89.2 FL (ref 80–100)
NRBC AUTOMATED: NORMAL PER 100 WBC
PDW BLD-RTO: 14.3 % (ref 11.5–14.9)
PLATELET # BLD: 228 K/UL (ref 150–450)
PMV BLD AUTO: 8.1 FL (ref 6–12)
POTASSIUM SERPL-SCNC: 4 MMOL/L (ref 3.7–5.3)
RBC # BLD: 4.8 M/UL (ref 4.5–5.9)
SODIUM BLD-SCNC: 135 MMOL/L (ref 135–144)
VITAMIN B1 WHOLE BLOOD: 150 NMOL/L (ref 70–180)
WBC # BLD: 6.7 K/UL (ref 3.5–11)

## 2020-06-06 PROCEDURE — 36415 COLL VENOUS BLD VENIPUNCTURE: CPT

## 2020-06-06 PROCEDURE — 97116 GAIT TRAINING THERAPY: CPT

## 2020-06-06 PROCEDURE — 99232 SBSQ HOSP IP/OBS MODERATE 35: CPT | Performed by: INTERNAL MEDICINE

## 2020-06-06 PROCEDURE — 2580000003 HC RX 258: Performed by: INTERNAL MEDICINE

## 2020-06-06 PROCEDURE — 6360000002 HC RX W HCPCS: Performed by: NURSE PRACTITIONER

## 2020-06-06 PROCEDURE — 82947 ASSAY GLUCOSE BLOOD QUANT: CPT

## 2020-06-06 PROCEDURE — 85027 COMPLETE CBC AUTOMATED: CPT

## 2020-06-06 PROCEDURE — 80048 BASIC METABOLIC PNL TOTAL CA: CPT

## 2020-06-06 PROCEDURE — 2060000000 HC ICU INTERMEDIATE R&B

## 2020-06-06 PROCEDURE — 6370000000 HC RX 637 (ALT 250 FOR IP): Performed by: INTERNAL MEDICINE

## 2020-06-06 PROCEDURE — 97110 THERAPEUTIC EXERCISES: CPT

## 2020-06-06 RX ADMIN — CHLORDIAZEPOXIDE HYDROCHLORIDE 25 MG: 25 CAPSULE ORAL at 20:40

## 2020-06-06 RX ADMIN — CHLORDIAZEPOXIDE HYDROCHLORIDE 25 MG: 25 CAPSULE ORAL at 08:22

## 2020-06-06 RX ADMIN — THIAMINE HCL TAB 100 MG 100 MG: 100 TAB at 08:21

## 2020-06-06 RX ADMIN — ENOXAPARIN SODIUM 40 MG: 40 INJECTION SUBCUTANEOUS at 08:21

## 2020-06-06 RX ADMIN — LORAZEPAM 2 MG: 1 TABLET ORAL at 20:47

## 2020-06-06 RX ADMIN — LORAZEPAM 2 MG: 1 TABLET ORAL at 18:34

## 2020-06-06 RX ADMIN — METOPROLOL TARTRATE 25 MG: 25 TABLET, FILM COATED ORAL at 20:40

## 2020-06-06 RX ADMIN — FOLIC ACID 1 MG: 1 TABLET ORAL at 08:21

## 2020-06-06 RX ADMIN — Medication 10 ML: at 20:55

## 2020-06-06 RX ADMIN — Medication 10 ML: at 08:22

## 2020-06-06 RX ADMIN — METOPROLOL TARTRATE 25 MG: 25 TABLET, FILM COATED ORAL at 08:21

## 2020-06-06 ASSESSMENT — PAIN SCALES - GENERAL
PAINLEVEL_OUTOF10: 0

## 2020-06-06 NOTE — PROGRESS NOTES
Physical Therapy  Facility/Department: [facility] PROGRESSIVE CARE  Daily Treatment Note  NAME: Jordy Fowler  : 1945  MRN: [id number]    Date of Service: 2020    Discharge Recommendations:  Patient would benefit from continued therapy after discharge   PT Equipment Recommendations  Equipment Needed: Yes(TBD ? w walker)    Assessment      REQUIRES PT FOLLOW UP: Yes  Activity Tolerance  Activity Tolerance: Patient limited by cognitive status     Patient Diagnosis(es): The primary encounter diagnosis was Altered mental status, unspecified altered mental status type. Diagnoses of Hyponatremia and Hypomagnesemia were also pertinent to this visit. has a past medical history of Alcoholic dementia ([facility] [address] Back pain, Cerebral artery occlusion with cerebral infarction ([facility] [address] Diabetes mellitus ([facility] [address] Head injury, Hearing loss, Hypertension, TIA (transient ischemic attack), Transient ischemic attack, and Ulcer. has a past surgical history that includes back surgery; Pacemaker insertion; eye surgery (0169-2039); brain surgery; Appendectomy; craniotomy (Right, 3/9/2019); and Upper gastrointestinal endoscopy (N/A, 3/14/2019). Restrictions  Restrictions/Precautions  Restrictions/Precautions: Fall Risk, Seizure(peripheral IV right antecubital, sitter, pt had removed urinary catheter, alcoholic dementia)  Required Braces or Orthoses?: No  Implants present? : Pacemaker, Metal implants(back surgery ? metal, hx craniotomy)  Position Activity Restriction  Other position/activity restrictions: up w/ assist  Subjective   General  Additional Pertinent Hx: HPI 69 y.o. male was brought to the emergency department for evaluation of confusion. Apparently per EMS report, bystanders called EMS for a person walking around the street. EMS found the patient confused. Viewing the record the patient has a long history of alcohol abuse.   He has been diagnosed with alcoholic dementia, he has chronic subdurals, he is also sustained subarachnoid hemorrhages in the past.  He has a history of seizure disorder, DTs, and malnutrition. Response To Previous Treatment: Not applicable  Family / Caregiver Present: No  Referring Practitioner: Natacha Cummins CNP  Subjective  Subjective: Pt positioned semifowlers in bed upoe arrival, agreeable to tx. General Comment  Comments: JEB Peralta ok'd tx, PCA present as a 1:1 sitter  Pain Screening  Patient Currently in Pain: Denies  Vital Signs  Patient Currently in Pain: Denies       Orientation  Orientation  Overall Orientation Status: Impaired(President: Shaina Fenton  birthdate:  [birth year], unable to answer Place or date)  Orientation Level: Oriented to person;Disoriented to time;Disoriented to situation;Disoriented to place(alcoholic dementia)  Cognition      Objective   Bed mobility  Rolling to Left: Minimal assistance  Rolling to Right: Minimal assistance  Supine to Sit: Stand by assistance(HOB elevated. No use of UE)  Sit to Supine: Stand by assistance  Scooting: Supervision(supervision to scoot across the bed but max x 2 to boost up in bed at the end of treatment)  Comment: Pt completes Bed mobility with air mattress inflated. SBA for safety. Transfers  Sit to Stand: Stand by assistance  Stand to sit: Stand by assistance  Comment: Pt completes transfer with safe hand placement and upright posture  Ambulation  Ambulation?: Yes  Ambulation 1  Surface: level tile  Device: Rolling Walker  Assistance: Contact guard assistance  Gait Deviations: Deviated path  Distance: 55ft with 360 turn  Comments: Pt fast paced, decreased safety awareness, poor RW alignment with constant VC  to correct.    Stairs/Curb  Stairs?: No     Balance  Sitting - Static: Good;-  Sitting - Dynamic: Fair;+  Standing - Static: Fair;+  Standing - Dynamic: Fair;-  Comments: Seated   Other exercises  Other exercises 1: Seated B LE: Ankle pumps x20, LAQ x10, Marching x10   Other exercises 2: EOB dangle on air mattress ~ 15mins    Goals  Short term

## 2020-06-06 NOTE — PROGRESS NOTES
Tele sitter discontinued and switched to a one on one d/t pt not being redirectable. Pt consistently tried to get out of the bed and pulled out IV and heart monitor removed over and over. Pt seeing things and is a fall risk and safety risk to self.

## 2020-06-06 NOTE — PROGRESS NOTES
Department of Internal Medicine  Nephrology [facility], MD   Progress Note    Interval history:   Patient was seen and examined today patient is awake alert oriented little bit confused eating dinner he denies nausea, vomiting or diarrhea. He does not have any headache or dizziness. History of presenting illness: This is a 69 y.o. male with a significant past medical history of Chronic alcohol abuse with alcoholic dementia, status post cerebral artery occlusion, type 2 diabetes mellitus and Systemic hypertension, who presented to the hospital with confusion after being found wandering the road by the public. At presentation, blood pressure was 173/92 mmHg on 6/2/2020. Laboratory studies were remarkable for serum sodium 124 mmol/L and hence nephrology consultation. Serum sodium however improved from 124 to 134 mmol/L over 12 hours. He was subsequently switched to 5% dextrose in water to temporize rate of correction of serum sodium. Today, he remains confused but does not appear to have any acute focal neurologic deficits. CT scan of the brain done on presentation showed no acute process but there was marked cerebral cortical atrophy noted.     Physical Exam:    VITALS:  BP (!) 156/86   Pulse 69   Temp 98.4 °F (36.9 °C) (Oral)   Resp 18   Ht 5' 7\" (1.702 m)   Wt 126 lb 12.2 oz (57.5 kg)   SpO2 95%   BMI 19.85 kg/m²   24HR INTAKE/OUTPUT:      Intake/Output Summary (Last 24 hours) at 6/6/2020 1815  Last data filed at 6/6/2020 1300  Gross per 24 hour   Intake 600 ml   Output --   Net 600 ml       Constitutional: distracted    Skin: Skin color, texture, turgor normal. No rashes or lesions    Head: Normocephalic, without obvious abnormality, atraumatic     Cardiovascular/Edema: regular rate and rhythm, S1, S2 normal, no murmur, click, rub or gallop    Respiratory: Lungs: clear to auscultation bilaterally    Abdomen: soft, non-tender; bowel sounds normal; no masses,  no organomegaly    Back:

## 2020-06-06 NOTE — CARE COORDINATION
DISCHARGE PLANNING NOTE:    Plan is for this patient to go to SNF [facility]. LSW following and referral was made. Will require precert   PT/OT recs SNF.     [facility] following for when patient returns to home. On Librium and CIWA. Will continue to follow along.      Electronically signed by Karen Savage RN on 6/6/2020 at 1:10 PM

## 2020-06-06 NOTE — FLOWSHEET NOTE
06/05/20 2211   Provider Notification   Reason for Communication New orders   Provider Name dr Robert Boss   Provider Notification Physician   Method of Communication Call   Response See orders  (ciwa protocol ordered)   Notification Time 2210     Updated Dr Bree Qureshi on the need for the CIWA protocol/Ativan d/t pt increasingly seeing things and unable to redirect. CIWA currently 13.

## 2020-06-07 LAB
ANION GAP SERPL CALCULATED.3IONS-SCNC: 12 MMOL/L (ref 9–17)
BUN BLDV-MCNC: 12 MG/DL (ref 8–23)
BUN/CREAT BLD: ABNORMAL (ref 9–20)
CALCIUM SERPL-MCNC: 8.6 MG/DL (ref 8.6–10.4)
CHLORIDE BLD-SCNC: 102 MMOL/L (ref 98–107)
CO2: 23 MMOL/L (ref 20–31)
CREAT SERPL-MCNC: 0.75 MG/DL (ref 0.7–1.2)
GFR AFRICAN AMERICAN: >60 ML/MIN
GFR NON-AFRICAN AMERICAN: >60 ML/MIN
GFR SERPL CREATININE-BSD FRML MDRD: ABNORMAL ML/MIN/{1.73_M2}
GFR SERPL CREATININE-BSD FRML MDRD: ABNORMAL ML/MIN/{1.73_M2}
GLUCOSE BLD-MCNC: 178 MG/DL (ref 70–99)
HCT VFR BLD CALC: 42.1 % (ref 41–53)
HEMOGLOBIN: 14 G/DL (ref 13.5–17.5)
MCH RBC QN AUTO: 29.7 PG (ref 26–34)
MCHC RBC AUTO-ENTMCNC: 33.3 G/DL (ref 31–37)
MCV RBC AUTO: 89.2 FL (ref 80–100)
NRBC AUTOMATED: NORMAL PER 100 WBC
PDW BLD-RTO: 14.1 % (ref 11.5–14.9)
PLATELET # BLD: 231 K/UL (ref 150–450)
PMV BLD AUTO: 8.3 FL (ref 6–12)
POTASSIUM SERPL-SCNC: 4.1 MMOL/L (ref 3.7–5.3)
RBC # BLD: 4.72 M/UL (ref 4.5–5.9)
SODIUM BLD-SCNC: 137 MMOL/L (ref 135–144)
WBC # BLD: 6 K/UL (ref 3.5–11)

## 2020-06-07 PROCEDURE — 6360000002 HC RX W HCPCS: Performed by: NURSE PRACTITIONER

## 2020-06-07 PROCEDURE — 6370000000 HC RX 637 (ALT 250 FOR IP): Performed by: INTERNAL MEDICINE

## 2020-06-07 PROCEDURE — 36415 COLL VENOUS BLD VENIPUNCTURE: CPT

## 2020-06-07 PROCEDURE — 85027 COMPLETE CBC AUTOMATED: CPT

## 2020-06-07 PROCEDURE — 2580000003 HC RX 258: Performed by: INTERNAL MEDICINE

## 2020-06-07 PROCEDURE — 99232 SBSQ HOSP IP/OBS MODERATE 35: CPT | Performed by: INTERNAL MEDICINE

## 2020-06-07 PROCEDURE — 80048 BASIC METABOLIC PNL TOTAL CA: CPT

## 2020-06-07 PROCEDURE — 1200000000 HC SEMI PRIVATE

## 2020-06-07 RX ADMIN — THIAMINE HCL TAB 100 MG 100 MG: 100 TAB at 08:45

## 2020-06-07 RX ADMIN — FOLIC ACID 1 MG: 1 TABLET ORAL at 08:45

## 2020-06-07 RX ADMIN — CHLORDIAZEPOXIDE HYDROCHLORIDE 25 MG: 25 CAPSULE ORAL at 21:00

## 2020-06-07 RX ADMIN — METOPROLOL TARTRATE 25 MG: 25 TABLET, FILM COATED ORAL at 21:00

## 2020-06-07 RX ADMIN — Medication 10 ML: at 08:50

## 2020-06-07 RX ADMIN — ENOXAPARIN SODIUM 40 MG: 40 INJECTION SUBCUTANEOUS at 08:45

## 2020-06-07 RX ADMIN — METOPROLOL TARTRATE 25 MG: 25 TABLET, FILM COATED ORAL at 08:45

## 2020-06-07 RX ADMIN — Medication 10 ML: at 21:05

## 2020-06-07 RX ADMIN — CHLORDIAZEPOXIDE HYDROCHLORIDE 25 MG: 25 CAPSULE ORAL at 08:45

## 2020-06-07 ASSESSMENT — PAIN SCALES - GENERAL
PAINLEVEL_OUTOF10: 0

## 2020-06-07 NOTE — PROGRESS NOTES
pain no leg pain no trouble breathing no new weakness    Medications: Allergies: Allergies   Allergen Reactions    Celebrex [Celecoxib] Swelling       Current Meds:   Scheduled Meds:    chlordiazePOXIDE  25 mg Oral BID    sodium chloride flush  10 mL Intravenous 2 times per day    metoprolol tartrate  25 mg Oral BID    thiamine  100 mg Oral Daily    folic acid  1 mg Oral Daily    enoxaparin  40 mg Subcutaneous Daily     Continuous Infusions:     PRN Meds: sodium chloride flush, LORazepam **OR** LORazepam **OR** LORazepam **OR** LORazepam **OR** LORazepam **OR** LORazepam **OR** LORazepam **OR** LORazepam, potassium chloride **OR** potassium alternative oral replacement **OR** potassium chloride, magnesium sulfate, [DISCONTINUED] acetaminophen **OR** acetaminophen, polyethylene glycol, promethazine **OR** ondansetron    Data:     Past Medical History:  no change     Social History:  no change    Family History: @no change    Vitals:      I/O (24Hr):     Intake/Output Summary (Last 24 hours) at 6/7/2020 1308  Last data filed at 6/7/2020 1250  Gross per 24 hour   Intake 1060 ml   Output --   Net 1060 ml       Labs:    Hematology:  Recent Labs     06/04/20  1513 06/05/20  0502 06/06/20  0523 06/07/20  0512   WBC  --  7.3 6.7 6.0   RBC  --  5.05 4.80 4.72   HGB  --  15.4 14.4 14.0   HCT  --  44.8 42.9 42.1   MCV  --  88.8 89.2 89.2   MCH  --  30.5 30.0 29.7   MCHC  --  34.4 33.6 33.3   RDW  --  13.8 14.3 14.1   PLT  --  222 228 231   MPV  --  8.1 8.1 8.3   URICACID 5.5  --   --   --      Chemistry:  Recent Labs     06/05/20  0502 06/05/20  0851 06/06/20  0523 06/07/20  0512   * 131* 135 137   K 3.8  --  4.0 4.1   CL 99  --  102 102   CO2 22  --  23 23   GLUCOSE 166*  --  156* 178*   BUN 6*  --  8 12   CREATININE 0.69*  --  0.73 0.75   ANIONGAP 12  --  10 12   LABGLOM >60  --  >60 >60   GFRAA >60  --  >60 >60   CALCIUM 8.5*  --  8.5* 8.6     No results for input(s): PROT, LABALBU, EAG, R8QDKEI, H0FVDYV, due to alcohol abuse will replace  nephro input noted  meds rev labs rev  meds adjusted  Very poor physical condition fall risk will need a placement for rehab      Fuad Cobos MD  6/7/2020  1:08 PM

## 2020-06-07 NOTE — CARE COORDINATION
DISCHARGE PLANNING NOTE:    Plan is for this patient to go to [facility]. LSW following and a referral was made    A precert is required which has not been started yet. On CIWA, librium, DT's. PT/OT recs SNF. Will continue to follow along.      Electronically signed by Marci Poe RN on 6/7/2020 at 2:21 PM

## 2020-06-08 LAB
ANION GAP SERPL CALCULATED.3IONS-SCNC: 11 MMOL/L (ref 9–17)
BUN BLDV-MCNC: 13 MG/DL (ref 8–23)
BUN/CREAT BLD: ABNORMAL (ref 9–20)
CALCIUM SERPL-MCNC: 9.2 MG/DL (ref 8.6–10.4)
CHLORIDE BLD-SCNC: 99 MMOL/L (ref 98–107)
CHLORIDE, UR: 49 MMOL/L
CO2: 25 MMOL/L (ref 20–31)
CREAT SERPL-MCNC: 0.7 MG/DL (ref 0.7–1.2)
GFR AFRICAN AMERICAN: >60 ML/MIN
GFR NON-AFRICAN AMERICAN: >60 ML/MIN
GFR SERPL CREATININE-BSD FRML MDRD: ABNORMAL ML/MIN/{1.73_M2}
GFR SERPL CREATININE-BSD FRML MDRD: ABNORMAL ML/MIN/{1.73_M2}
GLUCOSE BLD-MCNC: 352 MG/DL (ref 70–99)
HCT VFR BLD CALC: 44.5 % (ref 41–53)
HEMOGLOBIN: 14.8 G/DL (ref 13.5–17.5)
MCH RBC QN AUTO: 29.7 PG (ref 26–34)
MCHC RBC AUTO-ENTMCNC: 33.2 G/DL (ref 31–37)
MCV RBC AUTO: 89.4 FL (ref 80–100)
NRBC AUTOMATED: NORMAL PER 100 WBC
OSMOLALITY URINE: 651 MOSM/KG (ref 80–1300)
PDW BLD-RTO: 14.4 % (ref 11.5–14.9)
PLATELET # BLD: 273 K/UL (ref 150–450)
PMV BLD AUTO: 8.6 FL (ref 6–12)
POTASSIUM SERPL-SCNC: 4.1 MMOL/L (ref 3.7–5.3)
RBC # BLD: 4.98 M/UL (ref 4.5–5.9)
SODIUM BLD-SCNC: 135 MMOL/L (ref 135–144)
SODIUM,UR: 56 MMOL/L
WBC # BLD: 8.3 K/UL (ref 3.5–11)

## 2020-06-08 PROCEDURE — 82436 ASSAY OF URINE CHLORIDE: CPT

## 2020-06-08 PROCEDURE — 6370000000 HC RX 637 (ALT 250 FOR IP): Performed by: INTERNAL MEDICINE

## 2020-06-08 PROCEDURE — 6360000002 HC RX W HCPCS: Performed by: NURSE PRACTITIONER

## 2020-06-08 PROCEDURE — 99232 SBSQ HOSP IP/OBS MODERATE 35: CPT | Performed by: INTERNAL MEDICINE

## 2020-06-08 PROCEDURE — 2580000003 HC RX 258: Performed by: INTERNAL MEDICINE

## 2020-06-08 PROCEDURE — 1200000000 HC SEMI PRIVATE

## 2020-06-08 PROCEDURE — 97116 GAIT TRAINING THERAPY: CPT

## 2020-06-08 PROCEDURE — 36415 COLL VENOUS BLD VENIPUNCTURE: CPT

## 2020-06-08 PROCEDURE — 85027 COMPLETE CBC AUTOMATED: CPT

## 2020-06-08 PROCEDURE — 84300 ASSAY OF URINE SODIUM: CPT

## 2020-06-08 PROCEDURE — 80048 BASIC METABOLIC PNL TOTAL CA: CPT

## 2020-06-08 PROCEDURE — 83935 ASSAY OF URINE OSMOLALITY: CPT

## 2020-06-08 PROCEDURE — 97110 THERAPEUTIC EXERCISES: CPT

## 2020-06-08 PROCEDURE — 97535 SELF CARE MNGMENT TRAINING: CPT

## 2020-06-08 RX ORDER — CHLORDIAZEPOXIDE HYDROCHLORIDE 5 MG/1
10 CAPSULE, GELATIN COATED ORAL 3 TIMES DAILY
Status: DISCONTINUED | OUTPATIENT
Start: 2020-06-08 | End: 2020-06-09

## 2020-06-08 RX ORDER — LISINOPRIL 10 MG/1
10 TABLET ORAL DAILY
Status: DISCONTINUED | OUTPATIENT
Start: 2020-06-08 | End: 2020-06-10 | Stop reason: HOSPADM

## 2020-06-08 RX ADMIN — LISINOPRIL 10 MG: 10 TABLET ORAL at 13:46

## 2020-06-08 RX ADMIN — LORAZEPAM 1 MG: 1 TABLET ORAL at 13:46

## 2020-06-08 RX ADMIN — CHLORDIAZEPOXIDE HYDROCHLORIDE 10 MG: 5 CAPSULE ORAL at 22:10

## 2020-06-08 RX ADMIN — LORAZEPAM 1 MG: 1 TABLET ORAL at 08:01

## 2020-06-08 RX ADMIN — Medication 10 ML: at 07:30

## 2020-06-08 RX ADMIN — CHLORDIAZEPOXIDE HYDROCHLORIDE 25 MG: 25 CAPSULE ORAL at 08:04

## 2020-06-08 RX ADMIN — THIAMINE HCL TAB 100 MG 100 MG: 100 TAB at 08:04

## 2020-06-08 RX ADMIN — FOLIC ACID 1 MG: 1 TABLET ORAL at 08:04

## 2020-06-08 RX ADMIN — CHLORDIAZEPOXIDE HYDROCHLORIDE 10 MG: 5 CAPSULE ORAL at 13:46

## 2020-06-08 RX ADMIN — Medication 10 ML: at 22:13

## 2020-06-08 RX ADMIN — METOPROLOL TARTRATE 25 MG: 25 TABLET, FILM COATED ORAL at 22:10

## 2020-06-08 RX ADMIN — ENOXAPARIN SODIUM 40 MG: 40 INJECTION SUBCUTANEOUS at 08:01

## 2020-06-08 RX ADMIN — METOPROLOL TARTRATE 25 MG: 25 TABLET, FILM COATED ORAL at 08:05

## 2020-06-08 ASSESSMENT — PAIN DESCRIPTION - LOCATION
LOCATION: ABDOMEN
LOCATION: BACK

## 2020-06-08 ASSESSMENT — PAIN SCALES - GENERAL: PAINLEVEL_OUTOF10: 0

## 2020-06-08 ASSESSMENT — PAIN DESCRIPTION - PAIN TYPE: TYPE: ACUTE PAIN

## 2020-06-08 ASSESSMENT — PAIN DESCRIPTION - ORIENTATION
ORIENTATION: LOWER
ORIENTATION: LEFT;LOWER

## 2020-06-08 NOTE — CARE COORDINATION
DISCHARGE PLANNING NOTE:    Writer reviewed chart. LSW following for SNF- [facility] with pre-cert started today. Pt has been accepted as long as he has no behavioral issues and is tele-sitter free for 48 hours. [facility] following for home services. Na improved at 135. Active order for PO Librium and Ativan per NII. Will continue to follow for additional discharge needs.     Electronically signed by Carmen Lazcano RN on 6/8/2020 at 2:53 PM

## 2020-06-08 NOTE — PROGRESS NOTES
Pt confused, and disoriented  X 3  Only oriented to person at this time  Bed alarm on  One on one cont.  At bedside

## 2020-06-08 NOTE — PROGRESS NOTES
Physical Therapy  Facility/Department: [facility] MED SURG  Daily Treatment Note  NAME: Latisha Rowley  : 1945  MRN: [id number]    Date of Service: 2020    Discharge Recommendations:  Patient would benefit from continued therapy after discharge   PT Equipment Recommendations  Equipment Needed: Yes(TBD ? w walker)    Assessment      REQUIRES PT FOLLOW UP: Yes  Activity Tolerance  Activity Tolerance: Patient Tolerated treatment well     Patient Diagnosis(es): The primary encounter diagnosis was Altered mental status, unspecified altered mental status type. Diagnoses of Hyponatremia and Hypomagnesemia were also pertinent to this visit. has a past medical history of Alcoholic dementia ([address] Back pain, Cerebral artery occlusion with cerebral infarction ([address] Diabetes mellitus ([address] Head injury, Hearing loss, Hypertension, TIA (transient ischemic attack), Transient ischemic attack, and Ulcer. has a past surgical history that includes back surgery; Pacemaker insertion; eye surgery (6415-2756); brain surgery; Appendectomy; craniotomy (Right, 3/9/2019); and Upper gastrointestinal endoscopy (N/A, 3/14/2019). Restrictions  Restrictions/Precautions  Restrictions/Precautions: Fall Risk, Seizure(peripheral IV right antecubital, telesitter, pt had removed urinary catheter, alcoholic dementia)  Required Braces or Orthoses?: No  Implants present? : Pacemaker, Metal implants(back surgery ? metal, hx craniotomy)  Position Activity Restriction  Other position/activity restrictions: up w/ assist  Subjective   General  Additional Pertinent Hx: HPI 69 y.o. male was brought to the emergency department for evaluation of confusion. Apparently per EMS report, bystanders called EMS for a person walking around the street. EMS found the patient confused. Viewing the record the patient has a long history of alcohol abuse.   He has been diagnosed with alcoholic dementia, he has chronic subdurals, he is also sustained subarachnoid goals  Time Frame for Short term goals: 5-7 treatments/ week  Short term goal 1: pt to tolerate 1/2 hour of therapuetic exercise and activity  Short term goal 2: pt to follow directions accurately 50% of the time to complete balance activities and strengthening exercises w/ good technique  Short term goal 3: pt to demonstrate independent rolling in bed using the rail for position change  Short term goal 4: pt to demonstrate  transfers supine <> sit w/ min x 1  Short term goal 5: pt to demonstrate fair + or better  sitting balance and the ability to dangle at the EOB x 20 minutes w/ CGA x 1   Short term goal 6: pt to demonstrate ability to pull to stand on the Leora Stedy w/ min x 2 maintaining balance and knee control x 3 minutes  Short term goal 7: pt to demonstrate sit <> stand and bed <> chair transfers using least restrictive device (? w walker) w/ min x 2 when pt can demonstrate knee control on the Adriana Settle  Short term goal 8: pt to demonstrate gait 20-30'  using least restrictive device (? w walker) w/ min x 2 w/ W/C follow when pt can demonstrate knee control on the [employer]  Short term goal 9: pt to demonstrate fair standing & dynamic balance using least restrictive device (? w walker) w/ min x 2  Patient Goals   Patient goals : too confused to state a goal    Plan    Plan  Times per week: 5-7 treatments/ week  Safety Devices  Type of devices:  All fall risk precautions in place, Gait belt, Patient at risk for falls, Left in bed, Sitter present     Therapy Time         06/08/20 1015   PT Individual Minutes   Time In 0942   Time Out 1005   Minutes [address] [place] [address]

## 2020-06-08 NOTE — PROGRESS NOTES
Metabolic encephalopathy [X19.37] 06/03/2020    Hyponatremia [E87.1] 06/02/2020    Seizure ([facility] [R56.9]     Hypomagnesemia [E83.42] 07/01/2017    Marijuana abuse [F12.10] 07/01/2017    Type 2 diabetes mellitus with hyperglycemia, without long-term current use of insulin ([facility] [E11.65] 06/30/2017    Alcoholism ([facility] [F10.20] 03/18/2015    Altered mental status [R41.82]        Plan:        1. Metabolic encephalopathy due to alcohol withdrawal, hyponatremia, improved  2. History of alcoholism, patient was in DTs, on CIWA protocol, Librium, thiamine, folic acid, multivitamin  3. Had episode of tachycardia, Norvasc discontinued by cardiologist, heart rate controlled  4. On DVT prophylaxis Lovenox,   5. ht starting patient lisinopril 10 mg n - blood pressure running high,   6. Ordering electrolytes, patient will need placement.     Riddhi Dobbins MD  6/8/2020  11:02 AM

## 2020-06-08 NOTE — PROGRESS NOTES
while completing a functional task. Short term goal 5: Pt will actively participate in 15-20 minutes of therapeutic exercise/activity to promote increased independence and safety with self-care and mobility.     OT Individual Minutes  Time In: 1491  Time Out: 1155  Minutes: 23      Electronically signed by EMERITA Rebolledo on 6/8/20 at 1:00 PM EDT

## 2020-06-09 LAB
ANION GAP SERPL CALCULATED.3IONS-SCNC: 10 MMOL/L (ref 9–17)
BUN BLDV-MCNC: 13 MG/DL (ref 8–23)
BUN/CREAT BLD: ABNORMAL (ref 9–20)
CALCIUM SERPL-MCNC: 9.2 MG/DL (ref 8.6–10.4)
CHLORIDE BLD-SCNC: 102 MMOL/L (ref 98–107)
CO2: 27 MMOL/L (ref 20–31)
CREAT SERPL-MCNC: 0.83 MG/DL (ref 0.7–1.2)
GFR AFRICAN AMERICAN: >60 ML/MIN
GFR NON-AFRICAN AMERICAN: >60 ML/MIN
GFR SERPL CREATININE-BSD FRML MDRD: ABNORMAL ML/MIN/{1.73_M2}
GFR SERPL CREATININE-BSD FRML MDRD: ABNORMAL ML/MIN/{1.73_M2}
GLUCOSE BLD-MCNC: 207 MG/DL (ref 70–99)
HCT VFR BLD CALC: 42.1 % (ref 41–53)
HEMOGLOBIN: 14.1 G/DL (ref 13.5–17.5)
MAGNESIUM: 1.5 MG/DL (ref 1.6–2.6)
MCH RBC QN AUTO: 30 PG (ref 26–34)
MCHC RBC AUTO-ENTMCNC: 33.5 G/DL (ref 31–37)
MCV RBC AUTO: 89.5 FL (ref 80–100)
NRBC AUTOMATED: NORMAL PER 100 WBC
PDW BLD-RTO: 14.2 % (ref 11.5–14.9)
PLATELET # BLD: 250 K/UL (ref 150–450)
PMV BLD AUTO: 8.4 FL (ref 6–12)
POTASSIUM SERPL-SCNC: 4.3 MMOL/L (ref 3.7–5.3)
RBC # BLD: 4.71 M/UL (ref 4.5–5.9)
SODIUM BLD-SCNC: 139 MMOL/L (ref 135–144)
WBC # BLD: 9.2 K/UL (ref 3.5–11)

## 2020-06-09 PROCEDURE — 6370000000 HC RX 637 (ALT 250 FOR IP): Performed by: INTERNAL MEDICINE

## 2020-06-09 PROCEDURE — 36415 COLL VENOUS BLD VENIPUNCTURE: CPT

## 2020-06-09 PROCEDURE — 99232 SBSQ HOSP IP/OBS MODERATE 35: CPT | Performed by: INTERNAL MEDICINE

## 2020-06-09 PROCEDURE — 85027 COMPLETE CBC AUTOMATED: CPT

## 2020-06-09 PROCEDURE — 2580000003 HC RX 258: Performed by: INTERNAL MEDICINE

## 2020-06-09 PROCEDURE — 97110 THERAPEUTIC EXERCISES: CPT

## 2020-06-09 PROCEDURE — 6360000002 HC RX W HCPCS: Performed by: INTERNAL MEDICINE

## 2020-06-09 PROCEDURE — 83735 ASSAY OF MAGNESIUM: CPT

## 2020-06-09 PROCEDURE — 80048 BASIC METABOLIC PNL TOTAL CA: CPT

## 2020-06-09 PROCEDURE — 1200000000 HC SEMI PRIVATE

## 2020-06-09 PROCEDURE — 97116 GAIT TRAINING THERAPY: CPT

## 2020-06-09 PROCEDURE — 6360000002 HC RX W HCPCS: Performed by: NURSE PRACTITIONER

## 2020-06-09 RX ORDER — LORAZEPAM 0.5 MG/1
0.5 TABLET ORAL EVERY 6 HOURS PRN
Status: DISCONTINUED | OUTPATIENT
Start: 2020-06-09 | End: 2020-06-10 | Stop reason: HOSPADM

## 2020-06-09 RX ORDER — CHLORDIAZEPOXIDE HYDROCHLORIDE 5 MG/1
5 CAPSULE, GELATIN COATED ORAL 3 TIMES DAILY
Status: DISCONTINUED | OUTPATIENT
Start: 2020-06-09 | End: 2020-06-10

## 2020-06-09 RX ADMIN — LORAZEPAM 1 MG: 1 TABLET ORAL at 07:21

## 2020-06-09 RX ADMIN — METOPROLOL TARTRATE 25 MG: 25 TABLET, FILM COATED ORAL at 22:11

## 2020-06-09 RX ADMIN — LISINOPRIL 10 MG: 10 TABLET ORAL at 07:22

## 2020-06-09 RX ADMIN — LORAZEPAM 0.5 MG: 0.5 TABLET ORAL at 22:11

## 2020-06-09 RX ADMIN — CHLORDIAZEPOXIDE HYDROCHLORIDE 10 MG: 5 CAPSULE ORAL at 07:21

## 2020-06-09 RX ADMIN — Medication 10 ML: at 07:36

## 2020-06-09 RX ADMIN — METOPROLOL TARTRATE 25 MG: 25 TABLET, FILM COATED ORAL at 07:21

## 2020-06-09 RX ADMIN — MAGNESIUM SULFATE HEPTAHYDRATE: 500 INJECTION, SOLUTION INTRAMUSCULAR; INTRAVENOUS at 14:19

## 2020-06-09 RX ADMIN — CHLORDIAZEPOXIDE HYDROCHLORIDE 5 MG: 5 CAPSULE ORAL at 13:29

## 2020-06-09 RX ADMIN — ENOXAPARIN SODIUM 40 MG: 40 INJECTION SUBCUTANEOUS at 07:21

## 2020-06-09 RX ADMIN — CHLORDIAZEPOXIDE HYDROCHLORIDE 5 MG: 5 CAPSULE ORAL at 22:11

## 2020-06-09 RX ADMIN — Medication 10 ML: at 22:11

## 2020-06-09 RX ADMIN — FOLIC ACID 1 MG: 1 TABLET ORAL at 07:21

## 2020-06-09 RX ADMIN — LORAZEPAM 2 MG: 1 TABLET ORAL at 01:11

## 2020-06-09 RX ADMIN — THIAMINE HCL TAB 100 MG 100 MG: 100 TAB at 07:21

## 2020-06-09 NOTE — PROGRESS NOTES
Alcoholic dementia ([facility] Back pain, Cerebral artery occlusion with cerebral infarction ([facility] Diabetes mellitus ([facility] Head injury, Hearing loss, Hypertension, TIA (transient ischemic attack), Transient ischemic attack, and Ulcer. Social History:   reports that he has quit smoking. He has never used smokeless tobacco. He reports current alcohol use of about 6.0 standard drinks of alcohol per week. He reports current drug use. Drug: Marijuana. Family History:   Family History   Problem Relation Age of Onset    Diabetes Mother     Heart Disease Mother        Vitals:  /88   Pulse 68   Temp 97.6 °F (36.4 °C) (Oral)   Resp 18 Comment: 18  Ht 5' 7\" (1.702 m)   Wt 129 lb 10.1 oz (58.8 kg)   SpO2 97%   BMI 20.30 kg/m²   Temp (24hrs), Av.6 °F (36.4 °C), Min:97.5 °F (36.4 °C), Max:97.6 °F (36.4 °C)    Recent Labs     20   POCGLU 194*       I/O (24Hr):     Intake/Output Summary (Last 24 hours) at 2020 1140  Last data filed at 2020 0715  Gross per 24 hour   Intake 800 ml   Output 1050 ml   Net -250 ml       Labs:    [unfilled]    Lab Results   Component Value Date/Time    SPECIAL RT [facility] 03/15/2019 02:32 AM     Lab Results   Component Value Date/Time    CULTURE NO GROWTH 6 DAYS 03/15/2019 02:32 AM       [unfilled]    Radiology:    Physical Examination:        General appearance:  alert, cooperative and no distress, thin build person  Mental Status:  oriented to person, place and time and normal affect  Lungs:  clear to auscultation bilaterally, normal effort  Heart:  regular rate and rhythm, no murmur  Abdomen:  soft, nontender, nondistended, normal bowel sounds, no masses, hepatomegaly, splenomegaly  Extremities:  no edema, redness, tenderness in the calves  Skin:  no gross lesions, rashes, induration    Assessment:        Primary Problem  Hyponatremia    Active Hospital Problems    Diagnosis Date Noted    Metabolic encephalopathy [K93.65] 2020    Hyponatremia [E87.1] 06/02/2020    Seizure ([facility] [R56.9]     Hypomagnesemia [E83.42] 07/01/2017    Marijuana abuse [F12.10] 07/01/2017    Type 2 diabetes mellitus with hyperglycemia, without long-term current use of insulin ([facility] [E11.65] 06/30/2017    Alcoholism ([facility] [F10.20] 03/18/2015    Altered mental status [R41.82]        Plan:        1. Metabolic encephalopathy due to alcohol withdrawal, hyponatremia, improved  2. History of alcoholism, patient was in DTs, on CIWA protocol, Librium, thiamine, folic acid, multivitamin  3. Had episode of tachycardia, Norvasc discontinued by cardiologist, heart rate controlled  4. On DVT prophylaxis Lovenox,   5. ht starting patient lisinopril 10 mg n - blood pressure running high,   6. Ordering electrolytes, patient will need placement.     6/9   Patient is doing much better  Hypertension controlled  We will discontinue CIWA protocol, low-dose Ativan as needed, reducing dose of Librium  Ordering magnesium check to the morning diet  Restarting nikolay, DC planning  Tracey Palafox MD  6/9/2020  11:40 AM

## 2020-06-09 NOTE — PLAN OF CARE
Continue to monitor current lab values  Cont. To monitor current pain status, and pt. Cont. On ativan PO and Po librium  Pt. Cont.  To be confused, and disoriented off and on  Bed alarm on

## 2020-06-09 NOTE — PROGRESS NOTES
Orientation  Orientation  Overall Orientation Status: Impaired  Orientation Level: Oriented to person;Disoriented to time;Disoriented to situation;Disoriented to place(alcoholic dementia)  Objective   Bed mobility  Bridging: Supervision  Sit to Supine: Supervision  Scooting: Supervision  Comment: HOB slightly elevated. Transfers  Sit to Stand: Stand by assistance  Stand to sit: Stand by assistance  Comment: VC to use safe handplacement with transfers, completed with RW. Pt demos weakness in BLE's. Ambulation  Ambulation?: Yes  Ambulation 1  Surface: level tile  Device: Rolling Walker  Assistance: Stand by assistance  Quality of Gait: NBOS, BLE weakness/shakey  Gait Deviations: Deviated path;Decreased step height;Decreased step length  Distance: 12'x2  Comments: Pt slow paced, poorly navigates around obsticles despite VC   Stairs/Curb  Stairs?: No     Balance  Posture: Good  Sitting - Static: Good  Sitting - Dynamic: Good  Standing - Static: Good;-  Standing - Dynamic: Fair;+  Comments: Seated EOB, Standing with RW  Other exercises  Other exercises 3: STS x2   Other exercises 4: Standing dynamic activity x2. Other exercises 5: Pt able to  object from ground with unilateral UE support for safety. Pt slightly unstady requiring CGA for safety. Other exercises 6: Education on safety awareness, transfer techniques, and importance of PT. Other exercises 7: Seated functional dynamic activity x2. SBA for safety. Comment: rest breaks PRN.                Goals  Short term goals  Time Frame for Short term goals: 5-7 treatments/ week  Short term goal 1: pt to tolerate 1/2 hour of therapuetic exercise and activity  Short term goal 2: pt to follow directions accurately 50% of the time to complete balance activities and strengthening exercises w/ good technique  Short term goal 3: pt to demonstrate independent rolling in bed using the rail for position change  Short term goal 4: pt to demonstrate transfers supine <> sit w/ min x 1  Short term goal 5: pt to demonstrate fair + or better  sitting balance and the ability to dangle at the EOB x 20 minutes w/ CGA x 1   Short term goal 6: pt to demonstrate ability to pull to stand on the Leora St[employer] w/ min x 2 maintaining balance and knee control x 3 minutes  Short term goal 7: pt to demonstrate sit <> stand and bed <> chair transfers using least restrictive device (? w walker) w/ min x 2 when pt can demonstrate knee control on the [employer]  Short term goal 8: pt to demonstrate gait 20-30'  using least restrictive device (? w walker) w/ min x 2 w/ W/C follow when pt can demonstrate knee control on the [employer]  Short term goal 9: pt to demonstrate fair standing & dynamic balance using least restrictive device (? w walker) w/ min x 2  Patient Goals   Patient goals : too confused to state a goal    Plan    Plan  Times per week: 5-7 treatments/ week  Times per day: (5-7 treatments/ week)  Specific instructions for Next Treatment: start w/ bedside exercises and [employer] for standing, advance to pivot to chair and gait when able to participate- pt confused- hx alcoholic dementia, TIA, craniotomy, SDH and SAH  Current Treatment Recommendations: Strengthening, Transfer Training, Endurance Training, Patient/Caregiver Education & Training, Equipment Evaluation, Education, & procurement, Balance Training, Gait Training, Functional Mobility Training, Safety Education & Training  Safety Devices  Type of devices:  All fall risk precautions in place, Gait belt, Patient at risk for falls, Left in bed, Sitter present     Therapy Time   Individual Concurrent Group Co-treatment   Time In 0946         Time Out 1013         Minutes 700 [address]

## 2020-06-09 NOTE — CARE COORDINATION
DISCHARGE PLANNING NOTE:     Writer reviewed chart. LSW following for SNF- [facility] with pre-cert started 6/8. Pt has been accepted as long as he has no behavioral issues and is tele-sitter free for 48 hours. [facility] following for home services.     Na improved at 139. Active order for PO Librium and Ativan prn     Will continue to follow for additional discharge needs.     Electronically signed by Dawn Hamm RN on 6/9/2020 at 2:26 PM

## 2020-06-10 ENCOUNTER — APPOINTMENT (OUTPATIENT)
Dept: CT IMAGING | Age: 75
End: 2020-06-10
Payer: MEDICARE

## 2020-06-10 ENCOUNTER — APPOINTMENT (OUTPATIENT)
Dept: GENERAL RADIOLOGY | Age: 75
End: 2020-06-10
Payer: MEDICARE

## 2020-06-10 ENCOUNTER — HOSPITAL ENCOUNTER (OUTPATIENT)
Age: 75
Setting detail: OBSERVATION
Discharge: NURSING, REHAB OR LONG-TERM CARE | End: 2020-06-15
Attending: EMERGENCY MEDICINE | Admitting: INTERNAL MEDICINE
Payer: MEDICARE

## 2020-06-10 VITALS
HEIGHT: 67 IN | BODY MASS INDEX: 20.35 KG/M2 | OXYGEN SATURATION: 97 % | WEIGHT: 129.63 LBS | SYSTOLIC BLOOD PRESSURE: 153 MMHG | DIASTOLIC BLOOD PRESSURE: 83 MMHG | HEART RATE: 70 BPM | RESPIRATION RATE: 18 BRPM | TEMPERATURE: 97.9 F

## 2020-06-10 LAB
ABSOLUTE EOS #: 0.1 K/UL (ref 0–0.4)
ABSOLUTE IMMATURE GRANULOCYTE: ABNORMAL K/UL (ref 0–0.3)
ABSOLUTE LYMPH #: 1.3 K/UL (ref 1–4.8)
ABSOLUTE MONO #: 0.8 K/UL (ref 0.1–1.3)
ALBUMIN SERPL-MCNC: 3.5 G/DL (ref 3.5–5.2)
ALBUMIN/GLOBULIN RATIO: ABNORMAL (ref 1–2.5)
ALP BLD-CCNC: 82 U/L (ref 40–129)
ALT SERPL-CCNC: 15 U/L (ref 5–41)
AMMONIA: 37 UMOL/L (ref 16–60)
ANION GAP SERPL CALCULATED.3IONS-SCNC: 11 MMOL/L (ref 9–17)
ANION GAP SERPL CALCULATED.3IONS-SCNC: 11 MMOL/L (ref 9–17)
AST SERPL-CCNC: 14 U/L
BASOPHILS # BLD: 1 % (ref 0–2)
BASOPHILS ABSOLUTE: 0.1 K/UL (ref 0–0.2)
BILIRUB SERPL-MCNC: 0.17 MG/DL (ref 0.3–1.2)
BNP INTERPRETATION: ABNORMAL
BUN BLDV-MCNC: 14 MG/DL (ref 8–23)
BUN BLDV-MCNC: 19 MG/DL (ref 8–23)
BUN/CREAT BLD: ABNORMAL (ref 9–20)
BUN/CREAT BLD: ABNORMAL (ref 9–20)
CALCIUM SERPL-MCNC: 9.5 MG/DL (ref 8.6–10.4)
CALCIUM SERPL-MCNC: 9.5 MG/DL (ref 8.6–10.4)
CHLORIDE BLD-SCNC: 102 MMOL/L (ref 98–107)
CHLORIDE BLD-SCNC: 96 MMOL/L (ref 98–107)
CO2: 26 MMOL/L (ref 20–31)
CO2: 26 MMOL/L (ref 20–31)
CREAT SERPL-MCNC: 0.77 MG/DL (ref 0.7–1.2)
CREAT SERPL-MCNC: 0.91 MG/DL (ref 0.7–1.2)
DIFFERENTIAL TYPE: ABNORMAL
EOSINOPHILS RELATIVE PERCENT: 1 % (ref 0–4)
GFR AFRICAN AMERICAN: >60 ML/MIN
GFR AFRICAN AMERICAN: >60 ML/MIN
GFR NON-AFRICAN AMERICAN: >60 ML/MIN
GFR NON-AFRICAN AMERICAN: >60 ML/MIN
GFR SERPL CREATININE-BSD FRML MDRD: ABNORMAL ML/MIN/{1.73_M2}
GLUCOSE BLD-MCNC: 215 MG/DL (ref 70–99)
GLUCOSE BLD-MCNC: 330 MG/DL (ref 70–99)
HCT VFR BLD CALC: 42.8 % (ref 41–53)
HCT VFR BLD CALC: 43.6 % (ref 41–53)
HEMOGLOBIN: 14.3 G/DL (ref 13.5–17.5)
HEMOGLOBIN: 14.7 G/DL (ref 13.5–17.5)
IMMATURE GRANULOCYTES: ABNORMAL %
INR BLD: 0.9
LYMPHOCYTES # BLD: 12 % (ref 24–44)
MAGNESIUM: 1.7 MG/DL (ref 1.6–2.6)
MCH RBC QN AUTO: 29.8 PG (ref 26–34)
MCH RBC QN AUTO: 29.9 PG (ref 26–34)
MCHC RBC AUTO-ENTMCNC: 33.3 G/DL (ref 31–37)
MCHC RBC AUTO-ENTMCNC: 33.6 G/DL (ref 31–37)
MCV RBC AUTO: 88.9 FL (ref 80–100)
MCV RBC AUTO: 89.6 FL (ref 80–100)
MONOCYTES # BLD: 7 % (ref 1–7)
NRBC AUTOMATED: ABNORMAL PER 100 WBC
NRBC AUTOMATED: NORMAL PER 100 WBC
PDW BLD-RTO: 14.1 % (ref 11.5–14.9)
PDW BLD-RTO: 14.4 % (ref 11.5–14.9)
PLATELET # BLD: 263 K/UL (ref 150–450)
PLATELET # BLD: 264 K/UL (ref 150–450)
PLATELET ESTIMATE: ABNORMAL
PMV BLD AUTO: 8.4 FL (ref 6–12)
PMV BLD AUTO: 9.1 FL (ref 6–12)
POTASSIUM SERPL-SCNC: 4.4 MMOL/L (ref 3.7–5.3)
POTASSIUM SERPL-SCNC: 4.4 MMOL/L (ref 3.7–5.3)
PRO-BNP: 511 PG/ML
PROTHROMBIN TIME: 12.4 SEC (ref 11.8–14.6)
RBC # BLD: 4.78 M/UL (ref 4.5–5.9)
RBC # BLD: 4.91 M/UL (ref 4.5–5.9)
RBC # BLD: ABNORMAL 10*6/UL
SEG NEUTROPHILS: 79 % (ref 36–66)
SEGMENTED NEUTROPHILS ABSOLUTE COUNT: 9.4 K/UL (ref 1.3–9.1)
SODIUM BLD-SCNC: 133 MMOL/L (ref 135–144)
SODIUM BLD-SCNC: 139 MMOL/L (ref 135–144)
TOTAL PROTEIN: 6 G/DL (ref 6.4–8.3)
TROPONIN INTERP: NORMAL
TROPONIN T: NORMAL NG/ML
TROPONIN, HIGH SENSITIVITY: 11 NG/L (ref 0–22)
TSH SERPL DL<=0.05 MIU/L-ACNC: 3.21 MIU/L (ref 0.3–5)
WBC # BLD: 10.3 K/UL (ref 3.5–11)
WBC # BLD: 11.7 K/UL (ref 3.5–11)
WBC # BLD: ABNORMAL 10*3/UL

## 2020-06-10 PROCEDURE — 36415 COLL VENOUS BLD VENIPUNCTURE: CPT

## 2020-06-10 PROCEDURE — 85610 PROTHROMBIN TIME: CPT

## 2020-06-10 PROCEDURE — 97110 THERAPEUTIC EXERCISES: CPT

## 2020-06-10 PROCEDURE — 83735 ASSAY OF MAGNESIUM: CPT

## 2020-06-10 PROCEDURE — 99285 EMERGENCY DEPT VISIT HI MDM: CPT

## 2020-06-10 PROCEDURE — 82607 VITAMIN B-12: CPT

## 2020-06-10 PROCEDURE — 71045 X-RAY EXAM CHEST 1 VIEW: CPT

## 2020-06-10 PROCEDURE — 6360000002 HC RX W HCPCS: Performed by: NURSE PRACTITIONER

## 2020-06-10 PROCEDURE — 97116 GAIT TRAINING THERAPY: CPT

## 2020-06-10 PROCEDURE — 70450 CT HEAD/BRAIN W/O DYE: CPT

## 2020-06-10 PROCEDURE — 85027 COMPLETE CBC AUTOMATED: CPT

## 2020-06-10 PROCEDURE — 85025 COMPLETE CBC W/AUTO DIFF WBC: CPT

## 2020-06-10 PROCEDURE — 2580000003 HC RX 258: Performed by: INTERNAL MEDICINE

## 2020-06-10 PROCEDURE — 6370000000 HC RX 637 (ALT 250 FOR IP): Performed by: INTERNAL MEDICINE

## 2020-06-10 PROCEDURE — 82746 ASSAY OF FOLIC ACID SERUM: CPT

## 2020-06-10 PROCEDURE — 84443 ASSAY THYROID STIM HORMONE: CPT

## 2020-06-10 PROCEDURE — 93005 ELECTROCARDIOGRAM TRACING: CPT | Performed by: EMERGENCY MEDICINE

## 2020-06-10 PROCEDURE — 84484 ASSAY OF TROPONIN QUANT: CPT

## 2020-06-10 PROCEDURE — 99239 HOSP IP/OBS DSCHRG MGMT >30: CPT | Performed by: INTERNAL MEDICINE

## 2020-06-10 PROCEDURE — 82140 ASSAY OF AMMONIA: CPT

## 2020-06-10 PROCEDURE — 83880 ASSAY OF NATRIURETIC PEPTIDE: CPT

## 2020-06-10 PROCEDURE — 80048 BASIC METABOLIC PNL TOTAL CA: CPT

## 2020-06-10 PROCEDURE — 80053 COMPREHEN METABOLIC PANEL: CPT

## 2020-06-10 PROCEDURE — 84425 ASSAY OF VITAMIN B-1: CPT

## 2020-06-10 RX ORDER — LISINOPRIL 10 MG/1
10 TABLET ORAL DAILY
Qty: 30 TABLET | Refills: 3 | Status: SHIPPED | OUTPATIENT
Start: 2020-06-11

## 2020-06-10 RX ORDER — LANOLIN ALCOHOL/MO/W.PET/CERES
100 CREAM (GRAM) TOPICAL DAILY
Qty: 30 TABLET | Refills: 3 | Status: SHIPPED | OUTPATIENT
Start: 2020-06-10

## 2020-06-10 RX ORDER — PANTOPRAZOLE SODIUM 40 MG/1
40 TABLET, DELAYED RELEASE ORAL
Qty: 90 TABLET | Refills: 1 | Status: SHIPPED | OUTPATIENT
Start: 2020-06-10

## 2020-06-10 RX ORDER — FOLIC ACID 1 MG/1
1 TABLET ORAL DAILY
Qty: 30 TABLET | Refills: 3 | Status: SHIPPED | OUTPATIENT
Start: 2020-06-10

## 2020-06-10 RX ADMIN — LISINOPRIL 10 MG: 10 TABLET ORAL at 09:04

## 2020-06-10 RX ADMIN — THIAMINE HCL TAB 100 MG 100 MG: 100 TAB at 09:03

## 2020-06-10 RX ADMIN — METOPROLOL TARTRATE 25 MG: 25 TABLET, FILM COATED ORAL at 09:04

## 2020-06-10 RX ADMIN — FOLIC ACID 1 MG: 1 TABLET ORAL at 09:04

## 2020-06-10 RX ADMIN — Medication 10 ML: at 09:04

## 2020-06-10 RX ADMIN — ENOXAPARIN SODIUM 40 MG: 40 INJECTION SUBCUTANEOUS at 09:03

## 2020-06-10 RX ADMIN — CHLORDIAZEPOXIDE HYDROCHLORIDE 5 MG: 5 CAPSULE ORAL at 09:04

## 2020-06-10 ASSESSMENT — PAIN DESCRIPTION - LOCATION: LOCATION: CHEST

## 2020-06-10 ASSESSMENT — PAIN DESCRIPTION - FREQUENCY: FREQUENCY: INTERMITTENT

## 2020-06-10 ASSESSMENT — PAIN DESCRIPTION - PAIN TYPE: TYPE: ACUTE PAIN

## 2020-06-10 ASSESSMENT — PAIN SCALES - GENERAL: PAINLEVEL_OUTOF10: 6

## 2020-06-10 ASSESSMENT — PAIN DESCRIPTION - DESCRIPTORS: DESCRIPTORS: THROBBING

## 2020-06-10 NOTE — PLAN OF CARE
Problem: Falls - Risk of:  Goal: Will remain free from falls  Description: Will remain free from falls  6/10/2020 1554 by Theodore Granda RN  Outcome: Ongoing  6/10/2020 0223 by Manny Kahn RN  Outcome: Met This Shift  Goal: Absence of physical injury  Description: Absence of physical injury  6/10/2020 1554 by Theodore Granda RN  Outcome: Ongoing  6/10/2020 0223 by Manny Kahn RN  Outcome: Met This Shift     Problem: Infection:  Goal: Will remain free from infection  Description: Will remain free from infection  6/10/2020 1554 by Theodore Granda RN  Outcome: Ongoing  6/10/2020 0223 by Manny Kahn RN  Outcome: Met This Shift     Problem: Safety:  Goal: Free from accidental physical injury  Description: Free from accidental physical injury  6/10/2020 1554 by Theodore Granda RN  Outcome: Ongoing  6/10/2020 0223 by Manny Kahn RN  Outcome: Met This Shift  Goal: Free from intentional harm  Description: Free from intentional harm  6/10/2020 1554 by Theodore Granda RN  Outcome: Ongoing  6/10/2020 0223 by Manny Kahn RN  Outcome: Met This Shift  Goal: Ability to remain free from injury will improve  Description: Ability to remain free from injury will improve  6/10/2020 1554 by Theodore Granda RN  Outcome: Ongoing  6/10/2020 0223 by Manny Kahn RN  Outcome: Met This Shift     Problem: Daily Care:  Goal: Daily care needs are met  Description: Daily care needs are met  6/10/2020 1554 by Theodore Granda RN  Outcome: Ongoing  6/10/2020 0223 by Manny Kahn RN  Outcome: Met This Shift     Problem: Pain:  Goal: Patient's pain/discomfort is manageable  Description: Patient's pain/discomfort is manageable  6/10/2020 1554 by Theodore Granda RN  Outcome: Ongoing  6/10/2020 0223 by Manny Kahn RN  Outcome: Met This Shift  Goal: Pain level will decrease  Description: Pain level will decrease  6/10/2020 1554 by Theodore Granda RN  Outcome: Ongoing  6/10/2020 0223 by Manny Kahn

## 2020-06-10 NOTE — CARE COORDINATION
DISCHARGE PLANNING NOTE:     Writer reviewed chart.  LSW following for SNF- [facility] with pre-cert started 6/8.  Pt has been accepted as long as he has no behavioral issues and is tele-sitter free for 48 hours.  [facility] following for home services.     Na improved at 139.  Active order Ativan prn     Will continue to follow for additional discharge needs.     Electronically signed by Marylen Blender, RN on 6/10/2020 at 10:24 AM

## 2020-06-10 NOTE — PROGRESS NOTES
Physical Therapy  Facility/Department: [facility] MED SURG  Daily Treatment Note  NAME: Ant Hopper  : 1945  MRN: [id number]    Date of Service: 6/10/2020    Discharge Recommendations:  Patient would benefit from continued therapy after discharge   PT Equipment Recommendations  Equipment Needed: Yes(TBD ? w walker)    Assessment   Body structures, Functions, Activity limitations: Decreased functional mobility ; Decreased strength;Decreased endurance;Decreased safe awareness;Decreased balance  Assessment: continue per POC to maxmize potential; pt will need placement as he is unsafe to return home and is currently 1 assist for safety. Pt remains a HIGH FALL RISK. Treatment Diagnosis: impaired strength and balance  Prognosis: Fair  Decision Making: Medium Complexity  History: pt found wandering in the streets confused  Exam: ROM, MMT, balance and mobility assessments  Barriers to Learning: confusion, hx alcoholic dementia  REQUIRES PT FOLLOW UP: Yes  Activity Tolerance  Activity Tolerance: Patient Tolerated treatment well     Patient Diagnosis(es): The primary encounter diagnosis was Altered mental status, unspecified altered mental status type. Diagnoses of Hyponatremia and Hypomagnesemia were also pertinent to this visit. has a past medical history of Alcoholic dementia ([address] Back pain, Cerebral artery occlusion with cerebral infarction ([address] Diabetes mellitus ([address] Head injury, Hearing loss, Hypertension, TIA (transient ischemic attack), Transient ischemic attack, and Ulcer. has a past surgical history that includes back surgery; Pacemaker insertion; eye surgery (2799-8249); brain surgery; Appendectomy; craniotomy (Right, 3/9/2019); and Upper gastrointestinal endoscopy (N/A, 3/14/2019).     Restrictions  Restrictions/Precautions  Restrictions/Precautions: Fall Risk, Seizure(peripheral IV right antecubital, telesitter, pt had removed urinary catheter, alcoholic dementia)  Required Braces or Orthoses?: No  Implants present? : Pacemaker, Metal implants(back surgery ? metal, hx craniotomy)  Position Activity Restriction  Other position/activity restrictions: up w/ assist  Subjective   General  Additional Pertinent Hx: HPI 69 y.o. male was brought to the emergency department for evaluation of confusion. Apparently per EMS report, bystanders called EMS for a person walking around the street. EMS found the patient confused. Viewing the record the patient has a long history of alcohol abuse. He has been diagnosed with alcoholic dementia, he has chronic subdurals, he is also sustained subarachnoid hemorrhages in the past.  He has a history of seizure disorder, DTs, and malnutrition. Response To Previous Treatment: Patient with no complaints from previous session. Family / Caregiver Present: No  Referring Practitioner: Mounika Jenkins CNP  Subjective  Subjective: Patient in bed upon arrival. Patient cooperative and agreeable for therapy. General Comment  Comments: JEB abarca's pt for PT. Orientation  Orientation  Overall Orientation Status: Impaired  Orientation Level: Oriented to person;Disoriented to time;Disoriented to situation;Disoriented to place(alcoholic dementia)  Cognition      Objective   Bed mobility  Bridging: Supervision  Rolling to Left: Supervision  Supine to Sit: Supervision  Sit to Supine: Supervision  Scooting: Supervision  Comment: HOB slightly elevated, no use of HR with bed mobility.    Transfers  Sit to Stand: Stand by assistance  Stand to sit: Stand by assistance  Comment: VC for safe sequence and technique sit <> stand using RW. - fair carryover   Ambulation  Ambulation?: Yes  Ambulation 1  Surface: level tile  Device: Rolling Walker  Assistance: Stand by assistance  Quality of Gait: Unsteady, no LOB, tends to push RW out of OSIEL   Gait Deviations: Deviated path;Decreased step height;Decreased step length  Distance: 50 ft   Comments: Pt poorly navigates around obstacles despite VC

## 2020-06-10 NOTE — DISCHARGE SUMMARY
COMPARISON: None. HISTORY: ORDERING SYSTEM PROVIDED HISTORY: altered mental status TECHNOLOGIST PROVIDED HISTORY: altered mental status Reason for Exam: altered mental status Additional signs and symptoms: Per ED notes. Flory Fiddler Flory Fiddler Per ems report, by 05/23/2019 standered called for person wandering around on the street FINDINGS: BRAIN/VENTRICLES: There is prominence of the ventricles and cortical sulci consistent with cortical volume loss. No midline shift. Basal cisterns are normally outlined. There is periventricular and subcortical white matter discrete and confluent low attenuation, nonspecific, likely representing age-related chronic small vessel ischemic disease. There is no evidence for acute infarct. No acute intra or extra-axial hemorrhage. ORBITS: The visualized portion of the orbits demonstrate no acute abnormality. SINUSES: The visualized paranasal sinuses and mastoid air cells demonstrate no acute abnormality. SOFT TISSUES/SKULL:  Biparietal in bifrontal bur holes are noted as seen previously. Soft tissues. No acute intracranial abnormality. Cerebral atrophy. Consultations:    Consults:     Final Specialist Recommendations/Findings:   IP CONSULT TO INTERNAL MEDICINE  IP CONSULT TO SOCIAL WORK  IP CONSULT TO NEPHROLOGY      The patient was seen and examined on day of discharge and this discharge summary is in conjunction with any daily progress note from day of discharge. Discharge plan:     Disposition:  To a non-[facility] facility    Physician Follow Up:     Bárbara Akhtar MD  [address] [place] [address]  [phone]             Requiring Further Evaluation/Follow Up POST HOSPITALIZATION/Incidental Findings:     Diet: diabetic diet    Activity: As tolerated    Instructions to Patient:     Discharge Medications:      Medication List      START taking these medications    lisinopril 10 MG tablet  Commonly known as:  PRINIVIL;ZESTRIL  Take 1 tablet by mouth daily  Start taking on:  June 11, 2020     metoprolol tartrate 25 MG tablet  Commonly known as:  LOPRESSOR  Take 1 tablet by mouth 2 times daily     pantoprazole 40 MG tablet  Commonly known as:  PROTONIX  Take 1 tablet by mouth every morning (before breakfast)        CONTINUE taking these medications    folic acid 1 MG tablet  Commonly known as:  FOLVITE  Take 1 tablet by mouth daily     metFORMIN 500 MG tablet  Commonly known as:  GLUCOPHAGE  Take 1 tablet by mouth 2 times daily (with meals)     thiamine 100 MG tablet  Take 1 tablet by mouth daily           Where to Get Your Medications      These medications were sent to [address], [place] [address]    Phone:  [phone]   · folic acid 1 MG tablet  · lisinopril 10 MG tablet  · metFORMIN 500 MG tablet  · metoprolol tartrate 25 MG tablet  · pantoprazole 40 MG tablet  · thiamine 100 MG tablet         Time Spent on discharge is  33 mins in patient examination, evaluation, counseling as well as medication reconciliation, prescriptions for required medications, discharge plan and follow up. Electronically signed by   Zain Jane MD  6/10/2020  10:15 AM      Thank you Dr. Jesusita Hamm MD for the opportunity to be involved in this patient's care.

## 2020-06-11 LAB
-: NORMAL
AMORPHOUS: NORMAL
BACTERIA: NORMAL
BILIRUBIN URINE: NEGATIVE
CASTS UA: NORMAL /LPF
COLOR: YELLOW
COMMENT UA: ABNORMAL
CRYSTALS, UA: NORMAL /HPF
EKG ATRIAL RATE: 98 BPM
EKG P AXIS: 16 DEGREES
EKG P-R INTERVAL: 160 MS
EKG Q-T INTERVAL: 336 MS
EKG QRS DURATION: 88 MS
EKG QTC CALCULATION (BAZETT): 428 MS
EKG R AXIS: -38 DEGREES
EKG T AXIS: 15 DEGREES
EKG VENTRICULAR RATE: 98 BPM
EPITHELIAL CELLS UA: NORMAL /HPF
FOLATE: 13.6 NG/ML
GLUCOSE BLD-MCNC: 192 MG/DL (ref 75–110)
GLUCOSE URINE: ABNORMAL
KETONES, URINE: NEGATIVE
LEUKOCYTE ESTERASE, URINE: NEGATIVE
MUCUS: NORMAL
NITRITE, URINE: NEGATIVE
OTHER OBSERVATIONS UA: NORMAL
PH UA: 6 (ref 5–8)
PROTEIN UA: ABNORMAL
RBC UA: NORMAL /HPF
RENAL EPITHELIAL, UA: NORMAL /HPF
SPECIFIC GRAVITY UA: 1.02 (ref 1–1.03)
TRICHOMONAS: NORMAL
TROPONIN INTERP: NORMAL
TROPONIN T: NORMAL NG/ML
TROPONIN, HIGH SENSITIVITY: 11 NG/L (ref 0–22)
TURBIDITY: ABNORMAL
URINE HGB: NEGATIVE
UROBILINOGEN, URINE: NORMAL
VITAMIN B-12: 357 PG/ML (ref 232–1245)
WBC UA: NORMAL /HPF
YEAST: NORMAL

## 2020-06-11 PROCEDURE — 96372 THER/PROPH/DIAG INJ SC/IM: CPT

## 2020-06-11 PROCEDURE — 99223 1ST HOSP IP/OBS HIGH 75: CPT | Performed by: INTERNAL MEDICINE

## 2020-06-11 PROCEDURE — G0378 HOSPITAL OBSERVATION PER HR: HCPCS

## 2020-06-11 PROCEDURE — 6370000000 HC RX 637 (ALT 250 FOR IP): Performed by: PHYSICIAN ASSISTANT

## 2020-06-11 PROCEDURE — 99220 PR INITIAL OBSERVATION CARE/DAY 70 MINUTES: CPT | Performed by: PSYCHIATRY & NEUROLOGY

## 2020-06-11 PROCEDURE — 6370000000 HC RX 637 (ALT 250 FOR IP): Performed by: INTERNAL MEDICINE

## 2020-06-11 PROCEDURE — 95816 EEG AWAKE AND DROWSY: CPT

## 2020-06-11 PROCEDURE — 36415 COLL VENOUS BLD VENIPUNCTURE: CPT

## 2020-06-11 PROCEDURE — 6360000002 HC RX W HCPCS: Performed by: PHYSICIAN ASSISTANT

## 2020-06-11 PROCEDURE — 84484 ASSAY OF TROPONIN QUANT: CPT

## 2020-06-11 PROCEDURE — 95816 EEG AWAKE AND DROWSY: CPT | Performed by: PSYCHIATRY & NEUROLOGY

## 2020-06-11 PROCEDURE — 93010 ELECTROCARDIOGRAM REPORT: CPT | Performed by: INTERNAL MEDICINE

## 2020-06-11 PROCEDURE — 82947 ASSAY GLUCOSE BLOOD QUANT: CPT

## 2020-06-11 PROCEDURE — 81001 URINALYSIS AUTO W/SCOPE: CPT

## 2020-06-11 RX ORDER — DEXTROSE MONOHYDRATE 25 G/50ML
12.5 INJECTION, SOLUTION INTRAVENOUS PRN
Status: DISCONTINUED | OUTPATIENT
Start: 2020-06-11 | End: 2020-06-15 | Stop reason: HOSPADM

## 2020-06-11 RX ORDER — LISINOPRIL 10 MG/1
10 TABLET ORAL DAILY
Status: DISCONTINUED | OUTPATIENT
Start: 2020-06-11 | End: 2020-06-11

## 2020-06-11 RX ORDER — LORAZEPAM 2 MG/ML
1 INJECTION INTRAMUSCULAR
Status: DISCONTINUED | OUTPATIENT
Start: 2020-06-11 | End: 2020-06-15 | Stop reason: HOSPADM

## 2020-06-11 RX ORDER — LORAZEPAM 2 MG/ML
4 INJECTION INTRAMUSCULAR
Status: DISCONTINUED | OUTPATIENT
Start: 2020-06-11 | End: 2020-06-15 | Stop reason: HOSPADM

## 2020-06-11 RX ORDER — LORAZEPAM 1 MG/1
1 TABLET ORAL
Status: DISCONTINUED | OUTPATIENT
Start: 2020-06-11 | End: 2020-06-15 | Stop reason: HOSPADM

## 2020-06-11 RX ORDER — PROMETHAZINE HYDROCHLORIDE 25 MG/1
12.5 TABLET ORAL EVERY 6 HOURS PRN
Status: DISCONTINUED | OUTPATIENT
Start: 2020-06-11 | End: 2020-06-15 | Stop reason: HOSPADM

## 2020-06-11 RX ORDER — ACETAMINOPHEN 325 MG/1
650 TABLET ORAL EVERY 6 HOURS PRN
Status: DISCONTINUED | OUTPATIENT
Start: 2020-06-11 | End: 2020-06-15 | Stop reason: HOSPADM

## 2020-06-11 RX ORDER — DEXTROSE MONOHYDRATE 50 MG/ML
100 INJECTION, SOLUTION INTRAVENOUS PRN
Status: DISCONTINUED | OUTPATIENT
Start: 2020-06-11 | End: 2020-06-15 | Stop reason: HOSPADM

## 2020-06-11 RX ORDER — ONDANSETRON 2 MG/ML
4 INJECTION INTRAMUSCULAR; INTRAVENOUS EVERY 6 HOURS PRN
Status: DISCONTINUED | OUTPATIENT
Start: 2020-06-11 | End: 2020-06-15 | Stop reason: HOSPADM

## 2020-06-11 RX ORDER — POLYETHYLENE GLYCOL 3350 17 G/17G
17 POWDER, FOR SOLUTION ORAL DAILY PRN
Status: DISCONTINUED | OUTPATIENT
Start: 2020-06-11 | End: 2020-06-15 | Stop reason: HOSPADM

## 2020-06-11 RX ORDER — SODIUM CHLORIDE 0.9 % (FLUSH) 0.9 %
10 SYRINGE (ML) INJECTION EVERY 12 HOURS SCHEDULED
Status: DISCONTINUED | OUTPATIENT
Start: 2020-06-11 | End: 2020-06-15 | Stop reason: HOSPADM

## 2020-06-11 RX ORDER — ACETAMINOPHEN 650 MG/1
650 SUPPOSITORY RECTAL EVERY 6 HOURS PRN
Status: DISCONTINUED | OUTPATIENT
Start: 2020-06-11 | End: 2020-06-15 | Stop reason: HOSPADM

## 2020-06-11 RX ORDER — LORAZEPAM 2 MG/ML
3 INJECTION INTRAMUSCULAR
Status: DISCONTINUED | OUTPATIENT
Start: 2020-06-11 | End: 2020-06-15 | Stop reason: HOSPADM

## 2020-06-11 RX ORDER — SODIUM CHLORIDE 0.9 % (FLUSH) 0.9 %
10 SYRINGE (ML) INJECTION PRN
Status: DISCONTINUED | OUTPATIENT
Start: 2020-06-11 | End: 2020-06-15 | Stop reason: HOSPADM

## 2020-06-11 RX ORDER — FOLIC ACID 1 MG/1
1 TABLET ORAL DAILY
Status: DISCONTINUED | OUTPATIENT
Start: 2020-06-11 | End: 2020-06-15 | Stop reason: HOSPADM

## 2020-06-11 RX ORDER — LORAZEPAM 2 MG/ML
2 INJECTION INTRAMUSCULAR
Status: DISCONTINUED | OUTPATIENT
Start: 2020-06-11 | End: 2020-06-15 | Stop reason: HOSPADM

## 2020-06-11 RX ORDER — LORAZEPAM 1 MG/1
3 TABLET ORAL
Status: DISCONTINUED | OUTPATIENT
Start: 2020-06-11 | End: 2020-06-15 | Stop reason: HOSPADM

## 2020-06-11 RX ORDER — LISINOPRIL 20 MG/1
20 TABLET ORAL DAILY
Status: DISCONTINUED | OUTPATIENT
Start: 2020-06-12 | End: 2020-06-15 | Stop reason: HOSPADM

## 2020-06-11 RX ORDER — PANTOPRAZOLE SODIUM 40 MG/1
40 TABLET, DELAYED RELEASE ORAL
Status: DISCONTINUED | OUTPATIENT
Start: 2020-06-11 | End: 2020-06-15 | Stop reason: HOSPADM

## 2020-06-11 RX ORDER — LORAZEPAM 1 MG/1
2 TABLET ORAL
Status: DISCONTINUED | OUTPATIENT
Start: 2020-06-11 | End: 2020-06-15 | Stop reason: HOSPADM

## 2020-06-11 RX ORDER — NICOTINE POLACRILEX 4 MG
15 LOZENGE BUCCAL PRN
Status: DISCONTINUED | OUTPATIENT
Start: 2020-06-11 | End: 2020-06-15 | Stop reason: HOSPADM

## 2020-06-11 RX ORDER — LORAZEPAM 1 MG/1
4 TABLET ORAL
Status: DISCONTINUED | OUTPATIENT
Start: 2020-06-11 | End: 2020-06-15 | Stop reason: HOSPADM

## 2020-06-11 RX ORDER — THIAMINE MONONITRATE (VIT B1) 100 MG
100 TABLET ORAL DAILY
Status: DISCONTINUED | OUTPATIENT
Start: 2020-06-11 | End: 2020-06-15 | Stop reason: HOSPADM

## 2020-06-11 RX ADMIN — METOPROLOL TARTRATE 25 MG: 25 TABLET, FILM COATED ORAL at 21:30

## 2020-06-11 RX ADMIN — THIAMINE HCL TAB 100 MG 100 MG: 100 TAB at 08:34

## 2020-06-11 RX ADMIN — METOPROLOL TARTRATE 25 MG: 25 TABLET, FILM COATED ORAL at 08:34

## 2020-06-11 RX ADMIN — LISINOPRIL 10 MG: 10 TABLET ORAL at 08:34

## 2020-06-11 RX ADMIN — INSULIN LISPRO 2 UNITS: 100 INJECTION, SOLUTION INTRAVENOUS; SUBCUTANEOUS at 17:05

## 2020-06-11 RX ADMIN — METFORMIN HYDROCHLORIDE 850 MG: 850 TABLET ORAL at 17:05

## 2020-06-11 RX ADMIN — ENOXAPARIN SODIUM 40 MG: 40 INJECTION SUBCUTANEOUS at 08:34

## 2020-06-11 RX ADMIN — INSULIN LISPRO 4 UNITS: 100 INJECTION, SOLUTION INTRAVENOUS; SUBCUTANEOUS at 08:35

## 2020-06-11 RX ADMIN — INSULIN LISPRO 1 UNITS: 100 INJECTION, SOLUTION INTRAVENOUS; SUBCUTANEOUS at 21:30

## 2020-06-11 RX ADMIN — METFORMIN HYDROCHLORIDE 500 MG: 500 TABLET ORAL at 08:34

## 2020-06-11 RX ADMIN — INSULIN LISPRO 2 UNITS: 100 INJECTION, SOLUTION INTRAVENOUS; SUBCUTANEOUS at 12:44

## 2020-06-11 RX ADMIN — FOLIC ACID 1 MG: 1 TABLET ORAL at 08:34

## 2020-06-11 ASSESSMENT — ENCOUNTER SYMPTOMS
COUGH: 0
VOMITING: 0
NAUSEA: 0
SHORTNESS OF BREATH: 0
ABDOMINAL DISTENTION: 0
WHEEZING: 0

## 2020-06-11 ASSESSMENT — PAIN SCALES - GENERAL
PAINLEVEL_OUTOF10: 0
PAINLEVEL_OUTOF10: 0

## 2020-06-11 NOTE — CONSULTS
Gorge Brim, PA        vitamin B-1 (THIAMINE) tablet 100 mg  100 mg Oral Daily Gorge Brim, [place]   100 mg at 06/11/20 9353    sodium chloride flush 0.9 % injection 10 mL  10 mL Intravenous 2 times per day Gorge Brim, [place]        sodium chloride flush 0.9 % injection 10 mL  10 mL Intravenous PRN Gorge Brim, PA        acetaminophen (TYLENOL) tablet 650 mg  650 mg Oral Q6H PRN Gorge Brim, PA        Or    acetaminophen (TYLENOL) suppository 650 mg  650 mg Rectal Q6H PRN Gorge Brim, PA        polyethylene glycol (GLYCOLAX) packet 17 g  17 g Oral Daily PRN Gorge Brim, PA        promethazine (PHENERGAN) tablet 12.5 mg  12.5 mg Oral Q6H PRN Gorge Brim, PA        Or    ondansetron [facility]) injection 4 mg  4 mg Intravenous Q6H PRN Gorge Brim, PA        enoxaparin (LOVENOX) injection 40 mg  40 mg Subcutaneous Daily Gorge Brim, PA   40 mg at 06/11/20 0834    glucose (GLUTOSE) 40 % oral gel 15 g  15 g Oral PRN Gorge Brim, PA        dextrose 50 % IV solution  12.5 g Intravenous PRN Gorge Brim, PA        glucagon (rDNA) injection 1 mg  1 mg Intramuscular PRN Gorge Brim, PA        dextrose 5 % solution  100 mL/hr Intravenous PRN Gorge Brim, PA        LORazepam (ATIVAN) tablet 1 mg  1 mg Oral Q1H PRN Gorge Brim, PA        Or    LORazepam (ATIVAN) injection 1 mg  1 mg Intravenous Q1H PRN Gorge Brim, PA        Or    LORazepam (ATIVAN) tablet 2 mg  2 mg Oral Q1H PRN Gorge Brim, PA        Or    LORazepam (ATIVAN) injection 2 mg  2 mg Intravenous Q1H PRN Gorge Brim, PA        Or    LORazepam (ATIVAN) tablet 3 mg  3 mg Oral Q1H PRN Gorge Brim, PA        Or    LORazepam (ATIVAN) injection 3 mg  3 mg Intravenous Q1H PRN Gorge Brim, PA        Or    LORazepam (ATIVAN) tablet 4 mg  4 mg Oral Q1H PRN Gorge Brim, PA        Or    LORazepam (ATIVAN) injection 4 mg  4 mg Intravenous Q1H PRN Gorge Brim, PA        [START ON 6/12/2020] lisinopril (PRINIVIL;ZESTRIL) tablet 20 mg  20 mg Oral Daily Una Alva MD        metFORMIN (GLUCOPHAGE) tablet 850 mg  850 mg Oral BID  Una Alva MD   850 mg at 06/11/20 1705    insulin lispro (HUMALOG) injection vial 0-12 Units  0-12 Units Subcutaneous TID  Una Alva MD   2 Units at 06/11/20 1705    insulin lispro (HUMALOG) injection vial 0-6 Units  0-6 Units Subcutaneous Nightly Una Alva MD            ALLERGIES:     Allergies   Allergen Reactions    Celebrex [Celecoxib] Swelling       REVIEW OF SYSTEMS:      CONSTITUTIONAL Weight change: absent, Appetite change: absent, Fatigue: absent    HEENT Ears: absent, Visual disturbance: absent    RESPIRATORY Shortness of breath: absent, Cough: absent    CARDIOVASCULAR Chest pain: absent, Leg swelling :absent    GI Constipation: absent, Diarrhea: absent, Swallowing change: absent     Urinary frequency: absent, Urinary urgency: absent, Urinary incontinence: absent    MUSCULOSKELETAL Neck pain: absent, Back pain: absent, Stiffness: absent, Muscle pain: absent, Joint pain: absent Restless legs: absent    DERMATOLOGIC Hair loss: absent, Skin changes: absent    NEUROLOGIC Memory loss: absent, Confusion: present, Seizures: absent; Trouble walking or imbalance: absent, Dizziness: absent, Weakness: absent, Numbness: absent Tremor: absent, Spasm: absent, Speech difficulty: absent, Headache: absent, Light sensitivity: absent    PSYCHIATRIC Anxiety: absent, Hallucination: absent, Mood disorder: absent    HEMATOLOGIC Abnormal bleeding: absent, Anemia: absent, Clotting disorder: absent, Lymph gland changes: absent     VITALS  BP (!) 141/82   Pulse 60   Temp 97.9 °F (36.6 °C) (Oral)   Resp 15   Ht 5' 7\" (1.702 m)   Wt 130 lb (59 kg)   SpO2 95%   BMI 20.36 kg/m²      PHYSICAL EXAMINATION:     General appearance: cooperative  Skin: no rash or skin lesions.   HEENT: normocephalic  Optic Fundi: deferred  Neck: supple, no cervcical adenopathy or carotid bruit  Lungs: clear to auscultation  Heart: Regular rate and rhythm, normal S1, S2. No murmurs, clicks or gallops. Peripheral pulses: radial pulses palpable  Abdominal: BS present, soft, NT, ND  Extremities: no edema    NEUROLOGICAL EXAMINATION:     MS: awake, alert and oriented to president, year, city, not to hospital, year. No aphasia, dysarthria  CNs: bilateral lateral gaze palsy, vertical gaze palsy, sensation intact, face symmetric, hearing intact, soft palate rises on phonation, sternocleidomastoid and trapezius intact. Tongue midline, no fasciculations. Motor: no abnormal movements, tone and bulk okay. RUE: delta 5/5, biceps 5/5, triceps 5/5,  5/5  LUE: delta 5/5, biceps 5/5, triceps 5/5,  5/5  RLE: hf 5/5, ke 5/5, df 5/5, pf 5/5  LLE: hf 5/5, ke 5/5, df 5/5, pf 5/5  Reflexes: 2+ throughout, symmetric, babinski not present. Coordination: FNF no dysmetria, heel to shin okay, ANTWON okay, deferred Rhomberg. Gait: deferred  Sensory: stock distribution to temp/pp below knees. no extinction. LABS & TESTS  Lab Results   Component Value Date    WBC 11.7 (H) 06/10/2020    HGB 14.3 06/10/2020    HCT 42.8 06/10/2020    MCV 89.6 06/10/2020     06/10/2020     ASSESSMENT/PLANS:     // Encephalopathy, unspecific  - baseline is unknown  - per chart, history of SDH, dementia  - CTH: volume loss, old pontine lacunar infarct  - EEG: no epileptiform discharges or seizures  - poorly controlled DM with  on admission  - continue hydration, optimal control DM    // Bilateral CN VI palsy  - could be due to old pontine stroke  - MRI brain w/o     // Poorly controlled DM  - per primary team      Thank you for giving us the opportunity to participate in Mr. April jolly. shall you have any questions, please do not hesitate to contact neurology consult team. Thank you.  Will follow     Viviana Knapp MD  Neurology Attending  Clinic: [phone]

## 2020-06-11 NOTE — ED NOTES
RN called [facility] at [phone], spoke with [facility] LPN and gave report pt is being sent back to facility.      Mirna Melvin RN  06/11/20 2574

## 2020-06-11 NOTE — PROGRESS NOTES
Pt does not have insurance for long term care. SW spoke to pt's son Juan Jesus. Juan Jesus said this situation with his dad has been ongoing for years. Juan Jesus said pt cannot stay with him as he has tried it in the past. Pt cannot stay with sister as he \"comes on to her sexually\". SW asked if pt had money to pay oop for ECF, Juan Jesus said he does not. Pt does own his own home. SW explained that pt may need to get on medicaid if family cannot take him home. Juan Jesus said he will talk to his siblings and see if someone has POA papers. Otherwise, pt may need an expert evaluation for guardianship process to start.  Family is coming to the hospital.

## 2020-06-11 NOTE — PROGRESS NOTES
Pt admitted to room 2060, under observational status. Admission Dx: Altered Mental Status. Pt has hx- dementia, etoh abuse, head injury, dm, TIA. Per Irina Bates RN in ER- Pt had pulled out IV, pt would not let staff place new IV. Pt arrived on unit & upon speaking with So Wolf NP, Jn Chaudhary voiced that the pt did not need an IV @ this time. Pt in bed, with bed alarm on. Pt educated about the call light and how/when to use it. Pt urinated in urinal 350mL clear, yellow urine w/out malodor. Pt came from [facility] [place], [place]. Pt oriented to self, & unable to complete majority of admission d/t pt's cognitive status.

## 2020-06-11 NOTE — ED PROVIDER NOTES
drug overdose, seizure, elevated ammonia, vitamin deficiency, adrenal insuficiency  -  #Impression/Plan:  - Clinically patient's presentation is most consistent with worsening dementia. Patient is treated will get laboratory testing and imaging. Will attempt symptomatic treatment. However clinically of low suspicion of acute infectious or life-threatening etiology. -  ##Reevaluation/Conversations on care:  -EKG not acutely ischemic. All work-up essentially unremarkable. Patient was just admitted for this. Will discharge back to [facility] refused to take patient back. Will admit here.  -----------------------  -----------------------  Shameka Denny MD, Scottie [address]  Emergency Medicine Attending  Questions? Please contact my cell phone anytime. [phone]  *This charting supersedes any ED resident or staff charting and was written using speech recognition software    ## The patient was evaluated during the global COVID-19 pandemic, and that diagnosis was suspected/considered upon their initial presentation.       PASTMEDICAL HISTORY     Past Medical History:   Diagnosis Date    Alcoholic dementia ([facility] [address]     Dr Obie Owens pain     Cerebral artery occlusion with cerebral infarction ([address]     Diabetes mellitus ([address]     Head injury     Hearing loss     Hypertension     TIA (transient ischemic attack)     Transient ischemic attack     Ulcer      SURGICAL HISTORY       Past Surgical History:   Procedure Laterality Date    APPENDECTOMY      50 years ago   [address]      15 years ago    CRANIOTOMY Right 3/9/2019    RIGHT FRONTAL VENTRICULAR DRAIN INSERTION AND BRODERICK BOLT INSERTION performed by Aidan Sam MD at [address] [place] [address]  [id number]    PACEMAKER INSERTION      UPPER GASTROINTESTINAL ENDOSCOPY N/A 3/14/2019    EGD ESOPHAGOGASTRODUODENOSCOPY performed by Gustavo Goodson MD at [facility]     CURRENT MEDICATIONS       Previous Medications    FOLIC Absolute 9.40 (*)     All other components within normal limits   COMPREHENSIVE METABOLIC PANEL W/ REFLEX TO MG FOR LOW K - Abnormal; Notable for the following components:    Glucose 330 (*)     Sodium 133 (*)     Chloride 96 (*)     Total Bilirubin 0.17 (*)     Total Protein 6.0 (*)     All other components within normal limits   URINE RT REFLEX TO CULTURE - Abnormal; Notable for the following components:    Turbidity UA CLOUDY (*)     Glucose, Ur 3+ (*)     Protein, UA 2+ (*)     All other components within normal limits   BRAIN NATRIURETIC PEPTIDE - Abnormal; Notable for the following components:    Pro- (*)     All other components within normal limits   TSH WITH REFLEX   TROPONIN   TROPONIN   PROTIME-INR   AMMONIA   VITAMIN B12 & FOLATE   MICROSCOPIC URINALYSIS   VITAMIN B1     EMERGENCY DEPARTMENTCOURSE:         Vitals:    Vitals:    06/10/20 2136 06/11/20 0043 06/11/20 0114   BP: (!) 182/85 (!) 152/73 (!) 147/97   Pulse: 90 100 86   Resp: 22 14 20   Temp: 98.5 °F (36.9 °C) 98.3 °F (36.8 °C) 97.4 °F (36.3 °C)   TempSrc: Oral Oral Oral   SpO2: 95% 94% 94%   Weight: 130 lb (59 kg)     Height: 5' 7\" (1.702 m)         The patient was given the following medications while in the emergency department:  No orders of the defined types were placed in this encounter. CONSULTS:  None    FINAL IMPRESSION      1. Confused    2. Dementia without behavioral disturbance, unspecified dementia type [facility])          DISPOSITION/PLAN   DISPOSITION Decision To Admit 06/11/2020 02:50:14 AM      PATIENT REFERRED TO:  No follow-up provider specified.   DISCHARGE MEDICATIONS:  New Prescriptions    No medications on file     Milvia Durand MD  Attending Emergency Physician                    Tea Coffman MD  06/11/20 0136       Tea Coffman MD  06/11/20 9800

## 2020-06-11 NOTE — ED NOTES
Report given to Kevin Lyon RN from [facility]. Report method by phone   The following was reviewed with receiving RN:   Current vital signs:  BP (!) 147/97   Pulse 86   Temp 97.4 °F (36.3 °C) (Oral)   Resp 20   Ht 5' 7\" (1.702 m)   Wt 130 lb (59 kg)   SpO2 94%   BMI 20.36 kg/m²                MEWS Score: 1     Any medication or safety alerts were reviewed. Any pending diagnostics and notifications were also reviewed, as well as any safety concerns or issues, abnormal labs, abnormal imaging, and abnormal assessment findings. Questions were answered.             Lorne George RN  06/11/20 9979

## 2020-06-11 NOTE — ED NOTES
Updated pt son of pt being admitted to [facility]. Son in agreement with plan.       Mahi Mcleod, JEB  06/11/20 0234

## 2020-06-11 NOTE — H&P
[facility]     HISTORY AND PHYSICAL EXAMINATION            Date:   6/11/2020  Patientname:  Ant Hopper  Date of admission:  6/10/2020  9:35 PM  MRN:   [id number]  Account:  [de-identified]  YOB: 1945  PCP:    Angeles Flanagan MD  Room:   2060/2060-01  Code Status:    Full Code    CHIEF COMPLAINT     Chief Complaint   Patient presents with    Altered Mental Status       HISTORY OF PRESENT ILLNESS  (Character, Onset, Location, Duration,  Exacerbating/RelievingFactors, Radiation,   Associated Symptoms, Severity )      The patient is a 69 y.o.  male, with a history of dementia, alcoholism, and  chronic subdural hematoma who presents with altered mental status. According to documentation, due to the patient being a poor historian, he has been more confused today and was sent here by his care facility. Of note the patient was discharged earlier this same day. Work-up in the ED showed hyperglycemia, however was otherwise unremarkable, and it was attempted to have the patient discharged. The facility would not accept him back with his altered mental status so patient was admitted for observation at this time. PAST MEDICAL HISTORY   Patient  has a past medical history of Alcoholic dementia ([address] Back pain, Cerebral artery occlusion with cerebral infarction ([address] Diabetes mellitus ([address] Head injury, Hearing loss, Hypertension, TIA (transient ischemic attack), Transient ischemic attack, and Ulcer. PAST SURGICAL HISTORY    Patient  has a past surgical history that includes back surgery; Pacemaker insertion; eye surgery ([id number]); brain surgery; Appendectomy; craniotomy (Right, 3/9/2019); and Upper gastrointestinal endoscopy (N/A, 3/14/2019). FAMILY HISTORY    Patient family history includes Diabetes in his mother; Heart Disease in his mother. SOCIAL HISTORY    Patient  reports that he has quit smoking.  He has never used smokeless tobacco. He reports current alcohol use of about 6.0 standard drinks of alcohol per week. He reports current drug use. Drug: Marijuana. HOME MEDICATIONS        Prior to Admission medications    Medication Sig Start Date End Date Taking? Authorizing Provider   lisinopril (PRINIVIL;ZESTRIL) 10 MG tablet Take 1 tablet by mouth daily 6/11/20  Yes Hakeem Lane MD   metoprolol tartrate (LOPRESSOR) 25 MG tablet Take 1 tablet by mouth 2 times daily 6/10/20  Yes Hakeem Lane MD   thiamine 100 MG tablet Take 1 tablet by mouth daily 6/10/20  Yes Hakeem Lane MD   folic acid (FOLVITE) 1 MG tablet Take 1 tablet by mouth daily 6/10/20  Yes Hakeem Lane MD   metFORMIN (GLUCOPHAGE) 500 MG tablet Take 1 tablet by mouth 2 times daily (with meals) 6/10/20  Yes Hakeem Lane MD   pantoprazole (PROTONIX) 40 MG tablet Take 1 tablet by mouth every morning (before breakfast) 6/10/20  Yes Hakeem Lane MD       ALLERGIES      Celebrex [celecoxib]    REVIEW OF SYSTEMS     Review of Systems   Respiratory: Negative for cough, shortness of breath and wheezing. Cardiovascular: Negative for chest pain. Gastrointestinal: Negative for abdominal distention, nausea and vomiting. Genitourinary: Negative for dysuria and flank pain. Neurological: Negative for seizures, syncope, facial asymmetry and weakness. Psychiatric/Behavioral: Positive for confusion. PHYSICAL EXAM      BP (!) 147/97   Pulse 86   Temp 97.4 °F (36.3 °C) (Oral)   Resp 20   Ht 5' 7\" (1.702 m)   Wt 130 lb (59 kg)   SpO2 94%   BMI 20.36 kg/m²  Body mass index is 20.36 kg/m². Physical Exam  Constitutional:       General: He is not in acute distress. Appearance: Normal appearance. He is not ill-appearing. HENT:      Head: Normocephalic and atraumatic. Right Ear: External ear normal.      Left Ear: External ear normal.      Nose: Nose normal.      Mouth/Throat:      Mouth: Mucous membranes are moist.   Eyes:      Extraocular Movements: Extraocular movements intact. Conjunctiva/sclera: Conjunctivae normal.      Pupils: Pupils are equal, round, and reactive to light. Neck:      Musculoskeletal: Normal range of motion and neck supple. Cardiovascular:      Rate and Rhythm: Normal rate and regular rhythm. Pulses: Normal pulses. Heart sounds: Normal heart sounds. Pulmonary:      Effort: Pulmonary effort is normal. No respiratory distress. Breath sounds: Normal breath sounds. Abdominal:      General: Bowel sounds are normal. There is no distension. Tenderness: There is no abdominal tenderness. Musculoskeletal: Normal range of motion. Skin:     General: Skin is warm and dry. Neurological:      General: No focal deficit present. Mental Status: He is alert. He is disoriented. DIAGNOSTICS      EKG: EKG:  EKG 12 Lead [[id number]]    Collected: 06/10/20 2148    Updated: 06/10/20 2152     Ventricular Rate 98 BPM    Atrial Rate 98 BPM    P-R Interval 160 ms    QRS Duration 88 ms    Q-T Interval 336 ms    QTc Calculation (Bazett) 428 ms    P Axis 16 degrees    R Axis -38 degrees    T Axis 15 degrees   Narrative:     Sinus rhythm with marked sinus arrhythmia with frequent Premature ventricular complexes  Left axis deviation  Voltage criteria for left ventricular hypertrophy  Abnormal ECG  When compared with ECG of 04-JUN-2020 06:56,  Premature ventricular complexes are now Present  Premature atrial complexes are no longer Present     Labs:  CBC:   Recent Labs     06/09/20  0532 06/10/20  0609 06/10/20  2205   WBC 9.2 10.3 11.7*   HGB 14.1 14.7 14.3    263 264     BMP:    Recent Labs     06/09/20  0532 06/10/20  0609 06/10/20  2205    139 133*   K 4.3 4.4 4.4    102 96*   CO2 27 26 26   BUN 13 14 19   CREATININE 0.83 0.77 0.91   GLUCOSE 207* 215* 330*     S. Calcium:  Recent Labs     06/10/20  2205   CALCIUM 9.5     S.  Magnesium:  Recent Labs     06/10/20  0609   MG 1.7     Glycosylated hemoglobin A1C:   Lab Results Component Value Date    LABA1C 6.9 06/03/2020     Hepatic:   Recent Labs     06/10/20  2205   AST 14   ALT 15   ALKPHOS 82     CARDIAC ENZY:   Recent Labs     06/10/20  2205 06/11/20  0050   TROPHS 11 11     INR:   Recent Labs     06/10/20  2205   INR 0.9     BNP:   Recent Labs     06/10/20  2205   PROBNP 511*      Thyroid functions:   Lab Results   Component Value Date    TSH 3.21 06/10/2020      U/A:  Recent Labs     06/11/20  0117   COLORU YELLOW   WBCUA 0 TO 2   RBCUA 0 TO 2   MUCUS NOT REPORTED   BACTERIA None   SPECGRAV 1.020   LEUKOCYTESUR NEGATIVE   GLUCOSEU 3+*   AMORPHOUS NOT REPORTED       Imaging/Diagonstics:     Ct Head Wo Contrast    Result Date: 6/10/2020  EXAMINATION: CT OF THE HEAD WITHOUT CONTRAST  6/10/2020 10:55 pm TECHNIQUE: CT of the head was performed without the administration of intravenous contrast. Dose modulation, iterative reconstruction, and/or weight based adjustment of the mA/kV was utilized to reduce the radiation dose to as low as reasonably achievable. COMPARISON: 06/02/2020, 05/23/2019 HISTORY: ORDERING SYSTEM PROVIDED HISTORY: HA TECHNOLOGIST PROVIDED HISTORY: ESTRELLA Reason for Exam: HA, AMS, hx of brain surgery and craniotomy Acuity: Unknown Type of Exam: Initial FINDINGS: BRAIN/VENTRICLES: There is no acute intracranial hemorrhage, mass effect or midline shift. No abnormal extra-axial fluid collection. The gray-white differentiation is maintained without evidence of an acute infarct. There is prominence of the ventricles and sulci due to global parenchymal volume loss. There are nonspecific areas of hypoattenuation within the periventricular and subcortical white matter, which likely represent chronic microvascular ischemic change. Pontine lacunar infarct. Persistent dural thickening deep to craniotomy sites over both convexities. ORBITS: The visualized portion of the orbits demonstrate no acute abnormality.  SINUSES: The visualized paranasal sinuses and mastoid air cells demonstrate no acute abnormality. SOFT TISSUES/SKULL: No acute abnormality of the visualized skull or soft tissues. Multiple craniotomies again noted. No acute intracranial abnormality. Chronic small vessel ischemic changes. Postsurgical changes and stable dural thickening. Ct Head Wo Contrast    Result Date: 6/2/2020  EXAMINATION: CT OF THE HEAD WITHOUT CONTRAST  6/2/2020 7:08 pm TECHNIQUE: CT of the head was performed without the administration of intravenous contrast. Dose modulation, iterative reconstruction, and/or weight based adjustment of the mA/kV was utilized to reduce the radiation dose to as low as reasonably achievable. COMPARISON: None. HISTORY: ORDERING SYSTEM PROVIDED HISTORY: altered mental status TECHNOLOGIST PROVIDED HISTORY: altered mental status Reason for Exam: altered mental status Additional signs and symptoms: Per ED notes. Phoebe Finders Phoebe Finders Per ems report, by 05/23/2019 standered called for person wandering around on the street FINDINGS: BRAIN/VENTRICLES: There is prominence of the ventricles and cortical sulci consistent with cortical volume loss. No midline shift. Basal cisterns are normally outlined. There is periventricular and subcortical white matter discrete and confluent low attenuation, nonspecific, likely representing age-related chronic small vessel ischemic disease. There is no evidence for acute infarct. No acute intra or extra-axial hemorrhage. ORBITS: The visualized portion of the orbits demonstrate no acute abnormality. SINUSES: The visualized paranasal sinuses and mastoid air cells demonstrate no acute abnormality. SOFT TISSUES/SKULL:  Biparietal in bifrontal bur holes are noted as seen previously. Soft tissues. No acute intracranial abnormality. Cerebral atrophy.      Xr Chest Portable    Result Date: 6/10/2020  EXAMINATION: ONE XRAY VIEW OF THE CHEST 6/10/2020 10:34 pm COMPARISON: March 27, 2019 HISTORY: ORDERING SYSTEM PROVIDED HISTORY: cough TECHNOLOGIST PROVIDED HISTORY: cough Reason for Exam: sob Acuity: Acute Type of Exam: Initial Additional signs and symptoms: Cough, confusion Relevant Medical/Surgical History: Cough, confusion FINDINGS: Marginal inspiration is noted. No focal area of consolidation or pneumothorax is present. Cardiomegaly is present. A dual lead permanent cardiac pacemaker is again noted in place. Osseous structures are stable. Cardiomegaly, without evidence of CHF     ASSESSMENT  and  PLAN     Principal Problem:    Altered mental status  Active Problems:    Type 2 diabetes mellitus with hyperglycemia, without long-term current use of insulin (HCC)  Resolved Problems:    * No resolved hospital problems. *    Plan: Altered mental status  - Patient admitted under observation  -Admission orders completed, med rec completed  - CIWA initiated    Hyperglycemia   -Continue home medications  - Medium dose sliding scale insulin  - POCT glucose checks Q AC & HS  - Hypoglycemia protocol  Consultations:     None      Dalia Alfordma   6/11/2020  4:47 AM    [address]  [place], [address] [place] [address].    Phone [phone]

## 2020-06-11 NOTE — ED PROVIDER NOTES
[facility] ED  Emergency Department Encounter  Emergency Medicine Resident     Pt Name: Farzana Javed  MRN: [id number]  Armsgurjitgfurt 1945  Date of evaluation: 6/10/20  PCP:  Claudio Salcido MD    [address]       Chief Complaint   Patient presents with    Altered Mental Status       HISTORY Emanuel  (Location/Symptom, Timing/Onset, Context/Setting, Quality, Duration, Modifying Factors,Severity.)      Farzana Javed is a 69 y. o.yo male who presents with concern for altered mental status per nursing home. Patient was admitted to the hospital and just discharged today for altered mental status secondary to hyponatremia. Patient does have a history of dementia. Patient is comfortable appearing on presentation. He is unable to provide further details surrounding the event. It was reported that patient was requiring more care than was expected so he was sent to the hospital for reevaluation. Patient denies any pain. No signs of recent trauma and no reports of recent trauma from nursing facility. No anticoagulation use on medication list.    PAST MEDICAL / SURGICAL / SOCIAL / FAMILY HISTORY      has a past medical history of Alcoholic dementia ([address] Back pain, Cerebral artery occlusion with cerebral infarction ([address] Diabetes mellitus ([address] Head injury, Hearing loss, Hypertension, TIA (transient ischemic attack), Transient ischemic attack, and Ulcer. has a past surgical history that includes back surgery; Pacemaker insertion; eye surgery (1374-8334); brain surgery; Appendectomy; craniotomy (Right, 3/9/2019); and Upper gastrointestinal endoscopy (N/A, 3/14/2019).      Social History     Socioeconomic History    Marital status: Single     Spouse name: Not on file    Number of children: Not on file    Years of education: Not on file    Highest education level: Not on file   Occupational History    Not on file   Social Needs    Financial resource strain: Not on file pantoprazole (PROTONIX) 40 MG tablet Take 1 tablet by mouth every morning (before breakfast) 6/10/20  Yes Hakeem Lane MD       REVIEW OFSYSTEMS    (2-9 systems for level 4, 10 or more for level 5)      Review of Systems  Unable to obtain secondary to pt condition    PHYSICAL EXAM   (up to 7 for level 4, 8 or more forlevel 5)      INITIAL VITALS:   ED Triage Vitals [06/10/20 2136]   BP Temp Temp Source Pulse Resp SpO2 Height Weight   (!) 182/85 98.5 °F (36.9 °C) Oral 90 22 95 % 5' 7\" (1.702 m) 130 lb (59 kg)       Physical Exam  Constitutional:       General: He is not in acute distress. Appearance: He is well-developed. Comments: frail   HENT:      Head: Normocephalic and atraumatic. Eyes:      Conjunctiva/sclera: Conjunctivae normal.      Pupils: Pupils are equal, round, and reactive to light. Neck:      Musculoskeletal: Normal range of motion and neck supple. Cardiovascular:      Rate and Rhythm: Normal rate. Heart sounds: Normal heart sounds. Pulmonary:      Effort: Pulmonary effort is normal.      Breath sounds: Normal breath sounds. Abdominal:      General: There is no distension. Palpations: Abdomen is soft. Tenderness: There is no abdominal tenderness. There is no rebound. Musculoskeletal: Normal range of motion. General: No deformity. Right lower leg: No edema. Left lower leg: No edema. Skin:     General: Skin is warm. Capillary Refill: Capillary refill takes less than 2 seconds. Neurological:      Mental Status: He is alert.       Comments: Oriented to person and place but not time or situation         DIFFERENTIAL  DIAGNOSIS     PLAN (LABS / IMAGING / EKG):  Orders Placed This Encounter   Procedures    XR CHEST PORTABLE    CT Head WO Contrast    CBC Auto Differential    Comprehensive Metabolic Panel w/ Reflex to MG    TSH with Reflex    Urinalysis Reflex to Culture    Brain Natriuretic Peptide    Troponin    Protime-INR    Ammonia  VITAMIN B1    VITAMIN B12 & FOLATE    EKG 12 Lead    Insert peripheral IV       MEDICATIONS ORDERED:  No orders of the defined types were placed in this encounter. Initial MDM/Plan: 69 y.o. male who presents with concern for altered mental status by nursing facility. Patient recently admitted and discharged from the hospital today for same. Documented history of dementia. Was admitted with hyponatremia likely causing altered mental status. Will obtain repeat evaluation for potential metabolic cause of his altered mental status. No outward signs of trauma. Will obtain CT of the head. Will evaluate for ACS as well. Follow-up work-up and likely discussed with nursing facility. Disposition to be determined.     DIAGNOSTIC RESULTS / EMERGENCYDEPARTMENT COURSE / MDM     LABS:  Labs Reviewed   CBC WITH AUTO DIFFERENTIAL - Abnormal; Notable for the following components:       Result Value    WBC 11.7 (*)     Seg Neutrophils 79 (*)     Lymphocytes 12 (*)     Segs Absolute 9.40 (*)     All other components within normal limits   COMPREHENSIVE METABOLIC PANEL W/ REFLEX TO MG FOR LOW K - Abnormal; Notable for the following components:    Glucose 330 (*)     Sodium 133 (*)     Chloride 96 (*)     Total Bilirubin 0.17 (*)     Total Protein 6.0 (*)     All other components within normal limits   BRAIN NATRIURETIC PEPTIDE - Abnormal; Notable for the following components:    Pro- (*)     All other components within normal limits   TSH WITH REFLEX   TROPONIN   PROTIME-INR   AMMONIA   URINE RT REFLEX TO CULTURE   TROPONIN   VITAMIN B1   VITAMIN B12 & FOLATE         RADIOLOGY:  Ct Head Wo Contrast    Result Date: 6/10/2020  EXAMINATION: CT OF THE HEAD WITHOUT CONTRAST  6/10/2020 10:55 pm TECHNIQUE: CT of the head was performed without the administration of intravenous contrast. Dose modulation, iterative reconstruction, and/or weight based adjustment of the mA/kV was utilized to reduce the radiation dose to

## 2020-06-11 NOTE — PROGRESS NOTES
Nutrition Assessment    Type and Reason for Visit: Initial    Nutrition Recommendations: Continue Cardiac, Carbohydrate Control diet 4 carbs/meal. Start Glucerna 2x/day. Nutrition Assessment: Patient admission related to altered mental status. Patient appears to be thin and family endorses patient has lost about 10 lbs in a year (significant medical issues a year ago). Patient takes oral nutrition supplements at home. Will start Glucerna in chocolate flavor. Continue current diet. Monitor p.o intakes and labs. Malnutrition Assessment:  · Malnutrition Status: Insufficient data  · Context: Acute illness or injury  · Findings of the 6 clinical characteristics of malnutrition (Minimum of 2 out of 6 clinical characteristics is required to make the diagnosis of moderate or severe Protein Calorie Malnutrition based on AND/ASPEN Guidelines):  1. Energy Intake-Less than or equal to 75% of estimated energy requirement, Greater than or equal to 1 month    2. Weight Loss-5% loss or greater, in 1 year  3. Fat Loss-Unable to assess,    4. Muscle Loss-Unable to assess,    5. Fluid Accumulation-No significant fluid accumulation, Extremities  6.  Strength-Not measured    Nutrition Risk Level: Moderate    Nutrient Needs:  · Estimated Daily Total Kcal: 5743-5652 kcal based on 28-30 kcal/kg of admission weight   · Estimated Daily Protein (g): 71-83 gm of protein based on 1.2-1.4 gm/kg of admission weight     Nutrition Diagnosis:   · Problem: Unintended weight loss  · Etiology: related to (Acute illnesses)     Signs and symptoms:  as evidenced by Diet history of poor intake, Intake 25-50%, Weight loss    Objective Information:  · Nutrition-Focused Physical Findings: Trace BLE edema. Labs: glucose: 330 (H), ProBNP: 511 (H). Altered mental status upon admission.  Thin appearance  · Current Nutrition Therapies:  · Oral Diet Orders: Cardiac, Carb Control 4 Carbs/Meal   · Oral Diet intake: 51-75%  · Oral Nutrition Supplement (ONS) Orders: None  · Anthropometric Measures:  · Ht: 5' 7\" (170.2 cm)   · Current Body Wt: 130 lb (59 kg)  · Admission Body Wt: 130 lb (59 kg)  · Usual Body Wt: 140 lb (63.5 kg)(3/9/19 obtained from Nutrition note)  · % Weight Change:  ,  7.1% loss in 15 months   · Ideal Body Wt: 148 lb (67.1 kg), % Ideal Body 88%  · BMI Classification: BMI 18.5 - 24.9 Normal Weight    Nutrition Interventions:   Continue current diet, Start ONS  Continued Inpatient Monitoring    Nutrition Evaluation:   · Evaluation: Goals set   · Goals: PO intakes are greater than 75% at meals     · Monitoring: Meal Intake, Supplement Intake, Pertinent Labs, Weight, Monitor Bowel Function, Diet Tolerance, Skin Integrity, I&O      Some areas of assessment maybe incomplete due to COVID-19 precautions.     Shelly ANDRADE, R.D, L.D,  Clinical Dietitian  Office # [phone]

## 2020-06-12 LAB
ABSOLUTE EOS #: 0.2 K/UL (ref 0–0.4)
ABSOLUTE IMMATURE GRANULOCYTE: ABNORMAL K/UL (ref 0–0.3)
ABSOLUTE LYMPH #: 1.8 K/UL (ref 1–4.8)
ABSOLUTE MONO #: 0.8 K/UL (ref 0.1–1.3)
ANION GAP SERPL CALCULATED.3IONS-SCNC: 11 MMOL/L (ref 9–17)
BASOPHILS # BLD: 1 % (ref 0–2)
BASOPHILS ABSOLUTE: 0.1 K/UL (ref 0–0.2)
BUN BLDV-MCNC: 15 MG/DL (ref 8–23)
BUN/CREAT BLD: ABNORMAL (ref 9–20)
CALCIUM SERPL-MCNC: 9.3 MG/DL (ref 8.6–10.4)
CHLORIDE BLD-SCNC: 100 MMOL/L (ref 98–107)
CO2: 24 MMOL/L (ref 20–31)
CREAT SERPL-MCNC: 0.68 MG/DL (ref 0.7–1.2)
DIFFERENTIAL TYPE: ABNORMAL
EOSINOPHILS RELATIVE PERCENT: 2 % (ref 0–4)
GFR AFRICAN AMERICAN: >60 ML/MIN
GFR NON-AFRICAN AMERICAN: >60 ML/MIN
GFR SERPL CREATININE-BSD FRML MDRD: ABNORMAL ML/MIN/{1.73_M2}
GFR SERPL CREATININE-BSD FRML MDRD: ABNORMAL ML/MIN/{1.73_M2}
GLUCOSE BLD-MCNC: 177 MG/DL (ref 75–110)
GLUCOSE BLD-MCNC: 202 MG/DL (ref 70–99)
HCT VFR BLD CALC: 41.3 % (ref 41–53)
HEMOGLOBIN: 13.7 G/DL (ref 13.5–17.5)
IMMATURE GRANULOCYTES: ABNORMAL %
LYMPHOCYTES # BLD: 17 % (ref 24–44)
MCH RBC QN AUTO: 30.2 PG (ref 26–34)
MCHC RBC AUTO-ENTMCNC: 33.2 G/DL (ref 31–37)
MCV RBC AUTO: 90.8 FL (ref 80–100)
MONOCYTES # BLD: 7 % (ref 1–7)
NRBC AUTOMATED: ABNORMAL PER 100 WBC
PDW BLD-RTO: 14.3 % (ref 11.5–14.9)
PLATELET # BLD: 252 K/UL (ref 150–450)
PLATELET ESTIMATE: ABNORMAL
PMV BLD AUTO: 8.6 FL (ref 6–12)
POTASSIUM SERPL-SCNC: 4.3 MMOL/L (ref 3.7–5.3)
RBC # BLD: 4.55 M/UL (ref 4.5–5.9)
RBC # BLD: ABNORMAL 10*6/UL
SEG NEUTROPHILS: 73 % (ref 36–66)
SEGMENTED NEUTROPHILS ABSOLUTE COUNT: 7.6 K/UL (ref 1.3–9.1)
SODIUM BLD-SCNC: 135 MMOL/L (ref 135–144)
WBC # BLD: 10.4 K/UL (ref 3.5–11)
WBC # BLD: ABNORMAL 10*3/UL

## 2020-06-12 PROCEDURE — 80048 BASIC METABOLIC PNL TOTAL CA: CPT

## 2020-06-12 PROCEDURE — 6370000000 HC RX 637 (ALT 250 FOR IP): Performed by: INTERNAL MEDICINE

## 2020-06-12 PROCEDURE — 6360000002 HC RX W HCPCS: Performed by: PHYSICIAN ASSISTANT

## 2020-06-12 PROCEDURE — 99225 PR SBSQ OBSERVATION CARE/DAY 25 MINUTES: CPT | Performed by: PSYCHIATRY & NEUROLOGY

## 2020-06-12 PROCEDURE — 82947 ASSAY GLUCOSE BLOOD QUANT: CPT

## 2020-06-12 PROCEDURE — 36415 COLL VENOUS BLD VENIPUNCTURE: CPT

## 2020-06-12 PROCEDURE — G0378 HOSPITAL OBSERVATION PER HR: HCPCS

## 2020-06-12 PROCEDURE — 99232 SBSQ HOSP IP/OBS MODERATE 35: CPT | Performed by: INTERNAL MEDICINE

## 2020-06-12 PROCEDURE — 85025 COMPLETE CBC W/AUTO DIFF WBC: CPT

## 2020-06-12 PROCEDURE — 96372 THER/PROPH/DIAG INJ SC/IM: CPT

## 2020-06-12 PROCEDURE — 6370000000 HC RX 637 (ALT 250 FOR IP): Performed by: PHYSICIAN ASSISTANT

## 2020-06-12 RX ORDER — LORAZEPAM 0.5 MG/1
0.5 TABLET ORAL EVERY 6 HOURS PRN
Status: DISCONTINUED | OUTPATIENT
Start: 2020-06-12 | End: 2020-06-15 | Stop reason: HOSPADM

## 2020-06-12 RX ADMIN — METFORMIN HYDROCHLORIDE 850 MG: 850 TABLET ORAL at 17:15

## 2020-06-12 RX ADMIN — LORAZEPAM 0.5 MG: 0.5 TABLET ORAL at 21:01

## 2020-06-12 RX ADMIN — FOLIC ACID 1 MG: 1 TABLET ORAL at 09:06

## 2020-06-12 RX ADMIN — INSULIN LISPRO 2 UNITS: 100 INJECTION, SOLUTION INTRAVENOUS; SUBCUTANEOUS at 09:06

## 2020-06-12 RX ADMIN — METOPROLOL TARTRATE 25 MG: 25 TABLET, FILM COATED ORAL at 21:01

## 2020-06-12 RX ADMIN — ENOXAPARIN SODIUM 40 MG: 40 INJECTION SUBCUTANEOUS at 09:12

## 2020-06-12 RX ADMIN — INSULIN LISPRO 1 UNITS: 100 INJECTION, SOLUTION INTRAVENOUS; SUBCUTANEOUS at 21:01

## 2020-06-12 RX ADMIN — THIAMINE HCL TAB 100 MG 100 MG: 100 TAB at 09:06

## 2020-06-12 RX ADMIN — LISINOPRIL 20 MG: 20 TABLET ORAL at 09:06

## 2020-06-12 RX ADMIN — ACETAMINOPHEN 650 MG: 325 TABLET, FILM COATED ORAL at 09:11

## 2020-06-12 RX ADMIN — INSULIN LISPRO 2 UNITS: 100 INJECTION, SOLUTION INTRAVENOUS; SUBCUTANEOUS at 17:15

## 2020-06-12 RX ADMIN — METOPROLOL TARTRATE 25 MG: 25 TABLET, FILM COATED ORAL at 09:06

## 2020-06-12 RX ADMIN — PANTOPRAZOLE SODIUM 40 MG: 40 TABLET, DELAYED RELEASE ORAL at 05:37

## 2020-06-12 RX ADMIN — METFORMIN HYDROCHLORIDE 850 MG: 850 TABLET ORAL at 09:19

## 2020-06-12 ASSESSMENT — PAIN SCALES - GENERAL
PAINLEVEL_OUTOF10: 0
PAINLEVEL_OUTOF10: 0
PAINLEVEL_OUTOF10: 8

## 2020-06-12 NOTE — PROGRESS NOTES
[facility]    Progress Note    6/12/2020    11:56 AM    Name:   Andrew Lugo  MRN:     [id number]     Acct:      [de-identified]   Room:   2060/2060-01   Day:  0  Admit Date:  6/10/2020  9:35 PM    PCP:   Gracia Anderson MD  Code Status:  Full Code    Subjective:     C/C:   Chief Complaint   Patient presents with    Altered Mental Status     Interval History Status: improved. Brief History:   Patient has past medical history of chronic alcoholism, alcoholic dementia, history of stroke, subdural hemorrhage in the past, history of seizure disorder, diabetes, hypertension  Patient was recently discharged from hospital, facility he was sent to, could not take care of him, he was sent back  Patient will report neurologist    Review of Systems:     Constitutional:  negative for chills, fevers, sweats  Respiratory:  negative for cough, dyspnea on exertion, shortness of breath, wheezing  Cardiovascular:  negative for chest pain, chest pressure/discomfort, lower extremity edema, palpitations  Gastrointestinal:  negative for abdominal pain, constipation, diarrhea, nausea, vomiting  Neurological: Has underlying dementia, not oriented to place    Medications: Allergies:     Allergies   Allergen Reactions    Celebrex [Celecoxib] Swelling       Current Meds:   Scheduled Meds:    folic acid  1 mg Oral Daily    metoprolol tartrate  25 mg Oral BID    pantoprazole  40 mg Oral QAM AC    thiamine  100 mg Oral Daily    sodium chloride flush  10 mL Intravenous 2 times per day    enoxaparin  40 mg Subcutaneous Daily    lisinopril  20 mg Oral Daily    metFORMIN  850 mg Oral BID WC    insulin lispro  0-12 Units Subcutaneous TID WC    insulin lispro  0-6 Units Subcutaneous Nightly     Continuous Infusions:    dextrose       PRN Meds: sodium chloride flush, acetaminophen **OR** acetaminophen, polyethylene glycol, promethazine **OR** ondansetron, glucose, dextrose, glucagon (rDNA), dextrose, LORazepam **OR** LORazepam **OR** LORazepam **OR** LORazepam **OR** LORazepam **OR** LORazepam **OR** LORazepam **OR** LORazepam    Data:     Past Medical History:   has a past medical history of Alcoholic dementia ([facility] [address] Back pain, Cerebral artery occlusion with cerebral infarction ([facility] [address] [facility] Head injury, Hearing loss, Hypertension, TIA (transient ischemic attack), Transient ischemic attack, and Ulcer. Social History:   reports that he has quit smoking. He has never used smokeless tobacco. He reports current alcohol use of about 6.0 standard drinks of alcohol per week. He reports current drug use. Drug: Marijuana. Family History:   Family History   Problem Relation Age of Onset    Diabetes Mother     Heart Disease Mother        Vitals:  BP (!) 145/90   Pulse 76   Temp 97.9 °F (36.6 °C) (Oral)   Resp 16   Ht 5' 7\" (1.702 m)   Wt 126 lb 5.2 oz (57.3 kg)   SpO2 94%   BMI 19.79 kg/m²   Temp (24hrs), Av.8 °F (36.6 °C), Min:97.7 °F (36.5 °C), Max:97.9 °F (36.6 °C)    Recent Labs     20  1222 20  2122   POCGLU 192* 177*       I/O (24Hr):     Intake/Output Summary (Last 24 hours) at 2020 1157  Last data filed at 2020 0003  Gross per 24 hour   Intake 620 ml   Output 300 ml   Net 320 ml       Labs:    [unfilled]    Lab Results   Component Value Date/Time    SPECIAL RT HAND [facility] 03/15/2019 02:32 AM     Lab Results   Component Value Date/Time    CULTURE NO GROWTH 6 DAYS 03/15/2019 02:32 AM       [unfilled]    Radiology:    Physical Examination:        General appearance: Not oriented to place, thin build person  Mental Status: Has dementia  Lungs:  clear to auscultation bilaterally, normal effort  Heart:  regular rate and rhythm, no murmur  Abdomen:  soft, nontender, nondistended, normal bowel sounds, no masses, hepatomegaly, splenomegaly  Extremities:  no edema, redness, tenderness in the calves  Skin:  no gross lesions, rashes, induration    Assessment:        Primary Problem  Altered mental status    Active Hospital Problems    Diagnosis Date Noted    Confused [R41.0]     Sixth cranial nerve palsy, bilateral [H49.23]     Poorly controlled diabetes mellitus ([facility] [address] [E11.65]     Type 2 diabetes mellitus with hyperglycemia, without long-term current use of insulin ([facility] [address] [E11.65] 06/30/2017    Altered mental status [R41.82]        Plan:        1. Patient has alcoholic dementia, likely cause altered mental status, will need with neurologist  2. MRI brain cannot be done because of pacemaker status  3.   History of seizure disorder, not taking any antiepileptics, in the month of March patient had EEG which was suggestive of seizure activity and frontal lobe, repeat EEG is okay  We will discuss with neurologist  Diabetes, better controlled, metformin was increased yesterday only  Hypertension, better controlled  On Lovenox for DVT prophylaxis  History of alcoholism on CIWA protocol  Difficult social situation, social workers are working for his placement    Osbaldo Coffman MD  6/12/2020  11:57 AM

## 2020-06-12 NOTE — PROGRESS NOTES
Dr. Carlos strickland served and notified of MRI being cancelled due to the patient having a permanent pacemaker.      Electronically signed by Villa oChen RN on 6/12/2020 at 8:50 AM

## 2020-06-12 NOTE — PROGRESS NOTES
Dr. Danna Mohan notified of patient refusing IV access, unable to have CT scan at this time. Physician states it is ok not to do it at this time.      Electronically signed by David Arenas RN on 6/12/2020 at 7:11 PM

## 2020-06-12 NOTE — PROGRESS NOTES
SIMBA spoke to [employer] from [facility]. Facility will accept pt. [employer] tried to get pt accepted under the previous authorization but could not. She did resubmit for pre-cert. SIMBA then spoke to pt's son Chaz Brewster [phone] who said that family will pay out of pocket if pt gets denied. Family will continue to work on the [employer] application as well. SIMBA did inform [employer] that pt will need $6000 for the month if insurance denies SNF. Chaz Brewster is working on getting the money as pt will most likely get denied. It would be helpful to have therapy work with pt before Monday.

## 2020-06-12 NOTE — CARE COORDINATION
ONGOING DISCHARGE PLAN:    Plan remains for LSW to continue to follow for Possible Long Term Placement, as per family request.    Pt. Has HX of Alcohol & Dementia. Unable to do MRI, for Pt. Has a Pacemaker. EEG. Neuro following. CIWA Scale. Will continue to follow for additional discharge needs.     Electronically signed by Erma Joe RN on 6/12/2020 at 1:44 PM

## 2020-06-13 PROCEDURE — 97161 PT EVAL LOW COMPLEX 20 MIN: CPT

## 2020-06-13 PROCEDURE — 6370000000 HC RX 637 (ALT 250 FOR IP): Performed by: INTERNAL MEDICINE

## 2020-06-13 PROCEDURE — 96372 THER/PROPH/DIAG INJ SC/IM: CPT

## 2020-06-13 PROCEDURE — 6360000002 HC RX W HCPCS: Performed by: PHYSICIAN ASSISTANT

## 2020-06-13 PROCEDURE — 82947 ASSAY GLUCOSE BLOOD QUANT: CPT

## 2020-06-13 PROCEDURE — G0378 HOSPITAL OBSERVATION PER HR: HCPCS

## 2020-06-13 PROCEDURE — 99232 SBSQ HOSP IP/OBS MODERATE 35: CPT | Performed by: INTERNAL MEDICINE

## 2020-06-13 PROCEDURE — 99226 PR SBSQ OBSERVATION CARE/DAY 35 MINUTES: CPT | Performed by: PSYCHIATRY & NEUROLOGY

## 2020-06-13 PROCEDURE — 6370000000 HC RX 637 (ALT 250 FOR IP): Performed by: PHYSICIAN ASSISTANT

## 2020-06-13 RX ADMIN — THIAMINE HCL TAB 100 MG 100 MG: 100 TAB at 08:30

## 2020-06-13 RX ADMIN — METOPROLOL TARTRATE 25 MG: 25 TABLET, FILM COATED ORAL at 08:30

## 2020-06-13 RX ADMIN — METFORMIN HYDROCHLORIDE 850 MG: 850 TABLET ORAL at 17:09

## 2020-06-13 RX ADMIN — METOPROLOL TARTRATE 25 MG: 25 TABLET, FILM COATED ORAL at 20:52

## 2020-06-13 RX ADMIN — LISINOPRIL 20 MG: 20 TABLET ORAL at 08:30

## 2020-06-13 RX ADMIN — FOLIC ACID 1 MG: 1 TABLET ORAL at 08:30

## 2020-06-13 RX ADMIN — PANTOPRAZOLE SODIUM 40 MG: 40 TABLET, DELAYED RELEASE ORAL at 06:08

## 2020-06-13 RX ADMIN — LORAZEPAM 0.5 MG: 0.5 TABLET ORAL at 22:43

## 2020-06-13 RX ADMIN — ENOXAPARIN SODIUM 40 MG: 40 INJECTION SUBCUTANEOUS at 08:30

## 2020-06-13 RX ADMIN — METFORMIN HYDROCHLORIDE 850 MG: 850 TABLET ORAL at 08:30

## 2020-06-13 RX ADMIN — INSULIN LISPRO 2 UNITS: 100 INJECTION, SOLUTION INTRAVENOUS; SUBCUTANEOUS at 08:30

## 2020-06-13 RX ADMIN — INSULIN LISPRO 2 UNITS: 100 INJECTION, SOLUTION INTRAVENOUS; SUBCUTANEOUS at 17:10

## 2020-06-13 ASSESSMENT — PAIN SCALES - WONG BAKER: WONGBAKER_NUMERICALRESPONSE: 2

## 2020-06-13 ASSESSMENT — PAIN DESCRIPTION - FREQUENCY: FREQUENCY: CONTINUOUS

## 2020-06-13 ASSESSMENT — PAIN - FUNCTIONAL ASSESSMENT: PAIN_FUNCTIONAL_ASSESSMENT: ACTIVITIES ARE NOT PREVENTED

## 2020-06-13 ASSESSMENT — PAIN DESCRIPTION - PAIN TYPE: TYPE: ACUTE PAIN

## 2020-06-13 ASSESSMENT — PAIN DESCRIPTION - PROGRESSION: CLINICAL_PROGRESSION: NOT CHANGED

## 2020-06-13 ASSESSMENT — PAIN DESCRIPTION - LOCATION: LOCATION: ABDOMEN

## 2020-06-13 ASSESSMENT — PAIN DESCRIPTION - ONSET: ONSET: ON-GOING

## 2020-06-13 ASSESSMENT — PAIN SCALES - GENERAL: PAINLEVEL_OUTOF10: 0

## 2020-06-13 ASSESSMENT — PAIN DESCRIPTION - DESCRIPTORS: DESCRIPTORS: DISCOMFORT

## 2020-06-13 NOTE — PROGRESS NOTES
[facility]                     NEUROLOGY PROGRESS NOTE    PATIENT NAME: Mary Mancia   PATIENT MRN: [id number]   PRIMARY CARE PHYSICIAN: Jesusita Hamm MD  DATE OF SERVICE: 6/13/2020    CHIEF COMPLAINT: \"I am good today\"     SUBJECTIVE:    Pt was laying in bed, said he feels good today. Oriented to hospital/president/year/state but not year. In March this year, he fell led to left frontal ICH, hospitalized at [facility], he was put on keppra, at that time, he underwent continuous video EEG monitoring from 3/13-3/15, there was one suspicious EEG seizure on first day of monitoring, no clinical signs were noted because camera was not facing the patient. there was sharp waves in right frontocentral/temporal region. During this admission, he had routine EEG which only showed bifrontemporal focal cortical dysfunction, no epileptiform discharges or EEG/clinical seizures. Patient was not on AED before this admission. Brief HPI  69year old WM with history of alcoholism and dementia was admitted on 6/10/2020, neurology was consulted for AMS.    Per chart, pt has baseline dementia, no detail information obtained from NH facility how much he can do there.  is working on getting hi from other place because current facility does not want him back. In H&P, pt refused labs. He had history of subdural hematoma, CTH showed chronic microvascular ischemic changes, volume loss, old pontine lacunar infarct. EEG on this admission, only bifrontotemporal focal cortical dysfunction no epileptiform discharges or EEG seizures.       today, on admission   UA negative for infection     CTH 6/10/2020  No acute intracranial abnormality.       Chronic small vessel ischemic changes.       Postsurgical changes and stable dural thickening.      PAST MEDICAL HISTORY:         Diagnosis Date    Alcoholic dementia [facility])     Dr Lucia Alvarez changes: absent    NEUROLOGIC Memory loss: present, Confusion: present, Seizures: absent; Trouble walking or imbalance: present Dizziness: absent, Weakness: absent, Numbness: absent Tremor: absent, Spasm: absent, Speech difficulty: present, Headache: absent, Light sensitivity: absent    PSYCHIATRIC Anxiety: absent, Hallucination: absent, Mood disorder: absent    HEMATOLOGIC Abnormal bleeding: absent, Anemia: absent, Clotting disorder: absent, Lymph gland changes: absent     VITALS  /79   Pulse 78   Temp 97.7 °F (36.5 °C) (Oral)   Resp 18   Ht 5' 7\" (1.702 m)   Wt 135 lb 5.8 oz (61.4 kg)   SpO2 95%   BMI 21.20 kg/m²      PHYSICAL EXAMINATION:     General appearance: cooperative partially  Skin: no rash or skin lesions. HEENT: normocephalic  Optic Fundi: deferred  Neck: supple, no cervcical adenopathy or carotid bruit  Lungs: clear to auscultation  Heart: Regular rate and rhythm, normal S1, S2. No murmurs, clicks or gallops. Peripheral pulses: radial pulses palpable  Abdominal: BS present, soft, NT, ND  Extremities: no edema    NEUROLOGICAL EXAMINATION:     MS: awake, alert and oriented to self, president, place, state but not to year, + dysarthria  CNs: bilateral lateral gaze palsy, vertical gaze palsy, sensation intact, face symmetric, hearing intact, soft palate rises on phonation, sternocleidomastoid and trapezius intact. Tongue midline, no fasciculations. Motor: no abnormal movements, tone and bulk okay. RUE: delta 5/5, biceps 5/5, triceps 5/5,  5/5  LUE: delta 5/5, biceps 5/5, triceps 5/5,  5/5  RLE: hf 5/5, ke 5/5, df 5/5, pf 5/5  LLE: hf 5/5, ke 5/5, df 5/5, pf 5/5  Reflexes: 2+ throughout, symmetric, babinski not present. Coordination: FNF no dysmetria, heel to shin okay, ANTWON okay, Rhomberg deferred  Gait: wide based, unsteady, tandem deferred  Sensory: stock distribution to temp/pp below knees. no extinction.     LABS & TESTS  Lab Results   Component Value Date    WBC 10.4

## 2020-06-13 NOTE — CARE COORDINATION
Social Work-Spoke with son. He stated that he would like patient to use his skilled benefit at SNF. Discussed that he may not meet criteria for skilled. He would like to wait to see if insurance will approve. Phone call to [facility]. They have not received precert.  Madisyn Kay

## 2020-06-13 NOTE — CARE COORDINATION
Social Work-Patient will need precert before admission to [facility].  Elisabeth Rothman

## 2020-06-13 NOTE — PROGRESS NOTES
Physical Therapy    Facility/Department: [facility]  Initial Assessment    NAME: Maurice Campbell  : 1945  MRN: [id number]    Date of Service: 2020    Discharge Recommendations: The patient may need non-skilled assistance after discharge. PT Equipment Recommendations  Equipment Needed: No    Assessment   Body structures, Functions, Activity limitations: Decreased functional mobility ; Decreased strength;Decreased safe awareness;Decreased balance;Decreased cognition; Increased pain  Assessment: Pt disoriented x3, hx of dementia. Pt requiring SBA/CGA assist for mobility no device. Pt will need 24/ supervision for safety d/t dementia. No further PT recommended at d/c. Treatment Diagnosis: Impaired functional mobility 2* AMS  Specific instructions for Next Treatment: amb distance, balance, fall prevention, strengthening program  Prognosis: Good  Decision Making: Low Complexity  History: Maurice Campbell is a 69 y. o.yo male who presents with concern for altered mental status per nursing home. Patient was admitted to the hospital and just discharged today for altered mental status secondary to hyponatremia. Patient does have a history of dementia. Patient is comfortable appearing on presentation. He is unable to provide further details surrounding the event. It was reported that patient was requiring more care than was expected so he was sent to the hospital for reevaluation. Exam: ROM, MMT, balance and mobility assessments  Clinical Presentation: Pt agreeable to PT, cooperative. Barriers to Learning: dementia, safety awareness  REQUIRES PT FOLLOW UP: Yes  Activity Tolerance  Activity Tolerance: Patient Tolerated treatment well       Patient Diagnosis(es): The primary encounter diagnosis was Confused. A diagnosis of Dementia without behavioral disturbance, unspecified dementia type [facility]) was also pertinent to this visit.      has a past medical history of Alcoholic dementia ([facility] [address] Back pain, Cerebral artery occlusion with cerebral infarction ([facility] [address] Diabetes mellitus ([facility] [address] Head injury, Hearing loss, Hypertension, TIA (transient ischemic attack), Transient ischemic attack, and Ulcer. has a past surgical history that includes back surgery; Pacemaker insertion; eye surgery ([id number]); brain surgery; Appendectomy; craniotomy (Right, 3/9/2019); and Upper gastrointestinal endoscopy (N/A, 3/14/2019). Restrictions  Restrictions/Precautions  Restrictions/Precautions: Fall Risk, Seizure(\"Bill\";alcoholic dementia, mat alarm at door)  Required Braces or Orthoses?: No  Implants present? : Pacemaker, Metal implants(back surgery ? metal, hx craniotomy)  Position Activity Restriction  Other position/activity restrictions: up as tolerated  Vision/Hearing  Vision: Impaired  Vision Exceptions: Wears glasses at all times  Hearing: Within functional limits(appears intact)     Subjective  General  Chart Reviewed: Yes  Patient assessed for rehabilitation services?: Yes  Additional Pertinent Hx: TIA, HTN, CVA, DM, alcoholic dementia, craniotomy March 2019  Family / Caregiver Present: No  Referring Practitioner: Sony Alejo MD  Referral Date : 06/13/20  Diagnosis: AMS  Follows Commands: Within Functional Limits  Subjective  Subjective: Pt in bed, agreeable to PT. Pt cooperative for therapy. JEB Beltran. Pain Screening  Patient Currently in Pain: Yes  Pain Assessment  Pain Assessment: Faces  Nielson-Baker Pain Rating: Hurts a little bit  Pain Type: Acute pain  Pain Location: Abdomen  Pain Descriptors: Discomfort  Pain Frequency: Continuous  Pain Onset: On-going  Clinical Progression: Not changed  Functional Pain Assessment: Activities are not prevented  Non-Pharmaceutical Pain Intervention(s): Ambulation/Increased Activity; Distraction;Repositioned; Rest  Response to Pain Intervention: Patient Satisfied  Vital Signs  Patient Currently in Pain: Yes  Oxygen Therapy  O2 Device: None (Room air)  Patient for Short term goals: 1-2 treatments  Short term goal 1: Pt to demonstrate IND bed mobility. Short term goal 2: Pt to demonstrate supervision for transfers. Short term goal 3: Pt to amb 100'-150' supervision  Short term goal 4: Pt to improve BLE strength by 1/2 MMG. Short term goal 5: Pt to improve standing balance to GOOD with improved Tinetti of 26/28.   Short term goal 6: -  Short term goal 7: -  Short term goal 8: -  Short term goal 9: -  Patient Goals   Patient goals : Pt did not state       Therapy Time   Individual Concurrent Group Co-treatment   Time In 1023         Time Out 1041         Minutes 18                 [facility], PT

## 2020-06-14 PROCEDURE — 6370000000 HC RX 637 (ALT 250 FOR IP): Performed by: PHYSICIAN ASSISTANT

## 2020-06-14 PROCEDURE — 6370000000 HC RX 637 (ALT 250 FOR IP): Performed by: INTERNAL MEDICINE

## 2020-06-14 PROCEDURE — 99232 SBSQ HOSP IP/OBS MODERATE 35: CPT | Performed by: INTERNAL MEDICINE

## 2020-06-14 PROCEDURE — 6360000002 HC RX W HCPCS: Performed by: PHYSICIAN ASSISTANT

## 2020-06-14 PROCEDURE — 82947 ASSAY GLUCOSE BLOOD QUANT: CPT

## 2020-06-14 PROCEDURE — G0378 HOSPITAL OBSERVATION PER HR: HCPCS

## 2020-06-14 PROCEDURE — 96372 THER/PROPH/DIAG INJ SC/IM: CPT

## 2020-06-14 RX ADMIN — ENOXAPARIN SODIUM 40 MG: 40 INJECTION SUBCUTANEOUS at 08:48

## 2020-06-14 RX ADMIN — INSULIN LISPRO 2 UNITS: 100 INJECTION, SOLUTION INTRAVENOUS; SUBCUTANEOUS at 08:48

## 2020-06-14 RX ADMIN — METFORMIN HYDROCHLORIDE 850 MG: 850 TABLET ORAL at 08:47

## 2020-06-14 RX ADMIN — LISINOPRIL 20 MG: 20 TABLET ORAL at 08:47

## 2020-06-14 RX ADMIN — INSULIN LISPRO 2 UNITS: 100 INJECTION, SOLUTION INTRAVENOUS; SUBCUTANEOUS at 11:25

## 2020-06-14 RX ADMIN — THIAMINE HCL TAB 100 MG 100 MG: 100 TAB at 08:48

## 2020-06-14 RX ADMIN — METOPROLOL TARTRATE 25 MG: 25 TABLET, FILM COATED ORAL at 08:48

## 2020-06-14 RX ADMIN — LORAZEPAM 0.5 MG: 0.5 TABLET ORAL at 20:19

## 2020-06-14 RX ADMIN — METOPROLOL TARTRATE 25 MG: 25 TABLET, FILM COATED ORAL at 20:19

## 2020-06-14 RX ADMIN — FOLIC ACID 1 MG: 1 TABLET ORAL at 08:48

## 2020-06-14 RX ADMIN — INSULIN LISPRO 2 UNITS: 100 INJECTION, SOLUTION INTRAVENOUS; SUBCUTANEOUS at 17:04

## 2020-06-14 RX ADMIN — METFORMIN HYDROCHLORIDE 850 MG: 850 TABLET ORAL at 17:04

## 2020-06-14 ASSESSMENT — PAIN SCALES - GENERAL: PAINLEVEL_OUTOF10: 0

## 2020-06-15 VITALS
DIASTOLIC BLOOD PRESSURE: 86 MMHG | HEART RATE: 58 BPM | TEMPERATURE: 98.1 F | SYSTOLIC BLOOD PRESSURE: 138 MMHG | BODY MASS INDEX: 20.52 KG/M2 | WEIGHT: 130.73 LBS | RESPIRATION RATE: 16 BRPM | OXYGEN SATURATION: 96 % | HEIGHT: 67 IN

## 2020-06-15 LAB
GLUCOSE BLD-MCNC: 117 MG/DL (ref 75–110)
GLUCOSE BLD-MCNC: 144 MG/DL (ref 75–110)
GLUCOSE BLD-MCNC: 146 MG/DL (ref 75–110)
GLUCOSE BLD-MCNC: 158 MG/DL (ref 75–110)
GLUCOSE BLD-MCNC: 164 MG/DL (ref 75–110)
VITAMIN B1 WHOLE BLOOD: 182 NMOL/L (ref 70–180)

## 2020-06-15 PROCEDURE — 6370000000 HC RX 637 (ALT 250 FOR IP): Performed by: PHYSICIAN ASSISTANT

## 2020-06-15 PROCEDURE — 82947 ASSAY GLUCOSE BLOOD QUANT: CPT

## 2020-06-15 PROCEDURE — 97116 GAIT TRAINING THERAPY: CPT

## 2020-06-15 PROCEDURE — 6360000002 HC RX W HCPCS: Performed by: PHYSICIAN ASSISTANT

## 2020-06-15 PROCEDURE — 99217 PR OBSERVATION CARE DISCHARGE MANAGEMENT: CPT | Performed by: INTERNAL MEDICINE

## 2020-06-15 PROCEDURE — 96372 THER/PROPH/DIAG INJ SC/IM: CPT

## 2020-06-15 PROCEDURE — 6370000000 HC RX 637 (ALT 250 FOR IP): Performed by: INTERNAL MEDICINE

## 2020-06-15 PROCEDURE — G0378 HOSPITAL OBSERVATION PER HR: HCPCS

## 2020-06-15 PROCEDURE — 97530 THERAPEUTIC ACTIVITIES: CPT

## 2020-06-15 RX ADMIN — INSULIN LISPRO 2 UNITS: 100 INJECTION, SOLUTION INTRAVENOUS; SUBCUTANEOUS at 09:41

## 2020-06-15 RX ADMIN — FOLIC ACID 1 MG: 1 TABLET ORAL at 09:41

## 2020-06-15 RX ADMIN — INSULIN LISPRO 2 UNITS: 100 INJECTION, SOLUTION INTRAVENOUS; SUBCUTANEOUS at 11:51

## 2020-06-15 RX ADMIN — METFORMIN HYDROCHLORIDE 850 MG: 850 TABLET ORAL at 09:45

## 2020-06-15 RX ADMIN — PANTOPRAZOLE SODIUM 40 MG: 40 TABLET, DELAYED RELEASE ORAL at 05:20

## 2020-06-15 RX ADMIN — METOPROLOL TARTRATE 25 MG: 25 TABLET, FILM COATED ORAL at 09:41

## 2020-06-15 RX ADMIN — METFORMIN HYDROCHLORIDE 850 MG: 850 TABLET ORAL at 17:37

## 2020-06-15 RX ADMIN — ENOXAPARIN SODIUM 40 MG: 40 INJECTION SUBCUTANEOUS at 09:41

## 2020-06-15 RX ADMIN — THIAMINE HCL TAB 100 MG 100 MG: 100 TAB at 09:41

## 2020-06-15 RX ADMIN — LISINOPRIL 20 MG: 20 TABLET ORAL at 09:41

## 2020-06-15 ASSESSMENT — ENCOUNTER SYMPTOMS
ABDOMINAL PAIN: 0
SHORTNESS OF BREATH: 0
ANAL BLEEDING: 0
DIARRHEA: 0
CONSTIPATION: 0
NAUSEA: 0
COUGH: 0
WHEEZING: 0
VOMITING: 0

## 2020-06-15 ASSESSMENT — PAIN SCALES - GENERAL
PAINLEVEL_OUTOF10: 0
PAINLEVEL_OUTOF10: 0

## 2020-06-15 NOTE — DISCHARGE SUMMARY
[facility] Internal Medicine    Discharge Summary     Patient ID: Kurtis Bakns  :  1945   MRN: [id number]     ACCOUNT:  [de-identified]   Patient's PCP: Gonzales Diaz MD  Admit Date: 6/10/2020   Discharge Date: 6/15/2020    Length of Stay: 0  Code Status:  Full Code  Admitting Physician: Luís Cruz MD  Discharge Physician: Luís Cruz MD     Active Discharge Diagnoses:     Primary Problem  Altered mental status      [place] Problems    Diagnosis Date Noted    Confused [R41.0]     Sixth cranial nerve palsy, bilateral [H49.23]     Poorly controlled diabetes mellitus ([facility] [address] [E11.65]     Type 2 diabetes mellitus with hyperglycemia, without long-term current use of insulin ([facility] [address] [E11.65] 2017    Altered mental status [R41.82]        Admission Condition:  fair     Discharged Condition: fair    Hospital Stay:     Hospital Course:  Kurtis Banks is a 69 y.o. male who was admitted for the management of Altered mental status , presented to ER with Altered Mental Status  81-year-old gentleman with a history of for diabetes history of dementia alcohol abuse was discharged after management of alcohol withdrawal to nursing home where he was wandering around and not following commands he was sent back within few hours patient main problem is dementia with behavioral disturbance possible alcohol abuse or Alzheimer's or both patient has done well here is following command alert oriented discharged to extended-care facility with the dementia unit        Significant therapeutic interventions:     Significant Diagnostic Studies:   Labs / Micro:        ,     Radiology:    Ct Head Wo Contrast    Result Date: 6/10/2020  EXAMINATION: CT OF THE HEAD WITHOUT CONTRAST  6/10/2020 10:55 pm TECHNIQUE: CT of the head was performed without the administration of intravenous contrast. Dose modulation, iterative reconstruction, and/or weight based standered called for person wandering around on the street FINDINGS: BRAIN/VENTRICLES: There is prominence of the ventricles and cortical sulci consistent with cortical volume loss. No midline shift. Basal cisterns are normally outlined. There is periventricular and subcortical white matter discrete and confluent low attenuation, nonspecific, likely representing age-related chronic small vessel ischemic disease. There is no evidence for acute infarct. No acute intra or extra-axial hemorrhage. ORBITS: The visualized portion of the orbits demonstrate no acute abnormality. SINUSES: The visualized paranasal sinuses and mastoid air cells demonstrate no acute abnormality. SOFT TISSUES/SKULL:  Biparietal in bifrontal bur holes are noted as seen previously. Soft tissues. No acute intracranial abnormality. Cerebral atrophy. Xr Chest Portable    Result Date: 6/10/2020  EXAMINATION: ONE XRAY VIEW OF THE CHEST 6/10/2020 10:34 pm COMPARISON: March 27, 2019 HISTORY: ORDERING SYSTEM PROVIDED HISTORY: cough TECHNOLOGIST PROVIDED HISTORY: cough Reason for Exam: sob Acuity: Acute Type of Exam: Initial Additional signs and symptoms: Cough, confusion Relevant Medical/Surgical History: Cough, confusion FINDINGS: Marginal inspiration is noted. No focal area of consolidation or pneumothorax is present. Cardiomegaly is present. A dual lead permanent cardiac pacemaker is again noted in place. Osseous structures are stable. Cardiomegaly, without evidence of CHF         Consultations:    Consults:     Final Specialist Recommendations/Findings:   IP CONSULT TO NEUROLOGY      The patient was seen and examined on day of discharge and this discharge summary is in conjunction with any daily progress note from day of discharge.     Discharge plan:     Disposition: Extended care facility with a dementia unit    Physician Follow Up:     Angeles Flanagan MD  [address] [place] [address]  [phone]             Requiring Further Evaluation/Follow Up POST HOSPITALIZATION/Incidental Findings:    Diet: Regular diet    Activity: As tolerated    Instructions to Patient:     Discharge Medications:      Medication List      CONTINUE taking these medications    folic acid 1 MG tablet  Commonly known as:  FOLVITE  Take 1 tablet by mouth daily     lisinopril 10 MG tablet  Commonly known as:  PRINIVIL;ZESTRIL  Take 1 tablet by mouth daily     metFORMIN 500 MG tablet  Commonly known as:  GLUCOPHAGE  Take 1 tablet by mouth 2 times daily (with meals)     metoprolol tartrate 25 MG tablet  Commonly known as:  LOPRESSOR  Take 1 tablet by mouth 2 times daily     pantoprazole 40 MG tablet  Commonly known as:  PROTONIX  Take 1 tablet by mouth every morning (before breakfast)     thiamine 100 MG tablet  Take 1 tablet by mouth daily            Time Spent on discharge is  35 mins in patient examination, evaluation, counseling as well as medication reconciliation, prescriptions for required medications, discharge plan and follow up. Electronically signed by   Simran Reddy MD  6/15/2020  12:34 PM      Thank you Dr. Blanca Campoverde MD for the opportunity to be involved in this patient's care.

## 2020-06-15 NOTE — DISCHARGE INSTR - COC
Continuity of Care Form    Patient Name: Diandra Lomeli   :  1945  MRN:  [id number]    Admit date:  6/10/2020  Discharge date: 6/15/20    Code Status Order: Full Code   Advance Directives:     Admitting Physician:  Shelly Roland MD  PCP: Del Clarke MD    Discharging Nurse: [facility] Unit/Room#: 0/-01  Discharging Unit Phone Number: [phone]    Emergency Contact:   Extended Emergency Contact Information  Primary Emergency Contact: Bret George [facility] [address] Phone: [phone]  Relation: Child  Secondary Emergency Contact: Yousuf Cortez [facility] [address] Phone: [phone]  Relation: Child    Past Surgical History:  Past Surgical History:   Procedure Laterality Date    APPENDECTOMY      50 years ago   [address]      15 years ago    CRANIOTOMY Right 3/9/2019    RIGHT FRONTAL VENTRICULAR DRAIN INSERTION AND BRODERICK BOLT INSERTION performed by Vel Wiggins MD at [address] [place] [address]  [id number]    PACEMAKER INSERTION      UPPER GASTROINTESTINAL ENDOSCOPY N/A 3/14/2019    EGD ESOPHAGOGASTRODUODENOSCOPY performed by Riley Dietrich MD at [facility]       Immunization History:   Immunization History   Administered Date(s) Administered    Tdap (Boostrix, Adacel) 2018       Active Problems:  Patient Active Problem List   Diagnosis Code    Chronic- SDH (subdural hematoma) ([facility] [address] S06.5X9A    acute- SAH (subarachnoid hemorrhage) ([facility]) I60.9    Altered mental status R41.82    Hygroma D18.1    Dementia ([address] F03.90    Alcoholism ([address] F10.20    Subdural hygroma G96.0    Acute encephalopathy G93.40    Shortness of breath R06.02    Type 2 diabetes mellitus with hyperglycemia, without long-term current use of insulin ([address] E11.65    Memory deficit R41.3    Hypomagnesemia E83.42    Marijuana abuse F12.10    Renal cyst N28.1    Calculus, kidney N20.0    Brain bleed ([facility]) I61.9    Traumatic Discharge: NA    Physician Certification: I certify the above information and transfer of Flory   is necessary for the continuing treatment of the diagnosis listed and that he requires [facility] for less 30 days.      Update Admission H&P: No change in H&P    PHYSICIAN SIGNATURE:  Electronically signed by Dank Ayala MD on 6/15/20 at 4:18 PM EDT

## 2020-06-15 NOTE — PROGRESS NOTES
EMS at bedside for patient transport. Verbal handoff provided to transport. Patient loaded onto stretcher and escorted off unit in stable condition.      Electronically signed by Jeffy Anderson RN on 6/15/2020 at 5:53 PM

## 2020-06-15 NOTE — PROGRESS NOTES
Peer to peer completed and decision reversed. Pt is approved for SNF for 7 days and then will be reviewed again. Orders were sent to facility and as well as POA papers. SW did speak to pt's son Julia Morales who expressed gratitude. Transport set for [employer] .

## 2020-06-15 NOTE — PROGRESS NOTES
[facility]    Progress Note    6/15/2020    9:46 AM    Name:   Darío Del Cid  MRN:     [id number]     Acct:      [de-identified]   Room:   2060/2060-01   Day:  0  Admit Date:  6/10/2020  9:35 PM    PCP:   Soy Laurent MD  Code Status:  Full Code    Subjective:     C/C: altered mental status  Interval HistoryStatus: improved. Examined at bedside. Patient is sitting up in bed, no acute distress. No acute events overnight. This morning patient is alert and oriented and denies any confusion. Patient denies any withdrawal symptoms. Review of systems is negative. Patient does not have any concerns or complaints. Brief History:     82-year-old male with a history of dementia, alcoholism, SDH, ICH, DM who was brought into the ED due to altered mental status patient was found to be confused and wandering at his nursing facility and sent to the ED. CT head did not show any acute abnormalities. Patient was admitted due to altered mental status. Review of Systems:     Review of Systems   Constitutional: Negative for chills and fever. Respiratory: Negative for cough, shortness of breath and wheezing. Cardiovascular: Negative for chest pain. Gastrointestinal: Negative for abdominal pain, anal bleeding, constipation, diarrhea, nausea and vomiting. Genitourinary: Negative for difficulty urinating, dysuria, frequency and urgency. Neurological: Negative for dizziness, weakness, light-headedness and headaches. Psychiatric/Behavioral: Negative for confusion. Medications: Allergies:     Allergies   Allergen Reactions    Celebrex [Celecoxib] Swelling       Current Meds:   Scheduled Meds:    folic acid  1 mg Oral Daily    metoprolol tartrate  25 mg Oral BID    pantoprazole  40 mg Oral QAM AC    thiamine  100 mg Oral Daily    sodium chloride flush  10 mL Intravenous 2 times per day    enoxaparin  40 mg Subcutaneous Daily  lisinopril  20 mg Oral Daily    metFORMIN  850 mg Oral BID     insulin lispro  0-12 Units Subcutaneous TID     insulin lispro  0-6 Units Subcutaneous Nightly     Continuous Infusions:    dextrose       PRN Meds: LORazepam, sodium chloride flush, acetaminophen **OR** acetaminophen, polyethylene glycol, promethazine **OR** ondansetron, glucose, dextrose, glucagon (rDNA), dextrose, LORazepam **OR** LORazepam **OR** LORazepam **OR** LORazepam **OR** LORazepam **OR** LORazepam **OR** LORazepam **OR** LORazepam    Data:     Past Medical History:   has a past medical history of Alcoholic dementia ([facility] [address] Back pain, Cerebral artery occlusion with cerebral infarction ([facility] [address] Diabetes mellitus ([facility] [address] Head injury, Hearing loss, Hypertension, TIA (transient ischemic attack), Transient ischemic attack, and Ulcer. Social History:   reports that he has quit smoking. He has never used smokeless tobacco. He reports current alcohol use of about 6.0 standard drinks of alcohol per week. He reports current drug use. Drug: Marijuana. Family History:   Family History   Problem Relation Age of Onset    Diabetes Mother     Heart Disease Mother        Vitals:  /86   Pulse 58   Temp 98.1 °F (36.7 °C) (Oral)   Resp 16   Ht 5' 7\" (1.702 m)   Wt 130 lb 11.7 oz (59.3 kg)   SpO2 96%   BMI 20.48 kg/m²   Temp (24hrs), Av °F (36.7 °C), Min:97.9 °F (36.6 °C), Max:98.1 °F (36.7 °C)    Recent Labs     20  1058 20  1626 06/14/20  2018 06/15/20  0704   POCGLU 164* 158* 117* 146*       I/O (24Hr):     Intake/Output Summary (Last 24 hours) at 6/15/2020 0946  Last data filed at 2020 1804  Gross per 24 hour   Intake 1440 ml   Output --   Net 1440 ml       Labs:    [unfilled]    Lab Results   Component Value Date/Time    SPECIAL RT [facility] 03/15/2019 02:32 AM     Lab Results   Component Value Date/Time    CULTURE NO GROWTH 6 DAYS 03/15/2019 02:32 AM       [facility]    Radiology:     Ct Head Wo Eyes:      Pupils: Pupils are equal, round, and reactive to light. Neck:      Thyroid: No thyromegaly. Vascular: No JVD. Cardiovascular:      Rate and Rhythm: Normal rate and regular rhythm. Heart sounds: Normal heart sounds. No murmur. No friction rub. No gallop. Pulmonary:      Effort: Pulmonary effort is normal. No respiratory distress. Breath sounds: Normal breath sounds. No wheezing. Abdominal:      General: Bowel sounds are normal. There is no distension. Palpations: Abdomen is soft. Tenderness: There is no abdominal tenderness. Skin:     General: Skin is warm and dry. Findings: No erythema or rash. Neurological:      Mental Status: He is alert and oriented to person, place, and time.            Assessment:        Primary Problem  Altered mental status    Active Hospital Problems    Diagnosis Date Noted    Confused [R41.0]     Sixth cranial nerve palsy, bilateral [H49.23]     Poorly controlled diabetes mellitus ([address] [E11.65]     Type 2 diabetes mellitus with hyperglycemia, without long-term current use of insulin ([address] [E11.65] 06/30/2017    Altered mental status [R41.82]        Plan:        Alcoholic dementia likely cause of altered mental status   MRI brain not done due to pacemaker status   H/o SDH and ICH  H/o of seizure disorder, has a history of non-compliance not on any antiepileptic therapy  Evaluated by neurology, not a candidate for antiepileptic therapy   EEG in March showed seizure activity, repeat EEG 6/11 did not show any epileptiform activity   Diabetes, last A1c 6.9 (6/3), metformin was increased to 850 bid, BG controlled   Patient was on CIWA has not been requiring Ativan, no signs/symptoms of withdrawal  Patient will need placement in dementia unit at SNF/NH  Will discharge once placement has been arranged        Rayshawn Stephens MD  6/15/2020  9:46 AM   Attending Physician Statement  I have discussed the care of Jason Contreras and I have examined

## 2020-06-15 NOTE — PLAN OF CARE
Nutrition Problem: Unintended weight loss  Intervention: Food and/or Nutrient Delivery: Continue current diet, Start ONS  Nutritional Goals: PO intakes are greater than 75% at meals
Problem: Falls - Risk of:  Goal: Will remain free from falls  Description: Will remain free from falls  6/11/2020 1556 by Tyron Tapia RN  Outcome: Ongoing     Problem: Falls - Risk of:  Goal: Absence of physical injury  Description: Absence of physical injury  6/11/2020 1556 by Tyron Tapia RN  Outcome: Ongoing     Problem: Health Behavior:  Goal: Identification of resources available to assist in meeting health care needs will improve  Description: Identification of resources available to assist in meeting health care needs will improve  Outcome: Ongoing     Problem: Safety:  Goal: Ability to remain free from injury will improve  Description: Ability to remain free from injury will improve  Outcome: Ongoing     Problem: Self-Care:  Goal: Ability to participate in self-care as condition permits will improve  Description: Ability to participate in self-care as condition permits will improve  Outcome: Ongoing       Patient needs prompting to care for self. Patient able to care for self with constant guidance. Remains free of falls. Bed alarm on. Family educated on bed alarm.
Problem: Falls - Risk of:  Goal: Will remain free from falls  Description: Will remain free from falls  6/13/2020 1623 by Genesis Mckoy RN  Outcome: Ongoing  6/13/2020 0422 by Jesusita Raphael RN  Outcome: Ongoing  Goal: Absence of physical injury  Description: Absence of physical injury  Outcome: Ongoing     Problem: Health Behavior:  Goal: Identification of resources available to assist in meeting health care needs will improve  Description: Identification of resources available to assist in meeting health care needs will improve  6/13/2020 1623 by Genesis Mckoy RN  Outcome: Ongoing  6/13/2020 0422 by Jesusita Raphael RN  Outcome: Ongoing     Problem: Safety:  Goal: Ability to remain free from injury will improve  Description: Ability to remain free from injury will improve  6/13/2020 1623 by Genesis Mckoy RN  Outcome: Ongoing  6/13/2020 0422 by Jesusita Raphael RN  Outcome: Ongoing     Problem: Self-Care:  Goal: Ability to participate in self-care as condition permits will improve  Description: Ability to participate in self-care as condition permits will improve  6/13/2020 1623 by Genesis Mckoy RN  Outcome: Ongoing  6/13/2020 0422 by Jesusita Raphael RN  Outcome: Ongoing     Problem: Nutrition  Goal: Optimal nutrition therapy  6/13/2020 1623 by Genesis Mckoy RN  Outcome: Ongoing  6/13/2020 0422 by Jesusita Raphael RN  Outcome: Ongoing     Problem: Pain:  Goal: Pain level will decrease  Description: Pain level will decrease  Outcome: Ongoing  Goal: Control of acute pain  Description: Control of acute pain  Outcome: Ongoing  Goal: Control of chronic pain  Description: Control of chronic pain  Outcome: Ongoing     Problem: Musculor/Skeletal Functional Status  Goal: Highest potential functional level  Outcome: Ongoing  Goal: Absence of falls  Outcome: Ongoing     Electronically signed by Genesis Mckoy RN on 6/13/2020 at 4:23 PM
Problem: Falls - Risk of:  Goal: Will remain free from falls  Description: Will remain free from falls  Outcome: Ongoing     Problem: Health Behavior:  Goal: Identification of resources available to assist in meeting health care needs will improve  Description: Identification of resources available to assist in meeting health care needs will improve  Outcome: Ongoing     Problem: Safety:  Goal: Ability to remain free from injury will improve  Description: Ability to remain free from injury will improve  Outcome: Ongoing     Problem: Self-Care:  Goal: Ability to participate in self-care as condition permits will improve  Description: Ability to participate in self-care as condition permits will improve  Outcome: Ongoing     Problem: Nutrition  Goal: Optimal nutrition therapy  Outcome: Ongoing
Ongoing  6/14/2020 1500 by Omi Washington RN  Outcome: Ongoing     Problem: Musculor/Skeletal Functional Status  Goal: Highest potential functional level  6/15/2020 0400 by Ibeth Adamson RN  Outcome: Ongoing  6/14/2020 1500 by Omi Washington RN  Outcome: Ongoing  Goal: Absence of falls  6/15/2020 0400 by Ibeth Adamson RN  Outcome: Ongoing  6/14/2020 1500 by Omi Washington RN  Outcome: Ongoing
RN  Outcome: Completed  6/15/2020 0400 by Bel Roth RN  Outcome: Ongoing     Problem: Musculor/Skeletal Functional Status  Goal: Highest potential functional level  6/15/2020 1550 by Ana Miller RN  Outcome: Completed  6/15/2020 0400 by Bel Roth RN  Outcome: Ongoing  Goal: Absence of falls  6/15/2020 1550 by Ana Miller RN  Outcome: Completed  6/15/2020 0400 by Bel Roth RN  Outcome: Ongoing   Electronically signed by Ana Miller RN on 6/15/2020 at 3:50 PM

## 2020-06-23 LAB
GLUCOSE BLD-MCNC: 138 MG/DL (ref 75–110)
GLUCOSE BLD-MCNC: 142 MG/DL (ref 75–110)
GLUCOSE BLD-MCNC: 150 MG/DL (ref 75–110)
GLUCOSE BLD-MCNC: 151 MG/DL (ref 75–110)
GLUCOSE BLD-MCNC: 162 MG/DL (ref 75–110)
GLUCOSE BLD-MCNC: 171 MG/DL (ref 75–110)
GLUCOSE BLD-MCNC: 176 MG/DL (ref 75–110)
GLUCOSE BLD-MCNC: 182 MG/DL (ref 75–110)
GLUCOSE BLD-MCNC: 195 MG/DL (ref 75–110)

## 2020-08-24 ENCOUNTER — HOSPITAL ENCOUNTER (OUTPATIENT)
Age: 75
Setting detail: SPECIMEN
Discharge: HOME | End: 2020-08-24
Payer: MEDICARE

## 2020-08-24 LAB
ALBUMIN SERPL-MCNC: 3.9 G/DL (ref 3.5–5.2)
ALBUMIN/GLOBULIN RATIO: 1.5 (ref 1–2.5)
ALP BLD-CCNC: 65 U/L (ref 40–129)
ALT SERPL-CCNC: 14 U/L (ref 5–41)
ANION GAP SERPL CALCULATED.3IONS-SCNC: 15 MMOL/L (ref 9–17)
AST SERPL-CCNC: 16 U/L
BILIRUB SERPL-MCNC: 0.45 MG/DL (ref 0.3–1.2)
BUN BLDV-MCNC: 21 MG/DL (ref 8–23)
BUN/CREAT BLD: ABNORMAL (ref 9–20)
CALCIUM SERPL-MCNC: 9.8 MG/DL (ref 8.6–10.4)
CHLORIDE BLD-SCNC: 101 MMOL/L (ref 98–107)
CO2: 23 MMOL/L (ref 20–31)
CREAT SERPL-MCNC: 0.71 MG/DL (ref 0.7–1.2)
GFR AFRICAN AMERICAN: >60 ML/MIN
GFR NON-AFRICAN AMERICAN: >60 ML/MIN
GFR SERPL CREATININE-BSD FRML MDRD: ABNORMAL ML/MIN/{1.73_M2}
GFR SERPL CREATININE-BSD FRML MDRD: ABNORMAL ML/MIN/{1.73_M2}
GLUCOSE BLD-MCNC: 180 MG/DL (ref 70–99)
HCT VFR BLD CALC: 45.1 % (ref 40.7–50.3)
HEMOGLOBIN: 15 G/DL (ref 13–17)
MCH RBC QN AUTO: 30.2 PG (ref 25.2–33.5)
MCHC RBC AUTO-ENTMCNC: 33.3 G/DL (ref 28.4–34.8)
MCV RBC AUTO: 90.7 FL (ref 82.6–102.9)
NRBC AUTOMATED: 0 PER 100 WBC
PDW BLD-RTO: 13.5 % (ref 11.8–14.4)
PLATELET # BLD: 246 K/UL (ref 138–453)
PMV BLD AUTO: 11.2 FL (ref 8.1–13.5)
POTASSIUM SERPL-SCNC: 4.7 MMOL/L (ref 3.7–5.3)
RBC # BLD: 4.97 M/UL (ref 4.21–5.77)
SODIUM BLD-SCNC: 139 MMOL/L (ref 135–144)
TOTAL PROTEIN: 6.5 G/DL (ref 6.4–8.3)
WBC # BLD: 9.7 K/UL (ref 3.5–11.3)

## 2020-08-24 PROCEDURE — P9603 ONE-WAY ALLOW PRORATED MILES: HCPCS

## 2020-08-24 PROCEDURE — 80053 COMPREHEN METABOLIC PANEL: CPT

## 2020-08-24 PROCEDURE — 36415 COLL VENOUS BLD VENIPUNCTURE: CPT

## 2020-08-24 PROCEDURE — 85027 COMPLETE CBC AUTOMATED: CPT

## 2020-09-15 ENCOUNTER — HOSPITAL ENCOUNTER (OUTPATIENT)
Age: 75
Setting detail: SPECIMEN
Discharge: HOME | End: 2020-09-15
Payer: MEDICARE

## 2020-09-15 LAB
ABSOLUTE EOS #: 0.12 K/UL (ref 0–0.44)
ABSOLUTE IMMATURE GRANULOCYTE: 0.03 K/UL (ref 0–0.3)
ABSOLUTE LYMPH #: 1.48 K/UL (ref 1.1–3.7)
ABSOLUTE MONO #: 0.7 K/UL (ref 0.1–1.2)
ALBUMIN SERPL-MCNC: 3.9 G/DL (ref 3.5–5.2)
ALBUMIN/GLOBULIN RATIO: 1.4 (ref 1–2.5)
ALP BLD-CCNC: 64 U/L (ref 40–129)
ALT SERPL-CCNC: 13 U/L (ref 5–41)
ANION GAP SERPL CALCULATED.3IONS-SCNC: 11 MMOL/L (ref 9–17)
AST SERPL-CCNC: 15 U/L
BASOPHILS # BLD: 1 % (ref 0–2)
BASOPHILS ABSOLUTE: 0.04 K/UL (ref 0–0.2)
BILIRUB SERPL-MCNC: 0.5 MG/DL (ref 0.3–1.2)
BUN BLDV-MCNC: 13 MG/DL (ref 8–23)
BUN/CREAT BLD: ABNORMAL (ref 9–20)
CALCIUM SERPL-MCNC: 9.5 MG/DL (ref 8.6–10.4)
CHLORIDE BLD-SCNC: 98 MMOL/L (ref 98–107)
CO2: 26 MMOL/L (ref 20–31)
CREAT SERPL-MCNC: 0.77 MG/DL (ref 0.7–1.2)
DIFFERENTIAL TYPE: ABNORMAL
EOSINOPHILS RELATIVE PERCENT: 1 % (ref 1–4)
ESTIMATED AVERAGE GLUCOSE: 166 MG/DL
GFR AFRICAN AMERICAN: >60 ML/MIN
GFR NON-AFRICAN AMERICAN: >60 ML/MIN
GFR SERPL CREATININE-BSD FRML MDRD: ABNORMAL ML/MIN/{1.73_M2}
GFR SERPL CREATININE-BSD FRML MDRD: ABNORMAL ML/MIN/{1.73_M2}
GLUCOSE BLD-MCNC: 131 MG/DL (ref 70–99)
HBA1C MFR BLD: 7.4 % (ref 4–6)
HCT VFR BLD CALC: 42.8 % (ref 40.7–50.3)
HEMOGLOBIN: 14.7 G/DL (ref 13–17)
IMMATURE GRANULOCYTES: 0 %
LYMPHOCYTES # BLD: 17 % (ref 24–43)
MCH RBC QN AUTO: 30.5 PG (ref 25.2–33.5)
MCHC RBC AUTO-ENTMCNC: 34.3 G/DL (ref 28.4–34.8)
MCV RBC AUTO: 88.8 FL (ref 82.6–102.9)
MONOCYTES # BLD: 8 % (ref 3–12)
NRBC AUTOMATED: 0 PER 100 WBC
PDW BLD-RTO: 13 % (ref 11.8–14.4)
PLATELET # BLD: 252 K/UL (ref 138–453)
PLATELET ESTIMATE: ABNORMAL
PMV BLD AUTO: 11.5 FL (ref 8.1–13.5)
POTASSIUM SERPL-SCNC: 4.4 MMOL/L (ref 3.7–5.3)
RBC # BLD: 4.82 M/UL (ref 4.21–5.77)
RBC # BLD: ABNORMAL 10*6/UL
SEG NEUTROPHILS: 73 % (ref 36–65)
SEGMENTED NEUTROPHILS ABSOLUTE COUNT: 6.3 K/UL (ref 1.5–8.1)
SODIUM BLD-SCNC: 135 MMOL/L (ref 135–144)
TOTAL PROTEIN: 6.6 G/DL (ref 6.4–8.3)
WBC # BLD: 8.7 K/UL (ref 3.5–11.3)
WBC # BLD: ABNORMAL 10*3/UL

## 2020-09-15 PROCEDURE — 85025 COMPLETE CBC W/AUTO DIFF WBC: CPT

## 2020-09-15 PROCEDURE — 83036 HEMOGLOBIN GLYCOSYLATED A1C: CPT

## 2020-09-15 PROCEDURE — 80053 COMPREHEN METABOLIC PANEL: CPT

## 2020-09-15 PROCEDURE — 36415 COLL VENOUS BLD VENIPUNCTURE: CPT

## 2020-09-15 PROCEDURE — P9603 ONE-WAY ALLOW PRORATED MILES: HCPCS

## 2020-12-13 ENCOUNTER — HOSPITAL ENCOUNTER (OUTPATIENT)
Age: 75
Setting detail: SPECIMEN
Discharge: HOME | End: 2020-12-13
Payer: MEDICARE

## 2020-12-13 LAB
ANION GAP SERPL CALCULATED.3IONS-SCNC: 10 MMOL/L (ref 9–17)
BUN BLDV-MCNC: 17 MG/DL (ref 8–23)
BUN/CREAT BLD: 20 (ref 9–20)
CALCIUM SERPL-MCNC: 9.5 MG/DL (ref 8.6–10.4)
CHLORIDE BLD-SCNC: 101 MMOL/L (ref 98–107)
CO2: 27 MMOL/L (ref 20–31)
CREAT SERPL-MCNC: 0.85 MG/DL (ref 0.7–1.2)
GFR AFRICAN AMERICAN: >60 ML/MIN
GFR NON-AFRICAN AMERICAN: >60 ML/MIN
GFR SERPL CREATININE-BSD FRML MDRD: ABNORMAL ML/MIN/{1.73_M2}
GFR SERPL CREATININE-BSD FRML MDRD: ABNORMAL ML/MIN/{1.73_M2}
GLUCOSE BLD-MCNC: 186 MG/DL (ref 70–99)
HCT VFR BLD CALC: 40.9 % (ref 40.7–50.3)
HEMOGLOBIN: 13 G/DL (ref 13–17)
MCH RBC QN AUTO: 30.9 PG (ref 25.2–33.5)
MCHC RBC AUTO-ENTMCNC: 31.8 G/DL (ref 28.4–34.8)
MCV RBC AUTO: 97.1 FL (ref 82.6–102.9)
NRBC AUTOMATED: 0 PER 100 WBC
PDW BLD-RTO: 12.8 % (ref 11.8–14.4)
PLATELET # BLD: 141 K/UL (ref 138–453)
PMV BLD AUTO: 11.2 FL (ref 8.1–13.5)
POTASSIUM SERPL-SCNC: 4.8 MMOL/L (ref 3.7–5.3)
RBC # BLD: 4.21 M/UL (ref 4.21–5.77)
SODIUM BLD-SCNC: 138 MMOL/L (ref 135–144)
WBC # BLD: 5.3 K/UL (ref 3.5–11.3)

## 2020-12-13 PROCEDURE — P9603 ONE-WAY ALLOW PRORATED MILES: HCPCS

## 2020-12-13 PROCEDURE — 80048 BASIC METABOLIC PNL TOTAL CA: CPT

## 2020-12-13 PROCEDURE — 36415 COLL VENOUS BLD VENIPUNCTURE: CPT

## 2020-12-13 PROCEDURE — 85027 COMPLETE CBC AUTOMATED: CPT

## 2021-01-29 ENCOUNTER — HOSPITAL ENCOUNTER (OUTPATIENT)
Age: 76
Setting detail: SPECIMEN
Discharge: HOME | End: 2021-01-29
Payer: MEDICARE

## 2021-01-29 LAB
ABSOLUTE EOS #: 0.09 K/UL (ref 0–0.44)
ABSOLUTE IMMATURE GRANULOCYTE: 0.05 K/UL (ref 0–0.3)
ABSOLUTE LYMPH #: 1.41 K/UL (ref 1.1–3.7)
ABSOLUTE MONO #: 0.64 K/UL (ref 0.1–1.2)
ANION GAP SERPL CALCULATED.3IONS-SCNC: 11 MMOL/L (ref 9–17)
BASOPHILS # BLD: 1 % (ref 0–2)
BASOPHILS ABSOLUTE: 0.06 K/UL (ref 0–0.2)
BUN BLDV-MCNC: 13 MG/DL (ref 8–23)
BUN/CREAT BLD: ABNORMAL (ref 9–20)
CALCIUM SERPL-MCNC: 9.1 MG/DL (ref 8.6–10.4)
CHLORIDE BLD-SCNC: 100 MMOL/L (ref 98–107)
CO2: 26 MMOL/L (ref 20–31)
CREAT SERPL-MCNC: 0.73 MG/DL (ref 0.7–1.2)
DIFFERENTIAL TYPE: ABNORMAL
EOSINOPHILS RELATIVE PERCENT: 1 % (ref 1–4)
GFR AFRICAN AMERICAN: >60 ML/MIN
GFR NON-AFRICAN AMERICAN: >60 ML/MIN
GFR SERPL CREATININE-BSD FRML MDRD: ABNORMAL ML/MIN/{1.73_M2}
GFR SERPL CREATININE-BSD FRML MDRD: ABNORMAL ML/MIN/{1.73_M2}
GLUCOSE BLD-MCNC: 217 MG/DL (ref 70–99)
HCT VFR BLD CALC: 39.6 % (ref 40.7–50.3)
HEMOGLOBIN: 12.8 G/DL (ref 13–17)
IMMATURE GRANULOCYTES: 1 %
LYMPHOCYTES # BLD: 17 % (ref 24–43)
MCH RBC QN AUTO: 30.1 PG (ref 25.2–33.5)
MCHC RBC AUTO-ENTMCNC: 32.3 G/DL (ref 28.4–34.8)
MCV RBC AUTO: 93.2 FL (ref 82.6–102.9)
MONOCYTES # BLD: 8 % (ref 3–12)
NRBC AUTOMATED: 0 PER 100 WBC
PDW BLD-RTO: 13.4 % (ref 11.8–14.4)
PLATELET # BLD: 262 K/UL (ref 138–453)
PLATELET ESTIMATE: ABNORMAL
PMV BLD AUTO: 11.7 FL (ref 8.1–13.5)
POTASSIUM SERPL-SCNC: 4 MMOL/L (ref 3.7–5.3)
RBC # BLD: 4.25 M/UL (ref 4.21–5.77)
RBC # BLD: ABNORMAL 10*6/UL
SEG NEUTROPHILS: 72 % (ref 36–65)
SEGMENTED NEUTROPHILS ABSOLUTE COUNT: 6.05 K/UL (ref 1.5–8.1)
SODIUM BLD-SCNC: 137 MMOL/L (ref 135–144)
VALPROIC ACID LEVEL: 45 UG/ML (ref 50–125)
VALPROIC DATE LAST DOSE: ABNORMAL
VALPROIC DOSE AMOUNT: ABNORMAL
VALPROIC TIME LAST DOSE: ABNORMAL
WBC # BLD: 8.3 K/UL (ref 3.5–11.3)
WBC # BLD: ABNORMAL 10*3/UL

## 2021-01-29 PROCEDURE — 80048 BASIC METABOLIC PNL TOTAL CA: CPT

## 2021-01-29 PROCEDURE — 80164 ASSAY DIPROPYLACETIC ACD TOT: CPT

## 2021-01-29 PROCEDURE — 85025 COMPLETE CBC W/AUTO DIFF WBC: CPT

## 2021-01-29 PROCEDURE — 36415 COLL VENOUS BLD VENIPUNCTURE: CPT

## 2021-01-29 PROCEDURE — P9603 ONE-WAY ALLOW PRORATED MILES: HCPCS

## 2021-02-18 PROCEDURE — 87086 URINE CULTURE/COLONY COUNT: CPT

## 2021-02-19 ENCOUNTER — HOSPITAL ENCOUNTER (OUTPATIENT)
Age: 76
Setting detail: SPECIMEN
Discharge: HOME | End: 2021-02-19
Payer: MEDICARE

## 2021-02-19 PROCEDURE — 87086 URINE CULTURE/COLONY COUNT: CPT

## 2021-02-20 LAB
CULTURE: NORMAL
Lab: NORMAL
SPECIMEN DESCRIPTION: NORMAL

## 2022-06-20 ENCOUNTER — HOSPITAL ENCOUNTER (OUTPATIENT)
Age: 77
Setting detail: SPECIMEN
Discharge: HOME | End: 2022-06-20
Payer: COMMERCIAL

## 2022-06-20 LAB
ANION GAP SERPL CALCULATED.3IONS-SCNC: 10 MMOL/L (ref 9–17)
BUN BLDV-MCNC: 25 MG/DL (ref 8–23)
BUN/CREAT BLD: 27 (ref 9–20)
CALCIUM SERPL-MCNC: 10 MG/DL (ref 8.6–10.4)
CHLORIDE BLD-SCNC: 102 MMOL/L (ref 98–107)
CO2: 28 MMOL/L (ref 20–31)
CREAT SERPL-MCNC: 0.92 MG/DL (ref 0.7–1.2)
GFR AFRICAN AMERICAN: >60 ML/MIN
GFR NON-AFRICAN AMERICAN: >60 ML/MIN
GFR SERPL CREATININE-BSD FRML MDRD: ABNORMAL ML/MIN/{1.73_M2}
GLUCOSE BLD-MCNC: 192 MG/DL (ref 70–99)
HCT VFR BLD CALC: 45.3 % (ref 40.7–50.3)
HEMOGLOBIN: 14.2 G/DL (ref 13–17)
MCH RBC QN AUTO: 29.7 PG (ref 25.2–33.5)
MCHC RBC AUTO-ENTMCNC: 31.3 G/DL (ref 28.4–34.8)
MCV RBC AUTO: 94.8 FL (ref 82.6–102.9)
NRBC AUTOMATED: 0 PER 100 WBC
PDW BLD-RTO: 13 % (ref 11.8–14.4)
PLATELET # BLD: 190 K/UL (ref 138–453)
PMV BLD AUTO: 13 FL (ref 8.1–13.5)
POTASSIUM SERPL-SCNC: 5.2 MMOL/L (ref 3.7–5.3)
RBC # BLD: 4.78 M/UL (ref 4.21–5.77)
SODIUM BLD-SCNC: 140 MMOL/L (ref 135–144)
WBC # BLD: 9.2 K/UL (ref 3.5–11.3)

## 2022-06-20 PROCEDURE — P9603 ONE-WAY ALLOW PRORATED MILES: HCPCS

## 2022-06-20 PROCEDURE — 80048 BASIC METABOLIC PNL TOTAL CA: CPT

## 2022-06-20 PROCEDURE — 36415 COLL VENOUS BLD VENIPUNCTURE: CPT

## 2022-06-20 PROCEDURE — 85027 COMPLETE CBC AUTOMATED: CPT

## 2023-06-26 NOTE — PLAN OF CARE
Nutrition Problem: Inadequate oral intake  Intervention: Food and/or Nutrient Delivery: Continue current diet, Modify current ONS(Discontinue Calorie Count)  Nutritional Goals: PO intake % of meals and supplements anticipated discharge recommendation

## (undated) DEVICE — GAUZE,SPONGE,FLUFF,6"X6.75",STRL,5/TRAY: Brand: MEDLINE

## (undated) DEVICE — ACCUDRAIN® EXTERNAL CSF DRAINAGE SYSTEM WITH ANTI-REFLUX VALVE: Brand: ACCUDRAIN®

## (undated) DEVICE — GOWN,AURORA,NONRNF,XL,30/CS: Brand: MEDLINE

## (undated) DEVICE — SVMMC CRANI PK

## (undated) DEVICE — BLADE CLP TAPR HD WET DRY CAPABILITY GTT IN CHARGING USE

## (undated) DEVICE — E-Z CLEAN, NON-STICK, PTFE COATED, ELECTROSURGICAL BLADE ELECTRODE, 2.75 INCH (7 CM): Brand: MEGADYNE

## (undated) DEVICE — PREP SOL PVP IODINE 4%  4 OZ/BTL

## (undated) DEVICE — MICRO VENTRICULAR BOLT PRESSURE MONITORING KIT: Brand: CAMINO®

## (undated) DEVICE — GAUZE,SPONGE,4"X4",16PLY,XRAY,STRL,LF: Brand: MEDLINE

## (undated) DEVICE — PATIENT RETURN ELECTRODE, SINGLE-USE, CONTACT QUALITY MONITORING, ADULT, WITH 9FT CORD, FOR PATIENTS WEIGING OVER 33LBS. (15KG): Brand: MEGADYNE

## (undated) DEVICE — GLOVE SURG SZ 65 THK91MIL LTX FREE SYN POLYISOPRENE

## (undated) DEVICE — SUTURE VCRL SZ 0 L18IN ABSRB VLT L26MM CT-2 1/2 CIR J727D

## (undated) DEVICE — COVER,MAYO STAND,STERILE: Brand: MEDLINE

## (undated) DEVICE — MARKER,SKIN,WI/RULER AND LABELS: Brand: MEDLINE

## (undated) DEVICE — 3M™ STERI-STRIP™ COMPOUND BENZOIN TINCTURE 40 BAGS/CARTON 4 CARTONS/CASE C1544: Brand: 3M™ STERI-STRIP™

## (undated) DEVICE — BUR SURG WIRE PASS DRILL ELITE 1.5MM

## (undated) DEVICE — STRIP,CLOSURE,WOUND,MEDI-STRIP,1/2X4: Brand: MEDLINE

## (undated) DEVICE — TOWEL,OR,DSP,ST,NATURAL,DLX,4/PK,20PK/CS: Brand: MEDLINE

## (undated) DEVICE — GOWN,AURORA,NONREINFORCED,LARGE: Brand: MEDLINE

## (undated) DEVICE — GLOVE ORANGE PI 7 1/2   MSG9075